# Patient Record
Sex: FEMALE | Race: BLACK OR AFRICAN AMERICAN | Employment: FULL TIME
[De-identification: names, ages, dates, MRNs, and addresses within clinical notes are randomized per-mention and may not be internally consistent; named-entity substitution may affect disease eponyms.]

---

## 2017-01-10 RX ORDER — LANCETS 33 GAUGE
EACH MISCELLANEOUS
Qty: 100 EACH | Refills: 3 | Status: SHIPPED | OUTPATIENT
Start: 2017-01-10 | End: 2017-12-28 | Stop reason: SDUPTHER

## 2017-01-13 RX ORDER — CYCLOBENZAPRINE HCL 10 MG
TABLET ORAL
Qty: 60 TABLET | Refills: 0 | Status: SHIPPED | OUTPATIENT
Start: 2017-01-13 | End: 2017-02-10 | Stop reason: SDUPTHER

## 2017-02-06 ENCOUNTER — OFFICE VISIT (OUTPATIENT)
Dept: FAMILY MEDICINE CLINIC | Facility: CLINIC | Age: 52
End: 2017-02-06

## 2017-02-06 ENCOUNTER — HOSPITAL ENCOUNTER (OUTPATIENT)
Age: 52
Setting detail: SPECIMEN
Discharge: HOME | End: 2017-02-06
Payer: MEDICARE

## 2017-02-06 VITALS
TEMPERATURE: 97.6 F | DIASTOLIC BLOOD PRESSURE: 85 MMHG | HEART RATE: 132 BPM | WEIGHT: 231 LBS | HEIGHT: 63 IN | BODY MASS INDEX: 40.93 KG/M2 | SYSTOLIC BLOOD PRESSURE: 138 MMHG

## 2017-02-06 DIAGNOSIS — R00.0 SINUS TACHYCARDIA: ICD-10-CM

## 2017-02-06 DIAGNOSIS — D50.9 IRON DEFICIENCY ANEMIA, UNSPECIFIED IRON DEFICIENCY ANEMIA TYPE: ICD-10-CM

## 2017-02-06 DIAGNOSIS — E11.65 TYPE 2 DIABETES MELLITUS WITH HYPERGLYCEMIA, WITH LONG-TERM CURRENT USE OF INSULIN (HCC): Primary | ICD-10-CM

## 2017-02-06 DIAGNOSIS — Z79.4 TYPE 2 DIABETES MELLITUS WITH HYPERGLYCEMIA, WITH LONG-TERM CURRENT USE OF INSULIN (HCC): Primary | ICD-10-CM

## 2017-02-06 DIAGNOSIS — Z00.00 HEALTH CARE MAINTENANCE: ICD-10-CM

## 2017-02-06 LAB
ABSOLUTE EOS #: 0.3 K/UL (ref 0–0.4)
ABSOLUTE LYMPH #: 3.1 K/UL (ref 1–4.8)
ABSOLUTE MONO #: 0.5 K/UL (ref 0.1–1.2)
BASOPHILS # BLD: 0 % (ref 0–2)
BASOPHILS ABSOLUTE: 0 K/UL (ref 0–0.2)
CREATININE URINE: 154.3 MG/DL (ref 28–217)
DIFFERENTIAL TYPE: NORMAL
EOSINOPHILS RELATIVE PERCENT: 3 % (ref 1–4)
HBA1C MFR BLD: 14 %
HCT VFR BLD CALC: 39.3 % (ref 36–46)
HEMOGLOBIN: 12.8 G/DL (ref 12–16)
HIV AG/AB: NONREACTIVE
LYMPHOCYTES # BLD: 33 % (ref 24–44)
MCH RBC QN AUTO: 27.6 PG (ref 26–34)
MCHC RBC AUTO-ENTMCNC: 32.6 G/DL (ref 31–37)
MCV RBC AUTO: 84.7 FL (ref 80–100)
MICROALBUMIN/CREAT 24H UR: <12 MG/L
MICROALBUMIN/CREAT UR-RTO: 8 MCG/MG CREAT
MONOCYTES # BLD: 5 % (ref 2–11)
PDW BLD-RTO: 14 % (ref 12.5–15.4)
PLATELET # BLD: 242 K/UL (ref 140–450)
PLATELET ESTIMATE: NORMAL
PMV BLD AUTO: 9.2 FL (ref 6–12)
RBC # BLD: 4.64 M/UL (ref 4–5.2)
RBC # BLD: NORMAL 10*6/UL
SEG NEUTROPHILS: 59 % (ref 36–66)
SEGMENTED NEUTROPHILS ABSOLUTE COUNT: 5.4 K/UL (ref 1.8–7.7)
WBC # BLD: 9.3 K/UL (ref 3.5–11)
WBC # BLD: NORMAL 10*3/UL

## 2017-02-06 PROCEDURE — 90715 TDAP VACCINE 7 YRS/> IM: CPT | Performed by: FAMILY MEDICINE

## 2017-02-06 PROCEDURE — 90472 IMMUNIZATION ADMIN EACH ADD: CPT | Performed by: FAMILY MEDICINE

## 2017-02-06 PROCEDURE — 99213 OFFICE O/P EST LOW 20 MIN: CPT | Performed by: FAMILY MEDICINE

## 2017-02-06 PROCEDURE — 90732 PPSV23 VACC 2 YRS+ SUBQ/IM: CPT | Performed by: FAMILY MEDICINE

## 2017-02-06 PROCEDURE — 83036 HEMOGLOBIN GLYCOSYLATED A1C: CPT | Performed by: FAMILY MEDICINE

## 2017-02-06 PROCEDURE — 90471 IMMUNIZATION ADMIN: CPT | Performed by: FAMILY MEDICINE

## 2017-02-06 RX ORDER — ZOLPIDEM TARTRATE 10 MG/1
10 TABLET ORAL NIGHTLY PRN
COMMUNITY

## 2017-02-06 ASSESSMENT — ENCOUNTER SYMPTOMS
NAUSEA: 0
COUGH: 0
SHORTNESS OF BREATH: 0
VOMITING: 0
WHEEZING: 0
ABDOMINAL PAIN: 0

## 2017-02-10 RX ORDER — PEN NEEDLE, DIABETIC 31 G X1/4"
NEEDLE, DISPOSABLE MISCELLANEOUS
Qty: 100 EACH | Refills: 9 | Status: SHIPPED | OUTPATIENT
Start: 2017-02-10 | End: 2017-03-20 | Stop reason: SDUPTHER

## 2017-02-13 RX ORDER — CYCLOBENZAPRINE HCL 10 MG
TABLET ORAL
Qty: 60 TABLET | Refills: 0 | Status: SHIPPED | OUTPATIENT
Start: 2017-02-13 | End: 2017-03-14 | Stop reason: SDUPTHER

## 2017-02-15 RX ORDER — PEN NEEDLE, DIABETIC 31 G X1/4"
NEEDLE, DISPOSABLE MISCELLANEOUS
Qty: 100 EACH | Refills: 3 | Status: SHIPPED | OUTPATIENT
Start: 2017-02-15 | End: 2017-03-20 | Stop reason: SDUPTHER

## 2017-03-13 DIAGNOSIS — Z79.4 TYPE 2 DIABETES MELLITUS WITH HYPEROSMOLARITY WITHOUT COMA, WITH LONG-TERM CURRENT USE OF INSULIN (HCC): ICD-10-CM

## 2017-03-13 DIAGNOSIS — E11.00 TYPE 2 DIABETES MELLITUS WITH HYPEROSMOLARITY WITHOUT COMA, WITH LONG-TERM CURRENT USE OF INSULIN (HCC): ICD-10-CM

## 2017-03-13 RX ORDER — GABAPENTIN 300 MG/1
CAPSULE ORAL
Qty: 90 CAPSULE | Refills: 10 | Status: SHIPPED | OUTPATIENT
Start: 2017-03-13 | End: 2018-02-23 | Stop reason: SDUPTHER

## 2017-03-14 ENCOUNTER — TELEPHONE (OUTPATIENT)
Dept: FAMILY MEDICINE CLINIC | Age: 52
End: 2017-03-14

## 2017-03-15 RX ORDER — CYCLOBENZAPRINE HCL 10 MG
TABLET ORAL
Qty: 60 TABLET | Refills: 0 | Status: SHIPPED | OUTPATIENT
Start: 2017-03-15 | End: 2017-04-12 | Stop reason: SDUPTHER

## 2017-03-20 RX ORDER — PEN NEEDLE, DIABETIC 31 G X1/4"
NEEDLE, DISPOSABLE MISCELLANEOUS
Qty: 100 EACH | Refills: 11 | Status: SHIPPED | OUTPATIENT
Start: 2017-03-20 | End: 2017-04-20 | Stop reason: SDUPTHER

## 2017-03-28 RX ORDER — AMLODIPINE BESYLATE 5 MG/1
TABLET ORAL
Qty: 30 TABLET | Refills: 5 | Status: SHIPPED | OUTPATIENT
Start: 2017-03-28 | End: 2017-10-24 | Stop reason: SDUPTHER

## 2017-04-12 RX ORDER — PEN NEEDLE, DIABETIC 31 G X1/4"
NEEDLE, DISPOSABLE MISCELLANEOUS
Qty: 100 EACH | Refills: 3 | Status: SHIPPED | OUTPATIENT
Start: 2017-04-12 | End: 2018-10-16

## 2017-04-12 RX ORDER — CYCLOBENZAPRINE HCL 10 MG
TABLET ORAL
Qty: 60 TABLET | Refills: 0 | Status: SHIPPED | OUTPATIENT
Start: 2017-04-12 | End: 2017-05-16 | Stop reason: SDUPTHER

## 2017-04-13 DIAGNOSIS — C50.012 MALIGNANT NEOPLASM OF AREOLA OF LEFT BREAST IN FEMALE (HCC): Primary | ICD-10-CM

## 2017-04-17 ENCOUNTER — OFFICE VISIT (OUTPATIENT)
Dept: FAMILY MEDICINE CLINIC | Age: 52
End: 2017-04-17
Payer: MEDICARE

## 2017-04-17 VITALS
SYSTOLIC BLOOD PRESSURE: 139 MMHG | BODY MASS INDEX: 40.79 KG/M2 | HEART RATE: 98 BPM | HEIGHT: 63 IN | TEMPERATURE: 98.2 F | DIASTOLIC BLOOD PRESSURE: 77 MMHG | WEIGHT: 230.2 LBS

## 2017-04-17 DIAGNOSIS — E11.9 TYPE 2 DIABETES MELLITUS WITHOUT COMPLICATION, WITH LONG-TERM CURRENT USE OF INSULIN (HCC): Primary | ICD-10-CM

## 2017-04-17 DIAGNOSIS — Z79.4 TYPE 2 DIABETES MELLITUS WITHOUT COMPLICATION, WITH LONG-TERM CURRENT USE OF INSULIN (HCC): Primary | ICD-10-CM

## 2017-04-17 PROCEDURE — 99213 OFFICE O/P EST LOW 20 MIN: CPT | Performed by: FAMILY MEDICINE

## 2017-04-17 ASSESSMENT — ENCOUNTER SYMPTOMS
WHEEZING: 0
COUGH: 0
SHORTNESS OF BREATH: 0
VOMITING: 0

## 2017-04-18 ENCOUNTER — HOSPITAL ENCOUNTER (OUTPATIENT)
Facility: MEDICAL CENTER | Age: 52
Discharge: HOME | End: 2017-04-18
Payer: MEDICARE

## 2017-04-18 DIAGNOSIS — C50.012 MALIGNANT NEOPLASM OF AREOLA OF LEFT BREAST IN FEMALE (HCC): ICD-10-CM

## 2017-04-18 LAB
ABSOLUTE EOS #: 0.2 K/UL (ref 0–0.4)
ABSOLUTE LYMPH #: 3.7 K/UL (ref 1–4.8)
ABSOLUTE MONO #: 0.3 K/UL (ref 0.2–0.8)
ALBUMIN SERPL-MCNC: 3.5 G/DL (ref 3.5–5.2)
ALBUMIN/GLOBULIN RATIO: ABNORMAL (ref 1–2.5)
ALP BLD-CCNC: 93 U/L (ref 35–104)
ALT SERPL-CCNC: 11 U/L (ref 5–33)
ANION GAP SERPL CALCULATED.3IONS-SCNC: 16 MMOL/L (ref 9–17)
AST SERPL-CCNC: 17 U/L
BASOPHILS # BLD: 3 % (ref 0–2)
BASOPHILS ABSOLUTE: 0.3 K/UL (ref 0–0.2)
BILIRUB SERPL-MCNC: 0.44 MG/DL (ref 0.3–1.2)
BUN BLDV-MCNC: 8 MG/DL (ref 6–20)
BUN/CREAT BLD: 11 (ref 9–20)
CALCIUM SERPL-MCNC: 9.3 MG/DL (ref 8.6–10.4)
CHLORIDE BLD-SCNC: 98 MMOL/L (ref 98–107)
CO2: 24 MMOL/L (ref 20–31)
CREAT SERPL-MCNC: 0.7 MG/DL (ref 0.5–0.9)
DIFFERENTIAL TYPE: ABNORMAL
EOSINOPHILS RELATIVE PERCENT: 2 % (ref 1–4)
FERRITIN: 703 UG/L (ref 13–150)
GFR AFRICAN AMERICAN: >60 ML/MIN
GFR NON-AFRICAN AMERICAN: >60 ML/MIN
GFR SERPL CREATININE-BSD FRML MDRD: ABNORMAL ML/MIN/{1.73_M2}
GFR SERPL CREATININE-BSD FRML MDRD: ABNORMAL ML/MIN/{1.73_M2}
GLUCOSE BLD-MCNC: 219 MG/DL (ref 70–99)
HCT VFR BLD CALC: 38.8 % (ref 36–46)
HEMOGLOBIN: 12.7 G/DL (ref 12–16)
IRON SATURATION: 46 % (ref 20–55)
IRON: 96 UG/DL (ref 37–145)
LYMPHOCYTES # BLD: 41 % (ref 24–44)
MCH RBC QN AUTO: 28.2 PG (ref 26–34)
MCHC RBC AUTO-ENTMCNC: 32.7 G/DL (ref 31–37)
MCV RBC AUTO: 86.2 FL (ref 80–100)
MONOCYTES # BLD: 4 % (ref 1–7)
PDW BLD-RTO: 13.4 % (ref 11.5–14.5)
PLATELET # BLD: 254 K/UL (ref 130–400)
PLATELET ESTIMATE: ABNORMAL
PMV BLD AUTO: ABNORMAL FL (ref 6–12)
POTASSIUM SERPL-SCNC: 4.1 MMOL/L (ref 3.7–5.3)
RBC # BLD: 4.5 M/UL (ref 4–5.2)
RBC # BLD: ABNORMAL 10*6/UL
SEG NEUTROPHILS: 50 % (ref 36–66)
SEGMENTED NEUTROPHILS ABSOLUTE COUNT: 4.5 K/UL (ref 1.8–7.7)
SODIUM BLD-SCNC: 138 MMOL/L (ref 135–144)
TOTAL IRON BINDING CAPACITY: 210 UG/DL (ref 250–450)
TOTAL PROTEIN: 7.9 G/DL (ref 6.4–8.3)
UNSATURATED IRON BINDING CAPACITY: 114 UG/DL (ref 112–347)
WBC # BLD: 9 K/UL (ref 3.5–11)
WBC # BLD: ABNORMAL 10*3/UL

## 2017-04-18 PROCEDURE — 36415 COLL VENOUS BLD VENIPUNCTURE: CPT

## 2017-04-18 PROCEDURE — 83550 IRON BINDING TEST: CPT

## 2017-04-18 PROCEDURE — 82728 ASSAY OF FERRITIN: CPT

## 2017-04-18 PROCEDURE — 83540 ASSAY OF IRON: CPT

## 2017-04-18 PROCEDURE — 85025 COMPLETE CBC W/AUTO DIFF WBC: CPT

## 2017-04-18 PROCEDURE — 80053 COMPREHEN METABOLIC PANEL: CPT

## 2017-04-24 ENCOUNTER — HOSPITAL ENCOUNTER (OUTPATIENT)
Facility: MEDICAL CENTER | Age: 52
End: 2017-04-24
Payer: MEDICARE

## 2017-04-25 ENCOUNTER — OFFICE VISIT (OUTPATIENT)
Dept: ONCOLOGY | Age: 52
End: 2017-04-25
Payer: MEDICARE

## 2017-04-25 ENCOUNTER — TELEPHONE (OUTPATIENT)
Dept: ONCOLOGY | Age: 52
End: 2017-04-25

## 2017-04-25 VITALS
BODY MASS INDEX: 42.09 KG/M2 | WEIGHT: 237.6 LBS | HEART RATE: 115 BPM | DIASTOLIC BLOOD PRESSURE: 92 MMHG | TEMPERATURE: 97.8 F | SYSTOLIC BLOOD PRESSURE: 414 MMHG | RESPIRATION RATE: 20 BRPM

## 2017-04-25 DIAGNOSIS — C50.912 MALIGNANT NEOPLASM OF LEFT FEMALE BREAST, UNSPECIFIED SITE OF BREAST: Primary | ICD-10-CM

## 2017-04-25 PROCEDURE — 99214 OFFICE O/P EST MOD 30 MIN: CPT | Performed by: INTERNAL MEDICINE

## 2017-04-25 PROCEDURE — 99211 OFF/OP EST MAY X REQ PHY/QHP: CPT

## 2017-04-25 RX ORDER — HYDROCODONE BITARTRATE AND ACETAMINOPHEN 10; 325 MG/1; MG/1
1 TABLET ORAL EVERY 6 HOURS PRN
Qty: 120 TABLET | Refills: 0 | Status: SHIPPED | OUTPATIENT
Start: 2017-04-25 | End: 2017-06-23 | Stop reason: ALTCHOICE

## 2017-05-17 RX ORDER — LISINOPRIL 20 MG/1
TABLET ORAL
Qty: 30 TABLET | Refills: 11 | Status: SHIPPED | OUTPATIENT
Start: 2017-05-17 | End: 2017-06-13 | Stop reason: SDUPTHER

## 2017-05-17 RX ORDER — ASPIRIN 81 MG
TABLET, DELAYED RELEASE (ENTERIC COATED) ORAL
Qty: 30 TABLET | Refills: 11 | Status: SHIPPED | OUTPATIENT
Start: 2017-05-17 | End: 2017-06-13 | Stop reason: SDUPTHER

## 2017-05-19 RX ORDER — CYCLOBENZAPRINE HCL 10 MG
TABLET ORAL
Qty: 60 TABLET | Refills: 0 | Status: SHIPPED | OUTPATIENT
Start: 2017-05-19 | End: 2019-10-17 | Stop reason: SDUPTHER

## 2017-05-19 RX ORDER — FERROUS SULFATE 325(65) MG
TABLET ORAL
Qty: 90 TABLET | Refills: 11 | Status: SHIPPED | OUTPATIENT
Start: 2017-05-19 | End: 2018-06-01 | Stop reason: SDUPTHER

## 2017-05-30 ENCOUNTER — TELEPHONE (OUTPATIENT)
Dept: INFUSION THERAPY | Facility: MEDICAL CENTER | Age: 52
End: 2017-05-30

## 2017-06-13 RX ORDER — LISINOPRIL 20 MG/1
TABLET ORAL
Qty: 30 TABLET | Refills: 11 | Status: SHIPPED | OUTPATIENT
Start: 2017-06-13 | End: 2017-08-02 | Stop reason: SDUPTHER

## 2017-06-13 RX ORDER — ASPIRIN 81 MG
TABLET, DELAYED RELEASE (ENTERIC COATED) ORAL
Qty: 30 TABLET | Refills: 11 | Status: SHIPPED | OUTPATIENT
Start: 2017-06-13 | End: 2017-08-02 | Stop reason: SDUPTHER

## 2017-06-20 DIAGNOSIS — R51.9 HEADACHE DISORDER: ICD-10-CM

## 2017-06-23 ENCOUNTER — OFFICE VISIT (OUTPATIENT)
Dept: FAMILY MEDICINE CLINIC | Age: 52
End: 2017-06-23
Payer: MEDICARE

## 2017-06-23 VITALS
HEART RATE: 86 BPM | WEIGHT: 229.4 LBS | HEIGHT: 63 IN | DIASTOLIC BLOOD PRESSURE: 82 MMHG | TEMPERATURE: 96.7 F | BODY MASS INDEX: 40.64 KG/M2 | SYSTOLIC BLOOD PRESSURE: 148 MMHG

## 2017-06-23 DIAGNOSIS — Z79.4 TYPE 2 DIABETES MELLITUS WITHOUT COMPLICATION, WITH LONG-TERM CURRENT USE OF INSULIN (HCC): Primary | ICD-10-CM

## 2017-06-23 DIAGNOSIS — E11.9 TYPE 2 DIABETES MELLITUS WITHOUT COMPLICATION, WITH LONG-TERM CURRENT USE OF INSULIN (HCC): Primary | ICD-10-CM

## 2017-06-23 LAB — HBA1C MFR BLD: 14 %

## 2017-06-23 PROCEDURE — 83036 HEMOGLOBIN GLYCOSYLATED A1C: CPT | Performed by: FAMILY MEDICINE

## 2017-06-23 PROCEDURE — 99213 OFFICE O/P EST LOW 20 MIN: CPT | Performed by: FAMILY MEDICINE

## 2017-06-23 ASSESSMENT — PATIENT HEALTH QUESTIONNAIRE - PHQ9
SUM OF ALL RESPONSES TO PHQ9 QUESTIONS 1 & 2: 0
1. LITTLE INTEREST OR PLEASURE IN DOING THINGS: 0
2. FEELING DOWN, DEPRESSED OR HOPELESS: 0
SUM OF ALL RESPONSES TO PHQ QUESTIONS 1-9: 0

## 2017-06-23 ASSESSMENT — ENCOUNTER SYMPTOMS
VOMITING: 0
COUGH: 0
SHORTNESS OF BREATH: 0
WHEEZING: 0
ABDOMINAL PAIN: 0
NAUSEA: 0

## 2017-07-17 DIAGNOSIS — E11.9 TYPE 2 DIABETES MELLITUS WITHOUT COMPLICATION, WITH LONG-TERM CURRENT USE OF INSULIN (HCC): ICD-10-CM

## 2017-07-17 DIAGNOSIS — Z79.4 TYPE 2 DIABETES MELLITUS WITH HYPEROSMOLARITY WITHOUT COMA, WITH LONG-TERM CURRENT USE OF INSULIN (HCC): ICD-10-CM

## 2017-07-17 DIAGNOSIS — Z79.4 TYPE 2 DIABETES MELLITUS WITHOUT COMPLICATION, WITH LONG-TERM CURRENT USE OF INSULIN (HCC): ICD-10-CM

## 2017-07-17 DIAGNOSIS — E11.00 TYPE 2 DIABETES MELLITUS WITH HYPEROSMOLARITY WITHOUT COMA, WITH LONG-TERM CURRENT USE OF INSULIN (HCC): ICD-10-CM

## 2017-07-17 RX ORDER — GLIPIZIDE 10 MG/1
10 TABLET ORAL 2 TIMES DAILY
Qty: 60 TABLET | Refills: 3 | Status: SHIPPED | OUTPATIENT
Start: 2017-07-17 | End: 2017-11-28 | Stop reason: SDUPTHER

## 2017-07-18 ENCOUNTER — OFFICE VISIT (OUTPATIENT)
Dept: FAMILY MEDICINE CLINIC | Age: 52
End: 2017-07-18
Payer: MEDICARE

## 2017-07-18 VITALS
TEMPERATURE: 97.3 F | SYSTOLIC BLOOD PRESSURE: 133 MMHG | WEIGHT: 225 LBS | BODY MASS INDEX: 39.87 KG/M2 | HEIGHT: 63 IN | DIASTOLIC BLOOD PRESSURE: 96 MMHG | HEART RATE: 110 BPM

## 2017-07-18 DIAGNOSIS — K61.0 PERIANAL ABSCESS: Primary | ICD-10-CM

## 2017-07-18 PROCEDURE — 99213 OFFICE O/P EST LOW 20 MIN: CPT | Performed by: FAMILY MEDICINE

## 2017-07-18 RX ORDER — DOXYCYCLINE HYCLATE 100 MG
100 TABLET ORAL 2 TIMES DAILY
Qty: 14 TABLET | Refills: 0 | Status: SHIPPED | OUTPATIENT
Start: 2017-07-18 | End: 2017-07-25

## 2017-07-18 ASSESSMENT — ENCOUNTER SYMPTOMS
VOMITING: 0
DIARRHEA: 0
SHORTNESS OF BREATH: 0
ABDOMINAL PAIN: 0
NAUSEA: 0
COUGH: 0

## 2017-07-19 ENCOUNTER — OFFICE VISIT (OUTPATIENT)
Dept: SURGERY | Age: 52
End: 2017-07-19
Payer: MEDICARE

## 2017-07-19 VITALS
WEIGHT: 224.6 LBS | TEMPERATURE: 97.5 F | DIASTOLIC BLOOD PRESSURE: 105 MMHG | BODY MASS INDEX: 39.8 KG/M2 | HEART RATE: 113 BPM | HEIGHT: 63 IN | SYSTOLIC BLOOD PRESSURE: 146 MMHG

## 2017-07-19 DIAGNOSIS — K61.0 PERIANAL ABSCESS: Primary | ICD-10-CM

## 2017-07-19 PROCEDURE — 99203 OFFICE O/P NEW LOW 30 MIN: CPT | Performed by: SURGERY

## 2017-07-20 ENCOUNTER — TELEPHONE (OUTPATIENT)
Dept: FAMILY MEDICINE CLINIC | Age: 52
End: 2017-07-20

## 2017-07-20 ENCOUNTER — TELEPHONE (OUTPATIENT)
Dept: SURGERY | Age: 52
End: 2017-07-20

## 2017-07-21 ENCOUNTER — HOSPITAL ENCOUNTER (OUTPATIENT)
Age: 52
Setting detail: OUTPATIENT SURGERY
Discharge: HOME | End: 2017-07-21
Attending: SURGERY | Admitting: SURGERY
Payer: MEDICARE

## 2017-07-21 ENCOUNTER — ANESTHESIA EVENT (OUTPATIENT)
Dept: OPERATING ROOM | Age: 52
End: 2017-07-21
Payer: MEDICARE

## 2017-07-21 ENCOUNTER — ANESTHESIA (OUTPATIENT)
Dept: OPERATING ROOM | Age: 52
End: 2017-07-21
Payer: MEDICARE

## 2017-07-21 VITALS
DIASTOLIC BLOOD PRESSURE: 79 MMHG | RESPIRATION RATE: 22 BRPM | OXYGEN SATURATION: 98 % | TEMPERATURE: 97.2 F | HEIGHT: 63 IN | HEART RATE: 90 BPM | SYSTOLIC BLOOD PRESSURE: 153 MMHG | BODY MASS INDEX: 39.69 KG/M2 | WEIGHT: 224 LBS

## 2017-07-21 VITALS — OXYGEN SATURATION: 98 % | SYSTOLIC BLOOD PRESSURE: 105 MMHG | DIASTOLIC BLOOD PRESSURE: 62 MMHG

## 2017-07-21 LAB
GFR NON-AFRICAN AMERICAN: >60 ML/MIN
GFR SERPL CREATININE-BSD FRML MDRD: >60 ML/MIN
GFR SERPL CREATININE-BSD FRML MDRD: ABNORMAL ML/MIN/{1.73_M2}
GLUCOSE BLD-MCNC: 197 MG/DL (ref 74–100)
POC CREATININE: 0.44 MG/DL (ref 0.51–1.19)

## 2017-07-21 PROCEDURE — 6370000000 HC RX 637 (ALT 250 FOR IP): Performed by: SURGERY

## 2017-07-21 PROCEDURE — 3600000004 HC SURGERY LEVEL 4 BASE: Performed by: SURGERY

## 2017-07-21 PROCEDURE — 82565 ASSAY OF CREATININE: CPT

## 2017-07-21 PROCEDURE — 86403 PARTICLE AGGLUT ANTBDY SCRN: CPT

## 2017-07-21 PROCEDURE — A6222 GAUZE <=16 IN NO W/SAL W/O B: HCPCS | Performed by: SURGERY

## 2017-07-21 PROCEDURE — 6360000002 HC RX W HCPCS: Performed by: ANESTHESIOLOGY

## 2017-07-21 PROCEDURE — 2500000003 HC RX 250 WO HCPCS: Performed by: NURSE ANESTHETIST, CERTIFIED REGISTERED

## 2017-07-21 PROCEDURE — 87205 SMEAR GRAM STAIN: CPT

## 2017-07-21 PROCEDURE — 7100000011 HC PHASE II RECOVERY - ADDTL 15 MIN: Performed by: SURGERY

## 2017-07-21 PROCEDURE — 82947 ASSAY GLUCOSE BLOOD QUANT: CPT

## 2017-07-21 PROCEDURE — 3600000014 HC SURGERY LEVEL 4 ADDTL 15MIN: Performed by: SURGERY

## 2017-07-21 PROCEDURE — 2580000003 HC RX 258: Performed by: SURGERY

## 2017-07-21 PROCEDURE — 6360000002 HC RX W HCPCS: Performed by: NURSE ANESTHETIST, CERTIFIED REGISTERED

## 2017-07-21 PROCEDURE — 6370000000 HC RX 637 (ALT 250 FOR IP): Performed by: ANESTHESIOLOGY

## 2017-07-21 PROCEDURE — 87075 CULTR BACTERIA EXCEPT BLOOD: CPT

## 2017-07-21 PROCEDURE — 87070 CULTURE OTHR SPECIMN AEROBIC: CPT

## 2017-07-21 PROCEDURE — 3700000000 HC ANESTHESIA ATTENDED CARE: Performed by: SURGERY

## 2017-07-21 PROCEDURE — 7100000010 HC PHASE II RECOVERY - FIRST 15 MIN: Performed by: SURGERY

## 2017-07-21 PROCEDURE — 2500000003 HC RX 250 WO HCPCS: Performed by: SURGERY

## 2017-07-21 PROCEDURE — 3700000001 HC ADD 15 MINUTES (ANESTHESIA): Performed by: SURGERY

## 2017-07-21 RX ORDER — FENTANYL CITRATE 50 UG/ML
25 INJECTION, SOLUTION INTRAMUSCULAR; INTRAVENOUS EVERY 5 MIN PRN
Status: DISCONTINUED | OUTPATIENT
Start: 2017-07-21 | End: 2017-07-21 | Stop reason: HOSPADM

## 2017-07-21 RX ORDER — OXYCODONE HYDROCHLORIDE AND ACETAMINOPHEN 5; 325 MG/1; MG/1
1 TABLET ORAL EVERY 6 HOURS PRN
Qty: 10 TABLET | Refills: 0 | Status: SHIPPED | OUTPATIENT
Start: 2017-07-21 | End: 2017-07-28

## 2017-07-21 RX ORDER — OXYCODONE HYDROCHLORIDE AND ACETAMINOPHEN 5; 325 MG/1; MG/1
1 TABLET ORAL EVERY 4 HOURS PRN
Status: DISCONTINUED | OUTPATIENT
Start: 2017-07-21 | End: 2017-07-21 | Stop reason: HOSPADM

## 2017-07-21 RX ORDER — ULTRASOUND COUPLING MEDIUM
GEL (GRAM) TOPICAL PRN
Status: DISCONTINUED | OUTPATIENT
Start: 2017-07-21 | End: 2017-07-21 | Stop reason: HOSPADM

## 2017-07-21 RX ORDER — MAGNESIUM HYDROXIDE 1200 MG/15ML
LIQUID ORAL CONTINUOUS PRN
Status: DISCONTINUED | OUTPATIENT
Start: 2017-07-21 | End: 2017-07-21 | Stop reason: HOSPADM

## 2017-07-21 RX ORDER — FENTANYL CITRATE 50 UG/ML
50 INJECTION, SOLUTION INTRAMUSCULAR; INTRAVENOUS EVERY 5 MIN PRN
Status: DISCONTINUED | OUTPATIENT
Start: 2017-07-21 | End: 2017-07-21 | Stop reason: HOSPADM

## 2017-07-21 RX ORDER — LIDOCAINE HYDROCHLORIDE 10 MG/ML
INJECTION, SOLUTION EPIDURAL; INFILTRATION; INTRACAUDAL; PERINEURAL PRN
Status: DISCONTINUED | OUTPATIENT
Start: 2017-07-21 | End: 2017-07-21 | Stop reason: HOSPADM

## 2017-07-21 RX ORDER — ONDANSETRON 2 MG/ML
4 INJECTION INTRAMUSCULAR; INTRAVENOUS
Status: DISCONTINUED | OUTPATIENT
Start: 2017-07-21 | End: 2017-07-21 | Stop reason: HOSPADM

## 2017-07-21 RX ORDER — NYSTATIN 100000 U/G
CREAM TOPICAL
Qty: 1 TUBE | Refills: 0 | Status: SHIPPED | OUTPATIENT
Start: 2017-07-21 | End: 2020-10-13 | Stop reason: ALTCHOICE

## 2017-07-21 RX ORDER — HYDRALAZINE HYDROCHLORIDE 20 MG/ML
5 INJECTION INTRAMUSCULAR; INTRAVENOUS EVERY 10 MIN PRN
Status: DISCONTINUED | OUTPATIENT
Start: 2017-07-21 | End: 2017-07-21 | Stop reason: HOSPADM

## 2017-07-21 RX ORDER — PROPOFOL 10 MG/ML
INJECTION, EMULSION INTRAVENOUS PRN
Status: DISCONTINUED | OUTPATIENT
Start: 2017-07-21 | End: 2017-07-21 | Stop reason: SDUPTHER

## 2017-07-21 RX ORDER — PROPOFOL 10 MG/ML
INJECTION, EMULSION INTRAVENOUS CONTINUOUS PRN
Status: DISCONTINUED | OUTPATIENT
Start: 2017-07-21 | End: 2017-07-21 | Stop reason: SDUPTHER

## 2017-07-21 RX ORDER — BUPIVACAINE HYDROCHLORIDE 5 MG/ML
INJECTION, SOLUTION EPIDURAL; INTRACAUDAL PRN
Status: DISCONTINUED | OUTPATIENT
Start: 2017-07-21 | End: 2017-07-21 | Stop reason: HOSPADM

## 2017-07-21 RX ORDER — LIDOCAINE HYDROCHLORIDE 10 MG/ML
INJECTION, SOLUTION EPIDURAL; INFILTRATION; INTRACAUDAL; PERINEURAL PRN
Status: DISCONTINUED | OUTPATIENT
Start: 2017-07-21 | End: 2017-07-21 | Stop reason: SDUPTHER

## 2017-07-21 RX ORDER — MIDAZOLAM HYDROCHLORIDE 1 MG/ML
INJECTION INTRAMUSCULAR; INTRAVENOUS PRN
Status: DISCONTINUED | OUTPATIENT
Start: 2017-07-21 | End: 2017-07-21 | Stop reason: SDUPTHER

## 2017-07-21 RX ORDER — GAUZE BANDAGE 4" X 4"
1 BANDAGE TOPICAL DAILY
Qty: 2 EACH | Refills: 0 | Status: SHIPPED | OUTPATIENT
Start: 2017-07-21 | End: 2020-10-13 | Stop reason: ALTCHOICE

## 2017-07-21 RX ORDER — SODIUM CHLORIDE, SODIUM LACTATE, POTASSIUM CHLORIDE, CALCIUM CHLORIDE 600; 310; 30; 20 MG/100ML; MG/100ML; MG/100ML; MG/100ML
INJECTION, SOLUTION INTRAVENOUS CONTINUOUS
Status: DISCONTINUED | OUTPATIENT
Start: 2017-07-21 | End: 2017-07-21 | Stop reason: HOSPADM

## 2017-07-21 RX ORDER — CEFAZOLIN SODIUM 1 G/3ML
INJECTION, POWDER, FOR SOLUTION INTRAMUSCULAR; INTRAVENOUS PRN
Status: DISCONTINUED | OUTPATIENT
Start: 2017-07-21 | End: 2017-07-21 | Stop reason: SDUPTHER

## 2017-07-21 RX ORDER — TOPIRAMATE 25 MG/1
25 TABLET ORAL 2 TIMES DAILY
Status: DISCONTINUED | OUTPATIENT
Start: 2017-07-21 | End: 2017-07-21

## 2017-07-21 RX ORDER — LABETALOL HYDROCHLORIDE 5 MG/ML
5 INJECTION, SOLUTION INTRAVENOUS EVERY 10 MIN PRN
Status: DISCONTINUED | OUTPATIENT
Start: 2017-07-21 | End: 2017-07-21 | Stop reason: HOSPADM

## 2017-07-21 RX ORDER — FENTANYL CITRATE 50 UG/ML
INJECTION, SOLUTION INTRAMUSCULAR; INTRAVENOUS PRN
Status: DISCONTINUED | OUTPATIENT
Start: 2017-07-21 | End: 2017-07-21 | Stop reason: SDUPTHER

## 2017-07-21 RX ORDER — DIPHENHYDRAMINE HYDROCHLORIDE 50 MG/ML
12.5 INJECTION INTRAMUSCULAR; INTRAVENOUS
Status: DISCONTINUED | OUTPATIENT
Start: 2017-07-21 | End: 2017-07-21 | Stop reason: HOSPADM

## 2017-07-21 RX ADMIN — TOPIRAMATE 200 MG: 200 TABLET, FILM COATED ORAL at 11:21

## 2017-07-21 RX ADMIN — MIDAZOLAM HYDROCHLORIDE 2 MG: 1 INJECTION, SOLUTION INTRAMUSCULAR; INTRAVENOUS at 12:36

## 2017-07-21 RX ADMIN — OXYCODONE HYDROCHLORIDE AND ACETAMINOPHEN 1 TABLET: 5; 325 TABLET ORAL at 13:50

## 2017-07-21 RX ADMIN — LIDOCAINE HYDROCHLORIDE 50 MG: 10 INJECTION, SOLUTION EPIDURAL; INFILTRATION; INTRACAUDAL; PERINEURAL at 12:39

## 2017-07-21 RX ADMIN — FENTANYL CITRATE 50 MCG: 50 INJECTION, SOLUTION INTRAMUSCULAR; INTRAVENOUS at 13:27

## 2017-07-21 RX ADMIN — PROPOFOL 120 MCG/KG/MIN: 10 INJECTION, EMULSION INTRAVENOUS at 12:39

## 2017-07-21 RX ADMIN — SODIUM CHLORIDE, POTASSIUM CHLORIDE, SODIUM LACTATE AND CALCIUM CHLORIDE: 600; 310; 30; 20 INJECTION, SOLUTION INTRAVENOUS at 10:41

## 2017-07-21 RX ADMIN — FENTANYL CITRATE 50 MCG: 50 INJECTION INTRAMUSCULAR; INTRAVENOUS at 12:57

## 2017-07-21 RX ADMIN — FENTANYL CITRATE 50 MCG: 50 INJECTION, SOLUTION INTRAMUSCULAR; INTRAVENOUS at 13:44

## 2017-07-21 RX ADMIN — FENTANYL CITRATE 50 MCG: 50 INJECTION INTRAMUSCULAR; INTRAVENOUS at 12:38

## 2017-07-21 RX ADMIN — PROPOFOL 80 MG: 10 INJECTION, EMULSION INTRAVENOUS at 12:39

## 2017-07-21 RX ADMIN — CEFAZOLIN 2000 MG: 330 INJECTION, POWDER, FOR SOLUTION INTRAMUSCULAR; INTRAVENOUS at 12:42

## 2017-07-21 ASSESSMENT — PAIN SCALES - GENERAL
PAINLEVEL_OUTOF10: 8
PAINLEVEL_OUTOF10: 8
PAINLEVEL_OUTOF10: 9
PAINLEVEL_OUTOF10: 6
PAINLEVEL_OUTOF10: 8
PAINLEVEL_OUTOF10: 0
PAINLEVEL_OUTOF10: 0
PAINLEVEL_OUTOF10: 8

## 2017-07-21 ASSESSMENT — PAIN - FUNCTIONAL ASSESSMENT: PAIN_FUNCTIONAL_ASSESSMENT: 0-10

## 2017-07-26 LAB
CULTURE: ABNORMAL
DIRECT EXAM: ABNORMAL
DIRECT EXAM: ABNORMAL
Lab: ABNORMAL
SPECIMEN DESCRIPTION: ABNORMAL
STATUS: ABNORMAL

## 2017-08-02 ENCOUNTER — OFFICE VISIT (OUTPATIENT)
Dept: SURGERY | Age: 52
End: 2017-08-02
Payer: MEDICARE

## 2017-08-02 VITALS — HEART RATE: 104 BPM | SYSTOLIC BLOOD PRESSURE: 175 MMHG | DIASTOLIC BLOOD PRESSURE: 101 MMHG | TEMPERATURE: 97.8 F

## 2017-08-02 DIAGNOSIS — Z98.890 POST-OPERATIVE STATE: Primary | ICD-10-CM

## 2017-08-02 PROCEDURE — 99024 POSTOP FOLLOW-UP VISIT: CPT | Performed by: STUDENT IN AN ORGANIZED HEALTH CARE EDUCATION/TRAINING PROGRAM

## 2017-08-02 RX ORDER — LISINOPRIL 20 MG/1
TABLET ORAL
Qty: 30 TABLET | Refills: 11 | Status: SHIPPED | OUTPATIENT
Start: 2017-08-02 | End: 2018-03-20 | Stop reason: SINTOL

## 2017-08-02 RX ORDER — ASPIRIN 81 MG/1
TABLET ORAL
Qty: 30 TABLET | Refills: 11 | Status: SHIPPED | OUTPATIENT
Start: 2017-08-02 | End: 2018-06-25 | Stop reason: SDUPTHER

## 2017-08-09 ENCOUNTER — TELEPHONE (OUTPATIENT)
Dept: FAMILY MEDICINE CLINIC | Age: 52
End: 2017-08-09

## 2017-08-25 ENCOUNTER — TELEPHONE (OUTPATIENT)
Dept: ADMINISTRATIVE | Age: 52
End: 2017-08-25

## 2017-08-28 ENCOUNTER — TELEPHONE (OUTPATIENT)
Dept: FAMILY MEDICINE CLINIC | Age: 52
End: 2017-08-28

## 2017-09-11 ENCOUNTER — OFFICE VISIT (OUTPATIENT)
Dept: FAMILY MEDICINE CLINIC | Age: 52
End: 2017-09-11
Payer: MEDICARE

## 2017-09-11 VITALS
WEIGHT: 225 LBS | HEART RATE: 102 BPM | DIASTOLIC BLOOD PRESSURE: 96 MMHG | SYSTOLIC BLOOD PRESSURE: 142 MMHG | BODY MASS INDEX: 39.87 KG/M2 | TEMPERATURE: 97.2 F | HEIGHT: 63 IN

## 2017-09-11 DIAGNOSIS — G89.29 CHRONIC KNEE PAIN, UNSPECIFIED LATERALITY: ICD-10-CM

## 2017-09-11 DIAGNOSIS — M25.569 CHRONIC KNEE PAIN, UNSPECIFIED LATERALITY: ICD-10-CM

## 2017-09-11 DIAGNOSIS — E11.9 TYPE 2 DIABETES MELLITUS WITHOUT COMPLICATION, WITH LONG-TERM CURRENT USE OF INSULIN (HCC): Primary | ICD-10-CM

## 2017-09-11 DIAGNOSIS — N89.8 VAGINAL DISCHARGE: ICD-10-CM

## 2017-09-11 DIAGNOSIS — Z79.4 TYPE 2 DIABETES MELLITUS WITHOUT COMPLICATION, WITH LONG-TERM CURRENT USE OF INSULIN (HCC): Primary | ICD-10-CM

## 2017-09-11 LAB — HBA1C MFR BLD: 14 %

## 2017-09-11 PROCEDURE — 83036 HEMOGLOBIN GLYCOSYLATED A1C: CPT | Performed by: FAMILY MEDICINE

## 2017-09-11 PROCEDURE — 99213 OFFICE O/P EST LOW 20 MIN: CPT | Performed by: FAMILY MEDICINE

## 2017-09-11 RX ORDER — FLUCONAZOLE 150 MG/1
150 TABLET ORAL ONCE
Qty: 1 TABLET | Refills: 0 | Status: SHIPPED | OUTPATIENT
Start: 2017-09-11 | End: 2017-09-11

## 2017-09-11 RX ORDER — IBUPROFEN 600 MG/1
600 TABLET ORAL EVERY 6 HOURS PRN
Qty: 60 TABLET | Refills: 1 | Status: SHIPPED | OUTPATIENT
Start: 2017-09-11 | End: 2022-10-11

## 2017-09-11 RX ORDER — METRONIDAZOLE 500 MG/1
500 TABLET ORAL 3 TIMES DAILY
Qty: 21 TABLET | Refills: 0 | Status: SHIPPED | OUTPATIENT
Start: 2017-09-11 | End: 2017-09-18

## 2017-09-11 ASSESSMENT — ENCOUNTER SYMPTOMS
COUGH: 0
ABDOMINAL PAIN: 0
VOMITING: 0
NAUSEA: 0
SHORTNESS OF BREATH: 0
WHEEZING: 0

## 2017-10-10 ENCOUNTER — NURSE ONLY (OUTPATIENT)
Dept: ONCOLOGY | Age: 52
End: 2017-10-10

## 2017-10-10 ENCOUNTER — HOSPITAL ENCOUNTER (OUTPATIENT)
Facility: MEDICAL CENTER | Age: 52
Discharge: HOME | End: 2017-10-10
Payer: MEDICARE

## 2017-10-10 DIAGNOSIS — C50.912 MALIGNANT NEOPLASM OF LEFT FEMALE BREAST, UNSPECIFIED SITE OF BREAST: ICD-10-CM

## 2017-10-10 DIAGNOSIS — D50.9 IRON DEFICIENCY ANEMIA, UNSPECIFIED IRON DEFICIENCY ANEMIA TYPE: ICD-10-CM

## 2017-10-10 DIAGNOSIS — C50.912 MALIGNANT NEOPLASM OF LEFT FEMALE BREAST, UNSPECIFIED SITE OF BREAST: Primary | ICD-10-CM

## 2017-10-10 LAB
ABSOLUTE EOS #: 0.3 K/UL (ref 0–0.4)
ABSOLUTE LYMPH #: 3.7 K/UL (ref 1–4.8)
ABSOLUTE MONO #: 0.5 K/UL (ref 0.2–0.8)
ALBUMIN SERPL-MCNC: 4.2 G/DL (ref 3.5–5.2)
ALBUMIN/GLOBULIN RATIO: ABNORMAL (ref 1–2.5)
ALP BLD-CCNC: 82 U/L (ref 35–104)
ALT SERPL-CCNC: 12 U/L (ref 5–33)
ANION GAP SERPL CALCULATED.3IONS-SCNC: 14 MMOL/L (ref 9–17)
AST SERPL-CCNC: 14 U/L
BASOPHILS # BLD: 1 %
BASOPHILS ABSOLUTE: 0.1 K/UL (ref 0–0.2)
BILIRUB SERPL-MCNC: 0.31 MG/DL (ref 0.3–1.2)
BUN BLDV-MCNC: 9 MG/DL (ref 6–20)
BUN/CREAT BLD: 10 (ref 9–20)
CALCIUM SERPL-MCNC: 9.2 MG/DL (ref 8.6–10.4)
CHLORIDE BLD-SCNC: 102 MMOL/L (ref 98–107)
CO2: 24 MMOL/L (ref 20–31)
CREAT SERPL-MCNC: 0.92 MG/DL (ref 0.5–0.9)
DIFFERENTIAL TYPE: NORMAL
EOSINOPHILS RELATIVE PERCENT: 3 %
FERRITIN: 625 UG/L (ref 13–150)
GFR AFRICAN AMERICAN: >60 ML/MIN
GFR NON-AFRICAN AMERICAN: >60 ML/MIN
GFR SERPL CREATININE-BSD FRML MDRD: ABNORMAL ML/MIN/{1.73_M2}
GFR SERPL CREATININE-BSD FRML MDRD: ABNORMAL ML/MIN/{1.73_M2}
GLUCOSE BLD-MCNC: 101 MG/DL (ref 70–99)
HCT VFR BLD CALC: 38.5 % (ref 36–46)
HEMOGLOBIN: 12.6 G/DL (ref 12–16)
IRON SATURATION: 36 % (ref 20–55)
IRON: 76 UG/DL (ref 37–145)
LYMPHOCYTES # BLD: 35 %
MCH RBC QN AUTO: 27.9 PG (ref 26–34)
MCHC RBC AUTO-ENTMCNC: 32.8 G/DL (ref 31–37)
MCV RBC AUTO: 85.1 FL (ref 80–100)
MONOCYTES # BLD: 5 %
PDW BLD-RTO: 13.2 % (ref 11.5–14.5)
PLATELET # BLD: 312 K/UL (ref 130–400)
PLATELET ESTIMATE: NORMAL
PMV BLD AUTO: NORMAL FL (ref 6–12)
POTASSIUM SERPL-SCNC: 4.3 MMOL/L (ref 3.7–5.3)
RBC # BLD: 4.52 M/UL (ref 4–5.2)
RBC # BLD: NORMAL 10*6/UL
SEG NEUTROPHILS: 56 %
SEGMENTED NEUTROPHILS ABSOLUTE COUNT: 6.1 K/UL (ref 1.8–7.7)
SODIUM BLD-SCNC: 140 MMOL/L (ref 135–144)
TOTAL IRON BINDING CAPACITY: 210 UG/DL (ref 250–450)
TOTAL PROTEIN: 8.1 G/DL (ref 6.4–8.3)
UNSATURATED IRON BINDING CAPACITY: 134 UG/DL (ref 112–347)
WBC # BLD: 10.7 K/UL (ref 3.5–11)
WBC # BLD: NORMAL 10*3/UL

## 2017-10-10 PROCEDURE — 82728 ASSAY OF FERRITIN: CPT

## 2017-10-10 PROCEDURE — 36415 COLL VENOUS BLD VENIPUNCTURE: CPT

## 2017-10-10 PROCEDURE — 83550 IRON BINDING TEST: CPT

## 2017-10-10 PROCEDURE — 80053 COMPREHEN METABOLIC PANEL: CPT

## 2017-10-10 PROCEDURE — 83540 ASSAY OF IRON: CPT

## 2017-10-10 PROCEDURE — 85025 COMPLETE CBC W/AUTO DIFF WBC: CPT

## 2017-10-11 DIAGNOSIS — C50.012 MALIGNANT NEOPLASM OF NIPPLE OF LEFT BREAST IN FEMALE (HCC): Primary | ICD-10-CM

## 2017-10-12 NOTE — TELEPHONE ENCOUNTER
Medication is on med list please review and address    Please address the medication refill and close the encounter. If I can be of assistance, please route to the applicable pool. Thank you. Next Visit Date:  Future Appointments  Date Time Provider Kota Sanz   10/17/2017 1:40 PM Malachi Arroyo MD [facility]   10/19/2017 9:00 AM Juli Beyer MD [address] [place] [address] Maintenance   Topic Date Due    Colon Cancer Screen FIT/FOBT  05/25/2017    Lipid screen  08/16/2017    Flu vaccine (1) 09/01/2017    Diabetic retinal exam  10/24/2017 (Originally 6/1/2016)    Breast cancer screen  06/30/2018 (Originally 7/17/2013)    Diabetic hemoglobin A1C test  12/11/2017    Diabetic foot exam  02/06/2018    Diabetic microalbuminuria test  02/06/2018    Cervical cancer screen  03/18/2018    DTaP/Tdap/Td vaccine (2 - Td) 02/06/2027    Pneumococcal med risk  Completed    HIV screen  Completed       Hemoglobin A1C (%)   Date Value   09/11/2017 14.0   06/23/2017 14   02/06/2017 14             ( goal A1C is < 7)   Microalb/Crt.  Ratio (mcg/mg creat)   Date Value   02/06/2017 8     LDL Cholesterol (mg/dL)   Date Value   08/16/2016 59       (goal LDL is <100)   AST (U/L)   Date Value   10/10/2017 14     ALT (U/L)   Date Value   10/10/2017 12     BUN (mg/dL)   Date Value   10/10/2017 9     BP Readings from Last 3 Encounters:   09/11/17 (!) 142/96   08/02/17 (!) 175/101   07/21/17 (!) 153/79          (goal 120/80)    All Future Testing planned in [facility]  Lab Frequency Next Occurrence   HIV Screen Once 02/06/2017   CBC With Auto Differential Once 02/06/2017   Comprehensive Metabolic Panel     CBC Auto Differential     Ferritin                 Patient Active Problem List:     Cancer ([facility] [address]     Type II or unspecified type diabetes mellitus without mention of complication, not stated as uncontrolled     Pain, knee     Depression     Hypertension     DJD (degenerative joint disease) of knee

## 2017-10-16 ENCOUNTER — HOSPITAL ENCOUNTER (OUTPATIENT)
Facility: MEDICAL CENTER | Age: 52
End: 2017-10-16
Payer: MEDICARE

## 2017-10-17 ENCOUNTER — TELEPHONE (OUTPATIENT)
Dept: ONCOLOGY | Age: 52
End: 2017-10-17

## 2017-10-17 ENCOUNTER — OFFICE VISIT (OUTPATIENT)
Dept: ONCOLOGY | Age: 52
End: 2017-10-17
Payer: MEDICARE

## 2017-10-17 VITALS
HEART RATE: 125 BPM | RESPIRATION RATE: 20 BRPM | SYSTOLIC BLOOD PRESSURE: 153 MMHG | TEMPERATURE: 98.4 F | WEIGHT: 235.4 LBS | DIASTOLIC BLOOD PRESSURE: 96 MMHG | BODY MASS INDEX: 41.71 KG/M2

## 2017-10-17 DIAGNOSIS — C50.012 MALIGNANT NEOPLASM OF NIPPLE OF LEFT BREAST IN FEMALE, ESTROGEN RECEPTOR POSITIVE (HCC): Primary | ICD-10-CM

## 2017-10-17 DIAGNOSIS — Z17.0 MALIGNANT NEOPLASM OF NIPPLE OF LEFT BREAST IN FEMALE, ESTROGEN RECEPTOR POSITIVE (HCC): Primary | ICD-10-CM

## 2017-10-17 PROCEDURE — G8427 DOCREV CUR MEDS BY ELIG CLIN: HCPCS | Performed by: INTERNAL MEDICINE

## 2017-10-17 PROCEDURE — 3014F SCREEN MAMMO DOC REV: CPT | Performed by: INTERNAL MEDICINE

## 2017-10-17 PROCEDURE — G8484 FLU IMMUNIZE NO ADMIN: HCPCS | Performed by: INTERNAL MEDICINE

## 2017-10-17 PROCEDURE — 99214 OFFICE O/P EST MOD 30 MIN: CPT | Performed by: INTERNAL MEDICINE

## 2017-10-17 PROCEDURE — 99211 OFF/OP EST MAY X REQ PHY/QHP: CPT

## 2017-10-17 PROCEDURE — G8417 CALC BMI ABV UP PARAM F/U: HCPCS | Performed by: INTERNAL MEDICINE

## 2017-10-17 PROCEDURE — 3017F COLORECTAL CA SCREEN DOC REV: CPT | Performed by: INTERNAL MEDICINE

## 2017-10-17 PROCEDURE — 1036F TOBACCO NON-USER: CPT | Performed by: INTERNAL MEDICINE

## 2017-10-17 RX ORDER — AMLODIPINE BESYLATE 5 MG/1
TABLET ORAL
Qty: 30 TABLET | Refills: 5 | Status: CANCELLED | OUTPATIENT
Start: 2017-10-17

## 2017-10-19 ENCOUNTER — OFFICE VISIT (OUTPATIENT)
Dept: FAMILY MEDICINE CLINIC | Age: 52
End: 2017-10-19
Payer: MEDICARE

## 2017-10-19 VITALS
TEMPERATURE: 96.5 F | HEIGHT: 63 IN | BODY MASS INDEX: 41.99 KG/M2 | HEART RATE: 117 BPM | SYSTOLIC BLOOD PRESSURE: 135 MMHG | DIASTOLIC BLOOD PRESSURE: 90 MMHG | WEIGHT: 237 LBS

## 2017-10-19 DIAGNOSIS — Z23 NEEDS FLU SHOT: ICD-10-CM

## 2017-10-19 DIAGNOSIS — Z12.11 COLON CANCER SCREENING: ICD-10-CM

## 2017-10-19 DIAGNOSIS — Z79.4 TYPE 2 DIABETES MELLITUS WITHOUT COMPLICATION, WITH LONG-TERM CURRENT USE OF INSULIN (HCC): Primary | ICD-10-CM

## 2017-10-19 DIAGNOSIS — E11.9 TYPE 2 DIABETES MELLITUS WITHOUT COMPLICATION, WITH LONG-TERM CURRENT USE OF INSULIN (HCC): Primary | ICD-10-CM

## 2017-10-19 PROCEDURE — 3014F SCREEN MAMMO DOC REV: CPT | Performed by: FAMILY MEDICINE

## 2017-10-19 PROCEDURE — 90471 IMMUNIZATION ADMIN: CPT | Performed by: HOSPITALIST

## 2017-10-19 PROCEDURE — 3017F COLORECTAL CA SCREEN DOC REV: CPT | Performed by: FAMILY MEDICINE

## 2017-10-19 PROCEDURE — 99213 OFFICE O/P EST LOW 20 MIN: CPT | Performed by: FAMILY MEDICINE

## 2017-10-19 PROCEDURE — 3046F HEMOGLOBIN A1C LEVEL >9.0%: CPT | Performed by: FAMILY MEDICINE

## 2017-10-19 PROCEDURE — 90688 IIV4 VACCINE SPLT 0.5 ML IM: CPT | Performed by: HOSPITALIST

## 2017-10-19 PROCEDURE — 1036F TOBACCO NON-USER: CPT | Performed by: FAMILY MEDICINE

## 2017-10-19 PROCEDURE — G8417 CALC BMI ABV UP PARAM F/U: HCPCS | Performed by: FAMILY MEDICINE

## 2017-10-19 PROCEDURE — G8427 DOCREV CUR MEDS BY ELIG CLIN: HCPCS | Performed by: FAMILY MEDICINE

## 2017-10-19 PROCEDURE — G8484 FLU IMMUNIZE NO ADMIN: HCPCS | Performed by: FAMILY MEDICINE

## 2017-10-19 RX ORDER — M-VIT,TX,IRON,MINS/CALC/FOLIC 27MG-0.4MG
1 TABLET ORAL DAILY
Qty: 60 TABLET | Refills: 3 | Status: SHIPPED | OUTPATIENT
Start: 2017-10-19 | End: 2018-04-24 | Stop reason: SDUPTHER

## 2017-10-19 ASSESSMENT — ENCOUNTER SYMPTOMS
NAUSEA: 0
BACK PAIN: 0
WHEEZING: 0
VOMITING: 0
ABDOMINAL PAIN: 0
SHORTNESS OF BREATH: 0

## 2017-10-19 NOTE — PATIENT INSTRUCTIONS
Thank you for letting us take care of you today. We hope all your questions were addressed. If a question was overlooked or something else comes to mind after you return home, please contact a member of your Care Team listed below. Please make sure you have a routine office visit set up to follow-up on [address]. Your Care Team at [place] [address] is Team #1  Vero Mooney MD (Faculty)  Sariah Griffin MD (Faculty  Veejacqui Meier MD (Resident)  Davion Cormier MD (Resident)  Anam Lomax MD (Resident)  Yogesh Cano MD (Resident)  Quiana Mcpherson MD (Resident)  Luisito Lau [facility]  Sammi Tucker JOSE LUIS MIGUEL, [facility], ([address] [facility])  DALTON Jones, ([address] [facility])  Genoveva Payton, Ph.D., ([address] [facility])  Gretchen Vaca, [facility] (Clinical Pharmacist)     Office phone number: [phone]    If you need to get in right away due to illness, please be advised we have \"Same Day\" appointments available Monday-Friday. Please call us at [phone] option #1 to schedule your \"Same Day\" appointment.

## 2017-10-19 NOTE — PROGRESS NOTES
optimal glycemic control and provided more logs to continue to monitor serum glucose levels. Insulin regimen with Lantus increased to 70 units BID, she is following a diabetic diet, and planning follow up in 2 months. A referral to endocrinology is still pending. Health maintenance reviewed, diabetic eye exam is planned tomorrow, flu vaccine given and FIT test ordered. Return in about 2 months (around 12/19/2017) for DM type 2.    (Trina Rao ) Dr. Elsa Morelos MD

## 2017-10-19 NOTE — PROGRESS NOTES
Subjective:      Patient ID: Nick Carr is a 47 y.o. female. HPI  Patient is here today to follow up on type 2 DM. Patient was seen by me last month and was found to have an a1c of 14%. I had referred her to endo at the time, and increased her lantus to 70 Units nightly. However, patient states she has not been able to get a hold of the endocrinologist's office, and there fore has not made an appointment with them. She states that she has been watching very carefully what she eats now. She has brought her blood sugar logs wit her and shows fasting blood sugars in the low 100s. She mentioned that she has also been taking 70U of insulin twice a day every other day. She states that she wasn't sure what the actual dosage was. Per chart, she her lantus went up to 70 units at night. Denies any lightheadedness, dizziness, or fatigue. Review of Systems   Constitutional: Negative for appetite change, fatigue and fever. Eyes: Negative for visual disturbance. Respiratory: Negative for shortness of breath and wheezing. Cardiovascular: Negative for chest pain and palpitations. Gastrointestinal: Negative for abdominal pain, nausea and vomiting. Endocrine: Negative for polydipsia, polyphagia and polyuria. Genitourinary: Negative for dysuria. Musculoskeletal: Negative for back pain. Neurological: Negative for dizziness and light-headedness. Objective:   Physical Exam   Constitutional: She is oriented to person, place, and time. She appears well-developed. HENT:   Head: Normocephalic and atraumatic. Cardiovascular: Normal rate and regular rhythm. Pulmonary/Chest: Effort normal. She has no wheezes. Musculoskeletal: She exhibits no edema. Neurological: She is alert and oriented to person, place, and time. Assessment/Plan:        1. Type 2 diabetes mellitus without complication, with long-term current use of insulin (HCC)  - continue 70 units nightly.  I have given more blood sugar logs to monitor   -eye exam tomorrow  - continue ASA, lipitor   - Lipid Panel; Future  - Multiple Vitamins-Minerals (THERAPEUTIC MULTIVITAMIN-MINERALS) tablet; Take 1 tablet by mouth daily  Dispense: 60 tablet; Refill: 3  -f/u in 2 months for a repeat a1c    2. Colon cancer screening  - POCT Fecal Immunochemical Test (FIT); Future    3. Needs flu shot  - INFLUENZA, QUADV, 6 MO AND OLDER, IM, MDV, 0.5ML (FLULAVAL QUADV)    BP Readings from Last 3 Encounters:   10/19/17 (!) 135/90   10/17/17 (!) 153/96   09/11/17 (!) 142/96       Betzy Briscoe received counseling on the following healthy behaviors: nutrition, exercise and medication adherence    Discussed use, benefit, and side effects of prescribed medications. Barriers to medication compliance addressed. All patient questions answered. Pt voiced understanding. Return in about 2 months (around 12/19/2017) for DM type 2.

## 2017-10-19 NOTE — PROGRESS NOTES
Visit Information    Have you changed or started any medications since your last visit including any over-the-counter medicines, vitamins, or herbal medicines? no   Have you stopped taking any of your medications? Is so, why? -  no  Are you having any side effects from any of your medications? - no    Have you seen any other physician or provider since your last visit? yes - oncology   Have you had any other diagnostic tests since your last visit?  no   Have you been seen in the emergency room and/or had an admission in a hospital since we last saw you?  no   Have you had your routine dental cleaning in the past 6 months?  no     Do you have an active MyChart account? If no, what is the barrier?   Yes    Patient Care Team:  Robert Ocampo MD as PCP - General (Family Medicine)  Carlos Varela MD as Consulting Physician (Hematology and Oncology)  Del Worley MD (General Surgery)  Ruperto Berrios DO as Consulting Physician (General Surgery)    Medical History Review  Past Medical, Family, and Social History reviewed and does contribute to the patient presenting condition    Health Maintenance   Topic Date Due    Colon Cancer Screen FIT/FOBT  05/25/2017    Lipid screen  08/16/2017    Flu vaccine (1) 09/01/2017    Diabetic retinal exam  10/24/2017 (Originally 6/1/2016)    Breast cancer screen  06/30/2018 (Originally 7/17/2013)    Diabetic hemoglobin A1C test  12/11/2017    Diabetic foot exam  02/06/2018    Diabetic microalbuminuria test  02/06/2018    Cervical cancer screen  03/18/2018    DTaP/Tdap/Td vaccine (2 - Td) 02/06/2027    Pneumococcal med risk  Completed    HIV screen  Completed

## 2017-10-24 DIAGNOSIS — I10 ESSENTIAL HYPERTENSION: Primary | ICD-10-CM

## 2017-10-24 RX ORDER — AMLODIPINE BESYLATE 5 MG/1
TABLET ORAL
Qty: 30 TABLET | Refills: 5 | Status: SHIPPED | OUTPATIENT
Start: 2017-10-24 | End: 2017-10-24 | Stop reason: SDUPTHER

## 2017-10-24 NOTE — TELEPHONE ENCOUNTER
Medication is on med list please review and address    Please address the medication refill and close the encounter. If I can be of assistance, please route to the applicable pool. Thank you. Next Visit Date:  Future Appointments  Date Time Provider Kota Sanz   12/19/2017 9:00 AM Javy Espinosa MD [facility]   10/9/2018 10:00 AM SCHEDULE, [facility]   10/16/2018 1:00 PM Renée Rollins MD [address] [facility] Maintenance   Topic Date Due    Colon Cancer Screen FIT/FOBT  05/25/2017    Lipid screen  08/16/2017    Diabetic retinal exam  10/24/2017 (Originally 6/1/2016)    Breast cancer screen  06/30/2018 (Originally 7/17/2013)    Diabetic hemoglobin A1C test  12/11/2017    Diabetic foot exam  02/06/2018    Diabetic microalbuminuria test  02/06/2018    Cervical cancer screen  03/18/2018    DTaP/Tdap/Td vaccine (2 - Td) 02/06/2027    Flu vaccine  Completed    Pneumococcal med risk  Completed    HIV screen  Completed       Hemoglobin A1C (%)   Date Value   09/11/2017 14.0   06/23/2017 14   02/06/2017 14             ( goal A1C is < 7)   Microalb/Crt.  Ratio (mcg/mg creat)   Date Value   02/06/2017 8     LDL Cholesterol (mg/dL)   Date Value   08/16/2016 59       (goal LDL is <100)   AST (U/L)   Date Value   10/10/2017 14     ALT (U/L)   Date Value   10/10/2017 12     BUN (mg/dL)   Date Value   10/10/2017 9     BP Readings from Last 3 Encounters:   10/19/17 (!) 135/90   10/17/17 (!) 153/96   09/11/17 (!) 142/96          (goal 120/80)    All Future Testing planned in [facility]  Lab Frequency Next Occurrence   HIV Screen Once 02/06/2017   CBC With Auto Differential Once 02/06/2017   POCT Fecal Immunochemical Test (FIT) Once 11/09/2017   Lipid Panel Once 10/19/2017   Comprehensive Metabolic Panel     CBC Auto Differential     Ferritin                 Patient Active Problem List:     Cancer [facility])     Colon cancer screening     Pain, knee     Depression Hypertension     DJD (degenerative joint disease) of knee     Headache     Breast cancer (HCC)     Iron deficiency anemia     DM2 (diabetes mellitus, type 2) (HCC)     HTN (hypertension)     DM w/o complication type II     Malignant neoplasm of left female breast (HCC)     Left shoulder pain     Diabetes 1.5, managed as type 2 ([facility] [address]     Headache disorder     [place]

## 2017-10-26 RX ORDER — AMLODIPINE BESYLATE 5 MG/1
TABLET ORAL
Qty: 30 TABLET | Refills: 5 | Status: SHIPPED | OUTPATIENT
Start: 2017-10-26 | End: 2018-03-20 | Stop reason: ALTCHOICE

## 2017-10-29 NOTE — PROGRESS NOTES
_      Chief Complaint   Patient presents with    Follow-up     Patient would like to review status of Disease         DIAGNOSIS:   1. Relapse of left breast cancer with original diagnosis of a stage T2N1M0, left breast invasive ductal carcinoma with staging after neoadjuvant chemotherapy, ER/DE positive, HER2-jacinta negative, originally diagnosed in the spring of 2006 with recent recurrence of left breast cancer and a small tumor [id number], left breast invasive ductal carcinoma, ER/DE positive, and HER2-jacinta negative. 2. Iron Deficiency Anemia. CURRENT THERAPY:    1. Status post bilateral mastectomy done on 10/10/2012 with no evidence of residual malignancy. 2. Status post 5 years of tamoxifen  between 2006 and 2011.    3. Status post neoadjuvant chemotherapy with Adriamycin and Cytoxan for 4 cycles followed by weekly Taxol for 8 weeks. Chemotherapy was given between June 2006 and October 2006. 4. Status post lumpectomy and lymph node dissection November 2006.  5. Radiation therapy following the lumpectomy. 6. Iron replacement. INTERIM HISTORY:    The patient is here for follow up for her history of left sided invasive ductal carcinoma with recurrence. She had a bilateral mastectomy in 2012. She denies fever, chills, unintentional weight loss, or new masses. She had bariatric surgery in April     No recent problems related to anemia. No weakness or fatigue. No active bleeding. REVIEW OF SYSTEMS:   General: She has weakness and fatigue. No weight loss or decreased appetite. No fever or chills. Eyes: No blurred vision, eye pain or double vision. Ears: No hearing problems or drainage. No tinnitus. Throat: No sore throat, problems with swallowing or dysphagia. Respiratory: No cough, sputum or hemoptysis. No shortness of breath. Cardiovascular: No chest pain, orthopnea or PND. No lower extremity edema. No palpitation. Gastrointestinal: No problems with swallowing. No abdominal pain or bloating.  No 10/10/2017    FERRITIN 625 (H) 10/10/2017       ASSESSMENT:     1. Recurrence of breast cancer as stated above status post mastectomy. Currently in remission  2. Iron deficiency anemia secondary to heavy menstrual periods and possibly malabsorption. Stable. 3. S/p Bariatric surgery April 2015  4. Chest pain s/p mastectomy controlled with [place] and Neurontin  5. Uncontrolled DM on insulin     PLAN:      The patient is doing well in regard to her breast cancer with no evidence of recurrence. We will continue observation. Will continue the Norco same dose for the patient. follow up with us in 12 months. Hb is stable. No recent drop. No signs of bleeding. Continue observation. Patient's questions were answered to the best of her satisfaction and she verbalized full understanding and agreement.                                   [address] [facility]                          Cell: [phone]

## 2017-10-31 DIAGNOSIS — E11.9 TYPE 2 DIABETES MELLITUS WITHOUT COMPLICATION, WITH LONG-TERM CURRENT USE OF INSULIN (HCC): ICD-10-CM

## 2017-10-31 DIAGNOSIS — Z79.4 TYPE 2 DIABETES MELLITUS WITHOUT COMPLICATION, WITH LONG-TERM CURRENT USE OF INSULIN (HCC): ICD-10-CM

## 2017-10-31 RX ORDER — INSULIN GLARGINE 100 [IU]/ML
INJECTION, SOLUTION SUBCUTANEOUS
Qty: 15 ML | Refills: 11 | Status: SHIPPED | OUTPATIENT
Start: 2017-10-31 | End: 2018-10-19 | Stop reason: SDUPTHER

## 2017-10-31 NOTE — TELEPHONE ENCOUNTER
Medication is on med list please review and address    Please address the medication refill and close the encounter. If I can be of assistance, please route to the applicable pool. Thank you. Next Visit Date:  Future Appointments  Date Time Provider Kota Sanz   12/19/2017 9:00 AM Joe Hodgson MD [facility]   10/9/2018 10:00 AM SCHEDULE, [facility]   10/16/2018 1:00 PM Rachel Lamb MD [address] [facility] Maintenance   Topic Date Due    Diabetic retinal exam  06/01/2016    Colon Cancer Screen FIT/FOBT  05/25/2017    Lipid screen  08/16/2017    Breast cancer screen  06/30/2018 (Originally 7/17/2013)    Diabetic hemoglobin A1C test  12/11/2017    Diabetic foot exam  02/06/2018    Diabetic microalbuminuria test  02/06/2018    Cervical cancer screen  03/18/2018    DTaP/Tdap/Td vaccine (2 - Td) 02/06/2027    Flu vaccine  Completed    Pneumococcal med risk  Completed    HIV screen  Completed       Hemoglobin A1C (%)   Date Value   09/11/2017 14.0   06/23/2017 14   02/06/2017 14             ( goal A1C is < 7)   Microalb/Crt.  Ratio (mcg/mg creat)   Date Value   02/06/2017 8     LDL Cholesterol (mg/dL)   Date Value   08/16/2016 59       (goal LDL is <100)   AST (U/L)   Date Value   10/10/2017 14     ALT (U/L)   Date Value   10/10/2017 12     BUN (mg/dL)   Date Value   10/10/2017 9     BP Readings from Last 3 Encounters:   10/19/17 (!) 135/90   10/17/17 (!) 153/96   09/11/17 (!) 142/96          (goal 120/80)    All Future Testing planned in [facility]  Lab Frequency Next Occurrence   HIV Screen Once 02/06/2017   CBC With Auto Differential Once 02/06/2017   POCT Fecal Immunochemical Test (FIT) Once 11/09/2017   Lipid Panel Once 12/19/2017   Comprehensive Metabolic Panel     CBC Auto Differential     Ferritin                 Patient Active Problem List:     Cancer [facility])     Colon cancer screening     Pain, knee     Depression     Hypertension     DJD

## 2017-11-17 ENCOUNTER — TELEPHONE (OUTPATIENT)
Dept: ADMINISTRATIVE | Age: 52
End: 2017-11-17

## 2017-11-21 ENCOUNTER — HOSPITAL ENCOUNTER (OUTPATIENT)
Age: 52
Setting detail: SPECIMEN
Discharge: HOME | End: 2017-11-21
Payer: MEDICARE

## 2017-11-21 DIAGNOSIS — Z79.4 TYPE 2 DIABETES MELLITUS WITHOUT COMPLICATION, WITH LONG-TERM CURRENT USE OF INSULIN (HCC): ICD-10-CM

## 2017-11-21 DIAGNOSIS — E11.9 TYPE 2 DIABETES MELLITUS WITHOUT COMPLICATION, WITH LONG-TERM CURRENT USE OF INSULIN (HCC): ICD-10-CM

## 2017-11-21 LAB
CHOLESTEROL/HDL RATIO: 2.6
CHOLESTEROL: 179 MG/DL
HDLC SERPL-MCNC: 70 MG/DL
LDL CHOLESTEROL: 95 MG/DL (ref 0–130)
TRIGL SERPL-MCNC: 72 MG/DL
VLDLC SERPL CALC-MCNC: NORMAL MG/DL (ref 1–30)

## 2017-11-22 DIAGNOSIS — Z12.11 COLON CANCER SCREENING: ICD-10-CM

## 2017-11-22 LAB
CONTROL: PRESENT
HEMOCCULT STL QL: NEGATIVE

## 2017-11-28 DIAGNOSIS — Z79.4 TYPE 2 DIABETES MELLITUS WITHOUT COMPLICATION, WITH LONG-TERM CURRENT USE OF INSULIN (HCC): ICD-10-CM

## 2017-11-28 DIAGNOSIS — E11.9 TYPE 2 DIABETES MELLITUS WITHOUT COMPLICATION, WITH LONG-TERM CURRENT USE OF INSULIN (HCC): ICD-10-CM

## 2017-11-28 RX ORDER — GLIPIZIDE 10 MG/1
TABLET ORAL
Qty: 60 TABLET | Refills: 10 | Status: SHIPPED | OUTPATIENT
Start: 2017-11-28 | End: 2017-12-19 | Stop reason: ALTCHOICE

## 2017-11-28 RX ORDER — SITAGLIPTIN 100 MG/1
100 TABLET, FILM COATED ORAL DAILY
Qty: 30 TABLET | Refills: 10 | Status: SHIPPED | OUTPATIENT
Start: 2017-11-28 | End: 2018-05-22 | Stop reason: SDUPTHER

## 2017-11-28 NOTE — TELEPHONE ENCOUNTER
Medication is on med list please review and address    Please address the medication refill and close the encounter. If I can be of assistance, please route to the applicable pool. Thank you. Next Visit Date:  Future Appointments  Date Time Provider Kota Sanz   12/19/2017 9:00 AM Acosta Desir MD [facility]   10/9/2018 10:00 AM SCHEDULE, [facility]   10/16/2018 1:00 PM Nacho Santos MD [address] [facility] Maintenance   Topic Date Due    Diabetic retinal exam  06/01/2016    Diabetic hemoglobin A1C test  12/11/2017    Breast cancer screen  06/30/2018 (Originally 7/17/2013)    Diabetic foot exam  02/06/2018    Diabetic microalbuminuria test  02/06/2018    Cervical cancer screen  03/18/2018    Lipid screen  11/21/2018    Colon Cancer Screen FIT/FOBT  11/21/2018    DTaP/Tdap/Td vaccine (2 - Td) 02/06/2027    Flu vaccine  Completed    Pneumococcal med risk  Completed    HIV screen  Completed       Hemoglobin A1C (%)   Date Value   09/11/2017 14.0   06/23/2017 14   02/06/2017 14             ( goal A1C is < 7)   Microalb/Crt.  Ratio (mcg/mg creat)   Date Value   02/06/2017 8     LDL Cholesterol (mg/dL)   Date Value   11/21/2017 95       (goal LDL is <100)   AST (U/L)   Date Value   10/10/2017 14     ALT (U/L)   Date Value   10/10/2017 12     BUN (mg/dL)   Date Value   10/10/2017 9     BP Readings from Last 3 Encounters:   10/19/17 (!) 135/90   10/17/17 (!) 153/96   09/11/17 (!) 142/96          (goal 120/80)    All Future Testing planned in [facility]  Lab Frequency Next Occurrence   HIV Screen Once 02/06/2017   CBC With Auto Differential Once 02/06/2017   Comprehensive Metabolic Panel     CBC Auto Differential     Ferritin                 Patient Active Problem List:     Cancer [facility])     Colon cancer screening     Pain, knee     Depression     Hypertension     DJD (degenerative joint disease) of knee     Headache     Breast cancer (HCC)     Iron

## 2017-12-19 ENCOUNTER — OFFICE VISIT (OUTPATIENT)
Dept: FAMILY MEDICINE CLINIC | Age: 52
End: 2017-12-19
Payer: MEDICARE

## 2017-12-19 VITALS
TEMPERATURE: 98 F | HEART RATE: 116 BPM | WEIGHT: 238.6 LBS | BODY MASS INDEX: 42.28 KG/M2 | SYSTOLIC BLOOD PRESSURE: 136 MMHG | DIASTOLIC BLOOD PRESSURE: 78 MMHG | HEIGHT: 63 IN

## 2017-12-19 DIAGNOSIS — I10 ESSENTIAL HYPERTENSION: ICD-10-CM

## 2017-12-19 DIAGNOSIS — Z79.4 TYPE 2 DIABETES MELLITUS WITHOUT COMPLICATION, WITH LONG-TERM CURRENT USE OF INSULIN (HCC): Primary | ICD-10-CM

## 2017-12-19 DIAGNOSIS — E11.9 TYPE 2 DIABETES MELLITUS WITHOUT COMPLICATION, WITH LONG-TERM CURRENT USE OF INSULIN (HCC): Primary | ICD-10-CM

## 2017-12-19 LAB — HBA1C MFR BLD: 6.5 %

## 2017-12-19 PROCEDURE — G8417 CALC BMI ABV UP PARAM F/U: HCPCS | Performed by: FAMILY MEDICINE

## 2017-12-19 PROCEDURE — G8427 DOCREV CUR MEDS BY ELIG CLIN: HCPCS | Performed by: FAMILY MEDICINE

## 2017-12-19 PROCEDURE — G8484 FLU IMMUNIZE NO ADMIN: HCPCS | Performed by: FAMILY MEDICINE

## 2017-12-19 PROCEDURE — 3044F HG A1C LEVEL LT 7.0%: CPT | Performed by: FAMILY MEDICINE

## 2017-12-19 PROCEDURE — 99213 OFFICE O/P EST LOW 20 MIN: CPT | Performed by: FAMILY MEDICINE

## 2017-12-19 PROCEDURE — 3017F COLORECTAL CA SCREEN DOC REV: CPT | Performed by: FAMILY MEDICINE

## 2017-12-19 PROCEDURE — 3014F SCREEN MAMMO DOC REV: CPT | Performed by: FAMILY MEDICINE

## 2017-12-19 PROCEDURE — 83036 HEMOGLOBIN GLYCOSYLATED A1C: CPT | Performed by: FAMILY MEDICINE

## 2017-12-19 PROCEDURE — 1036F TOBACCO NON-USER: CPT | Performed by: FAMILY MEDICINE

## 2017-12-19 ASSESSMENT — ENCOUNTER SYMPTOMS
VOMITING: 0
SHORTNESS OF BREATH: 0
COUGH: 0
NAUSEA: 0
WHEEZING: 0

## 2017-12-19 NOTE — PROGRESS NOTES
Attending Physician Statement  I have discussed the care of Jac Spence, including pertinent history and exam findings,  with the resident. I have reviewed the key elements of all parts of the encounter with the resident. I agree with the assessment, plan and orders as documented by the resident.   (GE Modifier)

## 2017-12-19 NOTE — PROGRESS NOTES
Visit Information    Have you changed or started any medications since your last visit including any over-the-counter medicines, vitamins, or herbal medicines? no   Have you stopped taking any of your medications? Is so, why? -  no  Are you having any side effects from any of your medications? - no    Have you seen any other physician or provider since your last visit? yes - psych    Have you had any other diagnostic tests since your last visit?  no   Have you been seen in the emergency room and/or had an admission in a hospital since we last saw you?  no   Have you had your routine dental cleaning in the past 6 months?  no     Do you have an active MyChart account? If no, what is the barrier?   Yes    Patient Care Team:  Cliff Thompson MD as PCP - General (Family Medicine)  Katiuska Torres MD as Consulting Physician (Hematology and Oncology)  Sharyn Garcia MD (General Surgery)  Linda Lovelace DO as Consulting Physician (General Surgery)    Medical History Review  Past Medical, Family, and Social History reviewed and does contribute to the patient presenting condition    Health Maintenance   Topic Date Due    Diabetic retinal exam  06/01/2016    Diabetic hemoglobin A1C test  12/11/2017    Breast cancer screen  06/30/2018 (Originally 7/17/2013)    Diabetic foot exam  02/06/2018    Diabetic microalbuminuria test  02/06/2018    Cervical cancer screen  03/18/2018    Lipid screen  11/21/2018    Colon Cancer Screen FIT/FOBT  11/21/2018    DTaP/Tdap/Td vaccine (2 - Td) 02/06/2027    Flu vaccine  Completed    Pneumococcal med risk  Completed    HIV screen  Completed

## 2017-12-19 NOTE — PROGRESS NOTES
Subjective:      Patient ID: Schuyler Watson is a 47 y.o. female. HPI  Patient is here today to follow-up on type 2 diabetes. She was seen by me nearly 2 months ago and was found to have an A1c of 14%. She had her last A1c checked twice and it was 14%. Patient was referred to Endo at that time however is not scheduled to be seen by that provider until March. States her diet has changed and has been avoiding sugary drinks, soda, and sugared foods. States she has been using more Splenda. Since blood sugars range anywhere from 70s to 130s. She is currently taking 70 units of Lantus, 6 units of NovoLog 3 times a day, Januvia 100 mg. Patient is unsure whether she is taking glipizide are not however. Appears to be in her medication list.  Denies any lightheadedness, dizziness, or fatigue. Review of Systems   Constitutional: Negative for chills, fatigue and fever. HENT: Negative for congestion. Eyes: Negative for visual disturbance. Respiratory: Negative for cough, shortness of breath and wheezing. Cardiovascular: Negative for chest pain, palpitations and leg swelling. Gastrointestinal: Negative for nausea and vomiting. Endocrine: Negative for polydipsia and polyuria. Neurological: Negative for dizziness and light-headedness. Objective:   Physical Exam   Constitutional: She is oriented to person, place, and time. She appears well-developed and well-nourished. HENT:   Head: Normocephalic and atraumatic. Cardiovascular: Normal rate and regular rhythm. No murmur heard. Pulmonary/Chest: Effort normal and breath sounds normal.   Musculoskeletal: She exhibits no edema. Neurological: She is alert and oriented to person, place, and time. Assessment/Plan:       1.  Type 2 diabetes mellitus without complication, with long-term current use of insulin ([facility])  - POCT glycosylated hemoglobin (Hb A1C): 6.5%  -Continue lifestyle modifications  -discontinue glipizide   -Takes aspirin daily,

## 2017-12-28 RX ORDER — LANCETS 33 GAUGE
EACH MISCELLANEOUS
Qty: 100 EACH | Refills: 3 | Status: SHIPPED | OUTPATIENT
Start: 2017-12-28 | End: 2018-05-24 | Stop reason: SDUPTHER

## 2017-12-28 NOTE — TELEPHONE ENCOUNTER
Medication is on med list please review and address    Please address the medication refill and close the encounter. If I can be of assistance, please route to the applicable pool. Thank you. Next Visit Date:  Future Appointments  Date Time Provider Kota Sanz   3/20/2018 8:30 AM Yvette Corbin MD [address] [facility]   10/9/2018 10:00 AM SCHEDULE, [facility]   10/16/2018 1:00 PM Maddison Mcbride MD [address] [facility] Maintenance   Topic Date Due    Diabetic retinal exam  06/01/2016    Breast cancer screen  06/30/2018 (Originally 7/17/2013)    Diabetic foot exam  02/06/2018    Diabetic microalbuminuria test  02/06/2018    Cervical cancer screen  03/18/2018    Lipid screen  11/21/2018    Colon Cancer Screen FIT/FOBT  11/21/2018    Diabetic hemoglobin A1C test  12/19/2018    DTaP/Tdap/Td vaccine (2 - Td) 02/06/2027    Flu vaccine  Completed    Pneumococcal med risk  Completed    HIV screen  Completed       Hemoglobin A1C (%)   Date Value   12/19/2017 6.5   09/11/2017 14.0   06/23/2017 14             ( goal A1C is < 7)   Microalb/Crt.  Ratio (mcg/mg creat)   Date Value   02/06/2017 8     LDL Cholesterol (mg/dL)   Date Value   11/21/2017 95       (goal LDL is <100)   AST (U/L)   Date Value   10/10/2017 14     ALT (U/L)   Date Value   10/10/2017 12     BUN (mg/dL)   Date Value   10/10/2017 9     BP Readings from Last 3 Encounters:   12/19/17 136/78   10/19/17 (!) 135/90   10/17/17 (!) 153/96          (goal 120/80)    All Future Testing planned in [facility]  Lab Frequency Next Occurrence   HIV Screen Once 02/06/2017   CBC With Auto Differential Once 02/06/2017   Comprehensive Metabolic Panel     CBC Auto Differential     Ferritin                 Patient Active Problem List:     Cancer [facility])     Colon cancer screening     Pain, knee     Depression     Hypertension     DJD (degenerative joint disease) of knee     Headache     Breast cancer (HCC)     Iron

## 2018-02-24 RX ORDER — GABAPENTIN 300 MG/1
CAPSULE ORAL
Qty: 90 CAPSULE | Refills: 3 | Status: SHIPPED | OUTPATIENT
Start: 2018-02-24 | End: 2018-06-25 | Stop reason: SDUPTHER

## 2018-03-20 ENCOUNTER — OFFICE VISIT (OUTPATIENT)
Dept: FAMILY MEDICINE CLINIC | Age: 53
End: 2018-03-20
Payer: MEDICARE

## 2018-03-20 VITALS
SYSTOLIC BLOOD PRESSURE: 142 MMHG | BODY MASS INDEX: 43.16 KG/M2 | DIASTOLIC BLOOD PRESSURE: 86 MMHG | HEIGHT: 63 IN | HEART RATE: 108 BPM | TEMPERATURE: 97.1 F | WEIGHT: 243.6 LBS

## 2018-03-20 DIAGNOSIS — I10 ESSENTIAL HYPERTENSION: ICD-10-CM

## 2018-03-20 DIAGNOSIS — E11.9 TYPE 2 DIABETES MELLITUS WITHOUT COMPLICATION, WITH LONG-TERM CURRENT USE OF INSULIN (HCC): Primary | ICD-10-CM

## 2018-03-20 DIAGNOSIS — Z79.4 TYPE 2 DIABETES MELLITUS WITHOUT COMPLICATION, WITH LONG-TERM CURRENT USE OF INSULIN (HCC): Primary | ICD-10-CM

## 2018-03-20 LAB — HBA1C MFR BLD: 7.4 %

## 2018-03-20 PROCEDURE — 3014F SCREEN MAMMO DOC REV: CPT | Performed by: FAMILY MEDICINE

## 2018-03-20 PROCEDURE — 99213 OFFICE O/P EST LOW 20 MIN: CPT

## 2018-03-20 PROCEDURE — G8417 CALC BMI ABV UP PARAM F/U: HCPCS | Performed by: FAMILY MEDICINE

## 2018-03-20 PROCEDURE — 99213 OFFICE O/P EST LOW 20 MIN: CPT | Performed by: FAMILY MEDICINE

## 2018-03-20 PROCEDURE — 1036F TOBACCO NON-USER: CPT | Performed by: FAMILY MEDICINE

## 2018-03-20 PROCEDURE — 3046F HEMOGLOBIN A1C LEVEL >9.0%: CPT | Performed by: FAMILY MEDICINE

## 2018-03-20 PROCEDURE — G8427 DOCREV CUR MEDS BY ELIG CLIN: HCPCS | Performed by: FAMILY MEDICINE

## 2018-03-20 PROCEDURE — 3017F COLORECTAL CA SCREEN DOC REV: CPT | Performed by: FAMILY MEDICINE

## 2018-03-20 PROCEDURE — 83036 HEMOGLOBIN GLYCOSYLATED A1C: CPT | Performed by: FAMILY MEDICINE

## 2018-03-20 PROCEDURE — G8482 FLU IMMUNIZE ORDER/ADMIN: HCPCS | Performed by: FAMILY MEDICINE

## 2018-03-20 RX ORDER — HYDROCHLOROTHIAZIDE 25 MG/1
25 TABLET ORAL DAILY
Qty: 30 TABLET | Refills: 3 | Status: SHIPPED | OUTPATIENT
Start: 2018-03-20 | End: 2018-06-25 | Stop reason: SDUPTHER

## 2018-03-20 ASSESSMENT — ENCOUNTER SYMPTOMS
SHORTNESS OF BREATH: 0
DIARRHEA: 0
ABDOMINAL PAIN: 0
COUGH: 0
VOMITING: 0

## 2018-03-20 NOTE — PROGRESS NOTES
Attending Physician Statement  I have discussed the care of Sherrell Arrieta, 47 y.o. female,including pertinent history and exam findings,  with the resident Dr. Reynaldo Naqvi MD.  History:  Chief Complaint   Patient presents with    Diabetes     f/u     46 yo female here for follow up on type II DM and hypertension. I have reviewed the key elements of the encounter with the resident. Examination was done by resident as documented in residents note. BP Readings from Last 3 Encounters:   03/20/18 (!) 142/86   12/19/17 136/78   10/19/17 (!) 135/90     BP (!) 142/86 (Site: Right Arm, Position: Sitting, Cuff Size: Large Adult)   Pulse 108   Temp 97.1 °F (36.2 °C) (Temporal)   Ht 5' 2.99\" (1.6 m)   Wt 243 lb 9.6 oz (110.5 kg)   LMP 12/08/2015 (Approximate)   BMI 43.16 kg/m²   Lab Results   Component Value Date    WBC 10.7 10/10/2017    HGB 12.6 10/10/2017    HCT 38.5 10/10/2017     10/10/2017    CHOL 179 11/21/2017    TRIG 72 11/21/2017    HDL 70 11/21/2017    ALT 12 10/10/2017    AST 14 10/10/2017     10/10/2017    K 4.3 10/10/2017     10/10/2017    CREATININE 0.92 (H) 10/10/2017    BUN 9 10/10/2017    CO2 24 10/10/2017    TSH 2.79 07/21/2015    LABA1C 6.5 12/19/2017    LABMICR 8 02/06/2017     Lab Results   Component Value Date    CALCIUM 9.2 10/10/2017    PHOS 3.8 08/12/2012     Lab Results   Component Value Date    LDLCHOLESTEROL 95 11/21/2017     I agree with the assessment, plan and diagnosis of   1. Type 2 diabetes mellitus without complication, with long-term current use of insulin ([address]     2. Essential hypertension  hydrochlorothiazide (HYDRODIURIL) 25 MG tablet     I agree with  orders as documented by the resident. Recommendations:   Patient's Hgb A1C went up, she was counseled to titrate her insulin regimen up and schedule follow up with glucose monitoring log, continue Januvia and adhere to diabetic diet.  Lisinopril and Norvasc discontinued due to adverse effects and angioedema, she was started on HCTZ and follow up is planned. Diabetic foot and eye care are up to date. Return in about 2 weeks (around 4/3/2018) for nurse visit-2 weeks .    (Meir Whitt ) Dr. Telford Ahumada, MD

## 2018-04-17 ENCOUNTER — NURSE ONLY (OUTPATIENT)
Dept: FAMILY MEDICINE CLINIC | Age: 53
End: 2018-04-17
Payer: MEDICARE

## 2018-04-17 VITALS — SYSTOLIC BLOOD PRESSURE: 140 MMHG | DIASTOLIC BLOOD PRESSURE: 82 MMHG

## 2018-04-17 DIAGNOSIS — I10 ESSENTIAL HYPERTENSION: Primary | ICD-10-CM

## 2018-04-17 PROCEDURE — 99211 OFF/OP EST MAY X REQ PHY/QHP: CPT | Performed by: FAMILY MEDICINE

## 2018-04-24 ENCOUNTER — TELEPHONE (OUTPATIENT)
Dept: ADMINISTRATIVE | Age: 53
End: 2018-04-24

## 2018-04-24 DIAGNOSIS — Z79.4 TYPE 2 DIABETES MELLITUS WITHOUT COMPLICATION, WITH LONG-TERM CURRENT USE OF INSULIN (HCC): ICD-10-CM

## 2018-04-24 DIAGNOSIS — E11.9 TYPE 2 DIABETES MELLITUS WITHOUT COMPLICATION, WITH LONG-TERM CURRENT USE OF INSULIN (HCC): ICD-10-CM

## 2018-04-25 RX ORDER — PEN NEEDLE, DIABETIC 31 G X1/4"
NEEDLE, DISPOSABLE MISCELLANEOUS
Qty: 100 EACH | Refills: 11 | Status: SHIPPED | OUTPATIENT
Start: 2018-04-25 | End: 2019-06-21 | Stop reason: SDUPTHER

## 2018-04-25 RX ORDER — M-VIT,TX,IRON,MINS/CALC/FOLIC 27MG-0.4MG
1 TABLET ORAL DAILY
Qty: 60 TABLET | Refills: 3 | Status: SHIPPED | OUTPATIENT
Start: 2018-04-25 | End: 2018-11-20 | Stop reason: SDUPTHER

## 2018-05-22 ENCOUNTER — OFFICE VISIT (OUTPATIENT)
Dept: FAMILY MEDICINE CLINIC | Age: 53
End: 2018-05-22
Payer: MEDICARE

## 2018-05-22 VITALS
SYSTOLIC BLOOD PRESSURE: 138 MMHG | HEART RATE: 106 BPM | HEIGHT: 63 IN | WEIGHT: 246.6 LBS | TEMPERATURE: 96.8 F | DIASTOLIC BLOOD PRESSURE: 84 MMHG | BODY MASS INDEX: 43.7 KG/M2

## 2018-05-22 DIAGNOSIS — Z79.4 TYPE 2 DIABETES MELLITUS WITHOUT COMPLICATION, WITH LONG-TERM CURRENT USE OF INSULIN (HCC): Primary | ICD-10-CM

## 2018-05-22 DIAGNOSIS — E11.9 TYPE 2 DIABETES MELLITUS WITHOUT COMPLICATION, WITH LONG-TERM CURRENT USE OF INSULIN (HCC): Primary | ICD-10-CM

## 2018-05-22 DIAGNOSIS — I10 ESSENTIAL HYPERTENSION: ICD-10-CM

## 2018-05-22 LAB — HBA1C MFR BLD: 10 %

## 2018-05-22 PROCEDURE — 3046F HEMOGLOBIN A1C LEVEL >9.0%: CPT | Performed by: FAMILY MEDICINE

## 2018-05-22 PROCEDURE — 3017F COLORECTAL CA SCREEN DOC REV: CPT | Performed by: FAMILY MEDICINE

## 2018-05-22 PROCEDURE — 83036 HEMOGLOBIN GLYCOSYLATED A1C: CPT | Performed by: FAMILY MEDICINE

## 2018-05-22 PROCEDURE — 2022F DILAT RTA XM EVC RTNOPTHY: CPT | Performed by: FAMILY MEDICINE

## 2018-05-22 PROCEDURE — 99213 OFFICE O/P EST LOW 20 MIN: CPT | Performed by: FAMILY MEDICINE

## 2018-05-22 PROCEDURE — G8427 DOCREV CUR MEDS BY ELIG CLIN: HCPCS | Performed by: FAMILY MEDICINE

## 2018-05-22 PROCEDURE — 1036F TOBACCO NON-USER: CPT | Performed by: FAMILY MEDICINE

## 2018-05-22 PROCEDURE — G8417 CALC BMI ABV UP PARAM F/U: HCPCS | Performed by: FAMILY MEDICINE

## 2018-05-22 RX ORDER — WEIGH SCALE
1 MISCELLANEOUS MISCELLANEOUS DAILY
Qty: 1 EACH | Refills: 0 | Status: SHIPPED | OUTPATIENT
Start: 2018-05-22 | End: 2018-10-16

## 2018-05-22 ASSESSMENT — PATIENT HEALTH QUESTIONNAIRE - PHQ9
SUM OF ALL RESPONSES TO PHQ9 QUESTIONS 1 & 2: 0
2. FEELING DOWN, DEPRESSED OR HOPELESS: 0
SUM OF ALL RESPONSES TO PHQ QUESTIONS 1-9: 0
1. LITTLE INTEREST OR PLEASURE IN DOING THINGS: 0

## 2018-05-22 ASSESSMENT — ENCOUNTER SYMPTOMS
SHORTNESS OF BREATH: 0
ABDOMINAL PAIN: 0
COUGH: 0

## 2018-05-23 ENCOUNTER — TELEPHONE (OUTPATIENT)
Dept: PHARMACY | Age: 53
End: 2018-05-23

## 2018-05-25 ENCOUNTER — TELEPHONE (OUTPATIENT)
Dept: ADMINISTRATIVE | Age: 53
End: 2018-05-25

## 2018-05-25 DIAGNOSIS — E11.9 TYPE 2 DIABETES MELLITUS WITHOUT COMPLICATION, WITH LONG-TERM CURRENT USE OF INSULIN (HCC): Primary | ICD-10-CM

## 2018-05-25 DIAGNOSIS — Z79.4 TYPE 2 DIABETES MELLITUS WITHOUT COMPLICATION, WITH LONG-TERM CURRENT USE OF INSULIN (HCC): Primary | ICD-10-CM

## 2018-05-29 RX ORDER — LANCETS 33 GAUGE
EACH MISCELLANEOUS
Qty: 100 EACH | Refills: 3 | Status: SHIPPED | OUTPATIENT
Start: 2018-05-29 | End: 2019-06-21 | Stop reason: SDUPTHER

## 2018-06-01 RX ORDER — FERROUS SULFATE 325(65) MG
TABLET ORAL
Qty: 90 TABLET | Refills: 11 | Status: SHIPPED | OUTPATIENT
Start: 2018-06-01 | End: 2019-05-21 | Stop reason: SDUPTHER

## 2018-06-05 ENCOUNTER — TELEPHONE (OUTPATIENT)
Dept: ADMINISTRATIVE | Age: 53
End: 2018-06-05

## 2018-06-07 ENCOUNTER — TELEPHONE (OUTPATIENT)
Dept: ADMINISTRATIVE | Age: 53
End: 2018-06-07

## 2018-06-07 NOTE — TELEPHONE ENCOUNTER
Patient is calling to say she still has not received her one touch ultra machine. She has not been able to test her blood sugars. Please advise.

## 2018-06-07 NOTE — TELEPHONE ENCOUNTER
Meter was faxed after being signed. After calling pharmacy mason from [facility] states they haven't received it. At this time she transferred me to the pharmacist and I called it in as a verbal order as written per order in epic.

## 2018-06-08 ENCOUNTER — TELEPHONE (OUTPATIENT)
Dept: PHARMACY | Age: 53
End: 2018-06-08

## 2018-06-11 ENCOUNTER — TELEPHONE (OUTPATIENT)
Dept: FAMILY MEDICINE CLINIC | Age: 53
End: 2018-06-11

## 2018-06-21 ENCOUNTER — HOSPITAL ENCOUNTER (OUTPATIENT)
Age: 53
Setting detail: SPECIMEN
Discharge: HOME | End: 2018-06-21
Payer: MEDICARE

## 2018-06-21 ENCOUNTER — TELEPHONE (OUTPATIENT)
Dept: PHARMACY | Age: 53
End: 2018-06-21

## 2018-06-21 ENCOUNTER — OFFICE VISIT (OUTPATIENT)
Dept: FAMILY MEDICINE CLINIC | Age: 53
End: 2018-06-21
Payer: MEDICARE

## 2018-06-21 VITALS
BODY MASS INDEX: 43.05 KG/M2 | TEMPERATURE: 96.8 F | WEIGHT: 243 LBS | HEART RATE: 112 BPM | SYSTOLIC BLOOD PRESSURE: 135 MMHG | HEIGHT: 63 IN | DIASTOLIC BLOOD PRESSURE: 81 MMHG

## 2018-06-21 DIAGNOSIS — N89.8 VAGINAL DISCHARGE: Primary | ICD-10-CM

## 2018-06-21 DIAGNOSIS — Z01.419 WOMEN'S ANNUAL ROUTINE GYNECOLOGICAL EXAMINATION: ICD-10-CM

## 2018-06-21 DIAGNOSIS — N89.8 VAGINAL DISCHARGE: ICD-10-CM

## 2018-06-21 LAB
DIRECT EXAM: NORMAL
Lab: NORMAL
SPECIMEN DESCRIPTION: NORMAL
STATUS: NORMAL

## 2018-06-21 PROCEDURE — 99396 PREV VISIT EST AGE 40-64: CPT | Performed by: FAMILY MEDICINE

## 2018-06-21 PROCEDURE — 99214 OFFICE O/P EST MOD 30 MIN: CPT | Performed by: FAMILY MEDICINE

## 2018-06-21 PROCEDURE — 87480 CANDIDA DNA DIR PROBE: CPT | Performed by: FAMILY MEDICINE

## 2018-06-21 RX ORDER — METRONIDAZOLE 500 MG/1
500 TABLET ORAL 2 TIMES DAILY
Qty: 14 TABLET | Refills: 0 | Status: SHIPPED | OUTPATIENT
Start: 2018-06-21 | End: 2018-06-28

## 2018-06-21 RX ORDER — FLUCONAZOLE 100 MG/1
100 TABLET ORAL DAILY
Qty: 1 TABLET | Refills: 0 | Status: SHIPPED | OUTPATIENT
Start: 2018-06-21 | End: 2018-06-22

## 2018-06-21 ASSESSMENT — ENCOUNTER SYMPTOMS
WHEEZING: 0
VOMITING: 0
NAUSEA: 0
COUGH: 0
ABDOMINAL PAIN: 0
SHORTNESS OF BREATH: 0

## 2018-06-25 DIAGNOSIS — R51.9 HEADACHE DISORDER: ICD-10-CM

## 2018-06-25 DIAGNOSIS — I10 ESSENTIAL HYPERTENSION: ICD-10-CM

## 2018-06-25 LAB
HPV SAMPLE: NORMAL
HPV SOURCE: NORMAL
HPV, GENOTYPE 16: NOT DETECTED
HPV, GENOTYPE 18: NOT DETECTED
HPV, HIGH RISK OTHER: NOT DETECTED
HPV, INTERPRETATION: NORMAL

## 2018-06-25 RX ORDER — GABAPENTIN 300 MG/1
CAPSULE ORAL
Qty: 90 CAPSULE | Refills: 11 | Status: SHIPPED | OUTPATIENT
Start: 2018-06-25 | End: 2019-05-21 | Stop reason: SDUPTHER

## 2018-06-25 RX ORDER — HYDROCHLOROTHIAZIDE 25 MG/1
TABLET ORAL
Qty: 30 TABLET | Refills: 11 | Status: SHIPPED | OUTPATIENT
Start: 2018-06-25 | End: 2019-07-26 | Stop reason: SDUPTHER

## 2018-06-25 RX ORDER — ASPIRIN 81 MG/1
TABLET ORAL
Qty: 30 TABLET | Refills: 11 | Status: SHIPPED | OUTPATIENT
Start: 2018-06-25 | End: 2019-07-26 | Stop reason: SDUPTHER

## 2018-06-27 ENCOUNTER — TELEPHONE (OUTPATIENT)
Dept: PHARMACY | Age: 53
End: 2018-06-27

## 2018-07-10 LAB — CYTOLOGY REPORT: NORMAL

## 2018-07-12 ENCOUNTER — TELEPHONE (OUTPATIENT)
Dept: PHARMACY | Age: 53
End: 2018-07-12

## 2018-07-12 NOTE — TELEPHONE ENCOUNTER
Medication Management Service  [facility]  [phone]      Trang Castrejon is a 47 y.o. female who was called by pharmacy for management of diabetes. Subjective/Objective     New Problems/Changes: Patient reported taking bolus insulin 10 minutes before meals instead of after meals. DIET  Patient reports increased intake of salad    EXERCISE  Patient has been walking more often in the mornings. WEIGHT 243 lb    DIABETES MANAGEMENT  Most Recent A1c: 10.0% on 18    Home Glucose Readings  Patient reported SMBG:  Fastin-128 mg/dl  Post-prandial: 150-180 mg/dl    Hypoglycemia: No episodes of hypoglycemia reported    BLOOD PRESSURE MANAGEMENT  Clinic BP Readin/81 mm Hg    MEDICATIONS    Diabetes Medications:     Basaglar 40 units AM, 35 units PM  Novolog 12 units TID with meals  Januvia 100mg    HTN Medications: Hydrochlorothiazide 25mg    Hyperlipidemia Medications: Atorvastatin 20mg     On Statin: Yes    On Aspirin: Yes    On ACE-I or ARB for HTN or Microalbuminuria: No    Medication Adherence/Cost/Adverse Events: No reports of missed doses    Assessment/Plan     Diabetes Management: Patient reports improved blood glucose readings from previous visit, attributed to administering mealtime insulin before meals instead of after. Counseled patient on diet and exercise, including maintaining intake of salad and current exercise regimen. Patient is within goal for blood sugar readings, and was counseled to continue current regimen. Patient has follow-up PCP appointment tomorrow, . Next Follow-Up: 2 weeks    Patient verbalized understanding of care plan.  Patient advised to call Mediation Management with any questions, concerns, or changes prior to next appointment    Education   The following education was provided during today's visit:     [x] Medication adherence   [] Insulin technique and storage   [x] Healthy lifestyle including diet and exercise changes   [x] Hypoglycemia

## 2018-07-13 ENCOUNTER — HOSPITAL ENCOUNTER (OUTPATIENT)
Age: 53
Setting detail: SPECIMEN
Discharge: HOME | End: 2018-07-13
Payer: MEDICARE

## 2018-07-13 ENCOUNTER — OFFICE VISIT (OUTPATIENT)
Dept: FAMILY MEDICINE CLINIC | Age: 53
End: 2018-07-13
Payer: MEDICARE

## 2018-07-13 VITALS
TEMPERATURE: 98.4 F | SYSTOLIC BLOOD PRESSURE: 128 MMHG | BODY MASS INDEX: 43.2 KG/M2 | HEIGHT: 63 IN | DIASTOLIC BLOOD PRESSURE: 84 MMHG | WEIGHT: 243.8 LBS | HEART RATE: 114 BPM

## 2018-07-13 DIAGNOSIS — Z79.4 TYPE 2 DIABETES MELLITUS WITHOUT COMPLICATION, WITH LONG-TERM CURRENT USE OF INSULIN (HCC): Primary | ICD-10-CM

## 2018-07-13 DIAGNOSIS — E11.9 TYPE 2 DIABETES MELLITUS WITHOUT COMPLICATION, WITH LONG-TERM CURRENT USE OF INSULIN (HCC): Primary | ICD-10-CM

## 2018-07-13 DIAGNOSIS — G47.30 SLEEP APNEA, UNSPECIFIED TYPE: ICD-10-CM

## 2018-07-13 DIAGNOSIS — Z00.00 HEALTH CARE MAINTENANCE: ICD-10-CM

## 2018-07-13 LAB
CREATININE URINE: 226 MG/DL (ref 28–217)
MICROALBUMIN/CREAT 24H UR: <12 MG/L
MICROALBUMIN/CREAT UR-RTO: ABNORMAL MCG/MG CREAT

## 2018-07-13 PROCEDURE — 1036F TOBACCO NON-USER: CPT | Performed by: STUDENT IN AN ORGANIZED HEALTH CARE EDUCATION/TRAINING PROGRAM

## 2018-07-13 PROCEDURE — 99213 OFFICE O/P EST LOW 20 MIN: CPT | Performed by: STUDENT IN AN ORGANIZED HEALTH CARE EDUCATION/TRAINING PROGRAM

## 2018-07-13 PROCEDURE — 2022F DILAT RTA XM EVC RTNOPTHY: CPT | Performed by: STUDENT IN AN ORGANIZED HEALTH CARE EDUCATION/TRAINING PROGRAM

## 2018-07-13 PROCEDURE — G8427 DOCREV CUR MEDS BY ELIG CLIN: HCPCS | Performed by: STUDENT IN AN ORGANIZED HEALTH CARE EDUCATION/TRAINING PROGRAM

## 2018-07-13 PROCEDURE — 3046F HEMOGLOBIN A1C LEVEL >9.0%: CPT | Performed by: STUDENT IN AN ORGANIZED HEALTH CARE EDUCATION/TRAINING PROGRAM

## 2018-07-13 PROCEDURE — G8417 CALC BMI ABV UP PARAM F/U: HCPCS | Performed by: STUDENT IN AN ORGANIZED HEALTH CARE EDUCATION/TRAINING PROGRAM

## 2018-07-13 PROCEDURE — 3017F COLORECTAL CA SCREEN DOC REV: CPT | Performed by: STUDENT IN AN ORGANIZED HEALTH CARE EDUCATION/TRAINING PROGRAM

## 2018-07-13 PROCEDURE — 82043 UR ALBUMIN QUANTITATIVE: CPT | Performed by: STUDENT IN AN ORGANIZED HEALTH CARE EDUCATION/TRAINING PROGRAM

## 2018-07-13 ASSESSMENT — ENCOUNTER SYMPTOMS
CHEST TIGHTNESS: 0
APNEA: 1
BACK PAIN: 0
STRIDOR: 0
WHEEZING: 0
COUGH: 1
SHORTNESS OF BREATH: 0

## 2018-07-13 NOTE — PROGRESS NOTES
Attending Physician Statement  I have discussed the care of Sofia Novoa, including pertinent history and exam findings,  with the resident. I have reviewed the key elements of all parts of the encounter with the resident. I agree with the assessment, plan and orders as documented by the resident. (Justyn Samayoa) - Anita Caceres M.D  Vitals:    07/13/18 1342   BP: 128/84   Pulse:    Temp:      History of milana - treated in past. Sleep study.
wheezing and stridor. Cardiovascular: Negative for chest pain, palpitations and leg swelling. Endocrine: Negative for polydipsia, polyphagia and polyuria. Genitourinary: Negative for frequency. Musculoskeletal: Negative for arthralgias, back pain and joint swelling. Objective:    /84 Comment: manual  Pulse 114   Temp 98.4 °F (36.9 °C) (Oral)   Ht 5' 3\" (1.6 m)   Wt 243 lb 12.8 oz (110.6 kg)   LMP 12/08/2015 (Approximate)   BMI 43.19 kg/m²    BP Readings from Last 3 Encounters:   07/13/18 128/84   06/21/18 135/81   05/22/18 138/84       Physical Exam   Cardiovascular: Normal rate, regular rhythm and normal heart sounds. Pulmonary/Chest: Effort normal and breath sounds normal. She has no wheezes. Lab Results   Component Value Date    WBC 10.7 10/10/2017    HGB 12.6 10/10/2017    HCT 38.5 10/10/2017     10/10/2017    CHOL 179 11/21/2017    TRIG 72 11/21/2017    HDL 70 11/21/2017    ALT 12 10/10/2017    AST 14 10/10/2017     10/10/2017    K 4.3 10/10/2017     10/10/2017    CREATININE 0.92 (H) 10/10/2017    BUN 9 10/10/2017    CO2 24 10/10/2017    TSH 2.79 07/21/2015    LABA1C 10.0 05/22/2018    LABMICR 8 02/06/2017     Lab Results   Component Value Date    CALCIUM 9.2 10/10/2017    PHOS 3.8 08/12/2012     Lab Results   Component Value Date    LDLCHOLESTEROL 95 11/21/2017       Assessment and Plan:    1. Type 2 diabetes mellitus without complication, with long-term current use of insulin (HCC)  - Continue current regimen  - Scheduled to get eye exam done this month  - Microalbumin, Ur  - Check a1c next visit    2. Sleep apnea, unspecified type  - Couldn't find recent records for sleep apnea  - Sleep Study with PAP Titration; Future    3. Health care maintenance    - [facility])          Requested Prescriptions      No prescriptions requested or ordered in this encounter       There are no discontinued medications.     Sergei Cohen received counseling on the

## 2018-07-23 ENCOUNTER — HOSPITAL ENCOUNTER (OUTPATIENT)
Dept: SLEEP CENTER | Age: 53
Discharge: HOME | End: 2018-07-25
Payer: MEDICARE

## 2018-07-23 DIAGNOSIS — G47.30 SLEEP APNEA, UNSPECIFIED TYPE: ICD-10-CM

## 2018-07-23 PROCEDURE — 95811 POLYSOM 6/>YRS CPAP 4/> PARM: CPT

## 2018-07-26 ENCOUNTER — TELEPHONE (OUTPATIENT)
Dept: PHARMACY | Age: 53
End: 2018-07-26

## 2018-07-26 DIAGNOSIS — Z79.4 TYPE 2 DIABETES MELLITUS WITH HYPEROSMOLARITY WITHOUT COMA, WITH LONG-TERM CURRENT USE OF INSULIN (HCC): ICD-10-CM

## 2018-07-26 DIAGNOSIS — E11.00 TYPE 2 DIABETES MELLITUS WITH HYPEROSMOLARITY WITHOUT COMA, WITH LONG-TERM CURRENT USE OF INSULIN (HCC): ICD-10-CM

## 2018-07-27 RX ORDER — INSULIN ASPART 100 [IU]/ML
INJECTION, SOLUTION INTRAVENOUS; SUBCUTANEOUS
Qty: 15 ML | Refills: 3 | Status: SHIPPED | OUTPATIENT
Start: 2018-07-27 | End: 2019-08-07 | Stop reason: SDUPTHER

## 2018-08-02 ENCOUNTER — TELEPHONE (OUTPATIENT)
Dept: PHARMACY | Age: 53
End: 2018-08-02

## 2018-08-02 NOTE — TELEPHONE ENCOUNTER
Medication Management Service  [facility]  [phone]      Lisa Chavez is a 47 y.o. female who was called by pharmacy for diabetes management. Second attempt made to reach patient by telephone. Left message for patient to return phone call to [phone]. Will continue to attempt to contact patient by telephone as appropriate.

## 2018-08-03 NOTE — TELEPHONE ENCOUNTER
Medication Management Service  [facility]  [phone]      Genie Ford is a 47 y.o. female who was called by pharmacy for management of diabetes. Subjective/Objective     New Problems/Changes: Patient has started taking bolus insulin 15min before meals    DIET: Has been increasing salads    EXERCISE  Patient reports increased walking. WEIGHT 243 lbs    DIABETES MANAGEMENT  Most Recent A1c: 10.0%    Home Glucose Readings  FB-110 mg/dl  PP-164 mg/dl    Hypoglycemia: No reports of s/sx of hypoglycemia    Microalbumin/Creatinine Ratio: <12    MEDICATIONS    Diabetes Medications:   Basaglar 40 units QAM and 35 units QPM  Novolog 12 units TID WM  Januvia 100mg daily    HTN Medications:   Hydrochlorothiazide 25 mg daily    Hyperlipidemia Medications:   Atorvastatin 20mg daily    On Statin: Yes    On Aspirin: Yes    On ACE-I or ARB for HTN or Microalbuminuria: No    Medication Adherence/Cost/Adverse Events: None reported    Assessment/Plan     Diabetes Management: Patient states her blood sugars have been increasingly in range with no reports of hypoglycemia s/sx. Encouraged patient to continue current regimen     Next Follow-Up: PCP appt for A1C check on 18    Patient verbalized understanding of care plan. Patient advised to call Mediation Management with any questions, concerns, or changes prior to next appointment    Education   The following education was provided during today's visit:     [x] Medication adherence   [] Insulin technique and storage   [x] Healthy lifestyle including diet and exercise changes   [] Hypoglycemia symptoms and management   [x] Blood glucose goals     Branden GalarzaD  PGY-2 [address] Medication Management  Phone: [phone]  8/3/2018 12:07 PM        I have discussed the care of this patient with the resident. I agree with the assessment and plan as documented by the resident. Leif Lezama, Pharm. D., [address] Medication Management Service  [phone]  8/6/2018  8:30 AM        Time Spent: 20 minutes, phone-call

## 2018-08-14 ENCOUNTER — OFFICE VISIT (OUTPATIENT)
Dept: FAMILY MEDICINE CLINIC | Age: 53
End: 2018-08-14
Payer: MEDICARE

## 2018-08-14 VITALS
WEIGHT: 244 LBS | SYSTOLIC BLOOD PRESSURE: 121 MMHG | BODY MASS INDEX: 43.23 KG/M2 | HEIGHT: 63 IN | TEMPERATURE: 97.2 F | DIASTOLIC BLOOD PRESSURE: 79 MMHG | HEART RATE: 113 BPM

## 2018-08-14 DIAGNOSIS — E11.9 TYPE 2 DIABETES MELLITUS WITHOUT COMPLICATION, WITH LONG-TERM CURRENT USE OF INSULIN (HCC): Primary | ICD-10-CM

## 2018-08-14 DIAGNOSIS — I10 ESSENTIAL HYPERTENSION: ICD-10-CM

## 2018-08-14 DIAGNOSIS — Z79.4 TYPE 2 DIABETES MELLITUS WITHOUT COMPLICATION, WITH LONG-TERM CURRENT USE OF INSULIN (HCC): Primary | ICD-10-CM

## 2018-08-14 LAB — HBA1C MFR BLD: 7.8 %

## 2018-08-14 PROCEDURE — 83036 HEMOGLOBIN GLYCOSYLATED A1C: CPT | Performed by: STUDENT IN AN ORGANIZED HEALTH CARE EDUCATION/TRAINING PROGRAM

## 2018-08-14 PROCEDURE — 1036F TOBACCO NON-USER: CPT | Performed by: STUDENT IN AN ORGANIZED HEALTH CARE EDUCATION/TRAINING PROGRAM

## 2018-08-14 PROCEDURE — 99213 OFFICE O/P EST LOW 20 MIN: CPT | Performed by: STUDENT IN AN ORGANIZED HEALTH CARE EDUCATION/TRAINING PROGRAM

## 2018-08-14 PROCEDURE — 2022F DILAT RTA XM EVC RTNOPTHY: CPT | Performed by: STUDENT IN AN ORGANIZED HEALTH CARE EDUCATION/TRAINING PROGRAM

## 2018-08-14 PROCEDURE — 3045F PR MOST RECENT HEMOGLOBIN A1C LEVEL 7.0-9.0%: CPT | Performed by: STUDENT IN AN ORGANIZED HEALTH CARE EDUCATION/TRAINING PROGRAM

## 2018-08-14 PROCEDURE — G8417 CALC BMI ABV UP PARAM F/U: HCPCS | Performed by: STUDENT IN AN ORGANIZED HEALTH CARE EDUCATION/TRAINING PROGRAM

## 2018-08-14 PROCEDURE — G8427 DOCREV CUR MEDS BY ELIG CLIN: HCPCS | Performed by: STUDENT IN AN ORGANIZED HEALTH CARE EDUCATION/TRAINING PROGRAM

## 2018-08-14 PROCEDURE — 3017F COLORECTAL CA SCREEN DOC REV: CPT | Performed by: STUDENT IN AN ORGANIZED HEALTH CARE EDUCATION/TRAINING PROGRAM

## 2018-08-14 ASSESSMENT — ENCOUNTER SYMPTOMS
CHEST TIGHTNESS: 0
COUGH: 0
NAUSEA: 0
EYE REDNESS: 0
PHOTOPHOBIA: 0
WHEEZING: 0
SHORTNESS OF BREATH: 0
ABDOMINAL PAIN: 0
BLOOD IN STOOL: 0
EYE PAIN: 0
SORE THROAT: 0
BACK PAIN: 0
DIARRHEA: 0
CONSTIPATION: 0
EYE DISCHARGE: 0

## 2018-08-14 NOTE — PROGRESS NOTES
Musculoskeletal: Negative for back pain and joint swelling. Neurological: Negative for light-headedness and headaches. Psychiatric/Behavioral: Negative for agitation and confusion. Objective:    /79 (Site: Right Arm, Position: Sitting, Cuff Size: Medium Adult) Comment: machine  Pulse 113   Temp 97.2 °F (36.2 °C) (Temporal)   Ht 5' 2.99\" (1.6 m)   Wt 244 lb (110.7 kg)   LMP 12/08/2015 (Approximate)   BMI 43.23 kg/m²    BP Readings from Last 3 Encounters:   08/14/18 121/79   07/13/18 128/84   06/21/18 135/81       Physical Exam   Eyes: Conjunctivae are normal. No scleral icterus. Neck: Normal range of motion. Neck supple. No thyromegaly present. Cardiovascular: Normal rate, regular rhythm and normal heart sounds. Pulmonary/Chest: Effort normal and breath sounds normal. She has no wheezes. She has no rales. She exhibits no tenderness. Musculoskeletal: She exhibits no edema or tenderness. Skin: No erythema. No pallor. Lab Results   Component Value Date    WBC 10.7 10/10/2017    HGB 12.6 10/10/2017    HCT 38.5 10/10/2017     10/10/2017    CHOL 179 11/21/2017    TRIG 72 11/21/2017    HDL 70 11/21/2017    ALT 12 10/10/2017    AST 14 10/10/2017     10/10/2017    K 4.3 10/10/2017     10/10/2017    CREATININE 0.92 (H) 10/10/2017    BUN 9 10/10/2017    CO2 24 10/10/2017    TSH 2.79 07/21/2015    LABA1C 7.8 08/14/2018    LABMICR CANNOT BE CALCULATED 07/13/2018     Lab Results   Component Value Date    CALCIUM 9.2 10/10/2017    PHOS 3.8 08/12/2012     Lab Results   Component Value Date    LDLCHOLESTEROL 95 11/21/2017       Assessment and Plan:    1. Type 2 diabetes mellitus without complication, with long-term current use of insulin (HCC)    - POCT glycosylated hemoglobin (Hb A1C)- 7.8  - Continue current regimen  - Eye and foot exam done recently    2. Essential hypertension    - Well controlled  - Control current regiment    3.  Health maintainace  - b/l mastectomy done in 2012 does not need mammogram.  Follows with heme/onc. Requested Prescriptions      No prescriptions requested or ordered in this encounter       There are no discontinued medications. Thelma Mckeon received counseling on the following healthy behaviors: nutrition, exercise and medication adherence    Discussed use, benefit, and side effects of prescribed medications. Barriers to medication compliance addressed. All patient questions answered. Pt voiced understanding. Return in about 3 months (around 11/14/2018).

## 2018-08-14 NOTE — PROGRESS NOTES
Diabetic visit information    BP Readings from Last 3 Encounters:   08/14/18 121/79   07/13/18 128/84   06/21/18 135/81       Hemoglobin A1C (%)   Date Value   05/22/2018 10.0   03/20/2018 7.4   12/19/2017 6.5     Microalb/Crt. Ratio (mcg/mg creat)   Date Value   07/13/2018 CANNOT BE CALCULATED     LDL Cholesterol (mg/dL)   Date Value   11/21/2017 95               Have you changed or started any medications since your last visit including any over-the-counter medicines, vitamins, or herbal medicines? no   Have you stopped taking any of your medications? Is so, why? -  no  Are you having any side effects from any of your medications? - no    Have you seen any other physician or provider since your last visit?  no   Have you had any other diagnostic tests since your last visit?  no   Have you been seen in the emergency room and/or had an admission in a hospital since we last saw you?  no     Have you had your annual diabetic retinal (eye) exam? No   (ensure copy of exam is in the chart)    Have you had your routine dental cleaning in the past 6 months? no    Do you have an active [employer] account? If not, what are your barriers? Yes    Patient Care Team:  Ajit Leblanc MD as PCP - General (Family Medicine)  Genna Brooks MD as PCP - S Attributed Provider  Pricila Rocha MD as Consulting Physician (Hematology and Oncology)  Jaime Chong MD (General Surgery)  Katherin Hassan DO as Consulting Physician (General Surgery)    Medical history Review  Past Medical, Family, and Social History reviewed and does contribute to the patient presenting condition.     Health Maintenance   Topic Date Due    Breast cancer screen  07/17/2013    Shingles Vaccine (1 of 2 - 2 Dose Series) 04/12/2016    Diabetic retinal exam  06/01/2016    A1C test (Diabetic or Prediabetic)  08/22/2018    Flu vaccine (1) 09/01/2018    Potassium monitoring  10/10/2018    Creatinine monitoring  10/10/2018    Lipid screen  11/21/2018   

## 2018-08-23 ENCOUNTER — TELEPHONE (OUTPATIENT)
Dept: PHARMACY | Age: 53
End: 2018-08-23

## 2018-08-23 NOTE — TELEPHONE ENCOUNTER
[place]  PharmD Candidate 2019 8/23/2018  3:58 PM     I have discussed the care of this patient with the student. I agree with the assessment and plan as documented by the student. Mariza Avina, Pharm. D., [address] [facility] Medication Management Service  [phone]  8/30/2018  10:47 AM

## 2018-08-29 DIAGNOSIS — Z79.4 TYPE 2 DIABETES MELLITUS WITHOUT COMPLICATION, WITH LONG-TERM CURRENT USE OF INSULIN (HCC): ICD-10-CM

## 2018-08-29 DIAGNOSIS — E11.9 TYPE 2 DIABETES MELLITUS WITHOUT COMPLICATION, WITH LONG-TERM CURRENT USE OF INSULIN (HCC): ICD-10-CM

## 2018-08-29 NOTE — TELEPHONE ENCOUNTER
Medication is on med list please review and address    Please address the medication refill and close the encounter. If I can be of assistance, please route to the applicable pool. Thank you. Last visit:n/a  Last Med refill:n/a    Next Visit Date:  Future Appointments  Date Time Provider Kota Sanz   9/14/2018 3:00 PM MD Yahaira Sultana  [facility]   10/9/2018 10:00 AM SCHEDULE, [facility]   10/16/2018 1:00 PM Temi Giraldo MD [address] [facility] Maintenance   Topic Date Due    Shingles Vaccine (1 of 2 - 2 Dose Series) 04/12/2016    Diabetic retinal exam  06/01/2016    Flu vaccine (1) 09/01/2018    Potassium monitoring  10/10/2018    Creatinine monitoring  10/10/2018    Lipid screen  11/21/2018    Colon Cancer Screen FIT/FOBT  11/21/2018    Diabetic foot exam  05/22/2019    Diabetic microalbuminuria test  07/13/2019    A1C test (Diabetic or Prediabetic)  08/14/2019    Cervical cancer screen  06/21/2023    DTaP/Tdap/Td vaccine (2 - Td) 02/06/2027    Breast cancer screen  Completed    Pneumococcal med risk  Completed    HIV screen  Completed       Hemoglobin A1C (%)   Date Value   08/14/2018 7.8   05/22/2018 10.0   03/20/2018 7.4             ( goal A1C is < 7)   Microalb/Crt.  Ratio (mcg/mg creat)   Date Value   07/13/2018 CANNOT BE CALCULATED     LDL Cholesterol (mg/dL)   Date Value   11/21/2017 95   08/16/2016 59       (goal LDL is <100)   AST (U/L)   Date Value   10/10/2017 14     ALT (U/L)   Date Value   10/10/2017 12     BUN (mg/dL)   Date Value   10/10/2017 9     BP Readings from Last 3 Encounters:   08/14/18 121/79   07/13/18 128/84   06/21/18 135/81          (goal 120/80)    All Future Testing planned in [facility]  Lab Frequency Next Occurrence   GYN Cytology Once 07/20/2018   Comprehensive Metabolic Panel     CBC Auto Differential     Ferritin                 Patient Active Problem List:     Cancer ([address]     Pain, knee     Depression

## 2018-08-30 RX ORDER — SITAGLIPTIN 100 MG/1
100 TABLET, FILM COATED ORAL DAILY
Qty: 30 TABLET | Refills: 11 | Status: SHIPPED | OUTPATIENT
Start: 2018-08-30 | End: 2019-08-20 | Stop reason: SDUPTHER

## 2018-09-06 LAB — STATUS: NORMAL

## 2018-09-27 ENCOUNTER — TELEPHONE (OUTPATIENT)
Dept: FAMILY MEDICINE CLINIC | Age: 53
End: 2018-09-27

## 2018-10-01 ENCOUNTER — HOSPITAL ENCOUNTER (OUTPATIENT)
Facility: MEDICAL CENTER | Age: 53
End: 2018-10-01
Payer: MEDICARE

## 2018-10-10 ENCOUNTER — HOSPITAL ENCOUNTER (OUTPATIENT)
Facility: MEDICAL CENTER | Age: 53
Discharge: HOME | End: 2018-10-10
Payer: MEDICARE

## 2018-10-10 ENCOUNTER — HOSPITAL ENCOUNTER (OUTPATIENT)
Facility: MEDICAL CENTER | Age: 53
End: 2018-10-10
Payer: MEDICARE

## 2018-10-10 DIAGNOSIS — D50.8 OTHER IRON DEFICIENCY ANEMIA: Primary | ICD-10-CM

## 2018-10-10 DIAGNOSIS — C50.912 MALIGNANT NEOPLASM OF LEFT FEMALE BREAST (HCC): ICD-10-CM

## 2018-10-10 LAB
ABSOLUTE EOS #: 0.2 K/UL (ref 0–0.4)
ABSOLUTE IMMATURE GRANULOCYTE: ABNORMAL K/UL (ref 0–0.3)
ABSOLUTE LYMPH #: 3.1 K/UL (ref 1–4.8)
ABSOLUTE MONO #: 0.4 K/UL (ref 0.2–0.8)
ALBUMIN SERPL-MCNC: 4.1 G/DL (ref 3.5–5.2)
ALBUMIN/GLOBULIN RATIO: ABNORMAL (ref 1–2.5)
ALP BLD-CCNC: 97 U/L (ref 35–104)
ALT SERPL-CCNC: 12 U/L (ref 5–33)
ANION GAP SERPL CALCULATED.3IONS-SCNC: 14 MMOL/L (ref 9–17)
AST SERPL-CCNC: 15 U/L
BASOPHILS # BLD: 0 % (ref 0–2)
BASOPHILS ABSOLUTE: 0 K/UL (ref 0–0.2)
BILIRUB SERPL-MCNC: 0.28 MG/DL (ref 0.3–1.2)
BUN BLDV-MCNC: 12 MG/DL (ref 6–20)
BUN/CREAT BLD: 12 (ref 9–20)
CALCIUM SERPL-MCNC: 9 MG/DL (ref 8.6–10.4)
CHLORIDE BLD-SCNC: 90 MMOL/L (ref 98–107)
CO2: 30 MMOL/L (ref 20–31)
CREAT SERPL-MCNC: 1 MG/DL (ref 0.5–0.9)
DIFFERENTIAL TYPE: ABNORMAL
EOSINOPHILS RELATIVE PERCENT: 1 % (ref 1–4)
FERRITIN: 882 UG/L (ref 13–150)
GFR AFRICAN AMERICAN: >60 ML/MIN
GFR NON-AFRICAN AMERICAN: 58 ML/MIN
GFR SERPL CREATININE-BSD FRML MDRD: ABNORMAL ML/MIN/{1.73_M2}
GFR SERPL CREATININE-BSD FRML MDRD: ABNORMAL ML/MIN/{1.73_M2}
GLUCOSE BLD-MCNC: 379 MG/DL (ref 70–99)
HCT VFR BLD CALC: 39.3 % (ref 36–46)
HEMOGLOBIN: 12.9 G/DL (ref 12–16)
IMMATURE GRANULOCYTES: ABNORMAL %
LYMPHOCYTES # BLD: 26 % (ref 24–44)
MCH RBC QN AUTO: 28.3 PG (ref 26–34)
MCHC RBC AUTO-ENTMCNC: 33 G/DL (ref 31–37)
MCV RBC AUTO: 85.9 FL (ref 80–100)
MONOCYTES # BLD: 4 % (ref 1–7)
NRBC AUTOMATED: ABNORMAL PER 100 WBC
PDW BLD-RTO: 14.5 % (ref 11.5–14.5)
PLATELET # BLD: 312 K/UL (ref 130–400)
PLATELET ESTIMATE: ABNORMAL
PMV BLD AUTO: 8.1 FL (ref 6–12)
POTASSIUM SERPL-SCNC: 3.4 MMOL/L (ref 3.7–5.3)
RBC # BLD: 4.57 M/UL (ref 4–5.2)
RBC # BLD: ABNORMAL 10*6/UL
SEG NEUTROPHILS: 69 % (ref 36–66)
SEGMENTED NEUTROPHILS ABSOLUTE COUNT: 8 K/UL (ref 1.8–7.7)
SODIUM BLD-SCNC: 134 MMOL/L (ref 135–144)
TOTAL PROTEIN: 8.9 G/DL (ref 6.4–8.3)
WBC # BLD: 11.7 K/UL (ref 3.5–11)
WBC # BLD: ABNORMAL 10*3/UL

## 2018-10-10 PROCEDURE — 36415 COLL VENOUS BLD VENIPUNCTURE: CPT

## 2018-10-10 PROCEDURE — 80053 COMPREHEN METABOLIC PANEL: CPT

## 2018-10-10 PROCEDURE — 82728 ASSAY OF FERRITIN: CPT

## 2018-10-10 PROCEDURE — 85025 COMPLETE CBC W/AUTO DIFF WBC: CPT

## 2018-10-11 DIAGNOSIS — G47.33 OSA (OBSTRUCTIVE SLEEP APNEA): Primary | ICD-10-CM

## 2018-10-16 ENCOUNTER — OFFICE VISIT (OUTPATIENT)
Dept: ONCOLOGY | Age: 53
End: 2018-10-16
Payer: MEDICARE

## 2018-10-16 ENCOUNTER — TELEPHONE (OUTPATIENT)
Dept: ONCOLOGY | Age: 53
End: 2018-10-16

## 2018-10-16 ENCOUNTER — OFFICE VISIT (OUTPATIENT)
Dept: FAMILY MEDICINE CLINIC | Age: 53
End: 2018-10-16
Payer: MEDICARE

## 2018-10-16 VITALS
WEIGHT: 242 LBS | DIASTOLIC BLOOD PRESSURE: 85 MMHG | HEIGHT: 63 IN | HEART RATE: 90 BPM | TEMPERATURE: 97.7 F | SYSTOLIC BLOOD PRESSURE: 130 MMHG | BODY MASS INDEX: 42.88 KG/M2

## 2018-10-16 VITALS
RESPIRATION RATE: 18 BRPM | WEIGHT: 238.9 LBS | TEMPERATURE: 98.4 F | SYSTOLIC BLOOD PRESSURE: 143 MMHG | BODY MASS INDEX: 42.32 KG/M2 | HEART RATE: 111 BPM | DIASTOLIC BLOOD PRESSURE: 94 MMHG

## 2018-10-16 DIAGNOSIS — Z17.0 MALIGNANT NEOPLASM OF NIPPLE OF LEFT BREAST IN FEMALE, ESTROGEN RECEPTOR POSITIVE (HCC): Primary | ICD-10-CM

## 2018-10-16 DIAGNOSIS — K59.00 CONSTIPATION, UNSPECIFIED CONSTIPATION TYPE: ICD-10-CM

## 2018-10-16 DIAGNOSIS — D50.8 OTHER IRON DEFICIENCY ANEMIA: ICD-10-CM

## 2018-10-16 DIAGNOSIS — H91.93 BILATERAL HEARING LOSS, UNSPECIFIED HEARING LOSS TYPE: Primary | ICD-10-CM

## 2018-10-16 DIAGNOSIS — C50.012 MALIGNANT NEOPLASM OF NIPPLE OF LEFT BREAST IN FEMALE, ESTROGEN RECEPTOR POSITIVE (HCC): Primary | ICD-10-CM

## 2018-10-16 PROCEDURE — 1036F TOBACCO NON-USER: CPT | Performed by: INTERNAL MEDICINE

## 2018-10-16 PROCEDURE — G8427 DOCREV CUR MEDS BY ELIG CLIN: HCPCS | Performed by: STUDENT IN AN ORGANIZED HEALTH CARE EDUCATION/TRAINING PROGRAM

## 2018-10-16 PROCEDURE — 1036F TOBACCO NON-USER: CPT | Performed by: STUDENT IN AN ORGANIZED HEALTH CARE EDUCATION/TRAINING PROGRAM

## 2018-10-16 PROCEDURE — G8417 CALC BMI ABV UP PARAM F/U: HCPCS | Performed by: INTERNAL MEDICINE

## 2018-10-16 PROCEDURE — 3017F COLORECTAL CA SCREEN DOC REV: CPT | Performed by: STUDENT IN AN ORGANIZED HEALTH CARE EDUCATION/TRAINING PROGRAM

## 2018-10-16 PROCEDURE — 99214 OFFICE O/P EST MOD 30 MIN: CPT | Performed by: INTERNAL MEDICINE

## 2018-10-16 PROCEDURE — G8484 FLU IMMUNIZE NO ADMIN: HCPCS | Performed by: STUDENT IN AN ORGANIZED HEALTH CARE EDUCATION/TRAINING PROGRAM

## 2018-10-16 PROCEDURE — G8427 DOCREV CUR MEDS BY ELIG CLIN: HCPCS | Performed by: INTERNAL MEDICINE

## 2018-10-16 PROCEDURE — G8484 FLU IMMUNIZE NO ADMIN: HCPCS | Performed by: INTERNAL MEDICINE

## 2018-10-16 PROCEDURE — G8417 CALC BMI ABV UP PARAM F/U: HCPCS | Performed by: STUDENT IN AN ORGANIZED HEALTH CARE EDUCATION/TRAINING PROGRAM

## 2018-10-16 PROCEDURE — 3017F COLORECTAL CA SCREEN DOC REV: CPT | Performed by: INTERNAL MEDICINE

## 2018-10-16 PROCEDURE — 99213 OFFICE O/P EST LOW 20 MIN: CPT | Performed by: STUDENT IN AN ORGANIZED HEALTH CARE EDUCATION/TRAINING PROGRAM

## 2018-10-16 PROCEDURE — 99211 OFF/OP EST MAY X REQ PHY/QHP: CPT

## 2018-10-16 RX ORDER — GABAPENTIN 300 MG/1
CAPSULE ORAL
Refills: 11 | COMMUNITY
Start: 2018-08-07 | End: 2019-07-10 | Stop reason: SDUPTHER

## 2018-10-16 RX ORDER — BUPROPION HYDROCHLORIDE 300 MG/1
TABLET ORAL
Refills: 2 | COMMUNITY
Start: 2018-08-07 | End: 2018-10-16

## 2018-10-16 RX ORDER — MELATONIN 10 MG
CAPSULE ORAL
Refills: 2 | COMMUNITY
Start: 2018-08-07

## 2018-10-16 RX ORDER — DOCUSATE SODIUM 100 MG/1
100 CAPSULE, LIQUID FILLED ORAL 2 TIMES DAILY PRN
Qty: 60 CAPSULE | Refills: 0 | Status: SHIPPED | OUTPATIENT
Start: 2018-10-16 | End: 2018-10-19 | Stop reason: SDUPTHER

## 2018-10-16 ASSESSMENT — ENCOUNTER SYMPTOMS
NAUSEA: 0
COUGH: 0
ABDOMINAL PAIN: 0
CHEST TIGHTNESS: 0
SHORTNESS OF BREATH: 0
SORE THROAT: 0
BLOOD IN STOOL: 0
EYE REDNESS: 0
SINUS PAIN: 0
EYE PAIN: 0
PHOTOPHOBIA: 0
EYE DISCHARGE: 0
BACK PAIN: 0
CONSTIPATION: 0
WHEEZING: 0
DIARRHEA: 0

## 2018-10-16 NOTE — PATIENT INSTRUCTIONS
Thank you for letting us take care of you today. We hope all your questions were addressed. If a question was overlooked or something else comes to mind after you return home, please contact a member of your Care Team listed below. Please make sure you have a routine office visit set up to follow-up on [address]. Your Care Team at [place] [address] is Team #1  Silvia Galeano MD (Faculty)  Alvin Libman, MD (Faculty)  Cherrie Talbert MD (Resident)  Yaz Geronimo MD (Resident)  Christopher Kam MD (Resident)  Neymar Velasco MD (Resident)  Florinda Ramirez., [facility] [address] [facility], ([address])  DALTON Seay, (Clinical Practice Manager)  Marjorie Pérez, Ph.D., (Behavioral Services)  Dennis Middleton, [address] [place] [address] (Clinical Pharmacist)     Office phone number: [phone]    If you need to get in right away due to illness, please be advised we have \"Same Day\" appointments available Monday-Friday. Please call us at [phone] option #3 to schedule your \"Same Day\" appointment.

## 2018-10-16 NOTE — PROGRESS NOTES
I have reviewed and discussed key elements of Mj Cruz with the resident including plan of care and follow up and agree with the care erika plan.

## 2018-10-17 NOTE — PROGRESS NOTES
_      Chief Complaint   Patient presents with    Follow-up     Patient would like to review status of disease         DIAGNOSIS:   1. Relapse of left breast cancer with original diagnosis of a stage T2N1M0, left breast invasive ductal carcinoma with staging after neoadjuvant chemotherapy, ER/SD positive, HER2-jacinta negative, originally diagnosed in the spring of 2006 with recent recurrence of left breast cancer and a small tumor [id number], left breast invasive ductal carcinoma, ER/SD positive, and HER2-jacinta negative. 2. Iron Deficiency Anemia. CURRENT THERAPY:    1. Status post bilateral mastectomy done on 10/10/2012 with no evidence of residual malignancy. 2. Status post 5 years of tamoxifen  between 2006 and 2011.    3. Status post neoadjuvant chemotherapy with Adriamycin and Cytoxan for 4 cycles followed by weekly Taxol for 8 weeks. Chemotherapy was given between June 2006 and October 2006. 4. Status post lumpectomy and lymph node dissection November 2006.  5. Radiation therapy following the lumpectomy. 6. Iron replacement. INTERIM HISTORY:    The patient is here for follow up for her history of left sided invasive ductal carcinoma with recurrence. She had a bilateral mastectomy in 2012. She denies fever, chills, unintentional weight loss, or new masses. She had bariatric surgery in April     No recent problems related to anemia. No weakness or fatigue. No active bleeding. REVIEW OF SYSTEMS:   General: She has weakness and fatigue. No weight loss or decreased appetite. No fever or chills. Eyes: No blurred vision, eye pain or double vision. Ears: No hearing problems or drainage. No tinnitus. Throat: No sore throat, problems with swallowing or dysphagia. Respiratory: No cough, sputum or hemoptysis. No shortness of breath. Cardiovascular: No chest pain, orthopnea or PND. No lower extremity edema. No palpitation. Gastrointestinal: No problems with swallowing. No abdominal pain or bloating.  No (L) 10/10/2017    FERRITIN 882 (H) 10/10/2018       ASSESSMENT:     1. Recurrence of breast cancer as stated above status post mastectomy. Currently in remission  2. Iron deficiency anemia secondary to heavy menstrual periods and possibly malabsorption. Stable. 3. S/p Bariatric surgery April 2015  4. Chest pain s/p mastectomy controlled with [place] and Neurontin  5. Uncontrolled DM on insulin     PLAN:      The patient is doing well in regard to her breast cancer with no evidence of recurrence. We will continue observation. Will continue the Norco same dose for the patient. follow up with us in 12 months. Hb is stable. No recent drop. No signs of bleeding. Continue observation. Patient's questions were answered to the best of her satisfaction and she verbalized full understanding and agreement.                                   [address] [place] [address] [facility]                          Cell: [phone]

## 2018-10-18 ENCOUNTER — TELEPHONE (OUTPATIENT)
Dept: FAMILY MEDICINE CLINIC | Age: 53
End: 2018-10-18

## 2018-10-19 DIAGNOSIS — K59.00 CONSTIPATION, UNSPECIFIED CONSTIPATION TYPE: ICD-10-CM

## 2018-10-19 DIAGNOSIS — E11.9 TYPE 2 DIABETES MELLITUS WITHOUT COMPLICATION, WITH LONG-TERM CURRENT USE OF INSULIN (HCC): ICD-10-CM

## 2018-10-19 DIAGNOSIS — Z79.4 TYPE 2 DIABETES MELLITUS WITHOUT COMPLICATION, WITH LONG-TERM CURRENT USE OF INSULIN (HCC): ICD-10-CM

## 2018-10-22 RX ORDER — DOCUSATE SODIUM 100 MG
100 CAPSULE ORAL 2 TIMES DAILY PRN
Qty: 60 CAPSULE | Refills: 11 | Status: SHIPPED | OUTPATIENT
Start: 2018-10-22 | End: 2019-10-14 | Stop reason: SDUPTHER

## 2018-10-22 RX ORDER — INSULIN GLARGINE 100 [IU]/ML
INJECTION, SOLUTION SUBCUTANEOUS
Qty: 15 ML | Refills: 11 | Status: SHIPPED | OUTPATIENT
Start: 2018-10-22 | End: 2019-07-26 | Stop reason: SDUPTHER

## 2018-10-30 ENCOUNTER — TELEPHONE (OUTPATIENT)
Dept: PHARMACY | Age: 53
End: 2018-10-30

## 2018-10-30 ENCOUNTER — OFFICE VISIT (OUTPATIENT)
Dept: PULMONOLOGY | Age: 53
End: 2018-10-30
Payer: MEDICARE

## 2018-10-30 VITALS
WEIGHT: 240 LBS | OXYGEN SATURATION: 96 % | BODY MASS INDEX: 42.52 KG/M2 | HEIGHT: 63 IN | RESPIRATION RATE: 18 BRPM | DIASTOLIC BLOOD PRESSURE: 91 MMHG | HEART RATE: 94 BPM | SYSTOLIC BLOOD PRESSURE: 128 MMHG

## 2018-10-30 DIAGNOSIS — E11.9 TYPE 2 DIABETES MELLITUS WITHOUT COMPLICATION, WITH LONG-TERM CURRENT USE OF INSULIN (HCC): ICD-10-CM

## 2018-10-30 DIAGNOSIS — Z79.4 TYPE 2 DIABETES MELLITUS WITHOUT COMPLICATION, WITH LONG-TERM CURRENT USE OF INSULIN (HCC): ICD-10-CM

## 2018-10-30 DIAGNOSIS — Z99.89 OSA ON CPAP: Primary | ICD-10-CM

## 2018-10-30 DIAGNOSIS — I10 ESSENTIAL HYPERTENSION: ICD-10-CM

## 2018-10-30 DIAGNOSIS — G47.33 OSA ON CPAP: Primary | ICD-10-CM

## 2018-10-30 PROCEDURE — 99204 OFFICE O/P NEW MOD 45 MIN: CPT | Performed by: INTERNAL MEDICINE

## 2018-10-30 PROCEDURE — 1036F TOBACCO NON-USER: CPT | Performed by: INTERNAL MEDICINE

## 2018-10-30 PROCEDURE — G8484 FLU IMMUNIZE NO ADMIN: HCPCS | Performed by: INTERNAL MEDICINE

## 2018-10-30 PROCEDURE — 3017F COLORECTAL CA SCREEN DOC REV: CPT | Performed by: INTERNAL MEDICINE

## 2018-10-30 PROCEDURE — 3045F PR MOST RECENT HEMOGLOBIN A1C LEVEL 7.0-9.0%: CPT | Performed by: INTERNAL MEDICINE

## 2018-10-30 PROCEDURE — G8427 DOCREV CUR MEDS BY ELIG CLIN: HCPCS | Performed by: INTERNAL MEDICINE

## 2018-10-30 PROCEDURE — 2022F DILAT RTA XM EVC RTNOPTHY: CPT | Performed by: INTERNAL MEDICINE

## 2018-10-30 PROCEDURE — G8417 CALC BMI ABV UP PARAM F/U: HCPCS | Performed by: INTERNAL MEDICINE

## 2018-10-30 ASSESSMENT — SLEEP AND FATIGUE QUESTIONNAIRES
HOW LIKELY ARE YOU TO NOD OFF OR FALL ASLEEP WHILE SITTING QUIETLY AFTER LUNCH WITHOUT ALCOHOL: 0
HOW LIKELY ARE YOU TO NOD OFF OR FALL ASLEEP IN A CAR, WHILE STOPPED FOR A FEW MINUTES IN TRAFFIC: 0
HOW LIKELY ARE YOU TO NOD OFF OR FALL ASLEEP WHILE SITTING INACTIVE IN A PUBLIC PLACE: 0
HOW LIKELY ARE YOU TO NOD OFF OR FALL ASLEEP WHEN YOU ARE A PASSENGER IN A CAR FOR AN HOUR WITHOUT A BREAK: 1
HOW LIKELY ARE YOU TO NOD OFF OR FALL ASLEEP WHILE LYING DOWN TO REST IN THE AFTERNOON WHEN CIRCUMSTANCES PERMIT: 0
HOW LIKELY ARE YOU TO NOD OFF OR FALL ASLEEP WHILE SITTING AND TALKING TO SOMEONE: 0
ESS TOTAL SCORE: 2
HOW LIKELY ARE YOU TO NOD OFF OR FALL ASLEEP WHILE WATCHING TV: 0
HOW LIKELY ARE YOU TO NOD OFF OR FALL ASLEEP WHILE SITTING AND READING: 1

## 2018-10-30 NOTE — PROGRESS NOTES
TOBACCO:   reports that she has never smoked. She has never used smokeless tobacco.  ETOH:   reports that she does not drink alcohol. ALLERGIES:      No Known Allergies      Home Meds:   Prior to Admission medications    Medication Sig Start Date End Date Taking?  Authorizing Provider   BASAGLAR KWIKPEN 100 UNIT/ML injection pen INJECT 60 UNITS SUBCUTANEOUSLY NIGHTLY 10/22/18  Yes Sarah Pierre MD   STOOL SOFTENER 100 MG capsule TAKE 1 CAPSULE BY MOUTH 2 TIMES DAILY AS NEEDED FOR CONSTIPATION 10/22/18  Yes Sarah Pierre MD   Melatonin 10 MG CAPS TAKE 1 CAPSULE BY MOUTH AT NIGHT 8/7/18  Yes Historical Provider, MD   gabapentin (NEURONTIN) 300 MG capsule TAKE 1 CAPSULE BY MOUTH THREE TIMES DAILY 8/7/18  Yes Historical Provider, MD   JANUVIA 100 MG tablet TAKE 1 TABLET BY MOUTH DAILY 8/30/18  Yes Sarah Pierre MD   NOVOLOG FLEXPEN 100 UNIT/ML injection pen INJECT SUBCUTANEOUSLY 4 UNITS THREE TIMES A DAY BEFORE MEALS 7/27/18  Yes Daniele Thompson MD   aspirin 81 MG EC tablet TAKE (1) TABLET BY MOUTH ONCE DAILY 6/25/18  Yes Jordyn Ferro MD   topiramate (TOPAMAX) 200 MG tablet TAKE 2 TABLETS BY MOUTH 2 TIMES DAILY 6/25/18  Yes Jordyn Ferro MD   hydrochlorothiazide (HYDRODIURIL) 25 MG tablet TAKE (1) TABLET BY MOUTH DAILY 6/25/18  Yes Jordyn Ferro MD   ferrous sulfate 325 (65 Fe) MG tablet TAKE ONE (1) TABLET BY MOUTH THREE TIMES DAILY 6/1/18 9/1/19 Yes Daryl Betancur MD   ONETOUCH DELICA LANCETS 65N MISC USE TO TEST BLOOD SUGAR DAILY 5/29/18  Yes Sebas Caballero MD   ONE TOUCH ULTRA TEST strip USE DAILY TO TEST SUGARS 5/29/18  Yes Sebas Caballero MD   Multiple Vitamins-Minerals (THERAPEUTIC MULTIVITAMIN-MINERALS) tablet Take 1 tablet by mouth daily 4/25/18  Yes Sebas Caballero MD   EASY TOUCH PEN NEEDLES 31G X 6 MM MISC USE DAILY AS DIRECTED 4/25/18  Yes Sebas Caballero MD   ibuprofen (ADVIL;MOTRIN) 600 MG tablet Take 1 tablet by mouth every 6 hours as needed for Pain 9/11/17  Yes Sebas Caballero MD   nystatin (MYCOSTATIN) 784649 UNIT/GM cream Apply topically 2 times daily. 7/21/17  Yes Tim Gillis, DO   Gauze Pads & Dressings (CURITY IODOFORM PACKING STRIP) [address] [facility] 1 each by Does not apply route daily 7/21/17  Yes Tim Gillis DO   Gauze Pads & Dressings (GAUZE DRESSING) 4\"X4\" PADS 1 each by Does not apply route daily 7/21/17  Yes Tim Gillis, DO   cyclobenzaprine (FLEXERIL) 10 MG tablet TAKE (1) TABLET BY MOUTH EVERY 8 HOURS AS NEEDED FOR MUSCLE SPASMS 5/19/17  Yes Ryder Betancur MD   zolpidem (AMBIEN) 5 MG tablet Take 10 mg by mouth nightly as needed for Sleep. Razia Delgado Historical Provider, MD   risperiDONE (RISPERDAL) 4 MG tablet  6/13/16  Yes Historical Provider, MD   Blood Glucose Monitoring Suppl (ONE TOUCH ULTRA 2) W/DEVICE KIT  3/2/16  Yes Historical Provider, MD ODONNELL 3CC LUER-AMBAR SYR 02MM3-3/2 22G X 1-1/2\" 3 ML MISC  4/14/16  Yes Historical Provider, MD   atorvastatin (LIPITOR) 20 MG tablet TAKE 1 TABLET BY MOUTH ONCE DAILY 12/8/15  Yes Raffaele Olsen MD   hydrOXYzine (VISTARIL) 50 MG capsule Take 1 capsule by mouth 2 times daily 8/20/15  Yes Historical Provider, MD   benztropine (COGENTIN) 0.5 MG tablet Take 1 tablet by mouth 2 times daily 8/27/15  Yes Historical Provider, MD   buPROPion (WELLBUTRIN XL) 150 MG XL tablet Take 1 tablet by mouth daily 8/13/15  Yes Historical Provider, MD   gabapentin (NEURONTIN) 300 MG capsule TAKE 1 CAPSULE BY MOUTH THREE TIMES DAILY 6/25/18 7/25/18  Dane Lim MD              REVIEW OF SYSTEMS:    CONSTITUTIONAL:  negative for  fevers, chills, sweats, fatigue, malaise, anorexia and weight loss  EYES:  negative for  double vision, blurred vision, dry eyes, eye discharge and redness  HEENT:  negative for  hearing loss, tinnitus, ear drainage, earaches and nasal congestion  RESPIRATORY:  No cough, no shortness of breath.   No hemoptysis  CARDIOVASCULAR:  negative for  chest pain,, palpitations, orthopnea, PND  GASTROINTESTINAL:  negative for Neurologic:                  Soft, non-tender, bowel sounds active all four quadrants,     no masses, no organomegaly         2+ and symmetric all extremities     Skin color, texture, turgor normal, no rashes or lesions       Cervical, supraclavicular not enlarged or matted or tender      CNII-XII intact, normal strength 5/5 . Sensation grossly normal  and reflexes normal 2+  throughout     Clubbing No  Lower ext edema NO  Upper ext edema NO         Thyroid:   Lab Results   Component Value Date    TSH 2.79 07/21/2015                 IMPRESSION:     Diagnosis Orders   1. SUNITHA on CPAP     2. Essential hypertension     3. Type 2 diabetes mellitus without complication, with long-term current use of insulin (HCC)          :                PLAN:       Continue CPAP at 10 cm of water pressure  Compliance report requested   CPAP at least 4 hrs qhs  Wt loss is recommended and discussed  Follow good sleep hygeine instructions  Use humidifier   Questions answered pertaining to diagnosis and management explained importance of compliance with therapy     Return in 6 months       Requested Prescriptions      No prescriptions requested or ordered in this encounter       There are no discontinued medications. Spencer Hall received counseling on the following healthy behaviors: nutrition, exercise and medication adherence    Patient given educational materials : see patient instruction       Discussed use, benefit, and side effects of prescribed medications. Barriers to medication compliance addressed. All patient questions answered. Pt voiced understanding. I hope this updates you on my evaluation and clinical thinking. Thank you for allowing me to participate in his care. Sincerely,      Electronically signed by Liliane Do MD on   10/30/18 at 3:12 PM       Please note that this chart was generated using voice recognition Dragon dictation software.   Although every effort was made to ensure the accuracy of

## 2018-11-20 DIAGNOSIS — E11.9 TYPE 2 DIABETES MELLITUS WITHOUT COMPLICATION, WITH LONG-TERM CURRENT USE OF INSULIN (HCC): ICD-10-CM

## 2018-11-20 DIAGNOSIS — Z79.4 TYPE 2 DIABETES MELLITUS WITHOUT COMPLICATION, WITH LONG-TERM CURRENT USE OF INSULIN (HCC): ICD-10-CM

## 2018-11-24 RX ORDER — ASPIRIN 81 MG
TABLET, DELAYED RELEASE (ENTERIC COATED) ORAL
Qty: 60 TABLET | Refills: 11 | Status: SHIPPED | OUTPATIENT
Start: 2018-11-24 | End: 2019-09-24 | Stop reason: SDUPTHER

## 2018-11-28 ENCOUNTER — TELEPHONE (OUTPATIENT)
Dept: PHARMACY | Age: 53
End: 2018-11-28

## 2018-11-28 NOTE — TELEPHONE ENCOUNTER
Medication Management Service  [facility]  [phone]      Gladys Chou is a 47 y.o. female who was called by pharmacy for diabetes. First attempt made to reach patient by telephone. Left message for patient to return phone call to [phone]. Will continue to attempt to contact patient by telephone as appropriate.     [facility]  Medication Management Service  [phone]

## 2018-12-06 ENCOUNTER — TELEPHONE (OUTPATIENT)
Dept: PHARMACY | Age: 53
End: 2018-12-06

## 2018-12-28 ENCOUNTER — TELEPHONE (OUTPATIENT)
Dept: ADMINISTRATIVE | Age: 53
End: 2018-12-28

## 2018-12-28 NOTE — TELEPHONE ENCOUNTER
Becca Pt Ms Heber Davis is calling to see if Melanie Madera faxed over her form to the [facility], tried calling the office, went to , [facility] states they have not received it yet, can call pt at [phone], thanks [address].

## 2019-01-07 ENCOUNTER — OFFICE VISIT (OUTPATIENT)
Dept: FAMILY MEDICINE CLINIC | Age: 54
End: 2019-01-07
Payer: MEDICARE

## 2019-01-07 VITALS
WEIGHT: 230.6 LBS | SYSTOLIC BLOOD PRESSURE: 111 MMHG | BODY MASS INDEX: 40.86 KG/M2 | TEMPERATURE: 97 F | HEART RATE: 106 BPM | HEIGHT: 63 IN | DIASTOLIC BLOOD PRESSURE: 70 MMHG

## 2019-01-07 DIAGNOSIS — E11.9 TYPE 2 DIABETES MELLITUS WITHOUT COMPLICATION, WITH LONG-TERM CURRENT USE OF INSULIN (HCC): Primary | ICD-10-CM

## 2019-01-07 DIAGNOSIS — Z79.4 TYPE 2 DIABETES MELLITUS WITHOUT COMPLICATION, WITH LONG-TERM CURRENT USE OF INSULIN (HCC): Primary | ICD-10-CM

## 2019-01-07 DIAGNOSIS — M17.12 PRIMARY OSTEOARTHRITIS OF LEFT KNEE: ICD-10-CM

## 2019-01-07 DIAGNOSIS — Z12.11 COLON CANCER SCREENING: ICD-10-CM

## 2019-01-07 LAB — HBA1C MFR BLD: 8.8 %

## 2019-01-07 PROCEDURE — 83036 HEMOGLOBIN GLYCOSYLATED A1C: CPT | Performed by: STUDENT IN AN ORGANIZED HEALTH CARE EDUCATION/TRAINING PROGRAM

## 2019-01-07 PROCEDURE — 99213 OFFICE O/P EST LOW 20 MIN: CPT | Performed by: STUDENT IN AN ORGANIZED HEALTH CARE EDUCATION/TRAINING PROGRAM

## 2019-01-07 PROCEDURE — 1036F TOBACCO NON-USER: CPT | Performed by: STUDENT IN AN ORGANIZED HEALTH CARE EDUCATION/TRAINING PROGRAM

## 2019-01-07 PROCEDURE — 2022F DILAT RTA XM EVC RTNOPTHY: CPT | Performed by: STUDENT IN AN ORGANIZED HEALTH CARE EDUCATION/TRAINING PROGRAM

## 2019-01-07 PROCEDURE — G8427 DOCREV CUR MEDS BY ELIG CLIN: HCPCS | Performed by: STUDENT IN AN ORGANIZED HEALTH CARE EDUCATION/TRAINING PROGRAM

## 2019-01-07 PROCEDURE — 3017F COLORECTAL CA SCREEN DOC REV: CPT | Performed by: STUDENT IN AN ORGANIZED HEALTH CARE EDUCATION/TRAINING PROGRAM

## 2019-01-07 PROCEDURE — 3045F PR MOST RECENT HEMOGLOBIN A1C LEVEL 7.0-9.0%: CPT | Performed by: STUDENT IN AN ORGANIZED HEALTH CARE EDUCATION/TRAINING PROGRAM

## 2019-01-07 PROCEDURE — 99211 OFF/OP EST MAY X REQ PHY/QHP: CPT | Performed by: STUDENT IN AN ORGANIZED HEALTH CARE EDUCATION/TRAINING PROGRAM

## 2019-01-07 PROCEDURE — G8417 CALC BMI ABV UP PARAM F/U: HCPCS | Performed by: STUDENT IN AN ORGANIZED HEALTH CARE EDUCATION/TRAINING PROGRAM

## 2019-01-07 PROCEDURE — G8484 FLU IMMUNIZE NO ADMIN: HCPCS | Performed by: STUDENT IN AN ORGANIZED HEALTH CARE EDUCATION/TRAINING PROGRAM

## 2019-01-07 ASSESSMENT — ENCOUNTER SYMPTOMS
DIARRHEA: 0
CONSTIPATION: 0
EYE PAIN: 0
EYE DISCHARGE: 0
PHOTOPHOBIA: 0
SORE THROAT: 0
WHEEZING: 0
EYE REDNESS: 0
SHORTNESS OF BREATH: 0
BACK PAIN: 0
CHEST TIGHTNESS: 0
BLOOD IN STOOL: 0
NAUSEA: 0
COUGH: 0
ABDOMINAL PAIN: 0

## 2019-01-15 ENCOUNTER — HOSPITAL ENCOUNTER (OUTPATIENT)
Dept: PHYSICAL THERAPY | Age: 54
Setting detail: THERAPIES SERIES
Discharge: HOME | End: 2019-01-15
Payer: MEDICARE

## 2019-01-15 PROCEDURE — 97161 PT EVAL LOW COMPLEX 20 MIN: CPT

## 2019-01-15 PROCEDURE — 97110 THERAPEUTIC EXERCISES: CPT

## 2019-01-18 ENCOUNTER — HOSPITAL ENCOUNTER (OUTPATIENT)
Dept: PHYSICAL THERAPY | Age: 54
Setting detail: THERAPIES SERIES
Discharge: HOME | End: 2019-01-18
Payer: MEDICARE

## 2019-01-18 PROCEDURE — 97110 THERAPEUTIC EXERCISES: CPT

## 2019-01-18 PROCEDURE — 97016 VASOPNEUMATIC DEVICE THERAPY: CPT

## 2019-01-22 ENCOUNTER — TELEPHONE (OUTPATIENT)
Dept: PHARMACY | Age: 54
End: 2019-01-22

## 2019-01-22 ENCOUNTER — HOSPITAL ENCOUNTER (OUTPATIENT)
Dept: PHYSICAL THERAPY | Age: 54
Setting detail: THERAPIES SERIES
Discharge: HOME | End: 2019-01-22
Payer: MEDICARE

## 2019-01-22 PROCEDURE — 97016 VASOPNEUMATIC DEVICE THERAPY: CPT

## 2019-01-22 PROCEDURE — 97110 THERAPEUTIC EXERCISES: CPT

## 2019-01-23 PROCEDURE — 94770 HC ETCO2 MONITOR DAILY: CPT

## 2019-01-23 PROCEDURE — 94002 VENT MGMT INPAT INIT DAY: CPT

## 2019-01-23 PROCEDURE — 2700000000 HC OXYGEN THERAPY PER DAY

## 2019-01-25 ENCOUNTER — HOSPITAL ENCOUNTER (OUTPATIENT)
Dept: PHYSICAL THERAPY | Age: 54
Setting detail: THERAPIES SERIES
Discharge: HOME | End: 2019-01-25
Payer: MEDICARE

## 2019-01-28 ENCOUNTER — HOSPITAL ENCOUNTER (OUTPATIENT)
Dept: PHYSICAL THERAPY | Age: 54
Setting detail: THERAPIES SERIES
Discharge: HOME | End: 2019-01-28
Payer: MEDICARE

## 2019-01-28 PROCEDURE — 97110 THERAPEUTIC EXERCISES: CPT

## 2019-01-28 PROCEDURE — 97016 VASOPNEUMATIC DEVICE THERAPY: CPT

## 2019-02-01 ENCOUNTER — HOSPITAL ENCOUNTER (OUTPATIENT)
Dept: PHYSICAL THERAPY | Age: 54
Setting detail: THERAPIES SERIES
Discharge: HOME | End: 2019-02-01
Payer: MEDICARE

## 2019-02-01 PROCEDURE — 97110 THERAPEUTIC EXERCISES: CPT

## 2019-02-01 PROCEDURE — 97016 VASOPNEUMATIC DEVICE THERAPY: CPT

## 2019-02-04 ENCOUNTER — HOSPITAL ENCOUNTER (OUTPATIENT)
Dept: PHYSICAL THERAPY | Age: 54
Setting detail: THERAPIES SERIES
Discharge: HOME | End: 2019-02-04
Payer: MEDICARE

## 2019-02-04 PROCEDURE — 97110 THERAPEUTIC EXERCISES: CPT

## 2019-02-04 PROCEDURE — 97016 VASOPNEUMATIC DEVICE THERAPY: CPT

## 2019-02-08 ENCOUNTER — HOSPITAL ENCOUNTER (OUTPATIENT)
Dept: PHYSICAL THERAPY | Age: 54
Setting detail: THERAPIES SERIES
Discharge: HOME | End: 2019-02-08
Payer: MEDICARE

## 2019-02-08 PROCEDURE — 97016 VASOPNEUMATIC DEVICE THERAPY: CPT

## 2019-02-08 PROCEDURE — 97110 THERAPEUTIC EXERCISES: CPT

## 2019-02-11 ENCOUNTER — HOSPITAL ENCOUNTER (OUTPATIENT)
Dept: PHYSICAL THERAPY | Age: 54
Setting detail: THERAPIES SERIES
Discharge: HOME | End: 2019-02-11
Payer: MEDICARE

## 2019-02-15 ENCOUNTER — HOSPITAL ENCOUNTER (OUTPATIENT)
Dept: PHYSICAL THERAPY | Age: 54
Setting detail: THERAPIES SERIES
Discharge: HOME | End: 2019-02-15
Payer: MEDICARE

## 2019-02-15 PROCEDURE — 97110 THERAPEUTIC EXERCISES: CPT

## 2019-02-15 PROCEDURE — 97016 VASOPNEUMATIC DEVICE THERAPY: CPT

## 2019-02-18 ENCOUNTER — HOSPITAL ENCOUNTER (OUTPATIENT)
Dept: PHYSICAL THERAPY | Age: 54
Setting detail: THERAPIES SERIES
Discharge: HOME | End: 2019-02-18
Payer: MEDICARE

## 2019-02-18 DIAGNOSIS — R51.9 HEADACHE DISORDER: ICD-10-CM

## 2019-02-18 PROCEDURE — 97016 VASOPNEUMATIC DEVICE THERAPY: CPT

## 2019-02-18 PROCEDURE — 97110 THERAPEUTIC EXERCISES: CPT

## 2019-02-20 ENCOUNTER — TELEPHONE (OUTPATIENT)
Dept: PHARMACY | Age: 54
End: 2019-02-20

## 2019-02-22 ENCOUNTER — HOSPITAL ENCOUNTER (OUTPATIENT)
Dept: PHYSICAL THERAPY | Age: 54
Setting detail: THERAPIES SERIES
Discharge: HOME | End: 2019-02-22
Payer: MEDICARE

## 2019-02-22 PROCEDURE — 97016 VASOPNEUMATIC DEVICE THERAPY: CPT

## 2019-02-22 PROCEDURE — 97110 THERAPEUTIC EXERCISES: CPT

## 2019-02-25 ENCOUNTER — HOSPITAL ENCOUNTER (OUTPATIENT)
Dept: PHYSICAL THERAPY | Age: 54
Setting detail: THERAPIES SERIES
Discharge: HOME | End: 2019-02-25
Payer: MEDICARE

## 2019-02-25 PROCEDURE — 97110 THERAPEUTIC EXERCISES: CPT

## 2019-02-25 PROCEDURE — 97016 VASOPNEUMATIC DEVICE THERAPY: CPT

## 2019-02-27 ENCOUNTER — TELEPHONE (OUTPATIENT)
Dept: PHARMACY | Age: 54
End: 2019-02-27

## 2019-03-01 ENCOUNTER — HOSPITAL ENCOUNTER (OUTPATIENT)
Age: 54
Setting detail: THERAPIES SERIES
Discharge: HOME | End: 2019-03-01
Payer: MEDICARE

## 2019-03-04 ENCOUNTER — HOSPITAL ENCOUNTER (OUTPATIENT)
Dept: PHYSICAL THERAPY | Age: 54
Setting detail: THERAPIES SERIES
Discharge: HOME | End: 2019-03-04
Payer: MEDICARE

## 2019-03-04 PROCEDURE — 97110 THERAPEUTIC EXERCISES: CPT

## 2019-03-04 PROCEDURE — 97016 VASOPNEUMATIC DEVICE THERAPY: CPT

## 2019-03-12 ENCOUNTER — TELEPHONE (OUTPATIENT)
Dept: PHARMACY | Age: 54
End: 2019-03-12

## 2019-03-21 ENCOUNTER — TELEPHONE (OUTPATIENT)
Dept: PHARMACY | Age: 54
End: 2019-03-21

## 2019-05-14 ENCOUNTER — OFFICE VISIT (OUTPATIENT)
Dept: PULMONOLOGY | Age: 54
End: 2019-05-14
Payer: MEDICARE

## 2019-05-14 VITALS
HEIGHT: 63 IN | OXYGEN SATURATION: 98 % | BODY MASS INDEX: 39.48 KG/M2 | WEIGHT: 222.8 LBS | DIASTOLIC BLOOD PRESSURE: 80 MMHG | TEMPERATURE: 99.1 F | HEART RATE: 114 BPM | SYSTOLIC BLOOD PRESSURE: 143 MMHG

## 2019-05-14 DIAGNOSIS — C50.012 MALIGNANT NEOPLASM OF NIPPLE OF LEFT BREAST IN FEMALE, UNSPECIFIED ESTROGEN RECEPTOR STATUS (HCC): ICD-10-CM

## 2019-05-14 DIAGNOSIS — C50.012 MALIGNANT NEOPLASM OF NIPPLE OF LEFT BREAST IN FEMALE, ESTROGEN RECEPTOR POSITIVE (HCC): ICD-10-CM

## 2019-05-14 DIAGNOSIS — G47.33 OSA ON CPAP: Primary | ICD-10-CM

## 2019-05-14 DIAGNOSIS — E66.01 CLASS 2 SEVERE OBESITY DUE TO EXCESS CALORIES WITH SERIOUS COMORBIDITY IN ADULT, UNSPECIFIED BMI (HCC): ICD-10-CM

## 2019-05-14 DIAGNOSIS — I10 ESSENTIAL HYPERTENSION: ICD-10-CM

## 2019-05-14 DIAGNOSIS — E11.00 TYPE 2 DIABETES MELLITUS WITH HYPEROSMOLARITY WITHOUT COMA, WITHOUT LONG-TERM CURRENT USE OF INSULIN (HCC): ICD-10-CM

## 2019-05-14 DIAGNOSIS — Z17.0 MALIGNANT NEOPLASM OF NIPPLE OF LEFT BREAST IN FEMALE, ESTROGEN RECEPTOR POSITIVE (HCC): ICD-10-CM

## 2019-05-14 DIAGNOSIS — Z99.89 OSA ON CPAP: Primary | ICD-10-CM

## 2019-05-14 PROCEDURE — 3045F PR MOST RECENT HEMOGLOBIN A1C LEVEL 7.0-9.0%: CPT | Performed by: INTERNAL MEDICINE

## 2019-05-14 PROCEDURE — G8417 CALC BMI ABV UP PARAM F/U: HCPCS | Performed by: INTERNAL MEDICINE

## 2019-05-14 PROCEDURE — 1036F TOBACCO NON-USER: CPT | Performed by: INTERNAL MEDICINE

## 2019-05-14 PROCEDURE — 2022F DILAT RTA XM EVC RTNOPTHY: CPT | Performed by: INTERNAL MEDICINE

## 2019-05-14 PROCEDURE — 99214 OFFICE O/P EST MOD 30 MIN: CPT | Performed by: INTERNAL MEDICINE

## 2019-05-14 PROCEDURE — G8427 DOCREV CUR MEDS BY ELIG CLIN: HCPCS | Performed by: INTERNAL MEDICINE

## 2019-05-14 PROCEDURE — 3017F COLORECTAL CA SCREEN DOC REV: CPT | Performed by: INTERNAL MEDICINE

## 2019-05-14 NOTE — PROGRESS NOTES
REASON FOR THE CONSULTATION:  Obstructive sleep apnea  HISTORY OF PRESENT ILLNESS:    Юлия Lindsay is a 49y.o. year old female who underwent sleep study because she told her physician that she was choking at night, snoring, she was waking up gasping. She was tired and sleepy during the day she noticed that she would doze off on the bus while watching TV while reading and in her class. She underwent sleep study  , But it was changed to titration during the night because of severe elevated AHI during REM    Baseline diagnostic sleep study-split-night-7/23/2018  Total AHI -8.4  AHI during NREM- 4  AHI during REM- 53.3    She was titrated to a CPAP of 10 cm of water pressure. I do not have the compliance report but according to patient, she has been using it from 11:30 PM to 7 AM she feels well rested. She feels the mask is helping her. She denies any excessive daytime fatigue or tiredness. She feels more energetic. Mask is comfortable. Sleep Medicine 10/30/2018   Sitting and reading 1   Watching TV 0   Sitting, inactive in a public place (e.g. a theatre or a meeting) 0   As a passenger in a car for an hour without a break 1   Lying down to rest in the afternoon when circumstances permit 0   Sitting and talking to someone 0   Sitting quietly after a lunch without alcohol 0   In a car, while stopped for a few minutes in traffic 0   Total score 2                   LUNG CANCER SCREENING     1. CRITERIA MET    []     CT ORDERED  []      2. CRITERIA NOT MET   [x]      3. REFUSED                    []        REASON CRITERIA NOT MET     1. SMOKING LESS THAN 30 PY  []      2. AGE LESS THAN 55 or GREATER 77 YEARS  [x]      3. QUIT SMOKING 15 YEARS OR GREATER   []      4. RECENT CT WITH IN 11 MONTHS    []      5. LIFE EXPECTANCY < 5 YEARS   []      6.  SIGNS  AND SYMPTOMS OF LUNG CANCER   []         Immunization   Immunization History   Administered Date(s) Administered    Influenza Virus Vaccine 11/27/2012, NO    Occupational history not working     TOBACCO:   reports that she has never smoked. She has never used smokeless tobacco.  ETOH:   reports that she does not drink alcohol. ALLERGIES:      No Known Allergies      Home Meds:   Prior to Admission medications    Medication Sig Start Date End Date Taking?  Authorizing Provider   topiramate (TOPAMAX) 200 MG tablet TAKE 2 TABLETS BY MOUTH 2 TIMES DAILY 2/19/19  Yes Robert Barajas MD   Multiple Vitamins-Minerals (MULTIVITAMIN-MINERALS) TABS tablet TAKE (1) TABLET BY MOUTH DAILY 11/24/18  Yes Robert Barajas MD   BASAGLAR KWIKPEN 100 UNIT/ML injection pen INJECT 60 UNITS SUBCUTANEOUSLY NIGHTLY 10/22/18  Yes Robert Barajas MD   STOOL SOFTENER 100 MG capsule TAKE 1 CAPSULE BY MOUTH 2 TIMES DAILY AS NEEDED FOR CONSTIPATION 10/22/18  Yes Robert Barajas MD   Melatonin 10 MG CAPS TAKE 1 CAPSULE BY MOUTH AT NIGHT 8/7/18  Yes Historical Provider, MD   gabapentin (NEURONTIN) 300 MG capsule TAKE 1 CAPSULE BY MOUTH THREE TIMES DAILY 8/7/18  Yes Historical Provider, MD   JANUVIA 100 MG tablet TAKE 1 TABLET BY MOUTH DAILY 8/30/18  Yes Robert Barajas MD   NOVOLOG FLEXPEN 100 UNIT/ML injection pen INJECT SUBCUTANEOUSLY 4 UNITS THREE TIMES A DAY BEFORE MEALS 7/27/18  Yes Mario Crawford MD   gabapentin (NEURONTIN) 300 MG capsule TAKE 1 CAPSULE BY MOUTH THREE TIMES DAILY 6/25/18 5/14/19 Yes Khanh Mchugh MD   aspirin 81 MG EC tablet TAKE (1) TABLET BY MOUTH ONCE DAILY 6/25/18  Yes Valery Cardoza MD   hydrochlorothiazide (HYDRODIURIL) 25 MG tablet TAKE (1) TABLET BY MOUTH DAILY 6/25/18  Yes Valery Cardoza MD   ferrous sulfate 325 (65 Fe) MG tablet TAKE ONE (1) TABLET BY MOUTH THREE TIMES DAILY 6/1/18 9/1/19 Yes MD DIDIER MezaTOUCH DELICA LANCETS 33B MISC USE TO TEST BLOOD SUGAR DAILY 5/29/18  Yes Mitchell Junior MD   ONE TOUCH ULTRA TEST strip USE DAILY TO TEST SUGARS 5/29/18  Yes Mitchell Junior MD   EASY TOUCH PEN NEEDLES 31G X 6 MM MISC USE DAILY AS DIRECTED 4/25/18  Yes Daniel Vergara MD   ibuprofen (ADVIL;MOTRIN) 600 MG tablet Take 1 tablet by mouth every 6 hours as needed for Pain 9/11/17  Yes Daniel Vergara MD   nystatin (MYCOSTATIN) 919585 UNIT/GM cream Apply topically 2 times daily. 7/21/17  Yes Tim Gillis, DO   Gauze Pads & Dressings (CURITY IODOFORM PACKING STRIP) [address] [facility] 1 each by Does not apply route daily 7/21/17  Yes Tim Gillis, DO   Gauze Pads & Dressings (GAUZE DRESSING) 4\"X4\" PADS 1 each by Does not apply route daily 7/21/17  Yes Tim Gillis, DO   cyclobenzaprine (FLEXERIL) 10 MG tablet TAKE (1) TABLET BY MOUTH EVERY 8 HOURS AS NEEDED FOR MUSCLE SPASMS 5/19/17  Yes Ryder Betancur MD   zolpidem (AMBIEN) 5 MG tablet Take 10 mg by mouth nightly as needed for Sleep. [place] Historical Provider, MD   risperiDONE (RISPERDAL) 4 MG tablet  6/13/16  Yes Historical Provider, MD   Blood Glucose Monitoring Suppl (ONE TOUCH ULTRA 2) W/DEVICE KIT  3/2/16  Yes Historical Provider, MD ODONNELL 3CC LUER-AMBAR SYR 80KK2-7/2 22G X 1-1/2\" 3 ML MISC  4/14/16  Yes Historical Provider, MD   atorvastatin (LIPITOR) 20 MG tablet TAKE 1 TABLET BY MOUTH ONCE DAILY 12/8/15  Yes Cande Foley MD   hydrOXYzine (VISTARIL) 50 MG capsule Take 1 capsule by mouth 2 times daily 8/20/15  Yes Historical Provider, MD   benztropine (COGENTIN) 0.5 MG tablet Take 1 tablet by mouth 2 times daily 8/27/15  Yes Historical Provider, MD   buPROPion (WELLBUTRIN XL) 150 MG XL tablet Take 1 tablet by mouth daily 8/13/15  Yes Historical Provider, MD              REVIEW OF SYSTEMS:    CONSTITUTIONAL:  negative for  fevers, chills, sweats, fatigue, malaise, anorexia and weight loss  EYES:  negative for  double vision, blurred vision, dry eyes, eye discharge and redness  HEENT:  negative for  hearing loss, tinnitus, ear drainage, earaches and nasal congestion  RESPIRATORY:  No cough, no shortness of breath.   No hemoptysis  CARDIOVASCULAR:  negative for  chest pain,, palpitations, orthopnea, PND  GASTROINTESTINAL:  negative for nausea, vomiting, change in bowel habits, diarrhea, constipation, abdominal pain, pruritus, abdominal mass and abdominal distention  GENITOURINARY:  negative for frequency, dysuria, nocturia, urinary incontinence and hesitancy      HEMATOLOGIC/LYMPHATIC:  negative for easy bruising, bleeding, lymphadenopathy, petechiae and swelling/edema  ALLERGIC/IMMUNOLOGIC:  negative for recurrent infections, urticaria and drug reactions  ENDOCRINE:  negative for heat intolerance, cold intolerance, tremor, weight changes and change in bowel habits  MUSCULOSKELETAL:  negative for  myalgias, arthralgias, pain, joint swelling, stiff joints and decreased range of motion  NEUROLOGICAL:  negative for headaches, dizziness, seizures, memory problems, speech problems, visual disturbance and coordination problems      Vitals:  BP (!) 143/80   Pulse 114   Temp 99.1 °F (37.3 °C)   Ht 5' 3\" (1.6 m)   Wt 222 lb 12.8 oz (101.1 kg)   LMP 12/08/2015 (Approximate)   SpO2 98%   BMI 39.47 kg/m²     PHYSICAL EXAM:  General Appearance:    Alert, cooperative, no distress, appears stated age   Head:    Normocephalic, without obvious abnormality, atraumatic      Eyes:    PERRL, conjunctiva/corneas clear, EOM's intact   Ears:    Normal  external ear canals, both ears   Nose:   Nares normal, septum midline, mucosa normal, no drainage        or sinus tenderness   Throat:   Lips, mucosa, and tongue normal; teeth and gums normal   Neck:   Short thick neck, low hanging soft palate, large tongue, large uvula. No adenopathy, no goiter,    Back:     Symmetric, no curvature, ROM normal, no CVA tenderness   Lungs: No wheezing no rales no rhonchi ventilating all lobes.   No dullness         Heart:    Regular rate and rhythm, S1 and S2 normal, no murmur, rub        or gallop no rvh                           Abdomen:                                                 Pulses:                              Skin: Lymph nodes:                    Neurologic:                  Soft, non-tender, bowel sounds active all four quadrants,     no masses, no organomegaly         2+ and symmetric all extremities     Skin color, texture, turgor normal, no rashes or lesions       Cervical, supraclavicular not enlarged or matted or tender      CNII-XII intact, normal strength 5/5 . Sensation grossly normal  and reflexes normal 2+  throughout     Clubbing No  Lower ext edema NO  Upper ext edema NO         Thyroid:   Lab Results   Component Value Date    TSH 2.79 07/21/2015                 IMPRESSION:     Diagnosis Orders   1. SUNITHA on CPAP     2. Malignant neoplasm of nipple of left breast in female, estrogen receptor positive ([address]     3. Essential hypertension     4. Type 2 diabetes mellitus with hyperosmolarity without coma, without long-term current use of insulin ([address]     5. Malignant neoplasm of nipple of left breast in female, unspecified estrogen receptor status ([address]     6. Class 2 severe obesity due to excess calories with serious comorbidity in adult, unspecified BMI (HCC)          :                PLAN:       Continue CPAP at 10 cm of water pressure  Compliance report requested   CPAP at least 4 hrs qhs  Wt loss is recommended and discussed  Follow good sleep hygeine instructions  Use humidifier   Questions answered pertaining to diagnosis and management explained importance of compliance with therapy     Return in 6 months       Requested Prescriptions      No prescriptions requested or ordered in this encounter       There are no discontinued medications. Frieda Ceja received counseling on the following healthy behaviors: nutrition, exercise and medication adherence    Patient given educational materials : see patient instruction       Discussed use, benefit, and side effects of prescribed medications. Barriers to medication compliance addressed. All patient questions answered. Pt voiced understanding.    I hope this updates you on my evaluation and clinical thinking. Thank you for allowing me to participate in his care. Sincerely,      Electronically signed by Lorenzo Stevens MD on   10/30/18 at 3:12 PM       Please note that this chart was generated using voice recognition Dragon dictation software. Although every effort was made to ensure the accuracy of this automated transcription, some errors in transcription may have occurred. REASON FOR THE CONSULTATION:  Obstructive sleep apnea  HISTORY OF PRESENT ILLNESS:    Nupur Ramirez is a 49y.o. year old female who who will come up with follow-up for sleep apnea syndrome and sleep apnea syndrome has been mild but responding well to the use of CPAP at a final pressure of 10 cm water. She uses the machine regularly. CPAP was very effective in relieving the apnea improving her daytime symptoms. She is not sleepy, tired, fatigued during the daytime. Nighttime sleep is refreshing. Choking episodes. No morning headaches doesn't take naps in the daytime. She has not noted any pedal edema no thromboembolic process. Her blood pressure is under good control with present therapy as is the diabetes. She has a malignancy of the left breast which is has been under control without any recurrence. She continued to be morbidly obese and has not been able to lose much weight, although she is trying to do so. Patient did not   Complete and [place] Sleepiness Scale. LUNG CANCER SCREENING     4. CRITERIA MET    []     CT ORDERED  []      5. CRITERIA NOT MET   [x]      6. REFUSED                    []        REASON CRITERIA NOT MET     7. SMOKING LESS THAN 30 PY  []      8. AGE LESS THAN 55 or GREATER 77 YEARS  [x]      9. QUIT SMOKING 15 YEARS OR GREATER   []      10. RECENT CT WITH IN 11 MONTHS    []      11. LIFE EXPECTANCY < 5 YEARS   []      12.  SIGNS  AND SYMPTOMS OF LUNG CANCER   []         Immunization   Immunization History   Administered Date(s) Administered hot tub exposure. Pets  NO    Occupational history not working     TOBACCO:   reports that she has never smoked. She has never used smokeless tobacco.  ETOH:   reports that she does not drink alcohol. ALLERGIES:      No Known Allergies      Home Meds:   Prior to Admission medications    Medication Sig Start Date End Date Taking?  Authorizing Provider   topiramate (TOPAMAX) 200 MG tablet TAKE 2 TABLETS BY MOUTH 2 TIMES DAILY 2/19/19  Yes Cira Mir MD   Multiple Vitamins-Minerals (MULTIVITAMIN-MINERALS) TABS tablet TAKE (1) TABLET BY MOUTH DAILY 11/24/18  Yes Cira Mir MD   BASAGLAR KWIKPEN 100 UNIT/ML injection pen INJECT 60 UNITS SUBCUTANEOUSLY NIGHTLY 10/22/18  Yes Cira Mir MD   STOOL SOFTENER 100 MG capsule TAKE 1 CAPSULE BY MOUTH 2 TIMES DAILY AS NEEDED FOR CONSTIPATION 10/22/18  Yes Cira Mir MD   Melatonin 10 MG CAPS TAKE 1 CAPSULE BY MOUTH AT NIGHT 8/7/18  Yes Historical Provider, MD   gabapentin (NEURONTIN) 300 MG capsule TAKE 1 CAPSULE BY MOUTH THREE TIMES DAILY 8/7/18  Yes Historical Provider, MD   JANUVIA 100 MG tablet TAKE 1 TABLET BY MOUTH DAILY 8/30/18  Yes Cira Mir MD   NOVOLOG FLEXPEN 100 UNIT/ML injection pen INJECT SUBCUTANEOUSLY 4 UNITS THREE TIMES A DAY BEFORE MEALS 7/27/18  Yes Ijeoma Mccloud MD   gabapentin (NEURONTIN) 300 MG capsule TAKE 1 CAPSULE BY MOUTH THREE TIMES DAILY 6/25/18 5/14/19 Yes Boyd Khan MD   aspirin 81 MG EC tablet TAKE (1) TABLET BY MOUTH ONCE DAILY 6/25/18  Yes Erasmo Abdalla MD   hydrochlorothiazide (HYDRODIURIL) 25 MG tablet TAKE (1) TABLET BY MOUTH DAILY 6/25/18  Yes Erasmo Abdalla MD   ferrous sulfate 325 (65 Fe) MG tablet TAKE ONE (1) TABLET BY MOUTH THREE TIMES DAILY 6/1/18 9/1/19 Yes Franklinjulia Betancur MD   ONETOUCH DELICA LANCETS 46N MISC USE TO TEST BLOOD SUGAR DAILY 5/29/18  Yes Princess Timmy MD   ONE TOUCH ULTRA TEST strip USE DAILY TO TEST SUGARS 5/29/18  Yes Princess Timmy MD   EASY TOUCH PEN NEEDLES 31G X 6 MM [address] [facility] USE DAILY AS DIRECTED 4/25/18  Yes Georgina Farnsworth MD   ibuprofen (ADVIL;MOTRIN) 600 MG tablet Take 1 tablet by mouth every 6 hours as needed for Pain 9/11/17  Yes Georgina Farnsworth MD   nystatin (MYCOSTATIN) 024156 UNIT/GM cream Apply topically 2 times daily. 7/21/17  Yes Tim Gillis, DO   Gauze Pads & Dressings (CURITY IODOFORM PACKING STRIP) [address] [facility] 1 each by Does not apply route daily 7/21/17  Yes Tim Gillis, DO   Gauze Pads & Dressings (GAUZE DRESSING) 4\"X4\" PADS 1 each by Does not apply route daily 7/21/17  Yes Tim Gillis, DO   cyclobenzaprine (FLEXERIL) 10 MG tablet TAKE (1) TABLET BY MOUTH EVERY 8 HOURS AS NEEDED FOR MUSCLE SPASMS 5/19/17  Yes Ryder Betancur MD   zolpidem (AMBIEN) 5 MG tablet Take 10 mg by mouth nightly as needed for Sleep. Remington Castellanos Historical Provider, MD   risperiDONE (RISPERDAL) 4 MG tablet  6/13/16  Yes Historical Provider, MD   Blood Glucose Monitoring Suppl (ONE TOUCH ULTRA 2) W/DEVICE KIT  3/2/16  Yes Historical Provider, MD ODONNELL 3CC LUER-AMBAR SYR [id number]/2 22G X 1-1/2\" 3 ML MISC  4/14/16  Yes Historical Provider, MD   atorvastatin (LIPITOR) 20 MG tablet TAKE 1 TABLET BY MOUTH ONCE DAILY 12/8/15  Yes Sharon Contreras MD   hydrOXYzine (VISTARIL) 50 MG capsule Take 1 capsule by mouth 2 times daily 8/20/15  Yes Historical Provider, MD   benztropine (COGENTIN) 0.5 MG tablet Take 1 tablet by mouth 2 times daily 8/27/15  Yes Historical Provider, MD   buPROPion (WELLBUTRIN XL) 150 MG XL tablet Take 1 tablet by mouth daily 8/13/15  Yes Historical Provider, MD              REVIEW OF SYSTEMS:    CONSTITUTIONAL:  negative for  fevers, chills, sweats, fatigue, malaise, anorexia and weight loss  EYES:  negative for  double vision, blurred vision, dry eyes, eye discharge and redness  HEENT:  negative for  hearing loss, tinnitus, ear drainage, earaches and nasal congestion  RESPIRATORY:  No cough, no shortness of breath.   No hemoptysis  CARDIOVASCULAR:  negative for  chest pain,, palpitations, orthopnea, PND  GASTROINTESTINAL:  negative for nausea, vomiting, change in bowel habits, diarrhea, constipation, abdominal pain, pruritus, abdominal mass and abdominal distention  GENITOURINARY:  negative for frequency, dysuria, nocturia, urinary incontinence and hesitancy      HEMATOLOGIC/LYMPHATIC:  negative for easy bruising, bleeding, lymphadenopathy, petechiae and swelling/edema  ALLERGIC/IMMUNOLOGIC:  negative for recurrent infections, urticaria and drug reactions  ENDOCRINE:  negative for heat intolerance, cold intolerance, tremor, weight changes and change in bowel habits  MUSCULOSKELETAL:  negative for  myalgias, arthralgias, pain, joint swelling, stiff joints and decreased range of motion  NEUROLOGICAL:  negative for headaches, dizziness, seizures, memory problems, speech problems, visual disturbance and coordination problems      Vitals:  BP (!) 143/80   Pulse 114   Temp 99.1 °F (37.3 °C)   Ht 5' 3\" (1.6 m)   Wt 222 lb 12.8 oz (101.1 kg)   LMP 12/08/2015 (Approximate)   SpO2 98%   BMI 39.47 kg/m²     PHYSICAL EXAM:  General Appearance:    Alert, cooperative, no distress, appears stated age   Head:    Normocephalic, without obvious abnormality, atraumatic      Eyes:    PERRL, conjunctiva/corneas clear, EOM's intact   Ears:    Normal  external ear canals, both ears   Nose:   Nares normal, septum midline, mucosa normal, no drainage        or sinus tenderness   Throat:   Lips, mucosa, and tongue normal; teeth and gums normal   Neck:   Short thick neck, low hanging soft palate, large tongue, large uvula. No adenopathy, no goiter,    Back:     Symmetric, no curvature, ROM normal, no CVA tenderness   Lungs: No wheezing no rales no rhonchi ventilating all lobes.   No dullness         Heart:    Regular rate and rhythm, S1 and S2 normal, no murmur, rub        or gallop no rvh                           Abdomen:                                                 Pulses: Skin:                  Lymph nodes:                    Neurologic:                  Soft, non-tender, bowel sounds active all four quadrants,     no masses, no organomegaly         2+ and symmetric all extremities     Skin color, texture, turgor normal, no rashes or lesions       Cervical, supraclavicular not enlarged or matted or tender      CNII-XII intact, normal strength 5/5 . Sensation grossly normal  and reflexes normal 2+  throughout     Clubbing No  Lower ext edema NO  Upper ext edema NO         Thyroid:   Lab Results   Component Value Date    TSH 2.79 07/21/2015                 IMPRESSION:    Sleep apnea syndrome    Obesity. Diabetes. Systemic hypertension. History of breast cancer on the left side and         :                PLAN:      Sleep apnea, responding well to the use of CPAP. She was advised to continue CPAP as before. I did review her compliance study, which is very satisfactory. Patient was encouraged to lose weight. He'll continue the use of a heated medication to prevent dryness of the throat. She will continue the present medication for hypertension which is controlling Well. His new past.  Similarly, she'll continue the present treatment for diabetes as well. The no history of any hypo-glycemia or any ketoacidosis. Patient encouraged to lose weight by participating in exercise program and caloric restrictions. She already had Pneumovax and influenza vaccines. She is not a candidate for lung cancer screening. .  There have been no recurrence of the breast cancer. I plan to see her follow-up  Requested Prescriptions      No prescriptions requested or ordered in this encounter       There are no discontinued medications.     Jeremy Gibbs received counseling on the following healthy behaviors: nutrition, exercise and medication adherence    Patient given educational materials : see patient instruction       Discussed use, benefit, and side effects of

## 2019-05-16 ENCOUNTER — HOSPITAL ENCOUNTER (OUTPATIENT)
Age: 54
Setting detail: SPECIMEN
Discharge: HOME | End: 2019-05-16
Payer: MEDICARE

## 2019-05-16 DIAGNOSIS — M17.12 PRIMARY OSTEOARTHRITIS OF LEFT KNEE: ICD-10-CM

## 2019-05-16 DIAGNOSIS — Z79.4 TYPE 2 DIABETES MELLITUS WITHOUT COMPLICATION, WITH LONG-TERM CURRENT USE OF INSULIN (HCC): ICD-10-CM

## 2019-05-16 DIAGNOSIS — E11.9 TYPE 2 DIABETES MELLITUS WITHOUT COMPLICATION, WITH LONG-TERM CURRENT USE OF INSULIN (HCC): ICD-10-CM

## 2019-05-16 LAB
ABSOLUTE EOS #: 0.22 K/UL (ref 0–0.44)
ABSOLUTE IMMATURE GRANULOCYTE: <0.03 K/UL (ref 0–0.3)
ABSOLUTE LYMPH #: 3.75 K/UL (ref 1.1–3.7)
ABSOLUTE MONO #: 0.49 K/UL (ref 0.1–1.2)
ALBUMIN SERPL-MCNC: 3.6 G/DL (ref 3.5–5.2)
ALBUMIN/GLOBULIN RATIO: 0.8 (ref 1–2.5)
ALP BLD-CCNC: 102 U/L (ref 35–104)
ALT SERPL-CCNC: 9 U/L (ref 5–33)
ANION GAP SERPL CALCULATED.3IONS-SCNC: 12 MMOL/L (ref 9–17)
ANION GAP SERPL CALCULATED.3IONS-SCNC: 12 MMOL/L (ref 9–17)
AST SERPL-CCNC: 10 U/L
BASOPHILS # BLD: 0 % (ref 0–2)
BASOPHILS ABSOLUTE: 0.03 K/UL (ref 0–0.2)
BILIRUB SERPL-MCNC: 0.49 MG/DL (ref 0.3–1.2)
BUN BLDV-MCNC: 5 MG/DL (ref 6–20)
BUN BLDV-MCNC: 5 MG/DL (ref 6–20)
BUN/CREAT BLD: ABNORMAL (ref 9–20)
BUN/CREAT BLD: ABNORMAL (ref 9–20)
CALCIUM SERPL-MCNC: 8.6 MG/DL (ref 8.6–10.4)
CALCIUM SERPL-MCNC: 8.6 MG/DL (ref 8.6–10.4)
CHLORIDE BLD-SCNC: 96 MMOL/L (ref 98–107)
CHLORIDE BLD-SCNC: 96 MMOL/L (ref 98–107)
CHOLESTEROL, FASTING: 184 MG/DL
CHOLESTEROL/HDL RATIO: 4
CHOLESTEROL/HDL RATIO: 4
CHOLESTEROL: 184 MG/DL
CO2: 29 MMOL/L (ref 20–31)
CO2: 29 MMOL/L (ref 20–31)
CREAT SERPL-MCNC: 0.75 MG/DL (ref 0.5–0.9)
CREAT SERPL-MCNC: 0.75 MG/DL (ref 0.5–0.9)
DIFFERENTIAL TYPE: ABNORMAL
EOSINOPHILS RELATIVE PERCENT: 2 % (ref 1–4)
ESTIMATED AVERAGE GLUCOSE: 283 MG/DL
FOLATE: 18.6 NG/ML
GFR AFRICAN AMERICAN: >60 ML/MIN
GFR AFRICAN AMERICAN: >60 ML/MIN
GFR NON-AFRICAN AMERICAN: >60 ML/MIN
GFR NON-AFRICAN AMERICAN: >60 ML/MIN
GFR SERPL CREATININE-BSD FRML MDRD: ABNORMAL ML/MIN/{1.73_M2}
GLUCOSE BLD-MCNC: 347 MG/DL (ref 70–99)
GLUCOSE BLD-MCNC: 347 MG/DL (ref 70–99)
HBA1C MFR BLD: 11.5 % (ref 4–6)
HCT VFR BLD CALC: 39.3 % (ref 36.3–47.1)
HDLC SERPL-MCNC: 46 MG/DL
HDLC SERPL-MCNC: 46 MG/DL
HEMOGLOBIN: 12.4 G/DL (ref 11.9–15.1)
IMMATURE GRANULOCYTES: 0 %
LDL CHOLESTEROL: 116 MG/DL (ref 0–130)
LDL CHOLESTEROL: 116 MG/DL (ref 0–130)
LYMPHOCYTES # BLD: 40 % (ref 24–43)
MCH RBC QN AUTO: 27.4 PG (ref 25.2–33.5)
MCHC RBC AUTO-ENTMCNC: 31.6 G/DL (ref 28.4–34.8)
MCV RBC AUTO: 86.8 FL (ref 82.6–102.9)
MONOCYTES # BLD: 5 % (ref 3–12)
NRBC AUTOMATED: 0 PER 100 WBC
PDW BLD-RTO: 13.2 % (ref 11.8–14.4)
PLATELET # BLD: 245 K/UL (ref 138–453)
PLATELET ESTIMATE: ABNORMAL
PMV BLD AUTO: 10.9 FL (ref 8.1–13.5)
POTASSIUM SERPL-SCNC: 3.8 MMOL/L (ref 3.7–5.3)
POTASSIUM SERPL-SCNC: 3.8 MMOL/L (ref 3.7–5.3)
PROLACTIN: 51.94 UG/L (ref 4.79–23.3)
RBC # BLD: 4.53 M/UL (ref 3.95–5.11)
RBC # BLD: ABNORMAL 10*6/UL
SEG NEUTROPHILS: 53 % (ref 36–65)
SEGMENTED NEUTROPHILS ABSOLUTE COUNT: 4.8 K/UL (ref 1.5–8.1)
SODIUM BLD-SCNC: 137 MMOL/L (ref 135–144)
SODIUM BLD-SCNC: 137 MMOL/L (ref 135–144)
T3 TOTAL: 96 NG/DL (ref 80–200)
THYROXINE, FREE: 1.14 NG/DL (ref 0.93–1.7)
TOTAL PROTEIN: 7.9 G/DL (ref 6.4–8.3)
TRIGL SERPL-MCNC: 111 MG/DL
TRIGLYCERIDE, FASTING: 111 MG/DL
TSH SERPL DL<=0.05 MIU/L-ACNC: 3.33 MIU/L (ref 0.3–5)
VITAMIN B-12: 501 PG/ML (ref 232–1245)
VITAMIN D 25-HYDROXY: 20.1 NG/ML (ref 30–100)
VLDLC SERPL CALC-MCNC: NORMAL MG/DL (ref 1–30)
VLDLC SERPL CALC-MCNC: NORMAL MG/DL (ref 1–30)
WBC # BLD: 9.3 K/UL (ref 3.5–11.3)
WBC # BLD: ABNORMAL 10*3/UL

## 2019-05-17 ENCOUNTER — TELEPHONE (OUTPATIENT)
Dept: FAMILY MEDICINE CLINIC | Age: 54
End: 2019-05-17

## 2019-05-17 NOTE — TELEPHONE ENCOUNTER
----- Message from Lauren Valente MD sent at 5/16/2019 10:54 PM EDT -----  Good morning,    Please have this patient schedule an appointment to discuss her diabetes. Can be scheduled with any provider next week.     Dr. Lisa Vega      ----- Message -----  From: Shira Adame Incoming Lab Results From [facility]  Sent: 5/16/2019  10:48 AM  To: Bryan Vences MD

## 2019-05-21 ENCOUNTER — OFFICE VISIT (OUTPATIENT)
Dept: FAMILY MEDICINE CLINIC | Age: 54
End: 2019-05-21
Payer: MEDICARE

## 2019-05-21 VITALS
WEIGHT: 221 LBS | TEMPERATURE: 96.8 F | HEART RATE: 112 BPM | SYSTOLIC BLOOD PRESSURE: 135 MMHG | HEIGHT: 63 IN | DIASTOLIC BLOOD PRESSURE: 86 MMHG | OXYGEN SATURATION: 97 % | BODY MASS INDEX: 39.16 KG/M2

## 2019-05-21 DIAGNOSIS — E11.9 TYPE 2 DIABETES MELLITUS WITHOUT COMPLICATION, WITH LONG-TERM CURRENT USE OF INSULIN (HCC): ICD-10-CM

## 2019-05-21 DIAGNOSIS — E55.9 VITAMIN D DEFICIENCY: ICD-10-CM

## 2019-05-21 DIAGNOSIS — E78.00 HYPERCHOLESTEREMIA: ICD-10-CM

## 2019-05-21 DIAGNOSIS — E11.00 TYPE 2 DIABETES MELLITUS WITH HYPEROSMOLARITY WITHOUT COMA, WITHOUT LONG-TERM CURRENT USE OF INSULIN (HCC): ICD-10-CM

## 2019-05-21 DIAGNOSIS — Z23 NEED FOR PROPHYLACTIC VACCINATION AND INOCULATION AGAINST VARICELLA: Primary | ICD-10-CM

## 2019-05-21 DIAGNOSIS — Z79.4 TYPE 2 DIABETES MELLITUS WITHOUT COMPLICATION, WITH LONG-TERM CURRENT USE OF INSULIN (HCC): ICD-10-CM

## 2019-05-21 PROCEDURE — 3017F COLORECTAL CA SCREEN DOC REV: CPT | Performed by: FAMILY MEDICINE

## 2019-05-21 PROCEDURE — 2022F DILAT RTA XM EVC RTNOPTHY: CPT | Performed by: FAMILY MEDICINE

## 2019-05-21 PROCEDURE — 1036F TOBACCO NON-USER: CPT | Performed by: FAMILY MEDICINE

## 2019-05-21 PROCEDURE — G8427 DOCREV CUR MEDS BY ELIG CLIN: HCPCS | Performed by: FAMILY MEDICINE

## 2019-05-21 PROCEDURE — G8417 CALC BMI ABV UP PARAM F/U: HCPCS | Performed by: FAMILY MEDICINE

## 2019-05-21 PROCEDURE — 99213 OFFICE O/P EST LOW 20 MIN: CPT | Performed by: FAMILY MEDICINE

## 2019-05-21 PROCEDURE — 3046F HEMOGLOBIN A1C LEVEL >9.0%: CPT | Performed by: FAMILY MEDICINE

## 2019-05-21 RX ORDER — ERGOCALCIFEROL 1.25 MG/1
50000 CAPSULE ORAL WEEKLY
Qty: 12 CAPSULE | Refills: 0 | Status: SHIPPED | OUTPATIENT
Start: 2019-05-21 | End: 2019-08-20 | Stop reason: SDUPTHER

## 2019-05-21 RX ORDER — ATORVASTATIN CALCIUM 20 MG/1
20 TABLET, FILM COATED ORAL DAILY
Qty: 30 TABLET | Refills: 3 | Status: SHIPPED | OUTPATIENT
Start: 2019-05-21 | End: 2019-08-20 | Stop reason: SDUPTHER

## 2019-05-21 NOTE — PROGRESS NOTES
Follow up for DM, HTN, HYPERCHOL. Denies chest pain,sob, edema, fatigue or cough. States has trouble remembering to take insulin twice a day due towork schedule and would liek to see a diabetes specialist about a pump. Vitals reviewed. weight maintaining. Vitals:    05/21/19 1322   BP: 135/86   Pulse: 112   Temp: 96.8 °F (36 °C)   SpO2: 97%       Neck-neg masses, thyroid vikash. Lungs clear in all fields. CV- RRR with normal heart sounds. Neg peripheral edema  POC A1C today 11.1 ( was 8.8)  Lab Results   Component Value Date    WBC 9.3 05/16/2019    HGB 12.4 05/16/2019    HCT 39.3 05/16/2019     05/16/2019    CHOL 184 05/16/2019    TRIG 111 05/16/2019    HDL 46 05/16/2019    ALT 9 05/16/2019    AST 10 05/16/2019     05/16/2019    K 3.8 05/16/2019    CL 96 (L) 05/16/2019    CREATININE 0.75 05/16/2019    BUN 5 (L) 05/16/2019    CO2 29 05/16/2019    TSH 3.33 05/16/2019    LABA1C 11.5 (H) 05/16/2019    LABMICR CANNOT BE CALCULATED 07/13/2018         A/P  Patient received counseling on the following healthy behaviors: medication adherence     Diagnosis Orders   1. Need for prophylactic vaccination and inoculation against varicella  zoster recombinant adjuvanted vaccine [facility]) 50 MCG/0.5ML SUSR injection   2. Type 2 diabetes mellitus with hyperosmolarity without coma, without long-term current use of insulin ([facility])  POCT glycosylated hemoglobin (Hb A1C). Counseled on insulin compliance. Anne Archer MD, Endocrinology, [facility]    atorvastatin (LIPITOR) 20 MG tablet restarted. 3. Vitamin D deficiency  vitamin D (ERGOCALCIFEROL) 68175 units CAPS capsule   4. Type 2 diabetes mellitus without complication, with long-term current use of insulin ([address]     5. Hypercholesteremia  atorvastatin (LIPITOR) 20 MG tablet restarted     Follow up two months with Dr. Vale Joe, her PCP.

## 2019-05-21 NOTE — PROGRESS NOTES
Chronic Disease Visit Information    BP Readings from Last 3 Encounters:   05/21/19 135/86   05/14/19 (!) 143/80   01/07/19 111/70          Hemoglobin A1C (%)   Date Value   05/16/2019 11.5 (H)   01/07/2019 8.8   08/14/2018 7.8     Microalb/Crt. Ratio (mcg/mg creat)   Date Value   07/13/2018 CANNOT BE CALCULATED     LDL Cholesterol (mg/dL)   Date Value   05/16/2019 116     HDL (mg/dL)   Date Value   05/16/2019 46     BUN (mg/dL)   Date Value   05/16/2019 5 (L)     CREATININE (mg/dL)   Date Value   05/16/2019 0.75     Glucose (mg/dL)   Date Value   05/16/2019 347 (H)   03/01/2012 95            Have you changed or started any medications since your last visit including any over-the-counter medicines, vitamins, or herbal medicines? no   Are you having any side effects from any of your medications? -  no  Have you stopped taking any of your medications? Is so, why? -  no    Have you seen any other physician or provider since your last visit? Yes - Records Obtained  Have you had any other diagnostic tests since your last visit? Yes - Records Obtained  Have you been seen in the emergency room and/or had an admission to a hospital since we last saw you? No  Have you had your annual diabetic retinal (eye) exam? No  Have you had your routine dental cleaning in the past 6 months? no    Have you activated your [employer] account? If not, what are your barriers?  Yes     Patient Care Team:  Eboni Brink MD as PCP - General (Family Medicine)  Tereza Fishman MD as PCP - S Attributed Provider  Earlene Vick MD as Consulting Physician (Hematology and Oncology)  Bina Parr MD (General Surgery)  Vera Prather DO as Consulting Physician (General Surgery)  Jamison Madera MD as Consulting Physician (Pulmonology)         Medical History Review  Past Medical, Family, and Social History reviewed and does contribute to the patient presenting condition    Health Maintenance   Topic Date Due    Hepatitis B Vaccine (1 of 3 - Risk 3-dose series) 04/12/1985    Shingles Vaccine (1 of 2) 04/12/2016    Diabetic retinal exam  06/01/2016    Colon Cancer Screen FIT/FOBT  11/21/2018    Diabetic microalbuminuria test  07/13/2019    A1C test (Diabetic or Prediabetic)  08/16/2019    Flu vaccine (Season Ended) 09/01/2019    Diabetic foot exam  01/07/2020    Lipid screen  05/16/2020    Potassium monitoring  05/16/2020    Creatinine monitoring  05/16/2020    Cervical cancer screen  06/21/2023    DTaP/Tdap/Td vaccine (2 - Td) 02/06/2027    Breast cancer screen  Completed    Pneumococcal 0-64 years Vaccine  Completed    HIV screen  Completed

## 2019-05-22 RX ORDER — FERROUS SULFATE 325(65) MG
TABLET ORAL
Qty: 90 TABLET | Refills: 10 | Status: SHIPPED | OUTPATIENT
Start: 2019-05-22 | End: 2020-04-13

## 2019-05-24 ENCOUNTER — TELEPHONE (OUTPATIENT)
Dept: FAMILY MEDICINE CLINIC | Age: 54
End: 2019-05-24

## 2019-05-24 NOTE — TELEPHONE ENCOUNTER
patient wants to reach out to the doctor about an appt she was told to schedule to discuss test results she already has done so with ,patient did not want to schedule sppt

## 2019-05-24 NOTE — TELEPHONE ENCOUNTER
----- Message from Alva Kwon MD sent at 5/16/2019 10:54 PM EDT -----  Good morning,    Please have this patient schedule an appointment to discuss her diabetes. Can be scheduled with any provider next week.     Dr. Betty Perez      ----- Message -----  From: Shira Adame Incoming Lab Results From [facility]  Sent: 5/16/2019  10:48 AM  To: Rosa Hannon MD

## 2019-06-19 ENCOUNTER — TELEPHONE (OUTPATIENT)
Dept: ONCOLOGY | Age: 54
End: 2019-06-19

## 2019-06-24 NOTE — TELEPHONE ENCOUNTER
Please address the medication refill and close the encounter. If I can be of assistance, please route to the applicable pool. Thank you. Last visit: [id number]  Last Med refill: [id number]  Does patient have enough medication for 72 hours:   Next Visit Date:  Future Appointments   Date Time Provider Kota Sanz   7/24/2019  2:00 PM Asuncion Vergara MD [address]   10/10/2019 12:45 PM SCHEDULE, [facility]   10/17/2019  4:00 PM Kia Babb MD [facility]   11/12/2019  1:00 PM SCHEDULE, [facility] [address] Maintenance   Topic Date Due    Hepatitis B Vaccine (1 of 3 - Risk 3-dose series) 04/12/1985    Shingles Vaccine (1 of 2) 04/12/2016    Diabetic retinal exam  06/01/2016    Colon Cancer Screen FIT/FOBT  11/21/2018    Diabetic microalbuminuria test  07/13/2019    A1C test (Diabetic or Prediabetic)  08/16/2019    Flu vaccine (Season Ended) 09/01/2019    Diabetic foot exam  01/07/2020    Lipid screen  05/16/2020    Potassium monitoring  05/16/2020    Creatinine monitoring  05/16/2020    Cervical cancer screen  06/21/2023    DTaP/Tdap/Td vaccine (2 - Td) 02/06/2027    Breast cancer screen  Completed    Pneumococcal 0-64 years Vaccine  Completed    HIV screen  Completed       Hemoglobin A1C (%)   Date Value   05/16/2019 11.5 (H)   01/07/2019 8.8   08/14/2018 7.8             ( goal A1C is < 7)   Microalb/Crt.  Ratio (mcg/mg creat)   Date Value   07/13/2018 CANNOT BE CALCULATED     LDL Cholesterol (mg/dL)   Date Value   05/16/2019 116   05/16/2019 116       (goal LDL is <100)   AST (U/L)   Date Value   05/16/2019 10     ALT (U/L)   Date Value   05/16/2019 9     BUN (mg/dL)   Date Value   05/16/2019 5 (L)     BP Readings from Last 3 Encounters:   05/21/19 135/86   05/14/19 (!) 143/80   01/07/19 111/70          (goal 120/80)    All Future Testing planned in [facility]  Lab Frequency Next Occurrence   POCT Fecal Immunochemical Test

## 2019-06-27 RX ORDER — PEN NEEDLE, DIABETIC 31 G X1/4"
NEEDLE, DISPOSABLE MISCELLANEOUS
Qty: 100 EACH | Refills: 2 | Status: SHIPPED | OUTPATIENT
Start: 2019-06-27

## 2019-06-27 RX ORDER — LANCETS 33 GAUGE
EACH MISCELLANEOUS
Qty: 100 EACH | Refills: 10 | Status: SHIPPED | OUTPATIENT
Start: 2019-06-27 | End: 2020-12-15 | Stop reason: SDUPTHER

## 2019-07-12 RX ORDER — GABAPENTIN 300 MG/1
CAPSULE ORAL
Qty: 90 CAPSULE | Refills: 11 | Status: SHIPPED | OUTPATIENT
Start: 2019-07-12 | End: 2019-10-17 | Stop reason: SDUPTHER

## 2019-07-26 DIAGNOSIS — E11.9 TYPE 2 DIABETES MELLITUS WITHOUT COMPLICATION, WITH LONG-TERM CURRENT USE OF INSULIN (HCC): ICD-10-CM

## 2019-07-26 DIAGNOSIS — Z79.4 TYPE 2 DIABETES MELLITUS WITHOUT COMPLICATION, WITH LONG-TERM CURRENT USE OF INSULIN (HCC): ICD-10-CM

## 2019-07-29 RX ORDER — INSULIN GLARGINE 100 [IU]/ML
INJECTION, SOLUTION SUBCUTANEOUS
Qty: 15 ML | Refills: 10 | Status: SHIPPED | OUTPATIENT
Start: 2019-07-29 | End: 2019-12-24 | Stop reason: SDUPTHER

## 2019-08-07 DIAGNOSIS — Z79.4 TYPE 2 DIABETES MELLITUS WITH HYPEROSMOLARITY WITHOUT COMA, WITH LONG-TERM CURRENT USE OF INSULIN (HCC): ICD-10-CM

## 2019-08-07 DIAGNOSIS — E11.00 TYPE 2 DIABETES MELLITUS WITH HYPEROSMOLARITY WITHOUT COMA, WITH LONG-TERM CURRENT USE OF INSULIN (HCC): ICD-10-CM

## 2019-08-09 RX ORDER — INSULIN ASPART 100 [IU]/ML
INJECTION, SOLUTION INTRAVENOUS; SUBCUTANEOUS
Qty: 15 ML | Refills: 10 | Status: SHIPPED | OUTPATIENT
Start: 2019-08-09 | End: 2020-08-13

## 2019-08-15 ENCOUNTER — OFFICE VISIT (OUTPATIENT)
Dept: FAMILY MEDICINE CLINIC | Age: 54
End: 2019-08-15
Payer: MEDICARE

## 2019-08-15 VITALS
WEIGHT: 203 LBS | HEART RATE: 77 BPM | HEIGHT: 63 IN | BODY MASS INDEX: 35.97 KG/M2 | SYSTOLIC BLOOD PRESSURE: 152 MMHG | DIASTOLIC BLOOD PRESSURE: 92 MMHG

## 2019-08-15 DIAGNOSIS — Z79.4 TYPE 2 DIABETES MELLITUS WITHOUT COMPLICATION, WITH LONG-TERM CURRENT USE OF INSULIN (HCC): Primary | ICD-10-CM

## 2019-08-15 DIAGNOSIS — E11.9 TYPE 2 DIABETES MELLITUS WITHOUT COMPLICATION, WITH LONG-TERM CURRENT USE OF INSULIN (HCC): Primary | ICD-10-CM

## 2019-08-15 DIAGNOSIS — M22.2X9 DISORDER OF PATELLOFEMORAL JOINT, UNSPECIFIED LATERALITY: ICD-10-CM

## 2019-08-15 LAB — HBA1C MFR BLD: 10.9 %

## 2019-08-15 PROCEDURE — 83036 HEMOGLOBIN GLYCOSYLATED A1C: CPT | Performed by: STUDENT IN AN ORGANIZED HEALTH CARE EDUCATION/TRAINING PROGRAM

## 2019-08-15 PROCEDURE — 2022F DILAT RTA XM EVC RTNOPTHY: CPT | Performed by: STUDENT IN AN ORGANIZED HEALTH CARE EDUCATION/TRAINING PROGRAM

## 2019-08-15 PROCEDURE — 3017F COLORECTAL CA SCREEN DOC REV: CPT | Performed by: STUDENT IN AN ORGANIZED HEALTH CARE EDUCATION/TRAINING PROGRAM

## 2019-08-15 PROCEDURE — 99211 OFF/OP EST MAY X REQ PHY/QHP: CPT | Performed by: STUDENT IN AN ORGANIZED HEALTH CARE EDUCATION/TRAINING PROGRAM

## 2019-08-15 PROCEDURE — G8427 DOCREV CUR MEDS BY ELIG CLIN: HCPCS | Performed by: STUDENT IN AN ORGANIZED HEALTH CARE EDUCATION/TRAINING PROGRAM

## 2019-08-15 PROCEDURE — 99213 OFFICE O/P EST LOW 20 MIN: CPT | Performed by: STUDENT IN AN ORGANIZED HEALTH CARE EDUCATION/TRAINING PROGRAM

## 2019-08-15 PROCEDURE — G8417 CALC BMI ABV UP PARAM F/U: HCPCS | Performed by: STUDENT IN AN ORGANIZED HEALTH CARE EDUCATION/TRAINING PROGRAM

## 2019-08-15 PROCEDURE — 3046F HEMOGLOBIN A1C LEVEL >9.0%: CPT | Performed by: STUDENT IN AN ORGANIZED HEALTH CARE EDUCATION/TRAINING PROGRAM

## 2019-08-15 PROCEDURE — 1036F TOBACCO NON-USER: CPT | Performed by: STUDENT IN AN ORGANIZED HEALTH CARE EDUCATION/TRAINING PROGRAM

## 2019-08-15 ASSESSMENT — ENCOUNTER SYMPTOMS
CONSTIPATION: 0
NAUSEA: 0
EYE REDNESS: 0
EYE DISCHARGE: 0
CHEST TIGHTNESS: 0
WHEEZING: 0
BACK PAIN: 0
SORE THROAT: 0
BLOOD IN STOOL: 0
SHORTNESS OF BREATH: 0
DIARRHEA: 0
PHOTOPHOBIA: 0
EYE PAIN: 0
ABDOMINAL PAIN: 0
COUGH: 0

## 2019-08-15 NOTE — PROGRESS NOTES
Attending Physician Statement  I have discussed the care of Sai Jhaveri, including pertinent history and exam findings,  with the resident. I have reviewed the key elements of all parts of the encounter with the resident. I agree with the assessment, plan and orders as documented by the resident.   (GE Modifier)

## 2019-08-15 NOTE — PATIENT INSTRUCTIONS
[address] is Team #1  Taj Winkler MD (Faculty)  Blease Harada, MD (Faculty)  Quiana Castrejon MD (Resident)  Heena Humphreys MD (Resident)  Loren Torres MD (Resident)  Maru Moss MD (Resident)  Manish Grimes, A  Zita Ramirez., A  Malorie Humphrey., [facility] office)  Lissette [facility] office)  Torrie Hedrick [facility] office)  Tano Barahona, TERRYN  BETZAIDA Singh ([id number] )  Gabe Fields, Ph.D., (Behavioral Services)  Alayna Reed, [address] [place] [address] (Clinical Pharmacist)     Office phone number: [phone]    If you need to get in right away due to illness, please be advised we have \"Same Day\" appointments available Monday-Friday. Please call us at [phone] option #3 to schedule your \"Same Day\" appointment.

## 2019-08-15 NOTE — PROGRESS NOTES
Subjective:    Noemi Acuña is a 49 y.o. female with  has a past medical history of Breast cancer ([facility] [address] Cancer ([address] Conjunctivitis, Depression, DJD (degenerative joint disease) of knee, Hypertension, Pain, knee, Primary osteoarthritis of left knee, Sleep apnea, and Type II or unspecified type diabetes mellitus without mention of complication, not stated as uncontrolled. Family History   Problem Relation Age of Onset   Fredy Funez Cancer Father        Presented tot office today for:  Chief Complaint   Patient presents with    Diabetes    Health Maintenance     eye exam due, vaccines due       HPI  Patient is a 48 yo female here following up on DM2. She takes 4 units novolog and 50 basaglar  Last a1c in May was 11.5 today 10.9  Last eye exam- Earlier this year  Last foot exam- today    Review of Systems   Constitutional: Negative for chills, diaphoresis, fatigue and fever. HENT: Negative for ear pain and sore throat. Eyes: Negative for photophobia, pain, discharge, redness and visual disturbance. Respiratory: Negative for cough, chest tightness, shortness of breath and wheezing. Cardiovascular: Negative for chest pain, palpitations and leg swelling. Gastrointestinal: Negative for abdominal pain, blood in stool, constipation, diarrhea and nausea. Endocrine: Negative for cold intolerance and heat intolerance. Genitourinary: Negative for difficulty urinating and hematuria. Musculoskeletal: Negative for back pain and joint swelling. Neurological: Negative for light-headedness and headaches. Psychiatric/Behavioral: Negative for agitation and confusion.        Objective:    BP (!) 152/92 (Site: Right Lower Arm, Position: Sitting, Cuff Size: Medium Adult)   Pulse 77   Ht 5' 3\" (1.6 m)   Wt 203 lb (92.1 kg)   LMP 12/08/2015 (Approximate)   BMI 35.96 kg/m²    BP Readings from Last 3 Encounters:   08/15/19 (!) 152/92   05/21/19 135/86   05/14/19 (!) 143/80       Physical Exam   Eyes: Conjunctivae

## 2019-08-20 DIAGNOSIS — E11.9 TYPE 2 DIABETES MELLITUS WITHOUT COMPLICATION, WITH LONG-TERM CURRENT USE OF INSULIN (HCC): ICD-10-CM

## 2019-08-20 DIAGNOSIS — E11.00 TYPE 2 DIABETES MELLITUS WITH HYPEROSMOLARITY WITHOUT COMA, WITHOUT LONG-TERM CURRENT USE OF INSULIN (HCC): ICD-10-CM

## 2019-08-20 DIAGNOSIS — R51.9 HEADACHE DISORDER: ICD-10-CM

## 2019-08-20 DIAGNOSIS — E78.00 HYPERCHOLESTEREMIA: ICD-10-CM

## 2019-08-20 DIAGNOSIS — E55.9 VITAMIN D DEFICIENCY: ICD-10-CM

## 2019-08-20 DIAGNOSIS — Z79.4 TYPE 2 DIABETES MELLITUS WITHOUT COMPLICATION, WITH LONG-TERM CURRENT USE OF INSULIN (HCC): ICD-10-CM

## 2019-08-20 NOTE — TELEPHONE ENCOUNTER
01/07/2020   Microalbumin, Ur Once 09/14/2019   CBC Auto Differential     Comprehensive Metabolic Panel     Ferritin                 Patient Active Problem List:     Cancer ([facility] [address]     Pain, knee     Depression     Hypertension     DJD (degenerative joint disease) of knee     Headache     Breast cancer (HCC)     Iron deficiency anemia     DM2 (diabetes mellitus, type 2) (HCC)     HTN (hypertension)     DM w/o complication type II     Malignant neoplasm of nipple of left breast in female, estrogen receptor positive ([facility] [address]     Left shoulder pain     Diabetes 1.5, managed as type 2 ([address]     Headache disorder     [place]

## 2019-08-20 NOTE — TELEPHONE ENCOUNTER
Please address the medication refill and close the encounter. If I can be of assistance, please route to the applicable pool. Thank you. Last visit: 8/15/19  Last Med refill: 7/30/19  Does patient have enough medication for 72 hours: Yes    Next Visit Date:  Future Appointments   Date Time Provider Kota Sanz   10/10/2019 12:45 PM SCHEDULE, [facility]   10/17/2019  4:00 PM Cheikh Eubanks MD [facility]   11/12/2019  1:00 PM SCHEDULE, [facility] [address] Maintenance   Topic Date Due    Diabetic retinal exam  06/01/2016    Colon Cancer Screen FIT/FOBT  11/21/2018    Diabetic microalbuminuria test  07/13/2019    Hepatitis B Vaccine (1 of 3 - Risk 3-dose series) 08/15/2020 (Originally 4/12/1985)    Shingles Vaccine (1 of 2) 08/15/2020 (Originally 4/12/2016)    Flu vaccine (1) 09/01/2019    A1C test (Diabetic or Prediabetic)  11/15/2019    Diabetic foot exam  01/07/2020    Lipid screen  05/16/2020    Potassium monitoring  05/16/2020    Creatinine monitoring  05/16/2020    Cervical cancer screen  06/21/2023    DTaP/Tdap/Td vaccine (2 - Td) 02/06/2027    Breast cancer screen  Completed    Pneumococcal 0-64 years Vaccine  Completed    HIV screen  Completed       Hemoglobin A1C (%)   Date Value   08/15/2019 10.9   05/16/2019 11.5 (H)   01/07/2019 8.8             ( goal A1C is < 7)   Microalb/Crt.  Ratio (mcg/mg creat)   Date Value   07/13/2018 CANNOT BE CALCULATED     LDL Cholesterol (mg/dL)   Date Value   05/16/2019 116   05/16/2019 116       (goal LDL is <100)   AST (U/L)   Date Value   05/16/2019 10     ALT (U/L)   Date Value   05/16/2019 9     BUN (mg/dL)   Date Value   05/16/2019 5 (L)     BP Readings from Last 3 Encounters:   08/15/19 (!) 152/92   05/21/19 135/86   05/14/19 (!) 143/80          (goal 120/80)    All Future Testing planned in [facility]  Lab Frequency Next Occurrence   POCT Fecal Immunochemical Test (FIT) Once

## 2019-08-22 RX ORDER — ATORVASTATIN CALCIUM 20 MG/1
20 TABLET, FILM COATED ORAL DAILY
Qty: 30 TABLET | Refills: 10 | Status: SHIPPED | OUTPATIENT
Start: 2019-08-22 | End: 2020-07-10

## 2019-08-22 RX ORDER — SITAGLIPTIN 100 MG/1
100 TABLET, FILM COATED ORAL DAILY
Qty: 30 TABLET | Refills: 10 | Status: SHIPPED | OUTPATIENT
Start: 2019-08-22 | End: 2020-07-10

## 2019-08-22 RX ORDER — ERGOCALCIFEROL 1.25 MG/1
50000 CAPSULE ORAL WEEKLY
Qty: 12 CAPSULE | Refills: 10 | Status: SHIPPED | OUTPATIENT
Start: 2019-08-22 | End: 2020-09-16

## 2019-09-24 DIAGNOSIS — Z79.4 TYPE 2 DIABETES MELLITUS WITHOUT COMPLICATION, WITH LONG-TERM CURRENT USE OF INSULIN (HCC): ICD-10-CM

## 2019-09-24 DIAGNOSIS — E11.9 TYPE 2 DIABETES MELLITUS WITHOUT COMPLICATION, WITH LONG-TERM CURRENT USE OF INSULIN (HCC): ICD-10-CM

## 2019-09-25 RX ORDER — ASPIRIN 81 MG
TABLET, DELAYED RELEASE (ENTERIC COATED) ORAL
Qty: 60 TABLET | Refills: 11 | Status: SHIPPED | OUTPATIENT
Start: 2019-09-25

## 2019-10-02 RX ORDER — BLOOD-GLUCOSE METER
EACH MISCELLANEOUS
Qty: 1 KIT | Refills: 0 | Status: SHIPPED | OUTPATIENT
Start: 2019-10-02 | End: 2020-10-13 | Stop reason: ALTCHOICE

## 2019-10-10 ENCOUNTER — HOSPITAL ENCOUNTER (OUTPATIENT)
Facility: MEDICAL CENTER | Age: 54
Discharge: HOME | End: 2019-10-10
Payer: MEDICARE

## 2019-10-11 ENCOUNTER — HOSPITAL ENCOUNTER (OUTPATIENT)
Facility: MEDICAL CENTER | Age: 54
Discharge: HOME | End: 2019-10-11
Payer: MEDICARE

## 2019-10-11 DIAGNOSIS — D50.8 OTHER IRON DEFICIENCY ANEMIA: ICD-10-CM

## 2019-10-11 DIAGNOSIS — C50.012 MALIGNANT NEOPLASM OF NIPPLE OF LEFT BREAST IN FEMALE, ESTROGEN RECEPTOR POSITIVE (HCC): ICD-10-CM

## 2019-10-11 DIAGNOSIS — Z17.0 MALIGNANT NEOPLASM OF NIPPLE OF LEFT BREAST IN FEMALE, ESTROGEN RECEPTOR POSITIVE (HCC): ICD-10-CM

## 2019-10-11 DIAGNOSIS — C50.012 MALIGNANT NEOPLASM OF NIPPLE OF LEFT BREAST IN FEMALE, ESTROGEN RECEPTOR POSITIVE (HCC): Primary | ICD-10-CM

## 2019-10-11 DIAGNOSIS — Z17.0 MALIGNANT NEOPLASM OF NIPPLE OF LEFT BREAST IN FEMALE, ESTROGEN RECEPTOR POSITIVE (HCC): Primary | ICD-10-CM

## 2019-10-11 LAB
ABSOLUTE EOS #: 0.21 K/UL (ref 0–0.44)
ABSOLUTE IMMATURE GRANULOCYTE: 0.04 K/UL (ref 0–0.3)
ABSOLUTE LYMPH #: 4.83 K/UL (ref 1.1–3.7)
ABSOLUTE MONO #: 0.51 K/UL (ref 0.1–1.2)
ALBUMIN SERPL-MCNC: 3.7 G/DL (ref 3.5–5.2)
ALBUMIN/GLOBULIN RATIO: ABNORMAL (ref 1–2.5)
ALP BLD-CCNC: 86 U/L (ref 35–104)
ALT SERPL-CCNC: 8 U/L (ref 5–33)
ANION GAP SERPL CALCULATED.3IONS-SCNC: 11 MMOL/L (ref 9–17)
AST SERPL-CCNC: 12 U/L
BASOPHILS # BLD: 1 % (ref 0–2)
BASOPHILS ABSOLUTE: 0.06 K/UL (ref 0–0.2)
BILIRUB SERPL-MCNC: 0.46 MG/DL (ref 0.3–1.2)
BUN BLDV-MCNC: 6 MG/DL (ref 6–20)
BUN/CREAT BLD: 8 (ref 9–20)
CALCIUM SERPL-MCNC: 8.7 MG/DL (ref 8.6–10.4)
CHLORIDE BLD-SCNC: 102 MMOL/L (ref 98–107)
CO2: 28 MMOL/L (ref 20–31)
CREAT SERPL-MCNC: 0.74 MG/DL (ref 0.5–0.9)
DIFFERENTIAL TYPE: ABNORMAL
EOSINOPHILS RELATIVE PERCENT: 2 % (ref 1–4)
FERRITIN: 802 UG/L (ref 13–150)
GFR AFRICAN AMERICAN: >60 ML/MIN
GFR NON-AFRICAN AMERICAN: >60 ML/MIN
GFR SERPL CREATININE-BSD FRML MDRD: ABNORMAL ML/MIN/{1.73_M2}
GFR SERPL CREATININE-BSD FRML MDRD: ABNORMAL ML/MIN/{1.73_M2}
GLUCOSE BLD-MCNC: 133 MG/DL (ref 70–99)
HCT VFR BLD CALC: 36 % (ref 36.3–47.1)
HEMOGLOBIN: 11.5 G/DL (ref 11.9–15.1)
IMMATURE GRANULOCYTES: 0 %
LYMPHOCYTES # BLD: 45 % (ref 24–43)
MCH RBC QN AUTO: 27.8 PG (ref 25.2–33.5)
MCHC RBC AUTO-ENTMCNC: 31.9 G/DL (ref 28.4–34.8)
MCV RBC AUTO: 87 FL (ref 82.6–102.9)
MONOCYTES # BLD: 5 % (ref 3–12)
NRBC AUTOMATED: 0 PER 100 WBC
PDW BLD-RTO: 12.8 % (ref 11.8–14.4)
PLATELET # BLD: 272 K/UL (ref 138–453)
PLATELET ESTIMATE: ABNORMAL
PMV BLD AUTO: 9.9 FL (ref 8.1–13.5)
POTASSIUM SERPL-SCNC: 3.1 MMOL/L (ref 3.7–5.3)
RBC # BLD: 4.14 M/UL (ref 3.95–5.11)
RBC # BLD: ABNORMAL 10*6/UL
SEG NEUTROPHILS: 47 % (ref 36–65)
SEGMENTED NEUTROPHILS ABSOLUTE COUNT: 5.11 K/UL (ref 1.5–8.1)
SODIUM BLD-SCNC: 141 MMOL/L (ref 135–144)
TOTAL PROTEIN: 7.5 G/DL (ref 6.4–8.3)
WBC # BLD: 10.8 K/UL (ref 3.5–11.3)
WBC # BLD: ABNORMAL 10*3/UL

## 2019-10-11 PROCEDURE — 36415 COLL VENOUS BLD VENIPUNCTURE: CPT

## 2019-10-11 PROCEDURE — 80053 COMPREHEN METABOLIC PANEL: CPT

## 2019-10-11 PROCEDURE — 85025 COMPLETE CBC W/AUTO DIFF WBC: CPT

## 2019-10-11 PROCEDURE — 82728 ASSAY OF FERRITIN: CPT

## 2019-10-14 DIAGNOSIS — K59.00 CONSTIPATION, UNSPECIFIED CONSTIPATION TYPE: ICD-10-CM

## 2019-10-14 DIAGNOSIS — I10 ESSENTIAL HYPERTENSION: ICD-10-CM

## 2019-10-15 ENCOUNTER — HOSPITAL ENCOUNTER (OUTPATIENT)
Facility: MEDICAL CENTER | Age: 54
End: 2019-10-15
Payer: MEDICARE

## 2019-10-17 ENCOUNTER — TELEPHONE (OUTPATIENT)
Dept: ONCOLOGY | Age: 54
End: 2019-10-17

## 2019-10-17 ENCOUNTER — OFFICE VISIT (OUTPATIENT)
Dept: ONCOLOGY | Age: 54
End: 2019-10-17
Payer: MEDICARE

## 2019-10-17 VITALS
WEIGHT: 206.9 LBS | RESPIRATION RATE: 17 BRPM | SYSTOLIC BLOOD PRESSURE: 136 MMHG | HEART RATE: 81 BPM | BODY MASS INDEX: 36.65 KG/M2 | DIASTOLIC BLOOD PRESSURE: 89 MMHG | TEMPERATURE: 97 F

## 2019-10-17 DIAGNOSIS — D50.8 OTHER IRON DEFICIENCY ANEMIA: ICD-10-CM

## 2019-10-17 DIAGNOSIS — C50.012 MALIGNANT NEOPLASM OF NIPPLE OF LEFT BREAST IN FEMALE, ESTROGEN RECEPTOR POSITIVE (HCC): Primary | ICD-10-CM

## 2019-10-17 DIAGNOSIS — Z17.0 MALIGNANT NEOPLASM OF NIPPLE OF LEFT BREAST IN FEMALE, ESTROGEN RECEPTOR POSITIVE (HCC): Primary | ICD-10-CM

## 2019-10-17 PROCEDURE — G8484 FLU IMMUNIZE NO ADMIN: HCPCS | Performed by: INTERNAL MEDICINE

## 2019-10-17 PROCEDURE — 99211 OFF/OP EST MAY X REQ PHY/QHP: CPT

## 2019-10-17 PROCEDURE — 1036F TOBACCO NON-USER: CPT | Performed by: INTERNAL MEDICINE

## 2019-10-17 PROCEDURE — G8427 DOCREV CUR MEDS BY ELIG CLIN: HCPCS | Performed by: INTERNAL MEDICINE

## 2019-10-17 PROCEDURE — G8417 CALC BMI ABV UP PARAM F/U: HCPCS | Performed by: INTERNAL MEDICINE

## 2019-10-17 PROCEDURE — 3017F COLORECTAL CA SCREEN DOC REV: CPT | Performed by: INTERNAL MEDICINE

## 2019-10-17 PROCEDURE — 99214 OFFICE O/P EST MOD 30 MIN: CPT | Performed by: INTERNAL MEDICINE

## 2019-10-17 RX ORDER — ASPIRIN 81 MG/1
TABLET ORAL
Qty: 30 TABLET | Refills: 2 | Status: SHIPPED | OUTPATIENT
Start: 2019-10-17 | End: 2020-01-13

## 2019-10-17 RX ORDER — GABAPENTIN 300 MG/1
CAPSULE ORAL
Qty: 90 CAPSULE | Refills: 11 | Status: SHIPPED | OUTPATIENT
Start: 2019-10-17 | End: 2020-07-09

## 2019-10-17 RX ORDER — CYCLOBENZAPRINE HCL 10 MG
TABLET ORAL
Qty: 60 TABLET | Refills: 3 | Status: SHIPPED | OUTPATIENT
Start: 2019-10-17 | End: 2020-01-15

## 2019-10-17 RX ORDER — POTASSIUM CHLORIDE 1500 MG/1
20 TABLET, FILM COATED, EXTENDED RELEASE ORAL
Qty: 14 TABLET | Refills: 0 | Status: SHIPPED | OUTPATIENT
Start: 2019-10-17 | End: 2020-01-14

## 2019-10-17 RX ORDER — DOCUSATE SODIUM 100 MG
CAPSULE ORAL
Qty: 60 CAPSULE | Refills: 11 | Status: SHIPPED | OUTPATIENT
Start: 2019-10-17 | End: 2020-10-08

## 2019-10-17 RX ORDER — HYDROCHLOROTHIAZIDE 25 MG/1
TABLET ORAL
Qty: 30 TABLET | Refills: 2 | Status: SHIPPED | OUTPATIENT
Start: 2019-10-17 | End: 2020-01-13

## 2019-11-13 ENCOUNTER — TELEPHONE (OUTPATIENT)
Dept: ADMINISTRATIVE | Age: 54
End: 2019-11-13

## 2019-11-21 ENCOUNTER — HOSPITAL ENCOUNTER (OUTPATIENT)
Age: 54
Setting detail: SPECIMEN
Discharge: HOME | End: 2019-11-21
Payer: MEDICARE

## 2019-11-21 ENCOUNTER — OFFICE VISIT (OUTPATIENT)
Dept: FAMILY MEDICINE CLINIC | Age: 54
End: 2019-11-21
Payer: MEDICARE

## 2019-11-21 VITALS
DIASTOLIC BLOOD PRESSURE: 88 MMHG | BODY MASS INDEX: 35.25 KG/M2 | WEIGHT: 199 LBS | HEART RATE: 107 BPM | SYSTOLIC BLOOD PRESSURE: 136 MMHG

## 2019-11-21 DIAGNOSIS — Z79.4 TYPE 2 DIABETES MELLITUS WITHOUT COMPLICATION, WITH LONG-TERM CURRENT USE OF INSULIN (HCC): ICD-10-CM

## 2019-11-21 DIAGNOSIS — E11.9 TYPE 2 DIABETES MELLITUS WITHOUT COMPLICATION, WITH LONG-TERM CURRENT USE OF INSULIN (HCC): ICD-10-CM

## 2019-11-21 DIAGNOSIS — Z13.1 SCREENING FOR DIABETES MELLITUS: ICD-10-CM

## 2019-11-21 DIAGNOSIS — Z23 NEED FOR INFLUENZA VACCINATION: Primary | ICD-10-CM

## 2019-11-21 LAB
ESTIMATED AVERAGE GLUCOSE: 229 MG/DL
HBA1C MFR BLD: 9.6 % (ref 4–6)

## 2019-11-21 PROCEDURE — 99211 OFF/OP EST MAY X REQ PHY/QHP: CPT | Performed by: FAMILY MEDICINE

## 2019-11-21 PROCEDURE — G8417 CALC BMI ABV UP PARAM F/U: HCPCS | Performed by: STUDENT IN AN ORGANIZED HEALTH CARE EDUCATION/TRAINING PROGRAM

## 2019-11-21 PROCEDURE — 99213 OFFICE O/P EST LOW 20 MIN: CPT | Performed by: STUDENT IN AN ORGANIZED HEALTH CARE EDUCATION/TRAINING PROGRAM

## 2019-11-21 PROCEDURE — 3017F COLORECTAL CA SCREEN DOC REV: CPT | Performed by: STUDENT IN AN ORGANIZED HEALTH CARE EDUCATION/TRAINING PROGRAM

## 2019-11-21 PROCEDURE — 3046F HEMOGLOBIN A1C LEVEL >9.0%: CPT | Performed by: STUDENT IN AN ORGANIZED HEALTH CARE EDUCATION/TRAINING PROGRAM

## 2019-11-21 PROCEDURE — 1036F TOBACCO NON-USER: CPT | Performed by: STUDENT IN AN ORGANIZED HEALTH CARE EDUCATION/TRAINING PROGRAM

## 2019-11-21 PROCEDURE — G8482 FLU IMMUNIZE ORDER/ADMIN: HCPCS | Performed by: STUDENT IN AN ORGANIZED HEALTH CARE EDUCATION/TRAINING PROGRAM

## 2019-11-21 PROCEDURE — 2022F DILAT RTA XM EVC RTNOPTHY: CPT | Performed by: STUDENT IN AN ORGANIZED HEALTH CARE EDUCATION/TRAINING PROGRAM

## 2019-11-21 PROCEDURE — 90686 IIV4 VACC NO PRSV 0.5 ML IM: CPT | Performed by: FAMILY MEDICINE

## 2019-11-21 PROCEDURE — G8427 DOCREV CUR MEDS BY ELIG CLIN: HCPCS | Performed by: STUDENT IN AN ORGANIZED HEALTH CARE EDUCATION/TRAINING PROGRAM

## 2019-11-21 ASSESSMENT — ENCOUNTER SYMPTOMS
BACK PAIN: 0
SHORTNESS OF BREATH: 0
EYE PAIN: 0
DIARRHEA: 0
CONSTIPATION: 0
BLOOD IN STOOL: 0
EYE REDNESS: 0
NAUSEA: 0
EYE DISCHARGE: 0
COUGH: 0
PHOTOPHOBIA: 0
ABDOMINAL PAIN: 0
WHEEZING: 0
SORE THROAT: 0
CHEST TIGHTNESS: 0

## 2019-12-24 ENCOUNTER — OFFICE VISIT (OUTPATIENT)
Dept: FAMILY MEDICINE CLINIC | Age: 54
End: 2019-12-24
Payer: MEDICARE

## 2019-12-24 VITALS
BODY MASS INDEX: 36 KG/M2 | TEMPERATURE: 96.8 F | SYSTOLIC BLOOD PRESSURE: 134 MMHG | WEIGHT: 203.2 LBS | HEART RATE: 113 BPM | HEIGHT: 63 IN | DIASTOLIC BLOOD PRESSURE: 74 MMHG | OXYGEN SATURATION: 97 %

## 2019-12-24 DIAGNOSIS — Z79.4 TYPE 2 DIABETES MELLITUS WITHOUT COMPLICATION, WITH LONG-TERM CURRENT USE OF INSULIN (HCC): Primary | ICD-10-CM

## 2019-12-24 DIAGNOSIS — Z79.4 TYPE 2 DIABETES MELLITUS WITHOUT COMPLICATION, WITH LONG-TERM CURRENT USE OF INSULIN (HCC): ICD-10-CM

## 2019-12-24 DIAGNOSIS — Z12.11 COLON CANCER SCREENING: Primary | ICD-10-CM

## 2019-12-24 DIAGNOSIS — E11.9 TYPE 2 DIABETES MELLITUS WITHOUT COMPLICATION, WITH LONG-TERM CURRENT USE OF INSULIN (HCC): Primary | ICD-10-CM

## 2019-12-24 DIAGNOSIS — E11.9 TYPE 2 DIABETES MELLITUS WITHOUT COMPLICATION, WITH LONG-TERM CURRENT USE OF INSULIN (HCC): ICD-10-CM

## 2019-12-24 PROCEDURE — G8482 FLU IMMUNIZE ORDER/ADMIN: HCPCS | Performed by: STUDENT IN AN ORGANIZED HEALTH CARE EDUCATION/TRAINING PROGRAM

## 2019-12-24 PROCEDURE — 1036F TOBACCO NON-USER: CPT | Performed by: STUDENT IN AN ORGANIZED HEALTH CARE EDUCATION/TRAINING PROGRAM

## 2019-12-24 PROCEDURE — 99213 OFFICE O/P EST LOW 20 MIN: CPT | Performed by: STUDENT IN AN ORGANIZED HEALTH CARE EDUCATION/TRAINING PROGRAM

## 2019-12-24 PROCEDURE — G8417 CALC BMI ABV UP PARAM F/U: HCPCS | Performed by: STUDENT IN AN ORGANIZED HEALTH CARE EDUCATION/TRAINING PROGRAM

## 2019-12-24 PROCEDURE — G8427 DOCREV CUR MEDS BY ELIG CLIN: HCPCS | Performed by: STUDENT IN AN ORGANIZED HEALTH CARE EDUCATION/TRAINING PROGRAM

## 2019-12-24 PROCEDURE — 3046F HEMOGLOBIN A1C LEVEL >9.0%: CPT | Performed by: STUDENT IN AN ORGANIZED HEALTH CARE EDUCATION/TRAINING PROGRAM

## 2019-12-24 PROCEDURE — 2022F DILAT RTA XM EVC RTNOPTHY: CPT | Performed by: STUDENT IN AN ORGANIZED HEALTH CARE EDUCATION/TRAINING PROGRAM

## 2019-12-24 PROCEDURE — 99211 OFF/OP EST MAY X REQ PHY/QHP: CPT | Performed by: FAMILY MEDICINE

## 2019-12-24 PROCEDURE — 3017F COLORECTAL CA SCREEN DOC REV: CPT | Performed by: STUDENT IN AN ORGANIZED HEALTH CARE EDUCATION/TRAINING PROGRAM

## 2019-12-24 ASSESSMENT — ENCOUNTER SYMPTOMS
EYE PAIN: 0
ABDOMINAL PAIN: 0
WHEEZING: 0
PHOTOPHOBIA: 0
CHEST TIGHTNESS: 0
BACK PAIN: 0
DIARRHEA: 0
BLOOD IN STOOL: 0
COUGH: 0
SORE THROAT: 0
EYE DISCHARGE: 0
SHORTNESS OF BREATH: 0
CONSTIPATION: 0
EYE REDNESS: 0
NAUSEA: 0

## 2020-01-13 RX ORDER — HYDROCHLOROTHIAZIDE 25 MG/1
TABLET ORAL
Qty: 30 TABLET | Refills: 10 | Status: SHIPPED | OUTPATIENT
Start: 2020-01-13 | End: 2020-12-16 | Stop reason: SDUPTHER

## 2020-01-13 RX ORDER — ASPIRIN 81 MG/1
TABLET ORAL
Qty: 30 TABLET | Refills: 10 | Status: SHIPPED | OUTPATIENT
Start: 2020-01-13 | End: 2020-12-18 | Stop reason: SDUPTHER

## 2020-01-15 RX ORDER — CYCLOBENZAPRINE HCL 10 MG
TABLET ORAL
Qty: 60 TABLET | Refills: 10 | Status: SHIPPED | OUTPATIENT
Start: 2020-01-15 | End: 2020-12-15

## 2020-01-15 RX ORDER — POTASSIUM CHLORIDE 20 MEQ/1
TABLET, EXTENDED RELEASE ORAL
Qty: 14 TABLET | Refills: 10 | Status: SHIPPED | OUTPATIENT
Start: 2020-01-15 | End: 2021-10-12 | Stop reason: SDUPTHER

## 2020-01-16 ENCOUNTER — TELEPHONE (OUTPATIENT)
Dept: PHARMACY | Age: 55
End: 2020-01-16

## 2020-01-23 ENCOUNTER — TELEPHONE (OUTPATIENT)
Dept: PHARMACY | Age: 55
End: 2020-01-23

## 2020-01-23 NOTE — TELEPHONE ENCOUNTER
Medication Management Service  [facility]  [phone]      Ruiz Cole is a 49 y.o. female who was called by pharmacy for follow-up about home blood sugars. Second attempt made to reach patient by telephone. Left message for patient to return phone call to [phone]. Will continue to attempt to contact patient by telephone as appropriate. Tonie Inman PharmAce  D.  PGY2 Ambulatory Care Pharmacist Resident   [facility]) Medication Management Service  [phone]  1/23/2020 3:53 PM

## 2020-01-28 ENCOUNTER — TELEPHONE (OUTPATIENT)
Dept: PHARMACY | Age: 55
End: 2020-01-28

## 2020-02-04 ENCOUNTER — HOSPITAL ENCOUNTER (OUTPATIENT)
Age: 55
Setting detail: SPECIMEN
Discharge: HOME | End: 2020-02-04
Payer: MEDICARE

## 2020-02-04 LAB
CREATININE URINE: 249.6 MG/DL (ref 28–217)
MICROALBUMIN/CREAT 24H UR: <12 MG/L
MICROALBUMIN/CREAT UR-RTO: ABNORMAL MCG/MG CREAT

## 2020-02-07 ENCOUNTER — OFFICE VISIT (OUTPATIENT)
Dept: FAMILY MEDICINE CLINIC | Age: 55
End: 2020-02-07
Payer: MEDICARE

## 2020-02-07 VITALS
SYSTOLIC BLOOD PRESSURE: 121 MMHG | TEMPERATURE: 97.2 F | HEART RATE: 110 BPM | HEIGHT: 63 IN | OXYGEN SATURATION: 97 % | WEIGHT: 209.2 LBS | DIASTOLIC BLOOD PRESSURE: 80 MMHG | BODY MASS INDEX: 37.07 KG/M2

## 2020-02-07 PROBLEM — E66.812 CLASS 2 SEVERE OBESITY DUE TO EXCESS CALORIES WITH SERIOUS COMORBIDITY IN ADULT: Status: ACTIVE | Noted: 2020-02-07

## 2020-02-07 PROBLEM — E66.01 CLASS 2 SEVERE OBESITY DUE TO EXCESS CALORIES WITH SERIOUS COMORBIDITY IN ADULT (HCC): Status: ACTIVE | Noted: 2020-02-07

## 2020-02-07 PROCEDURE — 99211 OFF/OP EST MAY X REQ PHY/QHP: CPT | Performed by: HOSPITALIST

## 2020-02-07 PROCEDURE — G8427 DOCREV CUR MEDS BY ELIG CLIN: HCPCS | Performed by: STUDENT IN AN ORGANIZED HEALTH CARE EDUCATION/TRAINING PROGRAM

## 2020-02-07 PROCEDURE — 2022F DILAT RTA XM EVC RTNOPTHY: CPT | Performed by: STUDENT IN AN ORGANIZED HEALTH CARE EDUCATION/TRAINING PROGRAM

## 2020-02-07 PROCEDURE — 3046F HEMOGLOBIN A1C LEVEL >9.0%: CPT | Performed by: STUDENT IN AN ORGANIZED HEALTH CARE EDUCATION/TRAINING PROGRAM

## 2020-02-07 PROCEDURE — G8417 CALC BMI ABV UP PARAM F/U: HCPCS | Performed by: STUDENT IN AN ORGANIZED HEALTH CARE EDUCATION/TRAINING PROGRAM

## 2020-02-07 PROCEDURE — 1036F TOBACCO NON-USER: CPT | Performed by: STUDENT IN AN ORGANIZED HEALTH CARE EDUCATION/TRAINING PROGRAM

## 2020-02-07 PROCEDURE — G8482 FLU IMMUNIZE ORDER/ADMIN: HCPCS | Performed by: STUDENT IN AN ORGANIZED HEALTH CARE EDUCATION/TRAINING PROGRAM

## 2020-02-07 PROCEDURE — 99213 OFFICE O/P EST LOW 20 MIN: CPT | Performed by: STUDENT IN AN ORGANIZED HEALTH CARE EDUCATION/TRAINING PROGRAM

## 2020-02-07 PROCEDURE — 3017F COLORECTAL CA SCREEN DOC REV: CPT | Performed by: STUDENT IN AN ORGANIZED HEALTH CARE EDUCATION/TRAINING PROGRAM

## 2020-02-07 RX ORDER — LANCETS 30 GAUGE
EACH MISCELLANEOUS
Qty: 100 EACH | Refills: 3 | Status: SHIPPED | OUTPATIENT
Start: 2020-02-07 | End: 2020-03-17 | Stop reason: SDUPTHER

## 2020-02-07 ASSESSMENT — ENCOUNTER SYMPTOMS
ABDOMINAL PAIN: 0
WHEEZING: 0
PHOTOPHOBIA: 0
EYE DISCHARGE: 0
SHORTNESS OF BREATH: 0
SORE THROAT: 0
CHEST TIGHTNESS: 0
EYE PAIN: 0
NAUSEA: 0
EYE REDNESS: 0
BLOOD IN STOOL: 0
BACK PAIN: 0
COUGH: 0
DIARRHEA: 0
CONSTIPATION: 0

## 2020-02-07 NOTE — PROGRESS NOTES
Psychiatric/Behavioral: Negative for agitation and confusion. Objective:    /80 (Site: Right Lower Arm, Position: Sitting, Cuff Size: Medium Adult) Comment: machine  Pulse 110   Temp 97.2 °F (36.2 °C) (Temporal)   Ht 5' 2.99\" (1.6 m)   Wt 209 lb 3.2 oz (94.9 kg)   LMP 12/08/2015 (Approximate)   SpO2 97%   BMI 37.07 kg/m²    BP Readings from Last 3 Encounters:   02/07/20 121/80   12/24/19 134/74   11/21/19 136/88       Physical Exam  Eyes:      General: No scleral icterus. Conjunctiva/sclera: Conjunctivae normal.   Neck:      Musculoskeletal: Normal range of motion and neck supple. Thyroid: No thyromegaly. Cardiovascular:      Rate and Rhythm: Normal rate and regular rhythm. Heart sounds: Normal heart sounds. Pulmonary:      Effort: Pulmonary effort is normal.      Breath sounds: Normal breath sounds. No wheezing or rales. Chest:      Chest wall: No tenderness. Musculoskeletal:         General: No tenderness. Skin:     Coloration: Skin is not pale. Findings: No erythema. Lab Results   Component Value Date    WBC 10.8 10/11/2019    HGB 11.5 (L) 10/11/2019    HCT 36.0 (L) 10/11/2019     10/11/2019    CHOL 184 05/16/2019    TRIG 111 05/16/2019    HDL 46 05/16/2019    ALT 8 10/11/2019    AST 12 10/11/2019     10/11/2019    K 3.1 (L) 10/11/2019     10/11/2019    CREATININE 0.74 10/11/2019    BUN 6 10/11/2019    CO2 28 10/11/2019    TSH 3.33 05/16/2019    LABA1C 9.6 (H) 11/21/2019    LABMICR CANNOT BE CALCULATED 02/04/2020     Lab Results   Component Value Date    CALCIUM 8.7 10/11/2019    PHOS 3.8 08/12/2012     Lab Results   Component Value Date    LDLCHOLESTEROL 116 05/16/2019       Assessment and Plan:    1. Type 2 diabetes mellitus with hyperosmolarity without coma, with long-term current use of insulin (HCC)  Increase basaglar to 58 U  Follow up nurse visit in 2 weeks    2.  Class 2 severe obesity due to excess calories with serious comorbidity in adult, unspecified BMI ([facility] [address]  - bariatric surgery in 2015  - working on lifestyle modifications  hcc gap addressed    3. Malignant neoplasm of nipple of left breast in female, estrogen receptor [facility] ([facility]  - follows with dr Yariel Vela  in remission  hcc gap addressed      Requested Prescriptions     Signed Prescriptions Disp Refills    Lancets (ONETOUCH DELICA PLUS [id number]) MISC 100 each 3     Sig: Check blood sugars 3 times daily       There are no discontinued medications. Eyad Oscar received counseling on the following healthy behaviors:nutrition, exercise and medication adherence    Discussed use, benefit, and side effects of prescribed medications. Barriers to medication compliance addressed. All patient questions answered. Pt voicedunderstanding. Return in about 5 weeks (around 3/13/2020).

## 2020-02-07 NOTE — PATIENT INSTRUCTIONS
Thank you for letting us take care of you today. We hope all your questions were addressed. If a question was overlooked or something else comes to mind after you return home, please contact a member of your Care Team listed below. Please make sure you have a routine office visit set up to follow-up on [address]. Your Care Team at [place] [address] is Team #1  Dominique Lopez MD (Faculty)  Blayne Woods DO (Faculty)  Daina Waite (Resident)  Mya Espinoza (Resident)  Laureen Milner MD (Resident)  Yenny Love MD (Resident)  Law Floyd., JOSE LUIS Campbell., [facility] office)  [facility])  [facility])  DALTON Garsia Hawthorne, [address] [facility] ([address] [facility])  Nigel Castellon [facility] (Clinical Pharmacist)     Office phone number: [phone]    If you need to get in right away due to illness, please be advised we have \"Same Day\" appointments available Monday-Friday. Please call us at [phone] option #3 to schedule your \"Same Day\" appointment.

## 2020-02-07 NOTE — PROGRESS NOTES
Diabetic visit information    BP Readings from Last 3 Encounters:   02/07/20 121/80   12/24/19 134/74   11/21/19 136/88       Hemoglobin A1C (%)   Date Value   11/21/2019 9.6 (H)   08/15/2019 10.9   05/16/2019 11.5 (H)     Microalb/Crt. Ratio (mcg/mg creat)   Date Value   02/04/2020 CANNOT BE CALCULATED     LDL Cholesterol (mg/dL)   Date Value   05/16/2019 116               Have you changed or started any medications since your last visit including any over-the-counter medicines, vitamins, or herbal medicines? no   Have you stopped taking any of your medications? Is so, why? -  no  Are you having any side effects from any of your medications? - no    Have you seen any other physician or provider since your last visit?  no   Have you had any other diagnostic tests since your last visit?  no   Have you been seen in the emergency room and/or had an admission in a hospital since we last saw you?  no     Have you had your annual diabetic retinal (eye) exam? Yes  01/2020  (ensure copy of exam is in the chart)    Have you had your routine dental cleaning in the past 6 months? no    Do you have an active MyChart account? If not, what are your barriers? Yes    Patient Care Team:  Jesus Arriaga MD as PCP - General (Family Medicine)  Germain Batres MD as PCP - [facility] Empaneled Provider  Cooper Mena MD as Consulting Physician (Hematology and Oncology)  Shaan Del Angel MD (General Surgery)  Olga Morocho DO as Consulting Physician (General Surgery)  Williams Salazar MD as Consulting Physician (Pulmonology)    Medical history Review  Past Medical, Family, and Social History reviewed and does contribute to the patient presenting condition.     Health Maintenance   Topic Date Due    Diabetic retinal exam  06/01/2016    Colon Cancer Screen FIT/FOBT  11/21/2018    Diabetic microalbuminuria test  07/13/2019    Diabetic foot exam  01/07/2020    A1C test (Diabetic or Prediabetic)  02/21/2020    Hepatitis B

## 2020-02-20 ENCOUNTER — TELEPHONE (OUTPATIENT)
Dept: ONCOLOGY | Age: 55
End: 2020-02-20

## 2020-03-03 NOTE — TELEPHONE ENCOUNTER
[facility]  Lab Frequency Next Occurrence   Microalbumin, Ur Once 08/14/2020   Hemoglobin A1C Once 12/24/2020               Patient Active Problem List:     Cancer ([facility] [address]     Pain, knee     Depression     Hypertension     DJD (degenerative joint disease) of knee     Headache     Breast cancer ([address]     Iron deficiency anemia     DM2 (diabetes mellitus, type 2) ([address]     HTN (hypertension)     DM w/o complication type II     Malignant neoplasm of nipple of left breast in female, estrogen receptor positive ([address]     Left shoulder pain     Diabetes 1.5, managed as type 2 ([address]     Headache disorder     [place]ia     Class 2 severe obesity due to excess calories with serious comorbidity in adult [facility])

## 2020-03-17 ENCOUNTER — OFFICE VISIT (OUTPATIENT)
Dept: FAMILY MEDICINE CLINIC | Age: 55
End: 2020-03-17
Payer: MEDICARE

## 2020-03-17 VITALS
WEIGHT: 209 LBS | BODY MASS INDEX: 37.03 KG/M2 | TEMPERATURE: 98.1 F | OXYGEN SATURATION: 90 % | HEART RATE: 102 BPM | HEIGHT: 63 IN | DIASTOLIC BLOOD PRESSURE: 80 MMHG | SYSTOLIC BLOOD PRESSURE: 130 MMHG

## 2020-03-17 LAB — HBA1C MFR BLD: 10.9 %

## 2020-03-17 PROCEDURE — 3046F HEMOGLOBIN A1C LEVEL >9.0%: CPT | Performed by: STUDENT IN AN ORGANIZED HEALTH CARE EDUCATION/TRAINING PROGRAM

## 2020-03-17 PROCEDURE — 99211 OFF/OP EST MAY X REQ PHY/QHP: CPT | Performed by: FAMILY MEDICINE

## 2020-03-17 PROCEDURE — G8427 DOCREV CUR MEDS BY ELIG CLIN: HCPCS | Performed by: STUDENT IN AN ORGANIZED HEALTH CARE EDUCATION/TRAINING PROGRAM

## 2020-03-17 PROCEDURE — 3017F COLORECTAL CA SCREEN DOC REV: CPT | Performed by: STUDENT IN AN ORGANIZED HEALTH CARE EDUCATION/TRAINING PROGRAM

## 2020-03-17 PROCEDURE — G8417 CALC BMI ABV UP PARAM F/U: HCPCS | Performed by: STUDENT IN AN ORGANIZED HEALTH CARE EDUCATION/TRAINING PROGRAM

## 2020-03-17 PROCEDURE — 1036F TOBACCO NON-USER: CPT | Performed by: STUDENT IN AN ORGANIZED HEALTH CARE EDUCATION/TRAINING PROGRAM

## 2020-03-17 PROCEDURE — 83036 HEMOGLOBIN GLYCOSYLATED A1C: CPT | Performed by: STUDENT IN AN ORGANIZED HEALTH CARE EDUCATION/TRAINING PROGRAM

## 2020-03-17 PROCEDURE — 99213 OFFICE O/P EST LOW 20 MIN: CPT | Performed by: STUDENT IN AN ORGANIZED HEALTH CARE EDUCATION/TRAINING PROGRAM

## 2020-03-17 PROCEDURE — 90746 HEPB VACCINE 3 DOSE ADULT IM: CPT | Performed by: FAMILY MEDICINE

## 2020-03-17 PROCEDURE — G8482 FLU IMMUNIZE ORDER/ADMIN: HCPCS | Performed by: STUDENT IN AN ORGANIZED HEALTH CARE EDUCATION/TRAINING PROGRAM

## 2020-03-17 PROCEDURE — 2022F DILAT RTA XM EVC RTNOPTHY: CPT | Performed by: STUDENT IN AN ORGANIZED HEALTH CARE EDUCATION/TRAINING PROGRAM

## 2020-03-17 RX ORDER — LANCETS 30 GAUGE
EACH MISCELLANEOUS
Qty: 100 EACH | Refills: 3 | Status: SHIPPED | OUTPATIENT
Start: 2020-03-17

## 2020-03-17 ASSESSMENT — ENCOUNTER SYMPTOMS
BLOOD IN STOOL: 0
EYE DISCHARGE: 0
NAUSEA: 0
SORE THROAT: 0
CHEST TIGHTNESS: 0
BACK PAIN: 0
DIARRHEA: 0
WHEEZING: 0
SHORTNESS OF BREATH: 0
EYE REDNESS: 0
ABDOMINAL PAIN: 0
CONSTIPATION: 0
COUGH: 0
EYE PAIN: 0
PHOTOPHOBIA: 0

## 2020-03-17 NOTE — PROGRESS NOTES
Subjective:    Sandee Soni is a 49 y.o. female with  has a past medical history of Breast cancer ([address] Cancer ([address] Conjunctivitis, Depression, Disorder of patellofemoral joint, DJD (degenerative joint disease) of knee, Hypertension, Pain, knee, Primary osteoarthritis of left knee, Sleep apnea, and Type II or unspecified type diabetes mellitus without mention of complication, not stated as uncontrolled. Family History   Problem Relation Age of Onset   Antonio Yates Cancer Father        Presented tothe office today for:  Chief Complaint   Patient presents with    Diabetes     follow up        HPI  Patient is a 48 yo female following up on diabetes  Last a1c in November was 9.6 today it is 10.9  She is on basaglar 64 and januvia 100  Has up to date with eye and foot exam.     Review of Systems   Constitutional: Negative for chills, diaphoresis, fatigue and fever. HENT: Negative for ear pain and sore throat. Eyes: Negative for photophobia, pain, discharge, redness and visual disturbance. Respiratory: Negative for cough, chest tightness, shortness of breath and wheezing. Cardiovascular: Negative for chest pain, palpitations and leg swelling. Gastrointestinal: Negative for abdominal pain, blood in stool, constipation, diarrhea and nausea. Endocrine: Negative for cold intolerance and heat intolerance. Genitourinary: Negative for difficulty urinating and hematuria. Musculoskeletal: Negative for back pain and joint swelling. Neurological: Negative for light-headedness and headaches. Psychiatric/Behavioral: Negative for agitation and confusion.        Objective:    /80 (Site: Right Upper Arm, Position: Sitting, Cuff Size: Medium Adult) Comment: machine  Pulse 102   Temp 98.1 °F (36.7 °C) (Oral)   Ht 5' 2.99\" (1.6 m)   Wt 209 lb (94.8 kg)   LMP 12/08/2015 (Approximate)   SpO2 90%   BMI 37.03 kg/m²    BP Readings from Last 3 Encounters:   03/17/20 130/80   02/07/20 121/80   12/24/19 134/74 counseling on the following healthy behaviors:nutrition, exercise and medication adherence    Discussed use, benefit, and side effects of prescribed medications. Barriers to medication compliance addressed. All patient questions answered. Pt voicedunderstanding. Return in about 4 weeks (around 4/14/2020).

## 2020-03-17 NOTE — PATIENT INSTRUCTIONS
Thank you for letting us take care of you today. We hope all your questions were addressed. If a question was overlooked or something else comes to mind after you return home, please contact a member of your Care Team listed below. Please make sure you have a routine office visit set up to follow-up on [address]. Your Care Team at [place] [address] is Team #1  Randy Vieira MD (Faculty)  Loulou Ulrich DO (Faculty)  Greg Pace (Resident)  Lang Brasher (Resident)  Keith Candelario MD (Resident)  Lucien Hodgkins, MD (Resident)  Jade Hernandez., JOSE LUIS Merino., [facility] office)  [facility] office)  [facility])  DALTON Roberts, [address] [facility] ([address] [facility])  Adwoa [facility] (Clinical Pharmacist)     Office phone number: [phone]    If you need to get in right away due to illness, please be advised we have \"Same Day\" appointments available Monday-Friday. Please call us at [phone] option #3 to schedule your \"Same Day\" appointment. Patient Education        Hepatitis B Vaccine: What You Need to Know  Why get vaccinated? Hepatitis B vaccine can prevent hepatitis B. Hepatitis B is a liver disease that can cause mild illness lasting a few weeks, or it can lead to a serious, lifelong illness. · Acute hepatitis B infection is a short-term illness that can lead to fever, fatigue, loss of appetite, nausea, vomiting, jaundice (yellow skin or eyes, dark urine, maurice-colored bowel movements), and pain in the muscles, joints, and stomach. · Chronic hepatitis B infection is a long-term illness that occurs when the hepatitis B virus remains in a person's body. Most people who go on to develop chronic hepatitis B do not have symptoms, but it is still very serious and can lead to liver damage (cirrhosis), liver cancer, and death.  Chronically-infected people can spread hepatitis B virus to others, even if they do not feel or look sick protected from hepatitis B  Hepatitis B vaccine may be given at the same time as other vaccines. Talk with your health care provider  Tell your vaccine provider if the person getting the vaccine:  · Has had an allergic reaction after a previous dose of hepatitis B vaccine, or has any severe, life-threatening allergies. In some cases, your health care provider may decide to postpone hepatitis B vaccination to a future visit. People with minor illnesses, such as a cold, may be vaccinated. People who are moderately or severely ill should usually wait until they recover before getting hepatitis B vaccine. Your health care provider can give you more information. Risks of a vaccine reaction  · Soreness where the shot is given or fever can happen after hepatitis B vaccine. People sometimes faint after medical procedures, including vaccination. Tell your provider if you feel dizzy or have vision changes or ringing in the ears. As with any medicine, there is a very remote chance of a vaccine causing a severe allergic reaction, other serious injury, or death. What if there is a serious problem? An allergic reaction could occur after the vaccinated person leaves the clinic. If you see signs of a severe allergic reaction (hives, swelling of the face and throat, difficulty breathing, a fast heartbeat, dizziness, or weakness), call 9-1-1 and get the person to the nearest hospital.  For other signs that concern you, call your health care provider. Adverse reactions should be reported to the Vaccine Adverse Event Reporting System (VAERS). Your health care provider will usually file this report, or you can do it yourself. Visit the VAERS website at www.vaers. hhs.gov or call 8-130.244.2275. VAERS is only for reporting reactions, and VAERS staff do not give medical advice.   The [employer] Vaccine Injury Compensation Program  The National Vaccine Injury Compensation Program (VICP) is a federal program that was created to

## 2020-03-17 NOTE — PROGRESS NOTES
Diabetic visit information    BP Readings from Last 3 Encounters:   02/07/20 121/80   12/24/19 134/74   11/21/19 136/88       Hemoglobin A1C (%)   Date Value   11/21/2019 9.6 (H)   08/15/2019 10.9   05/16/2019 11.5 (H)     Microalb/Crt. Ratio (mcg/mg creat)   Date Value   02/04/2020 CANNOT BE CALCULATED     LDL Cholesterol (mg/dL)   Date Value   05/16/2019 116               Have you changed or started any medications since your last visit including any over-the-counter medicines, vitamins, or herbal medicines? no   Have you stopped taking any of your medications? Is so, why? -  no  Are you having any side effects from any of your medications? - no    Have you seen any other physician or provider since your last visit?  no   Have you had any other diagnostic tests since your last visit?  no   Have you been seen in the emergency room and/or had an admission in a hospital since we last saw you?  no     Have you had your annual diabetic retinal (eye) exam? Yes   (ensure copy of exam is in the chart)    Have you had your routine dental cleaning in the past 6 months? yes - 01/2020    Do you have an active MyChart account? If not, what are your barriers? Yes    Patient Care Team:  Tay Darby MD as PCP - General (Family Medicine)  Lorie Corral [facility] as [facility]CP [facility]  Ebony Mercedes MD as Consulting Physician (Hematology and Oncology)  Arlene Bell MD (General Surgery)  Everette Romero DO as Consulting Physician (General Surgery)  Vidhi Kelly MD as Consulting Physician (Pulmonology)    Medical history Review  Past Medical, Family, and Social History reviewed and does  contribute to the patient presenting condition.     Health Maintenance   Topic Date Due    Colon Cancer Screen FIT/FOBT  11/21/2018    A1C test (Diabetic or Prediabetic)  02/21/2020    Hepatitis B vaccine (1 of 3 - Risk 3-dose series) 08/15/2020 (Originally 4/12/1984)    Shingles Vaccine (1 of 2) 08/15/2020

## 2020-04-13 RX ORDER — FERROUS SULFATE 325(65) MG
TABLET ORAL
Qty: 90 TABLET | Refills: 10 | Status: SHIPPED | OUTPATIENT
Start: 2020-04-13 | End: 2021-03-17 | Stop reason: SDUPTHER

## 2020-05-04 ENCOUNTER — VIRTUAL VISIT (OUTPATIENT)
Dept: FAMILY MEDICINE CLINIC | Age: 55
End: 2020-05-04
Payer: MEDICARE

## 2020-05-04 VITALS — BODY MASS INDEX: 35.44 KG/M2 | WEIGHT: 200 LBS | HEIGHT: 63 IN

## 2020-05-04 PROBLEM — R55 EPISODE OF SYNCOPE: Status: ACTIVE | Noted: 2020-05-04

## 2020-05-04 PROCEDURE — 99442 PR PHYS/QHP TELEPHONE EVALUATION 11-20 MIN: CPT | Performed by: FAMILY MEDICINE

## 2020-05-12 NOTE — TELEPHONE ENCOUNTER
E-scribe request for insulin. Please review and e-scribe if applicable. Last Visit Date:  5-4-20  Next Visit Date:  Visit date not found    Hemoglobin A1C (%)   Date Value   03/17/2020 10.9   11/21/2019 9.6 (H)   08/15/2019 10.9             ( goal A1C is < 7)   Microalb/Crt.  Ratio (mcg/mg creat)   Date Value   02/04/2020 CANNOT BE CALCULATED     LDL Cholesterol (mg/dL)   Date Value   05/16/2019 116       (goal LDL is <100)   AST (U/L)   Date Value   10/11/2019 12     ALT (U/L)   Date Value   10/11/2019 8     BUN (mg/dL)   Date Value   10/11/2019 6     BP Readings from Last 3 Encounters:   03/17/20 130/80   02/07/20 121/80   12/24/19 134/74          (goal 120/80)        Patient Active Problem List:     Cancer ([address]     Pain, knee     Depression     Hypertension     DJD (degenerative joint disease) of knee     Headache     Breast cancer (HCC)     Iron deficiency anemia     DM2 (diabetes mellitus, type 2) (HCC)     HTN (hypertension)     DM w/o complication type II     Malignant neoplasm of nipple of left breast in female, estrogen receptor positive ([address]     Left shoulder pain     Diabetes 1.5, managed as type 2 ([address]     Headache disorder     [place]     Class 2 severe obesity due to excess calories with serious comorbidity in [facility])     Episode of syncope      ----Cindy Living

## 2020-05-14 RX ORDER — INSULIN GLARGINE 100 [IU]/ML
INJECTION, SOLUTION SUBCUTANEOUS
Qty: 15 ML | Refills: 10 | Status: SHIPPED
Start: 2020-05-14 | End: 2020-08-04

## 2020-05-18 NOTE — PROGRESS NOTES
Check will be due on the 06/17/2020 do not want to come in, want to got to the lab to do blood draw.

## 2020-06-25 ENCOUNTER — TELEPHONE (OUTPATIENT)
Dept: FAMILY MEDICINE CLINIC | Age: 55
End: 2020-06-25

## 2020-07-01 ENCOUNTER — TELEPHONE (OUTPATIENT)
Dept: FAMILY MEDICINE CLINIC | Age: 55
End: 2020-07-01

## 2020-07-09 RX ORDER — GABAPENTIN 300 MG/1
CAPSULE ORAL
Qty: 90 CAPSULE | Refills: 11 | Status: SHIPPED | OUTPATIENT
Start: 2020-07-09 | End: 2021-07-01

## 2020-07-09 NOTE — TELEPHONE ENCOUNTER
Next Visit Date:  Future Appointments   Date Time Provider Kota Sheehani   10/6/2020  9:45 AM SCHEDULE, [facility]   10/13/2020 10:00 AM Corrinne Mulders, MD [facility]       Health Maintenance   Topic Date Due    Hepatitis B vaccine (2 of 3 - CpG risk 3-dose series) 04/12/1985    Colon Cancer Screen FIT/FOBT  11/21/2018    Lipid screen  05/16/2020    A1C test (Diabetic or Prediabetic)  06/17/2020    Shingles Vaccine (1 of 2) 08/15/2020 (Originally 4/12/2016)    Flu vaccine (1) 09/01/2020    Potassium monitoring  10/11/2020    Creatinine monitoring  10/11/2020    Diabetic retinal exam  12/31/2020    Diabetic microalbuminuria test  02/04/2021    Diabetic foot exam  02/07/2021    Cervical cancer screen  06/21/2023    DTaP/Tdap/Td vaccine (2 - Td) 02/06/2027    Breast cancer screen  Completed    Pneumococcal 0-64 years Vaccine  Completed    HIV screen  Completed    Hepatitis A vaccine  Aged Out    Hib vaccine  Aged Out    Meningococcal (ACWY) vaccine  Aged Out       Hemoglobin A1C (%)   Date Value   03/17/2020 10.9   11/21/2019 9.6 (H)   08/15/2019 10.9             ( goal A1C is < 7)   Microalb/Crt.  Ratio (mcg/mg creat)   Date Value   02/04/2020 CANNOT BE CALCULATED     LDL Cholesterol (mg/dL)   Date Value   05/16/2019 116   05/16/2019 116       (goal LDL is <100)   AST (U/L)   Date Value   10/11/2019 12     ALT (U/L)   Date Value   10/11/2019 8     BUN (mg/dL)   Date Value   10/11/2019 6     BP Readings from Last 3 Encounters:   03/17/20 130/80   02/07/20 121/80   12/24/19 134/74          (goal 120/80)    All Future Testing planned in [facility]  Lab Frequency Next Occurrence   Microalbumin, Ur Once 08/14/2020   Hemoglobin A1C Once 12/24/2020   Cologuard (For External Results Only) Once 03/17/2021               Patient Active Problem List:     Cancer ([address]     Pain, knee     Depression     Hypertension     DJD (degenerative joint disease) of knee Headache     Breast cancer (HCC)     Iron deficiency anemia     DM2 (diabetes mellitus, type 2) (HCC)     HTN (hypertension)     DM w/o complication type II     Malignant neoplasm of nipple of left breast in female, estrogen receptor positive (HCC)     Left shoulder pain     Diabetes 1.5, managed as type 2 (HCC)     Headache disorder     [place]     Class 2 severe obesity due to excess calories with serious comorbidity in adult [facility])     Episode of syncope

## 2020-07-10 RX ORDER — SITAGLIPTIN 100 MG/1
100 TABLET, FILM COATED ORAL DAILY
Qty: 30 TABLET | Refills: 10 | Status: SHIPPED | OUTPATIENT
Start: 2020-07-10 | End: 2021-06-01

## 2020-07-10 RX ORDER — ATORVASTATIN CALCIUM 20 MG/1
20 TABLET, FILM COATED ORAL DAILY
Qty: 30 TABLET | Refills: 10 | Status: SHIPPED | OUTPATIENT
Start: 2020-07-10 | End: 2021-06-01

## 2020-07-13 ENCOUNTER — TELEPHONE (OUTPATIENT)
Dept: FAMILY MEDICINE CLINIC | Age: 55
End: 2020-07-13

## 2020-07-13 NOTE — TELEPHONE ENCOUNTER
PA request for Basaglar.     PA processed and submitted to pt insurance, waiting for response in regards to coverage

## 2020-08-04 ENCOUNTER — TELEPHONE (OUTPATIENT)
Dept: FAMILY MEDICINE CLINIC | Age: 55
End: 2020-08-04

## 2020-08-04 RX ORDER — INSULIN GLARGINE 100 [IU]/ML
60 INJECTION, SOLUTION SUBCUTANEOUS NIGHTLY
Qty: 5 PEN | Refills: 3 | Status: SHIPPED | OUTPATIENT
Start: 2020-08-04 | End: 2020-11-11

## 2020-08-13 RX ORDER — INSULIN ASPART 100 [IU]/ML
INJECTION, SOLUTION INTRAVENOUS; SUBCUTANEOUS
Qty: 15 ML | Refills: 10 | Status: SHIPPED | OUTPATIENT
Start: 2020-08-13

## 2020-08-13 NOTE — TELEPHONE ENCOUNTER
Last visit: 03/17/20  Last Med refill:   Does patient have enough medication for 72 hours:     Next Visit Date:  Future Appointments   Date Time Provider Kota Sanz   10/6/2020  9:45 AM SCHEDULE, [facility]   10/13/2020 10:00 AM Prema Porras MD [facility]       Health Maintenance   Topic Date Due    Colon Cancer Screen FIT/FOBT  11/21/2018    Hepatitis B vaccine (2 of 3 - Risk 3-dose series) 04/14/2020    Lipid screen  05/16/2020    A1C test (Diabetic or Prediabetic)  06/17/2020    Shingles Vaccine (1 of 2) 08/15/2020 (Originally 4/12/2016)    Flu vaccine (1) 09/01/2020    Potassium monitoring  10/11/2020    Creatinine monitoring  10/11/2020    Diabetic retinal exam  12/31/2020    Diabetic microalbuminuria test  02/04/2021    Diabetic foot exam  02/07/2021    Cervical cancer screen  06/21/2023    DTaP/Tdap/Td vaccine (2 - Td) 02/06/2027    Breast cancer screen  Completed    Pneumococcal 0-64 years Vaccine  Completed    HIV screen  Completed    Hepatitis A vaccine  Aged Out    Hib vaccine  Aged Out    Meningococcal (ACWY) vaccine  Aged Out       Hemoglobin A1C (%)   Date Value   03/17/2020 10.9   11/21/2019 9.6 (H)   08/15/2019 10.9             ( goal A1C is < 7)   Microalb/Crt.  Ratio (mcg/mg creat)   Date Value   02/04/2020 CANNOT BE CALCULATED     LDL Cholesterol (mg/dL)   Date Value   05/16/2019 116   05/16/2019 116       (goal LDL is <100)   AST (U/L)   Date Value   10/11/2019 12     ALT (U/L)   Date Value   10/11/2019 8     BUN (mg/dL)   Date Value   10/11/2019 6     BP Readings from Last 3 Encounters:   03/17/20 130/80   02/07/20 121/80   12/24/19 134/74          (goal 120/80)    All Future Testing planned in [facility]  Lab Frequency Next Occurrence   Microalbumin, Ur Once 08/14/2020   Hemoglobin A1C Once 12/24/2020   Cologuard (For External Results Only) Once 03/17/2021               Patient Active Problem List:     Cancer ([address]     Pain, knee Depression     Hypertension     DJD (degenerative joint disease) of knee     Headache     Breast cancer ([facility]     Iron deficiency anemia     DM2 (diabetes mellitus, type 2) (HCC)     HTN (hypertension)     DM w/o complication type II     Malignant neoplasm of nipple of left breast in female, estrogen receptor positive (HCC)     Left shoulder pain     Diabetes 1.5, managed as type 2 (HCC)     Headache disorder     [place]     Class 2 severe obesity due to excess calories with serious comorbidity in adult [facility])     Episode of syncope

## 2020-08-14 ENCOUNTER — TELEPHONE (OUTPATIENT)
Dept: FAMILY MEDICINE CLINIC | Age: 55
End: 2020-08-14

## 2020-09-14 ENCOUNTER — TELEPHONE (OUTPATIENT)
Dept: FAMILY MEDICINE CLINIC | Age: 55
End: 2020-09-14

## 2020-09-14 NOTE — TELEPHONE ENCOUNTER
Spoke to patient to schedule f/u appt dm/a1c working off the a1c/bcs list, pt slick with Dr. Ivana Torre 10/22/20 @8:30am

## 2020-09-16 RX ORDER — ERGOCALCIFEROL 1.25 MG/1
CAPSULE ORAL
Qty: 12 CAPSULE | Refills: 10 | Status: SHIPPED | OUTPATIENT
Start: 2020-09-16 | End: 2022-10-11

## 2020-09-16 NOTE — TELEPHONE ENCOUNTER
Patient    Please address the medication refill and close the encounter. If I can be of assistance, please route to the applicable pool. Thank you. Last visit: 05/04/20  Last Med refill: 08/22/19  Does patient have enough medication for 72 hours: No:     Next Visit Date:  Future Appointments   Date Time Provider Kota Sheehani   10/6/2020  9:45 AM SCHEDULE, [facility]   10/13/2020 10:00 AM Antionette Matamoros MD [facility]   10/22/2020  8:30 AM Vincent Beth MD [address] [place] [address] Maintenance   Topic Date Due    Shingles Vaccine (1 of 2) 04/12/2016    Colon Cancer Screen FIT/FOBT  11/21/2018    Hepatitis B vaccine (2 of 3 - Risk 3-dose series) 04/14/2020    Lipid screen  05/16/2020    A1C test (Diabetic or Prediabetic)  06/17/2020    Flu vaccine (1) 09/01/2020    Potassium monitoring  10/11/2020    Creatinine monitoring  10/11/2020    Diabetic retinal exam  12/31/2020    Diabetic microalbuminuria test  02/04/2021    Diabetic foot exam  02/07/2021    Cervical cancer screen  06/21/2023    DTaP/Tdap/Td vaccine (2 - Td) 02/06/2027    Breast cancer screen  Completed    Pneumococcal 0-64 years Vaccine  Completed    HIV screen  Completed    Hepatitis A vaccine  Aged Out    Hib vaccine  Aged Out    Meningococcal (ACWY) vaccine  Aged Out       Hemoglobin A1C (%)   Date Value   03/17/2020 10.9   11/21/2019 9.6 (H)   08/15/2019 10.9             ( goal A1C is < 7)   Microalb/Crt.  Ratio (mcg/mg creat)   Date Value   02/04/2020 CANNOT BE CALCULATED     LDL Cholesterol (mg/dL)   Date Value   05/16/2019 116   05/16/2019 116       (goal LDL is <100)   AST (U/L)   Date Value   10/11/2019 12     ALT (U/L)   Date Value   10/11/2019 8     BUN (mg/dL)   Date Value   10/11/2019 6     BP Readings from Last 3 Encounters:   03/17/20 130/80   02/07/20 121/80   12/24/19 134/74          (goal 120/80)    All Future Testing planned in [facility]  Lab Frequency Next Occurrence   Hemoglobin A1C Once 12/24/2020   Cologjimmy (For External Results Only) Once 03/17/2021               Patient Active Problem List:     Cancer ([facility]     Pain, knee     Depression     Hypertension     DJD (degenerative joint disease) of knee     Headache     Breast cancer ([facility]     Iron deficiency anemia     DM2 (diabetes mellitus, type 2) ([facility]     HTN (hypertension)     DM w/o complication type II     Malignant neoplasm of nipple of left breast in female, estrogen receptor positive (HCC)     Left shoulder pain     Diabetes 1.5, managed as type 2 ([facility]     Headache disorder     [place]     Class 2 severe obesity due to excess calories with serious comorbidity in adult [facility])     Episode of syncope

## 2020-09-23 NOTE — TELEPHONE ENCOUNTER
Last Visit Date:  5-4-20  Next Visit Date:  10/22/2020    Hemoglobin A1C (%)   Date Value   03/17/2020 10.9   11/21/2019 9.6 (H)   08/15/2019 10.9             ( goal A1C is < 7)   Microalb/Crt.  Ratio (mcg/mg creat)   Date Value   02/04/2020 CANNOT BE CALCULATED     LDL Cholesterol (mg/dL)   Date Value   05/16/2019 116       (goal LDL is <100)   AST (U/L)   Date Value   10/11/2019 12     ALT (U/L)   Date Value   10/11/2019 8     BUN (mg/dL)   Date Value   10/11/2019 6     BP Readings from Last 3 Encounters:   03/17/20 130/80   02/07/20 121/80   12/24/19 134/74          (goal 120/80)        Patient Active Problem List:     Cancer ([address]     Pain, knee     Depression     Hypertension     DJD (degenerative joint disease) of knee     Headache     Breast cancer (HCC)     Iron deficiency anemia     DM2 (diabetes mellitus, type 2) (HCC)     HTN (hypertension)     DM w/o complication type II     Malignant neoplasm of nipple of left breast in female, estrogen receptor positive ([address]     Left shoulder pain     Diabetes 1.5, managed as type 2 ([address]     Headache disorder     [place]     Class 2 severe obesity due to excess calories with serious comorbidity in [facility])     Episode of syncope      ----Graciella Later

## 2020-09-30 ENCOUNTER — HOSPITAL ENCOUNTER (OUTPATIENT)
Facility: MEDICAL CENTER | Age: 55
End: 2020-09-30

## 2020-10-07 ENCOUNTER — TELEPHONE (OUTPATIENT)
Dept: FAMILY MEDICINE CLINIC | Age: 55
End: 2020-10-07

## 2020-10-07 ENCOUNTER — HOSPITAL ENCOUNTER (OUTPATIENT)
Facility: MEDICAL CENTER | Age: 55
End: 2020-10-07
Payer: MEDICARE

## 2020-10-07 ENCOUNTER — HOSPITAL ENCOUNTER (OUTPATIENT)
Facility: MEDICAL CENTER | Age: 55
Discharge: HOME | End: 2020-10-07
Payer: MEDICARE

## 2020-10-07 DIAGNOSIS — Z17.0 MALIGNANT NEOPLASM OF NIPPLE OF LEFT BREAST IN FEMALE, ESTROGEN RECEPTOR POSITIVE (HCC): ICD-10-CM

## 2020-10-07 DIAGNOSIS — C50.012 MALIGNANT NEOPLASM OF NIPPLE OF LEFT BREAST IN FEMALE, ESTROGEN RECEPTOR POSITIVE (HCC): ICD-10-CM

## 2020-10-07 LAB
ABSOLUTE EOS #: 0.2 K/UL (ref 0–0.44)
ABSOLUTE IMMATURE GRANULOCYTE: 0.02 K/UL (ref 0–0.3)
ABSOLUTE LYMPH #: 2.37 K/UL (ref 1.1–3.7)
ABSOLUTE MONO #: 0.46 K/UL (ref 0.1–1.2)
ALBUMIN SERPL-MCNC: 4.1 G/DL (ref 3.5–5.2)
ALBUMIN/GLOBULIN RATIO: ABNORMAL (ref 1–2.5)
ALP BLD-CCNC: 113 U/L (ref 35–104)
ALT SERPL-CCNC: 11 U/L (ref 5–33)
ANION GAP SERPL CALCULATED.3IONS-SCNC: 11 MMOL/L (ref 9–17)
AST SERPL-CCNC: 13 U/L
BASOPHILS # BLD: 0 % (ref 0–2)
BASOPHILS ABSOLUTE: 0.03 K/UL (ref 0–0.2)
BILIRUB SERPL-MCNC: 0.63 MG/DL (ref 0.3–1.2)
BUN BLDV-MCNC: 12 MG/DL (ref 6–20)
BUN/CREAT BLD: 12 (ref 9–20)
CALCIUM SERPL-MCNC: 9.2 MG/DL (ref 8.6–10.4)
CHLORIDE BLD-SCNC: 91 MMOL/L (ref 98–107)
CO2: 31 MMOL/L (ref 20–31)
CREAT SERPL-MCNC: 1.04 MG/DL (ref 0.5–0.9)
DIFFERENTIAL TYPE: ABNORMAL
EOSINOPHILS RELATIVE PERCENT: 3 % (ref 1–4)
FERRITIN: 1385 UG/L (ref 13–150)
GFR AFRICAN AMERICAN: >60 ML/MIN
GFR NON-AFRICAN AMERICAN: 55 ML/MIN
GFR SERPL CREATININE-BSD FRML MDRD: ABNORMAL ML/MIN/{1.73_M2}
GFR SERPL CREATININE-BSD FRML MDRD: ABNORMAL ML/MIN/{1.73_M2}
GLUCOSE BLD-MCNC: 496 MG/DL (ref 70–99)
HCT VFR BLD CALC: 37 % (ref 36.3–47.1)
HEMOGLOBIN: 11.7 G/DL (ref 11.9–15.1)
IMMATURE GRANULOCYTES: 0 %
LYMPHOCYTES # BLD: 30 % (ref 24–43)
MCH RBC QN AUTO: 27 PG (ref 25.2–33.5)
MCHC RBC AUTO-ENTMCNC: 31.6 G/DL (ref 28.4–34.8)
MCV RBC AUTO: 85.5 FL (ref 82.6–102.9)
MONOCYTES # BLD: 6 % (ref 3–12)
NRBC AUTOMATED: ABNORMAL PER 100 WBC
PDW BLD-RTO: 12.5 % (ref 11.8–14.4)
PLATELET # BLD: 260 K/UL (ref 138–453)
PLATELET ESTIMATE: ABNORMAL
PMV BLD AUTO: 10.6 FL (ref 8.1–13.5)
POTASSIUM SERPL-SCNC: 3 MMOL/L (ref 3.7–5.3)
RBC # BLD: 4.33 M/UL (ref 3.95–5.11)
RBC # BLD: ABNORMAL 10*6/UL
SEG NEUTROPHILS: 62 % (ref 36–65)
SEGMENTED NEUTROPHILS ABSOLUTE COUNT: 4.93 K/UL (ref 1.5–8.1)
SODIUM BLD-SCNC: 133 MMOL/L (ref 135–144)
TOTAL PROTEIN: 8.1 G/DL (ref 6.4–8.3)
WBC # BLD: 8 K/UL (ref 3.5–11.3)
WBC # BLD: ABNORMAL 10*3/UL

## 2020-10-07 PROCEDURE — 36415 COLL VENOUS BLD VENIPUNCTURE: CPT

## 2020-10-07 PROCEDURE — 82728 ASSAY OF FERRITIN: CPT

## 2020-10-07 PROCEDURE — 80053 COMPREHEN METABOLIC PANEL: CPT

## 2020-10-07 PROCEDURE — 85025 COMPLETE CBC W/AUTO DIFF WBC: CPT

## 2020-10-07 NOTE — TELEPHONE ENCOUNTER
E-scribe request for  MG capsule. Please review and e-scribe if applicable. Last Visit Date:    Next Visit Date:  10/22/2020    Hemoglobin A1C (%)   Date Value   03/17/2020 10.9   11/21/2019 9.6 (H)   08/15/2019 10.9             ( goal A1C is < 7)   Microalb/Crt.  Ratio (mcg/mg creat)   Date Value   02/04/2020 CANNOT BE CALCULATED     LDL Cholesterol (mg/dL)   Date Value   05/16/2019 116       (goal LDL is <100)   AST (U/L)   Date Value   10/11/2019 12     ALT (U/L)   Date Value   10/11/2019 8     BUN (mg/dL)   Date Value   10/11/2019 6     BP Readings from Last 3 Encounters:   03/17/20 130/80   02/07/20 121/80   12/24/19 134/74          (goal 120/80)        Patient Active Problem List:     Cancer ([address]     Pain, knee     Depression     Hypertension     DJD (degenerative joint disease) of knee     Headache     Breast cancer (HCC)     Iron deficiency anemia     DM2 (diabetes mellitus, type 2) (HCC)     HTN (hypertension)     DM w/o complication type II     Malignant neoplasm of nipple of left breast in female, estrogen receptor positive ([address]     Left shoulder pain     Diabetes 1.5, managed as type 2 ([address]     Headache disorder     [place]     Class 2 severe obesity due to excess calories with serious comorbidity in [facility])     Episode of syncope      ----Casi Pompa

## 2020-10-08 RX ORDER — DOCUSATE SODIUM 100 MG/1
CAPSULE, LIQUID FILLED ORAL
Qty: 60 CAPSULE | Refills: 10 | Status: SHIPPED | OUTPATIENT
Start: 2020-10-08 | End: 2021-09-09

## 2020-10-08 NOTE — TELEPHONE ENCOUNTER
Called patient to discuss her results, patient has very elevated glucose and low potassium. Patient should come in to the office to further address her diabetes when she has a chance. Current being treated at [address] for cancer.

## 2020-10-13 ENCOUNTER — OFFICE VISIT (OUTPATIENT)
Dept: ONCOLOGY | Age: 55
End: 2020-10-13
Payer: MEDICARE

## 2020-10-13 ENCOUNTER — TELEPHONE (OUTPATIENT)
Dept: ONCOLOGY | Age: 55
End: 2020-10-13

## 2020-10-13 VITALS
DIASTOLIC BLOOD PRESSURE: 89 MMHG | TEMPERATURE: 97 F | BODY MASS INDEX: 35.7 KG/M2 | SYSTOLIC BLOOD PRESSURE: 132 MMHG | WEIGHT: 201.5 LBS | HEART RATE: 92 BPM

## 2020-10-13 PROCEDURE — 99214 OFFICE O/P EST MOD 30 MIN: CPT | Performed by: INTERNAL MEDICINE

## 2020-10-13 PROCEDURE — G8484 FLU IMMUNIZE NO ADMIN: HCPCS | Performed by: INTERNAL MEDICINE

## 2020-10-13 PROCEDURE — G8427 DOCREV CUR MEDS BY ELIG CLIN: HCPCS | Performed by: INTERNAL MEDICINE

## 2020-10-13 PROCEDURE — 1036F TOBACCO NON-USER: CPT | Performed by: INTERNAL MEDICINE

## 2020-10-13 PROCEDURE — G8417 CALC BMI ABV UP PARAM F/U: HCPCS | Performed by: INTERNAL MEDICINE

## 2020-10-13 PROCEDURE — 3017F COLORECTAL CA SCREEN DOC REV: CPT | Performed by: INTERNAL MEDICINE

## 2020-10-13 PROCEDURE — 99211 OFF/OP EST MAY X REQ PHY/QHP: CPT

## 2020-10-13 RX ORDER — POTASSIUM CHLORIDE 750 MG/1
10 TABLET, EXTENDED RELEASE ORAL 2 TIMES DAILY
Qty: 180 TABLET | Refills: 3 | Status: SHIPPED | OUTPATIENT
Start: 2020-10-13 | End: 2021-09-10

## 2020-10-13 NOTE — TELEPHONE ENCOUNTER
YULY ARRIVES AMBULATORY FOR MD VISIT  DR Monika Michaels IN TO SEE PATIENT  ORDERS RECEIVED  RV 1 YEAR W/LABS  LABS CDP CMP FERRITIN 10/5/21  MD VISIT 10/12/21 @10:15AM  SCRIPT CALLED INTO PATIENT'S PHARMACY  AVS PRINTED AND GIVEN TO PATIENT WITH INSTRUCTIONS  PATIENT DISCHARGED AMBULATORY

## 2020-10-13 NOTE — PROGRESS NOTES
_      Chief Complaint   Patient presents with    Follow-up    Discuss Labs         DIAGNOSIS:   1. Relapse of left breast cancer with original diagnosis of a stage T2N1M0, left breast invasive ductal carcinoma with staging after neoadjuvant chemotherapy, ER/TX positive, HER2-jacinta negative, originally diagnosed in the spring of 2006 with recent recurrence of left breast cancer and a small tumor [id number], left breast invasive ductal carcinoma, ER/TX positive, and HER2-jacinta negative. 2. Iron Deficiency Anemia. CURRENT THERAPY:    1. Status post bilateral mastectomy done on 10/10/2012 with no evidence of residual malignancy. 2. Status post 5 years of tamoxifen  between 2006 and 2011.    3. Status post neoadjuvant chemotherapy with Adriamycin and Cytoxan for 4 cycles followed by weekly Taxol for 8 weeks. Chemotherapy was given between June 2006 and October 2006. 4. Status post lumpectomy and lymph node dissection November 2006.  5. Radiation therapy following the lumpectomy. 6. Iron replacement. INTERIM HISTORY:    The patient is here for follow up for her history of left sided invasive ductal carcinoma with recurrence. She had a bilateral mastectomy in 2012. She denies fever, chills, unintentional weight loss, or new masses. She had bariatric surgery in April 2015    No recent problems related to anemia. No weakness or fatigue. No active bleeding. Patient was on oral iron. She has high ferritin. REVIEW OF SYSTEMS:   General: She has weakness and fatigue. No weight loss or decreased appetite. No fever or chills. Eyes: No blurred vision, eye pain or double vision. Ears: No hearing problems or drainage. No tinnitus. Throat: No sore throat, problems with swallowing or dysphagia. Respiratory: No cough, sputum or hemoptysis. No shortness of breath. Cardiovascular: No chest pain, orthopnea or PND. No lower extremity edema. No palpitation. Gastrointestinal: No problems with swallowing.  No abdominal pain or bloating. No nausea or vomiting. No diarrhea or constipation. No GI bleeding. Genitourinary: No dysuria, hematuria, frequency or urgency. Musculoskeletal: + muscle aches or pains. No limitation of movement. No back pain. Dermatologic: No skin rashes or pruritus. No skin lesions or discolorations. Psychiatric: No depression, anxiety, or stress or signs of schizophrenia. Hematologic: No history of bleeding tendency. No bruises or ecchymosis. No history of clotting problems. Infectious disease: No fever, chills or frequent infections. Endocrine: No problems with opacity. No polydipsia or polyuria. Neurologic: No headaches or dizziness. No weakness or numbness of the extremities. SOCIAL HISTORY:  No smoking and no alcohol drinking. PHYSICAL EXAM:  The patient is not in acute distress. Vital signs:  Blood pressure 132/89, pulse 92, temperature 97 °F (36.1 °C), temperature source Temporal, weight 201 lb 8 oz (91.4 kg), last menstrual period 12/08/2015, not currently breastfeeding. HEENT:  Eyes are normal. Ears, nose and throat are normal.  Neck: Supple. No lymph node enlargement. No thyroid enlargement. Trachea is centrally located. Breasts:  Status post bilateral mastectomy, no masses are felt, no axillary lymph node enlargement. Chest:  Clear to auscultation. No wheezes or crepitations. Heart: Regular sinus rhythm. Abdomen: Soft, nontender obese. No hepatosplenomegaly. No masses. Extremities:  With no edema. Lymph Nodes:  No cervical, axillary or inguinal lymph node enlargement. Neurologic:  Conscious and oriented. No focal neurological deficits. Psychosocial: No depression, anxiety or stress. Skin: No rashes, bruises or ecchymoses.     REVIEW OF DIAGNOSTIC DATA:     Lab Results   Component Value Date    WBC 8.0 10/07/2020    HGB 11.7 (L) 10/07/2020    HCT 37.0 10/07/2020    MCV 85.5 10/07/2020     10/07/2020     Lab Results   Component Value Date    IRON 76 10/10/2017    TIBC 210 (L) 10/10/2017    FERRITIN 1,385 (H) 10/07/2020       ASSESSMENT:     1. Recurrence of breast cancer as stated above status post mastectomy. Currently in remission  2. Iron deficiency anemia secondary to heavy menstrual periods and possibly malabsorption. Stable. 3. S/p Bariatric surgery April 2015  4. Chest pain s/p mastectomy controlled with Vancleve and Neurontin  5. Uncontrolled DM on insulin     PLAN:      The patient is doing well in regard to her breast cancer with no evidence of recurrence. We will continue observation. Hb is stable. No recent drop. No signs of bleeding. Continue observation. High ferritin. Discontinue iron. Patient has significant problem with hypoglycemia and hypokalemia. Undergoing management by her PCP. Explained importance of compliance with medications and glucose control. Patient will have follow-up with her PCP. Continues potassium replacement. Patient will be seen in 1 year. Sooner for any problems. Patient's questions were answered to the best of her satisfaction and she verbalized full understanding and agreement.                                   [address] [place] [address] [facility]                          Cell: [phone]

## 2020-10-23 ENCOUNTER — OFFICE VISIT (OUTPATIENT)
Dept: FAMILY MEDICINE CLINIC | Age: 55
End: 2020-10-23
Payer: MEDICARE

## 2020-10-23 VITALS
DIASTOLIC BLOOD PRESSURE: 97 MMHG | BODY MASS INDEX: 36.02 KG/M2 | TEMPERATURE: 97 F | HEIGHT: 63 IN | HEART RATE: 114 BPM | SYSTOLIC BLOOD PRESSURE: 137 MMHG | WEIGHT: 203.3 LBS

## 2020-10-23 LAB — HBA1C MFR BLD: 10 %

## 2020-10-23 PROCEDURE — 90715 TDAP VACCINE 7 YRS/> IM: CPT | Performed by: FAMILY MEDICINE

## 2020-10-23 PROCEDURE — 99213 OFFICE O/P EST LOW 20 MIN: CPT | Performed by: STUDENT IN AN ORGANIZED HEALTH CARE EDUCATION/TRAINING PROGRAM

## 2020-10-23 PROCEDURE — G8417 CALC BMI ABV UP PARAM F/U: HCPCS | Performed by: STUDENT IN AN ORGANIZED HEALTH CARE EDUCATION/TRAINING PROGRAM

## 2020-10-23 PROCEDURE — 3046F HEMOGLOBIN A1C LEVEL >9.0%: CPT | Performed by: STUDENT IN AN ORGANIZED HEALTH CARE EDUCATION/TRAINING PROGRAM

## 2020-10-23 PROCEDURE — G8482 FLU IMMUNIZE ORDER/ADMIN: HCPCS | Performed by: STUDENT IN AN ORGANIZED HEALTH CARE EDUCATION/TRAINING PROGRAM

## 2020-10-23 PROCEDURE — 90686 IIV4 VACC NO PRSV 0.5 ML IM: CPT | Performed by: FAMILY MEDICINE

## 2020-10-23 PROCEDURE — 90707 MMR VACCINE SC: CPT | Performed by: FAMILY MEDICINE

## 2020-10-23 PROCEDURE — 90632 HEPA VACCINE ADULT IM: CPT | Performed by: FAMILY MEDICINE

## 2020-10-23 PROCEDURE — G8427 DOCREV CUR MEDS BY ELIG CLIN: HCPCS | Performed by: STUDENT IN AN ORGANIZED HEALTH CARE EDUCATION/TRAINING PROGRAM

## 2020-10-23 PROCEDURE — 3017F COLORECTAL CA SCREEN DOC REV: CPT | Performed by: STUDENT IN AN ORGANIZED HEALTH CARE EDUCATION/TRAINING PROGRAM

## 2020-10-23 PROCEDURE — 1036F TOBACCO NON-USER: CPT | Performed by: STUDENT IN AN ORGANIZED HEALTH CARE EDUCATION/TRAINING PROGRAM

## 2020-10-23 PROCEDURE — 99211 OFF/OP EST MAY X REQ PHY/QHP: CPT | Performed by: FAMILY MEDICINE

## 2020-10-23 PROCEDURE — G0010 ADMIN HEPATITIS B VACCINE: HCPCS | Performed by: FAMILY MEDICINE

## 2020-10-23 PROCEDURE — 83036 HEMOGLOBIN GLYCOSYLATED A1C: CPT | Performed by: STUDENT IN AN ORGANIZED HEALTH CARE EDUCATION/TRAINING PROGRAM

## 2020-10-23 PROCEDURE — 2022F DILAT RTA XM EVC RTNOPTHY: CPT | Performed by: STUDENT IN AN ORGANIZED HEALTH CARE EDUCATION/TRAINING PROGRAM

## 2020-10-23 ASSESSMENT — ENCOUNTER SYMPTOMS
SINUS PRESSURE: 0
DIARRHEA: 0
SHORTNESS OF BREATH: 0
ABDOMINAL PAIN: 0
CHEST TIGHTNESS: 0
ABDOMINAL DISTENTION: 0
SORE THROAT: 0
ANAL BLEEDING: 0
NAUSEA: 0
COUGH: 0
COLOR CHANGE: 0
SINUS PAIN: 0
WHEEZING: 0

## 2020-10-23 NOTE — PATIENT INSTRUCTIONS
Thank you for letting us take care of you today. We hope all your questions were addressed. If a question was overlooked or something else comes to mind after you return home, please contact a member of your Care Team listed below. Your Care Team at [place] [address] is Team #4  José Miguel Treadwell MD (Faculty)  Adriana Lewis MD (Resident)  Tonio Helton MD (Resident)  Anju Boykin MD (Resident)  Mary Lock MD (Resident)  DALTON Neville RMA Myra Palin., [facility] office)  Dylan Butt, [address] [facility] (Clinical Practice Manager)  Rogerio Perez, [facility] (Clinical Pharmacist)       Office phone number: [phone]    If you need to get in right away due to illness, please be advised we have \"Same Day\" appointments available Monday-Friday. Please call us at [phone] option #3 to schedule your \"Same Day\" appointment.

## 2020-10-23 NOTE — PROGRESS NOTES
Visit Information    Have you changed or started any medications since your last visit including any over-the-counter medicines, vitamins, or herbal medicines? no   Have you stopped taking any of your medications? Is so, why? -  no  Are you having any side effects from any of your medications? - no    Have you seen any other physician or provider since your last visit?  no   Have you had any other diagnostic tests since your last visit?  no   Have you been seen in the emergency room and/or had an admission in a hospital since we last saw you?  no   Have you had your routine dental cleaning in the past 6 months?  no     Do you have an active MyChart account? If no, what is the barrier?   Yes    Patient Care Team:  Jaiden Mattson MD as PCP - General (Family Medicine)  Jatin Branham MD as PCP - [facility] Provider  Edison Duncan MD as Consulting Physician (Hematology and Oncology)  Melvin Tellez MD (General Surgery)  Tyrese Thorne DO as Consulting Physician (General Surgery)  Ora Campbell MD as Consulting Physician (Pulmonology)    Medical History Review  Past Medical, Family, and Social History reviewed and does contribute to the patient presenting condition    Health Maintenance   Topic Date Due    Shingles Vaccine (1 of 2) 04/12/2016    Colon Cancer Screen FIT/FOBT  11/21/2018    Hepatitis B vaccine (2 of 3 - Risk 3-dose series) 04/14/2020    Lipid screen  05/16/2020    A1C test (Diabetic or Prediabetic)  06/17/2020    Flu vaccine (1) 09/01/2020    Diabetic retinal exam  12/31/2020    Diabetic microalbuminuria test  02/04/2021    Diabetic foot exam  02/07/2021    Potassium monitoring  10/07/2021    Creatinine monitoring  10/07/2021    Cervical cancer screen  06/21/2023    DTaP/Tdap/Td vaccine (2 - Td) 02/06/2027    Breast cancer screen  Completed    Pneumococcal 0-64 years Vaccine  Completed    HIV screen  Completed    Hepatitis A vaccine  Aged Out    Hib vaccine  Aged Out    Meningococcal (ACWY) vaccine  Aged Out

## 2020-10-23 NOTE — PROGRESS NOTES
I have reviewed and discussed key elements of Mj Cruz with the resident including plan of care and follow up and agree with the care erika plan. VERY ELEVATED a1c. STATES HAS BEEN NOT TAKING INSULIN FOR ABOUT A MONTH. wILL RESTART AND FOLLOW  UP IN THREE MONTHS.

## 2020-10-23 NOTE — PROGRESS NOTES
Subjective:    Mayra Taylor is a 48 y.o. female with  has a past medical history of Breast cancer ([facility] [address] Cancer ([address] Conjunctivitis, Depression, Disorder of patellofemoral joint, DJD (degenerative joint disease) of knee, Hypertension, Pain, knee, Primary osteoarthritis of left knee, Sleep apnea, and Type II or unspecified type diabetes mellitus without mention of complication, not stated as uncontrolled. Presented to the office today for:  Chief Complaint   Patient presents with    Follow-up     here for dm and a1c checkup     Hypertension    Diabetes       HPI    Pt is presenting for diabetes follow up. Pt states that she has not been taking her insulin and other medications properly for the last 1 month. She has all her refills and does not need anything today. Recently had a visit with Dr. Marlen Chacko for her breast cancer remission. She is also requesting vaccinations today. No other complaints at this time. Review of Systems   Constitutional: Negative for fatigue and fever. HENT: Negative for sinus pressure, sinus pain, sore throat and tinnitus. Eyes: Negative for visual disturbance. Respiratory: Negative for cough, chest tightness, shortness of breath and wheezing. Cardiovascular: Negative for chest pain, palpitations and leg swelling. Gastrointestinal: Negative for abdominal distention, abdominal pain, anal bleeding, diarrhea and nausea. Genitourinary: Negative for enuresis, frequency and urgency. Musculoskeletal: Negative for arthralgias, gait problem and neck stiffness. Skin: Negative for color change and rash. Neurological: Negative for dizziness and headaches. Psychiatric/Behavioral: Negative for agitation and confusion.                  The patient has a   Family History   Problem Relation Age of Onset    Cancer Father        Objective:    BP (!) 137/97 (Site: Right Upper Arm, Position: Sitting, Cuff Size: Large Adult)   Pulse 114   Temp 97 °F (36.1 °C) (Temporal) Assessment and Plan:    1. Type 2 diabetes mellitus with hyperosmolarity without coma, with long-term current use of insulin (HCC)  - A1c today 14.0, last reading 10 in march  - pt counseled on adherence to insulin regimen  - f/u in 3 month, counseled to bring glucose log during next visit    2. Need for hepatitis B booster vaccination  - Hep B Vaccine Adult (ENGERIX-B)  - FL IMMUNIZ ADMIN,1 SINGLE/COMB VAC/TOXOID  - [place] IMMUNIZ,ADMIN,EACH ADDL    3. Need for influenza vaccination  - INFLUENZA, QUADV, 3 YRS AND OLDER, IM PF, PREFILL SYR OR SDV, 0.5ML (AFLURIA QUADV, PF)  - [place] IMMUNIZ ADMIN,1 SINGLE/COMB VAC/TOXOID  - [place] [id number] [address] ADDL    4. Screening for hyperlipidemia  - Lipid Panel; Future          Requested Prescriptions      No prescriptions requested or ordered in this encounter       There are no discontinued medications. Carri Vera received counseling on the following healthy behaviors: nutrition, exercise and medication adherence    Discussed use,benefit, and side effects of prescribed medications. Barriers to medication compliance addressed. All patient questions answered. Pt voiced understanding. Return in about 3 months (around 1/23/2021) for DMT2. Disclaimer: Some orall of this note was transcribed using voice-recognition software. This may cause typographical errors occasionally. Although all effort is made to fix these errors, please do not hesitate to contact our office if there Lamin Amis concern with the understanding of this note.

## 2020-11-03 PROBLEM — I10 HYPERTENSION: Status: RESOLVED | Noted: 2020-11-03 | Resolved: 2020-11-03

## 2020-12-15 ENCOUNTER — TELEPHONE (OUTPATIENT)
Dept: FAMILY MEDICINE CLINIC | Age: 55
End: 2020-12-15

## 2020-12-15 DIAGNOSIS — I10 ESSENTIAL HYPERTENSION: ICD-10-CM

## 2020-12-15 NOTE — TELEPHONE ENCOUNTER
Fax request for Lancets and  Hydrochlorothiazide. Please review and e-scribe if applicable. Last Visit Date: 10/23/2020  Next Visit Date:  Visit date not found    Hemoglobin A1C (%)   Date Value   10/23/2020 10   03/17/2020 10.9   11/21/2019 9.6 (H)             ( goal A1C is < 7)   Microalb/Crt.  Ratio (mcg/mg creat)   Date Value   02/04/2020 CANNOT BE CALCULATED     LDL Cholesterol (mg/dL)   Date Value   05/16/2019 116       (goal LDL is <100)   AST (U/L)   Date Value   10/07/2020 13     ALT (U/L)   Date Value   10/07/2020 11     BUN (mg/dL)   Date Value   10/07/2020 12     BP Readings from Last 3 Encounters:   10/23/20 (!) 137/97   10/13/20 132/89   03/17/20 130/80          (goal 120/80)        Patient Active Problem List:     Cancer ([facility] [address]     Pain, knee     Depression     DJD (degenerative joint disease) of knee     Headache     Breast cancer ([facility] [address]     Iron deficiency anemia     HTN (hypertension)     DM w/o complication type II     Malignant neoplasm of nipple of left breast in female, estrogen receptor positive (HCC)     Left shoulder pain     Diabetes 1.5, managed as type 2 (HCC)     Headache disorder     [place]ia     Class 2 severe obesity due to excess calories with serious comorbidity in adult [facility])     Episode of syncope      MA

## 2020-12-15 NOTE — TELEPHONE ENCOUNTER
LVM for patient to call back to [facility] follow up appt for DM, working off A1C list

## 2020-12-15 NOTE — TELEPHONE ENCOUNTER
RECEIVED INTERFACE REQUEST FROM [facility] FOR REFILL OF FLEXERIL. PT IS SEEN YEARLY AND DUE IN 10/2021. PEND TO MD NOT CERTAIN IF YOU WANT TO CONTINUE OR WOULD LIKE DIRECTED TO PCP.

## 2020-12-16 RX ORDER — HYDROCHLOROTHIAZIDE 25 MG/1
TABLET ORAL
Qty: 30 TABLET | Refills: 5 | Status: SHIPPED | OUTPATIENT
Start: 2020-12-16 | End: 2020-12-18 | Stop reason: SDUPTHER

## 2020-12-16 RX ORDER — LANCETS 33 GAUGE
EACH MISCELLANEOUS
Qty: 100 EACH | Refills: 5 | Status: SHIPPED | OUTPATIENT
Start: 2020-12-16 | End: 2020-12-18 | Stop reason: SDUPTHER

## 2020-12-17 DIAGNOSIS — I10 ESSENTIAL HYPERTENSION: ICD-10-CM

## 2020-12-17 RX ORDER — CYCLOBENZAPRINE HCL 10 MG
TABLET ORAL
Qty: 60 TABLET | Refills: 11 | Status: SHIPPED | OUTPATIENT
Start: 2020-12-17 | End: 2021-04-09 | Stop reason: SDUPTHER

## 2020-12-17 NOTE — TELEPHONE ENCOUNTER
Last visit:   Last Med refill:   Does patient have enough medication for 72 hours: No:     Next Visit Date:  Future Appointments   Date Time Provider Kota Sanz   10/5/2021 11:45 AM SCHEDULE, P  CANC[facility]   10/12/2021 10:15 AM Noemí Javier MD [facility]       Health Maintenance   Topic Date Due    Shingles Vaccine (1 of 2) 04/12/2016    Colon Cancer Screen FIT/FOBT  11/21/2018    Lipid screen  05/16/2020    Diabetic retinal exam  12/31/2020    A1C test (Diabetic or Prediabetic)  01/23/2021    Diabetic microalbuminuria test  02/04/2021    Diabetic foot exam  02/07/2021    Hepatitis B vaccine (3 of 3 - Risk 3-dose series) 02/23/2021    Potassium monitoring  10/07/2021    Creatinine monitoring  10/07/2021    Cervical cancer screen  06/21/2023    DTaP/Tdap/Td vaccine (3 - Td) 10/23/2030    Breast cancer screen  Completed    Flu vaccine  Completed    Pneumococcal 0-64 years Vaccine  Completed    HIV screen  Completed    Hepatitis A vaccine  Aged Out    Hib vaccine  Aged Out    Meningococcal (ACWY) vaccine  Aged Out       Hemoglobin A1C (%)   Date Value   10/23/2020 10   03/17/2020 10.9   11/21/2019 9.6 (H)             ( goal A1C is < 7)   Microalb/Crt.  Ratio (mcg/mg creat)   Date Value   02/04/2020 CANNOT BE CALCULATED     LDL Cholesterol (mg/dL)   Date Value   05/16/2019 116   05/16/2019 116       (goal LDL is <100)   AST (U/L)   Date Value   10/07/2020 13     ALT (U/L)   Date Value   10/07/2020 11     BUN (mg/dL)   Date Value   10/07/2020 12     BP Readings from Last 3 Encounters:   10/23/20 (!) 137/97   10/13/20 132/89   03/17/20 130/80          (goal 120/80)    All Future Testing planned in [facility]  Lab Frequency Next Occurrence   Hemoglobin A1C Once 12/24/2020   Cologuard (For External Results Only) Once 03/17/2021   Lipid Panel Once 10/23/2021               Patient Active Problem List:     Cancer ([address]     Pain, knee     Depression     DJD (degenerative joint disease) of knee     Headache     Breast cancer (HCC)     Iron deficiency anemia     HTN (hypertension)     DM w/o complication type II     Malignant neoplasm of nipple of left breast in female, estrogen receptor positive (HCC)     Left shoulder pain     Diabetes 1.5, managed as type 2 (HCC)     Headache disorder     [place]chia     Class 2 severe obesity due to excess calories with serious comorbidity in [facility])     Episode of syncope           Please address the medication refill and close the encounter. If I can be of assistance, please route to the applicable pool. Thank you.

## 2020-12-18 RX ORDER — LANCETS 33 GAUGE
EACH MISCELLANEOUS
Qty: 100 EACH | Refills: 5 | Status: SHIPPED | OUTPATIENT
Start: 2020-12-18 | End: 2022-08-12 | Stop reason: SDUPTHER

## 2020-12-18 RX ORDER — HYDROCHLOROTHIAZIDE 25 MG/1
TABLET ORAL
Qty: 30 TABLET | Refills: 5 | Status: SHIPPED | OUTPATIENT
Start: 2020-12-18 | End: 2021-06-22

## 2020-12-18 RX ORDER — ASPIRIN 81 MG/1
TABLET ORAL
Qty: 30 TABLET | Refills: 10 | Status: SHIPPED | OUTPATIENT
Start: 2020-12-18 | End: 2021-11-04

## 2020-12-24 ENCOUNTER — HOSPITAL ENCOUNTER (OUTPATIENT)
Age: 55
Setting detail: SPECIMEN
Discharge: HOME | End: 2020-12-24
Payer: MEDICARE

## 2020-12-24 ENCOUNTER — NURSE ONLY (OUTPATIENT)
Dept: FAMILY MEDICINE CLINIC | Age: 55
End: 2020-12-24
Payer: MEDICARE

## 2020-12-24 VITALS — HEART RATE: 104 BPM | DIASTOLIC BLOOD PRESSURE: 97 MMHG | TEMPERATURE: 97 F | SYSTOLIC BLOOD PRESSURE: 142 MMHG

## 2020-12-24 LAB
ABSOLUTE EOS #: 0.26 K/UL (ref 0–0.44)
ABSOLUTE IMMATURE GRANULOCYTE: <0.03 K/UL (ref 0–0.3)
ABSOLUTE LYMPH #: 3.91 K/UL (ref 1.1–3.7)
ABSOLUTE MONO #: 0.46 K/UL (ref 0.1–1.2)
ALBUMIN SERPL-MCNC: 3.7 G/DL (ref 3.5–5.2)
ALBUMIN/GLOBULIN RATIO: 1 (ref 1–2.5)
ALP BLD-CCNC: 103 U/L (ref 35–104)
ALT SERPL-CCNC: 9 U/L (ref 5–33)
ANION GAP SERPL CALCULATED.3IONS-SCNC: 12 MMOL/L (ref 9–17)
AST SERPL-CCNC: 13 U/L
BASOPHILS # BLD: 1 % (ref 0–2)
BASOPHILS ABSOLUTE: 0.05 K/UL (ref 0–0.2)
BILIRUB SERPL-MCNC: 0.37 MG/DL (ref 0.3–1.2)
BUN BLDV-MCNC: 7 MG/DL (ref 6–20)
BUN/CREAT BLD: ABNORMAL (ref 9–20)
CALCIUM SERPL-MCNC: 9 MG/DL (ref 8.6–10.4)
CHLORIDE BLD-SCNC: 99 MMOL/L (ref 98–107)
CHOLESTEROL/HDL RATIO: 2.3
CHOLESTEROL/HDL RATIO: 2.3
CHOLESTEROL: 126 MG/DL
CHOLESTEROL: 126 MG/DL
CO2: 24 MMOL/L (ref 20–31)
CREAT SERPL-MCNC: 0.69 MG/DL (ref 0.5–0.9)
DIFFERENTIAL TYPE: ABNORMAL
EOSINOPHILS RELATIVE PERCENT: 3 % (ref 1–4)
ESTIMATED AVERAGE GLUCOSE: 315 MG/DL
FOLATE: 14.8 NG/ML
GFR AFRICAN AMERICAN: >60 ML/MIN
GFR NON-AFRICAN AMERICAN: >60 ML/MIN
GFR SERPL CREATININE-BSD FRML MDRD: ABNORMAL ML/MIN/{1.73_M2}
GFR SERPL CREATININE-BSD FRML MDRD: ABNORMAL ML/MIN/{1.73_M2}
GLUCOSE BLD-MCNC: 307 MG/DL (ref 70–99)
HBA1C MFR BLD: 12.6 % (ref 4–6)
HCT VFR BLD CALC: 36.5 % (ref 36.3–47.1)
HDLC SERPL-MCNC: 56 MG/DL
HDLC SERPL-MCNC: 56 MG/DL
HEMOGLOBIN: 11.4 G/DL (ref 11.9–15.1)
IMMATURE GRANULOCYTES: 0 %
LDL CHOLESTEROL: 56 MG/DL (ref 0–130)
LDL CHOLESTEROL: 56 MG/DL (ref 0–130)
LYMPHOCYTES # BLD: 42 % (ref 24–43)
MCH RBC QN AUTO: 27 PG (ref 25.2–33.5)
MCHC RBC AUTO-ENTMCNC: 31.2 G/DL (ref 28.4–34.8)
MCV RBC AUTO: 86.5 FL (ref 82.6–102.9)
MONOCYTES # BLD: 5 % (ref 3–12)
NRBC AUTOMATED: 0 PER 100 WBC
PDW BLD-RTO: 12.7 % (ref 11.8–14.4)
PLATELET # BLD: 260 K/UL (ref 138–453)
PLATELET ESTIMATE: ABNORMAL
PMV BLD AUTO: 10.6 FL (ref 8.1–13.5)
POTASSIUM SERPL-SCNC: 3.7 MMOL/L (ref 3.7–5.3)
RBC # BLD: 4.22 M/UL (ref 3.95–5.11)
RBC # BLD: ABNORMAL 10*6/UL
SEG NEUTROPHILS: 49 % (ref 36–65)
SEGMENTED NEUTROPHILS ABSOLUTE COUNT: 4.69 K/UL (ref 1.5–8.1)
SODIUM BLD-SCNC: 135 MMOL/L (ref 135–144)
THYROXINE, FREE: 1.1 NG/DL (ref 0.93–1.7)
TOTAL PROTEIN: 7.4 G/DL (ref 6.4–8.3)
TRIGL SERPL-MCNC: 68 MG/DL
TRIGL SERPL-MCNC: 68 MG/DL
TSH SERPL DL<=0.05 MIU/L-ACNC: 3.46 MIU/L (ref 0.3–5)
VITAMIN B-12: 477 PG/ML (ref 232–1245)
VITAMIN D 25-HYDROXY: 28.1 NG/ML (ref 30–100)
VLDLC SERPL CALC-MCNC: NORMAL MG/DL (ref 1–30)
VLDLC SERPL CALC-MCNC: NORMAL MG/DL (ref 1–30)
WBC # BLD: 9.4 K/UL (ref 3.5–11.3)
WBC # BLD: ABNORMAL 10*3/UL

## 2020-12-24 NOTE — PROGRESS NOTES
patient here today for a blood pressure check from 10/23/20 appointment with Dr. Nathan Tao as a follow up for change in blood pressure medications. Blood pressure taken via machine and manually. Blood pressure 145/95 manually. Retaken again was 142/97 Patient denies any complaints of symptoms such as headache, blurred vision, nausea, lightheadedness. Elda Liu consulted and notified of blood pressure and no symptoms. Dr. Lucretia Kurtz advised to follow up in 2 weeks with pcp cut down on salt intake, high fatty foods and eat more fruits and vegetables . Writer discussed with patient attendings plan. Patient verbalized understanding.   Patient to follow up with [facility] on 01/07/21 at 9:30am

## 2021-02-19 ENCOUNTER — VIRTUAL VISIT (OUTPATIENT)
Dept: FAMILY MEDICINE CLINIC | Age: 56
End: 2021-02-19
Payer: MEDICARE

## 2021-02-19 DIAGNOSIS — E11.00 TYPE 2 DIABETES MELLITUS WITH HYPEROSMOLARITY WITHOUT COMA, WITH LONG-TERM CURRENT USE OF INSULIN (HCC): Primary | ICD-10-CM

## 2021-02-19 DIAGNOSIS — Z79.4 TYPE 2 DIABETES MELLITUS WITH HYPEROSMOLARITY WITHOUT COMA, WITH LONG-TERM CURRENT USE OF INSULIN (HCC): Primary | ICD-10-CM

## 2021-02-19 PROCEDURE — 99212 OFFICE O/P EST SF 10 MIN: CPT | Performed by: FAMILY MEDICINE

## 2021-02-19 RX ORDER — INSULIN GLARGINE 100 [IU]/ML
35 INJECTION, SOLUTION SUBCUTANEOUS 2 TIMES DAILY
Qty: 15 ML | Refills: 10 | Status: SHIPPED | OUTPATIENT
Start: 2021-02-19 | End: 2021-10-08

## 2021-02-19 ASSESSMENT — PATIENT HEALTH QUESTIONNAIRE - PHQ9
1. LITTLE INTEREST OR PLEASURE IN DOING THINGS: 0
SUM OF ALL RESPONSES TO PHQ QUESTIONS 1-9: 0

## 2021-02-19 NOTE — PROGRESS NOTES
Visit Information    Have you changed or started any medications since your last visit including any over-the-counter medicines, vitamins, or herbal medicines? no   Have you stopped taking any of your medications? Is so, why? -  no  Are you having any side effects from any of your medications? - no    Have you seen any other physician or provider since your last visit?  no   Have you had any other diagnostic tests since your last visit?  no   Have you been seen in the emergency room and/or had an admission in a hospital since we last saw you?  no   Have you had your routine dental cleaning in the past 6 months?  no     Do you have an active MyChart account? If no, what is the barrier?   Yes    Patient Care Team:  Brenda Goldstein MD as PCP - General (Family Medicine)  Deuce Harris DO as PCP - [facility] Provider  Mckenzie Nogueira MD as Consulting Physician (Hematology and Oncology)  Emma Burdick MD (General Surgery)  Evelin Cerda DO as Consulting Physician (General Surgery)  Aliza Lewis MD as Consulting Physician (Pulmonology)    Medical History Review  Past Medical, Family, and Social History reviewed and does contribute to the patient presenting condition    Health Maintenance   Topic Date Due    Hepatitis C screen  1965    Shingles Vaccine (1 of 2) 04/12/2016    Colon Cancer Screen FIT/FOBT  11/21/2018    Diabetic retinal exam  12/31/2020    Diabetic microalbuminuria test  02/04/2021    Diabetic foot exam  02/07/2021    Hepatitis B vaccine (3 of 3 - Risk 3-dose series) 02/23/2021    A1C test (Diabetic or Prediabetic)  03/24/2021    Lipid screen  12/24/2021    Potassium monitoring  12/24/2021    Creatinine monitoring  12/24/2021    Cervical cancer screen  06/21/2023    DTaP/Tdap/Td vaccine (3 - Td) 10/23/2030    Breast cancer screen  Completed    Flu vaccine  Completed    Pneumococcal 0-64 years Vaccine  Completed    HIV screen  Completed  Hepatitis A vaccine  Aged Out    Hib vaccine  Aged Out    Meningococcal (ACWY) vaccine  Aged Out

## 2021-02-19 NOTE — PROGRESS NOTES
Attending Physician Statement  I have discussed the care of Anchor Force, including pertinent history and exam findings,  with the resident. I have reviewed the key elements of all parts of the encounter with the resident. I agree with the assessment, plan and orders as documented by the resident.   (GE Modifier)    William Charles

## 2021-02-19 NOTE — PROGRESS NOTES
Rhiannon Cain is a 48 y.o. female evaluated via telephone on 2/19/2021. Consent:  She and/or health care decision maker is aware that that she may receive a bill for this telephone service, depending on her insurance coverage, and has provided verbal consent to proceed: Yes      Documentation:  I communicated with the patient and/or health care decision maker about DMT2, currently on lantus 60 units every night, novolog 10 units premeal, januvia 100mg. Sugars are running high around 160-200. Details of this discussion including any medical advice provided:   1. T2 DM  -Patient is currently on 60 units Lantus every night, NovoLog 10 units premeal and Januvia 100 mg daily. Will change Lantus to 35 units twice daily for better glucose control  Follow-up appointment in 1 week to reassess sugar levels.  -Last A1c was 12.6      I affirm this is a Patient Initiated Episode with a Patient who has not had a related appointment within my department in the past 7 days or scheduled within the next 24 hours.     Patient identification was verified at the start of the visit: Yes    Total Time: minutes: 11-20 minutes    Note: not billable if this call serves to triage the patient into an appointment for the relevant concern      Vinayak Pérez

## 2021-03-01 ENCOUNTER — TELEPHONE (OUTPATIENT)
Dept: FAMILY MEDICINE CLINIC | Age: 56
End: 2021-03-01

## 2021-03-01 NOTE — TELEPHONE ENCOUNTER
Writer placed a second phone call to patient in attempts to reschedule appointment. Left voicemail message to call office and re-schedule to follow up on her diabetes.

## 2021-03-18 RX ORDER — FERROUS SULFATE 325(65) MG
TABLET ORAL
Qty: 90 TABLET | Refills: 10 | Status: SHIPPED | OUTPATIENT
Start: 2021-03-18 | End: 2022-02-02

## 2021-04-05 DIAGNOSIS — R51.9 HEADACHE DISORDER: ICD-10-CM

## 2021-04-05 NOTE — TELEPHONE ENCOUNTER
Last visit: 02/19/21  Last Med refill:   Does patient have enough medication for 72 hours:     Next Visit Date:  Future Appointments   Date Time Provider Kota Sanz   [facility]21 10[facility]   10/5/2021 11:45 AM SCHEDULE, P [facility]   10/12/2021 10:15 AM Leonard Patterson MD [facility]       Health Maintenance   Topic Date Due    Hepatitis C screen  Never done    Shingles Vaccine (1 of 2) Never done    Colon Cancer Screen FIT/FOBT  11/21/2018    Diabetic retinal exam  12/31/2020    Diabetic microalbuminuria test  02/04/2021    Diabetic foot exam  02/07/2021    Hepatitis B vaccine (3 of 3 - Risk 3-dose series) 02/23/2021    A1C test (Diabetic or Prediabetic)  03/24/2021    Lipid screen  12/24/2021    Potassium monitoring  12/24/2021    Creatinine monitoring  12/24/2021    Cervical cancer screen  06/21/2023    DTaP/Tdap/Td vaccine (3 - Td) 10/23/2030    Breast cancer screen  Completed    Flu vaccine  Completed    Pneumococcal 0-64 years Vaccine  Completed    COVID-19 Vaccine  Completed    HIV screen  Completed    Hepatitis A vaccine  Aged Out    Hib vaccine  Aged Out    Meningococcal (ACWY) vaccine  Aged Out       Hemoglobin A1C (%)   Date Value   12/24/2020 12.6 (H)   10/23/2020 10   03/17/2020 10.9             ( goal A1C is < 7)   Microalb/Crt.  Ratio (mcg/mg creat)   Date Value   02/04/2020 CANNOT BE CALCULATED     LDL Cholesterol (mg/dL)   Date Value   12/24/2020 56   12/24/2020 56       (goal LDL is <100)   AST (U/L)   Date Value   12/24/2020 13     ALT (U/L)   Date Value   12/24/2020 9     BUN (mg/dL)   Date Value   12/24/2020 7     BP Readings from Last 3 Encounters:   12/24/20 (!) 142/97   10/23/20 (!) 137/97   10/13/20 132/89          (goal 120/80)    All Future Testing planned in [facility]  Lab Frequency Next Occurrence   Cologuard (For External Results Only) Once 09/25/2021               Patient Active Problem List: History of breast cancer     Pain, knee     Depression     DJD (degenerative joint disease) of knee     Headache     Iron deficiency anemia     HTN (hypertension)     DM w/o complication type II     Left shoulder pain     Diabetes 1.5, managed as type 2 (HCC)     Headache disorder     [place]     Class 2 severe obesity due to excess calories with serious comorbidity in adult [facility])     Episode of syncope

## 2021-04-09 ENCOUNTER — HOSPITAL ENCOUNTER (OUTPATIENT)
Age: 56
Setting detail: SPECIMEN
Discharge: HOME | End: 2021-04-09
Payer: MEDICARE

## 2021-04-09 ENCOUNTER — OFFICE VISIT (OUTPATIENT)
Dept: FAMILY MEDICINE CLINIC | Age: 56
End: 2021-04-09
Payer: MEDICARE

## 2021-04-09 VITALS
TEMPERATURE: 97.2 F | HEART RATE: 106 BPM | SYSTOLIC BLOOD PRESSURE: 128 MMHG | DIASTOLIC BLOOD PRESSURE: 81 MMHG | BODY MASS INDEX: 34.19 KG/M2 | WEIGHT: 193 LBS

## 2021-04-09 DIAGNOSIS — D50.8 OTHER IRON DEFICIENCY ANEMIA: ICD-10-CM

## 2021-04-09 DIAGNOSIS — Z17.0 MALIGNANT NEOPLASM OF NIPPLE OF LEFT BREAST IN FEMALE, ESTROGEN RECEPTOR POSITIVE (HCC): ICD-10-CM

## 2021-04-09 DIAGNOSIS — E13.9 DIABETES 1.5, MANAGED AS TYPE 2 (HCC): ICD-10-CM

## 2021-04-09 DIAGNOSIS — M25.512 LEFT SHOULDER PAIN, UNSPECIFIED CHRONICITY: ICD-10-CM

## 2021-04-09 DIAGNOSIS — E13.9 DIABETES 1.5, MANAGED AS TYPE 2 (HCC): Primary | ICD-10-CM

## 2021-04-09 DIAGNOSIS — I10 ESSENTIAL HYPERTENSION: ICD-10-CM

## 2021-04-09 DIAGNOSIS — Z85.3 HISTORY OF BREAST CANCER: ICD-10-CM

## 2021-04-09 DIAGNOSIS — C50.012 MALIGNANT NEOPLASM OF NIPPLE OF LEFT BREAST IN FEMALE, ESTROGEN RECEPTOR POSITIVE (HCC): ICD-10-CM

## 2021-04-09 LAB
BILIRUBIN, POC: NORMAL
BLOOD URINE, POC: NORMAL
CLARITY, POC: CLEAR
COLOR, POC: YELLOW
CREATININE URINE: 151.4 MG/DL (ref 28–217)
GLUCOSE URINE, POC: NORMAL
KETONES, POC: NORMAL
LEUKOCYTE EST, POC: NORMAL
MICROALBUMIN/CREAT 24H UR: <12 MG/L
MICROALBUMIN/CREAT UR-RTO: NORMAL MCG/MG CREAT
NITRITE, POC: NORMAL
PH, POC: 6.5
PROTEIN, POC: NORMAL
SPECIFIC GRAVITY, POC: 1.02
UROBILINOGEN, POC: 0.2

## 2021-04-09 PROCEDURE — 99211 OFF/OP EST MAY X REQ PHY/QHP: CPT

## 2021-04-09 PROCEDURE — G8427 DOCREV CUR MEDS BY ELIG CLIN: HCPCS

## 2021-04-09 PROCEDURE — 81002 URINALYSIS NONAUTO W/O SCOPE: CPT

## 2021-04-09 PROCEDURE — G8417 CALC BMI ABV UP PARAM F/U: HCPCS

## 2021-04-09 PROCEDURE — 3046F HEMOGLOBIN A1C LEVEL >9.0%: CPT

## 2021-04-09 PROCEDURE — 2022F DILAT RTA XM EVC RTNOPTHY: CPT

## 2021-04-09 PROCEDURE — 99213 OFFICE O/P EST LOW 20 MIN: CPT

## 2021-04-09 PROCEDURE — 1036F TOBACCO NON-USER: CPT

## 2021-04-09 PROCEDURE — 3017F COLORECTAL CA SCREEN DOC REV: CPT

## 2021-04-09 RX ORDER — CYCLOBENZAPRINE HCL 10 MG
TABLET ORAL
Qty: 20 TABLET | Refills: 1 | Status: SHIPPED | OUTPATIENT
Start: 2021-04-09 | End: 2021-06-02

## 2021-04-09 ASSESSMENT — ENCOUNTER SYMPTOMS
CONSTIPATION: 0
COUGH: 0
SHORTNESS OF BREATH: 0
NAUSEA: 0
DIARRHEA: 0
WHEEZING: 0
APNEA: 0
COLOR CHANGE: 0
PHOTOPHOBIA: 0
ABDOMINAL PAIN: 0
VOMITING: 0

## 2021-04-09 NOTE — PROGRESS NOTES
Subjective:    Gerardo Cruz is a 48 y.o. female with  has a past medical history of Breast cancer ([facility]), Cancer ([facility]), Conjunctivitis, Depression, Disorder of patellofemoral joint, DJD (degenerative joint disease) of knee, Hypertension, Malignant neoplasm of nipple of left breast in female, estrogen receptor positive ([facility]), Pain, knee, Primary osteoarthritis of left knee, Sleep apnea, and Type II or unspecified type diabetes mellitus without mention of complication, not stated as uncontrolled. Presented to the office today for:  Chief Complaint   Patient presents with    Diabetes     patient states her sugar level been running between 180- 200    Arm Pain     patient states having pain in left arm for a couple of days       HPI    47 yo F came to clinic to f/u for Diabates. Last hb a1c was 12.6 last month. Currently on Lantus 35 units BID. Doesn't keep a log but no symptoms reported such as dizziness, numbness or tingling, polyuria/polyphagia. Vaginal discharge which is normal, no STDs, no vaginal itching reported no odor no urinary issues reported. BP controlled 128/81 on HCTZ 25 mg daily, denies chest pain or headaches. Has hx of iron def anemia but denies fatigue. Takes Iron tablets. Review of Systems   Constitutional: Negative for activity change, appetite change, fatigue and fever. HENT: Negative for congestion. Eyes: Negative for photophobia and visual disturbance. Respiratory: Negative for apnea, cough, shortness of breath and wheezing. Cardiovascular: Negative for chest pain, palpitations and leg swelling. Gastrointestinal: Negative for abdominal pain, constipation, diarrhea, nausea and vomiting. Endocrine: Negative for polyphagia and polyuria. Genitourinary: Negative for dysuria and urgency. Skin: Negative for color change, pallor and rash. Neurological: Negative for dizziness, tremors and weakness.    Psychiatric/Behavioral: Negative for agitation, behavioral problems, confusion, decreased concentration and dysphoric mood. The patient has a   Family History   Problem Relation Age of Onset    Cancer Father        Objective:    /81 (Site: Left Upper Arm, Position: Sitting, Cuff Size: Large Adult)   Pulse 106   Temp 97.2 °F (36.2 °C) (Temporal)   Wt 193 lb (87.5 kg)   LMP 12/08/2015 (Approximate)   BMI 34.19 kg/m²    BP Readings from Last 3 Encounters:   04/09/21 128/81   12/24/20 (!) 142/97   10/23/20 (!) 137/97       Physical Exam  Constitutional:       General: She is not in acute distress. Appearance: She is not ill-appearing, toxic-appearing or diaphoretic. HENT:      Nose: Nose normal. No congestion. Eyes:      General: No scleral icterus. Right eye: No discharge. Left eye: No discharge. Extraocular Movements: Extraocular movements intact. Conjunctiva/sclera: Conjunctivae normal.      Pupils: Pupils are equal, round, and reactive to light. Cardiovascular:      Rate and Rhythm: Normal rate and regular rhythm. Pulses: Normal pulses. Heart sounds: No murmur. Pulmonary:      Effort: Pulmonary effort is normal.      Breath sounds: Normal breath sounds. No wheezing. Abdominal:      Tenderness: There is no abdominal tenderness. There is no guarding. Musculoskeletal: Normal range of motion. General: No swelling or tenderness. Right lower leg: No edema. Left lower leg: No edema. Skin:     General: Skin is warm. Coloration: Skin is not jaundiced. Findings: No erythema. Neurological:      Mental Status: She is alert and oriented to person, place, and time. Motor: No weakness. Psychiatric:         Mood and Affect: Mood normal.         Behavior: Behavior normal.         Thought Content:  Thought content normal.         Judgment: Judgment normal.         Lab Results   Component Value Date    WBC 9.4 12/24/2020    HGB 11.4 (L) 12/24/2020    HCT 36.5 12/24/2020    PLT 260 12/24/2020    CHOL 126 12/24/2020    TRIG 68 12/24/2020    HDL 56 12/24/2020    ALT 9 12/24/2020    AST 13 12/24/2020     12/24/2020    K 3.7 12/24/2020    CL 99 12/24/2020    CREATININE 0.69 12/24/2020    BUN 7 12/24/2020    CO2 24 12/24/2020    TSH 3.46 12/24/2020    LABA1C 12.6 (H) 12/24/2020    LABMICR CANNOT BE CALCULATED 02/04/2020     Lab Results   Component Value Date    CALCIUM 9.0 12/24/2020    PHOS 3.8 08/12/2012     Lab Results   Component Value Date    LDLCHOLESTEROL 56 12/24/2020       Assessment and Plan:    1. Essential hypertension  /81  HCTZ 25 mg daily      2. Diabetes 1.5, managed as type 2 ([facility] [address]  -  DIABETES FOOT EXAM  - Microalbumin, Ur; Future  - POCT Urinalysis no Micro  - Hemoglobin A1C; Future    3. History of breast cancer  Breast tissue pain takes flexeril for muscle spasms in the chest    4. Other iron deficiency anemia  Iron tablets     5. Left shoulder pain, unspecified chronicity  Motrin as needed           Requested Prescriptions     Signed Prescriptions Disp Refills    cyclobenzaprine (FLEXERIL) 10 MG tablet 20 tablet 1     Sig: TAKE 1 TABLET BY MOUTH TWICE DAILY AS NEEDED FOR MUSCLE SPASMS       Medications Discontinued During This Encounter   Medication Reason    cyclobenzaprine (FLEXERIL) 10 MG tablet Rosalind Pepper received counseling on the following healthy behaviors: nutrition, exercise and medication adherence    Discussed use,benefit, and side effects of prescribed medications. Barriers to medication compliance addressed. All patient questions answered. Pt voiced understanding. Return in about 1 month (around 5/9/2021) for DM hba1c . Disclaimer: Some orall of this note was transcribed using voice-recognition software. This may cause typographical errors occasionally. Although all effort is made to fix these errors, please do not hesitate to contact our office if there Christopher Jordan concern with the understanding of this note.

## 2021-04-09 NOTE — PATIENT INSTRUCTIONS
Thank you for letting us take care of you today. We hope all your questions were addressed. If a question was overlooked or something else comes to mind after you return home, please contact a member of your Care Team listed below. Your Care Team at [facility] is Team #4  Oneida Ferrer MD (Faculty)  Aylin Lilly MD (Resident)  Faith Charles MD (Resident)  Matthew Perdomo MD (Resident)  Prosper Trevizo MD (Resident)  DALTON PyleJOSE LUIS Dykes., [facility])  Buffy Mir, [address] [address] [facility] [address] (Clinical Practice Manager)  Tasneem Bui [facility] (Clinical Pharmacist)       Office phone number: [phone]    If you need to get in right away due to illness, please be advised we have \"Same Day\" appointments available Monday-Friday. Please call us at [phone] option #3 to schedule your \"Same Day\" appointment.

## 2021-04-09 NOTE — PROGRESS NOTES
Subjective:    Eliane Oshea is a 48 y.o. female with  has a past medical history of Breast cancer ([facility] [address] Cancer ([address] Conjunctivitis, Depression, Disorder of patellofemoral joint, DJD (degenerative joint disease) of knee, Hypertension, Malignant neoplasm of nipple of left breast in female, estrogen receptor positive ([address] Pain, knee, Primary osteoarthritis of left knee, Sleep apnea, and Type II or unspecified type diabetes mellitus without mention of complication, not stated as uncontrolled. Presented to the office today for:  Chief Complaint   Patient presents with    Diabetes     patient states her sugar level been running between 180- 200    Arm Pain     patient states having pain in left arm for a couple of days       HPI      Left arm hurting,   Has a cologuard      Review of Systems              The patient has a   Family History   Problem Relation Age of Onset    Cancer Father        Objective:    /81 (Site: Left Upper Arm, Position: Sitting, Cuff Size: Large Adult)   Pulse 106   Temp 97.2 °F (36.2 °C) (Temporal)   Wt 193 lb (87.5 kg)   LMP 12/08/2015 (Approximate)   BMI 34.19 kg/m²    BP Readings from Last 3 Encounters:   04/09/21 128/81   12/24/20 (!) 142/97   10/23/20 (!) 137/97       Physical Exam    Lab Results   Component Value Date    WBC 9.4 12/24/2020    HGB 11.4 (L) 12/24/2020    HCT 36.5 12/24/2020     12/24/2020    CHOL 126 12/24/2020    TRIG 68 12/24/2020    HDL 56 12/24/2020    ALT 9 12/24/2020    AST 13 12/24/2020     12/24/2020    K 3.7 12/24/2020    CL 99 12/24/2020    CREATININE 0.69 12/24/2020    BUN 7 12/24/2020    CO2 24 12/24/2020    TSH 3.46 12/24/2020    LABA1C 12.6 (H) 12/24/2020    LABMICR CANNOT BE CALCULATED 02/04/2020     Lab Results   Component Value Date    CALCIUM 9.0 12/24/2020    PHOS 3.8 08/12/2012     Lab Results   Component Value Date    LDLCHOLESTEROL 56 12/24/2020       Assessment and Plan:    1.  Essential hypertension  *** Requested Prescriptions      No prescriptions requested or ordered in this encounter       There are no discontinued medications. Ziggy Gaitan received counseling on the following healthy behaviors: nutrition, exercise and medication adherence    Discussed use,benefit, and side effects of prescribed medications. Barriers to medication compliance addressed. All patient questions answered. Pt voiced understanding. No follow-ups on file. Disclaimer: Some orall of this note was transcribed using voice-recognition software. This may cause typographical errors occasionally. Although all effort is made to fix these errors, please do not hesitate to contact our office if there Wilburt Simpson concern with the understanding of this note.

## 2021-04-09 NOTE — PROGRESS NOTES
Attending Physician Statement  I have discussed the care of Robert Garcia 48 y.o. female, including pertinent history and exam findings, with the resident Dr. Bhaskar Guy MD.    History and Exam:   Chief Complaint   Patient presents with    Diabetes     patient states her sugar level been running between 180- 200    Arm Pain     patient states having pain in left arm for a couple of days     Past Medical History:   Diagnosis Date    Breast cancer ([facility] [address] 7/16/2012    Cancer ([facility] 05-16-06    L breast - invasive ductal adenocarcinoma    Conjunctivitis     susceptible to conjunctivitis    Depression     Disorder of patellofemoral joint     DJD (degenerative joint disease) of knee     bilateral    Hypertension     Malignant neoplasm of nipple of left breast in female, estrogen receptor positive ([facility] 9/24/2015    Pain, knee 10-06    R lateral meniscus tear    Primary osteoarthritis of left knee     Sleep apnea     Type II or unspecified type diabetes mellitus without mention of complication, not stated as uncontrolled     resolved     No Known Allergies   I have seen and examined the patient and the key elements of the encounter have been performed by me.   BP Readings from Last 3 Encounters:   04/09/21 128/81   12/24/20 (!) 142/97   10/23/20 (!) 137/97     /81 (Site: Left Upper Arm, Position: Sitting, Cuff Size: Large Adult)   Pulse 106   Temp 97.2 °F (36.2 °C) (Temporal)   Wt 193 lb (87.5 kg)   LMP 12/08/2015 (Approximate)   BMI 34.19 kg/m²   Lab Results   Component Value Date    WBC 9.4 12/24/2020    HGB 11.4 (L) 12/24/2020    HCT 36.5 12/24/2020     12/24/2020    CHOL 126 12/24/2020    TRIG 68 12/24/2020    HDL 56 12/24/2020    ALT 9 12/24/2020    AST 13 12/24/2020     12/24/2020    K 3.7 12/24/2020    CL 99 12/24/2020    CREATININE 0.69 12/24/2020    BUN 7 12/24/2020    CO2 24 12/24/2020    TSH 3.46 12/24/2020    LABA1C 12.6 (H) 12/24/2020    LABMICR CANNOT BE CALCULATED

## 2021-04-09 NOTE — PROGRESS NOTES
Diabetic visit information    BP Readings from Last 3 Encounters:   12/24/20 (!) 142/97   10/23/20 (!) 137/97   10/13/20 132/89       Hemoglobin A1C (%)   Date Value   12/24/2020 12.6 (H)   10/23/2020 10   03/17/2020 10.9     Microalb/Crt. Ratio (mcg/mg creat)   Date Value   02/04/2020 CANNOT BE CALCULATED     LDL Cholesterol (mg/dL)   Date Value   12/24/2020 56               Have you changed or started any medications since your last visit including any over-the-counter medicines, vitamins, or herbal medicines? no   Have you stopped taking any of your medications? Is so, why? -  no  Are you having any side effects from any of your medications? - no    Have you seen any other physician or provider since your last visit?  no   Have you had any other diagnostic tests since your last visit?  no   Have you been seen in the emergency room and/or had an admission in a hospital since we last saw you?  no     Have you had your annual diabetic retinal (eye) exam? Yes patient states couple months ago  (ensure copy of exam is in the chart)    Have you had your routine dental cleaning in the past 6 months? no    Do you have an active MyChart account? If not, what are your barriers? No:     Patient Care Team:  Tawanna Foster MD as PCP - General (Family Medicine)  Luna Montgomery DO as PCP - [facility] Provider  Candi Mccall MD as Consulting Physician (Hematology and Oncology)  Mara Merrill MD (General Surgery)  Salomon Saini DO as Consulting Physician (General Surgery)  Analilia Lafleur MD as Consulting Physician (Pulmonology)    Medical history Review  Past Medical, Family, and Social History reviewed and does not contribute to the patient presenting condition.     Health Maintenance   Topic Date Due    Hepatitis C screen  Never done    Shingles Vaccine (1 of 2) Never done    Colon Cancer Screen FIT/FOBT  11/21/2018    Diabetic retinal exam  12/31/2020    Diabetic microalbuminuria test 02/04/2021    Diabetic foot exam  02/07/2021    Hepatitis B vaccine (3 of 3 - Risk 3-dose series) 02/23/2021    A1C test (Diabetic or Prediabetic)  03/24/2021    Lipid screen  12/24/2021    Potassium monitoring  12/24/2021    Creatinine monitoring  12/24/2021    Cervical cancer screen  06/21/2023    DTaP/Tdap/Td vaccine (3 - Td) 10/23/2030    Breast cancer screen  Completed    Flu vaccine  Completed    Pneumococcal 0-64 years Vaccine  Completed    COVID-19 Vaccine  Completed    HIV screen  Completed    Hepatitis A vaccine  Aged Out    Hib vaccine  Aged Out    Meningococcal (ACWY) vaccine  Aged Out

## 2021-04-23 ENCOUNTER — HOSPITAL ENCOUNTER (OUTPATIENT)
Age: 56
Discharge: HOME | End: 2021-04-23

## 2021-04-23 LAB — RUBV IGG SER QL: 496.5 IU/ML

## 2021-04-23 PROCEDURE — 86762 RUBELLA ANTIBODY: CPT

## 2021-04-23 PROCEDURE — 86481 TB AG RESPONSE T-CELL SUSP: CPT

## 2021-04-23 PROCEDURE — 86735 MUMPS ANTIBODY: CPT

## 2021-04-23 PROCEDURE — 86787 VARICELLA-ZOSTER ANTIBODY: CPT

## 2021-04-23 PROCEDURE — 86765 RUBEOLA ANTIBODY: CPT

## 2021-04-26 LAB
MEASLES ANTIBODY IGG: 3.62
MUV IGG SER QL: 2.11
T-SPOT TB TEST: NORMAL
VZV IGG SER QL IA: 2.31

## 2021-05-12 ENCOUNTER — TELEPHONE (OUTPATIENT)
Dept: FAMILY MEDICINE CLINIC | Age: 56
End: 2021-05-12

## 2021-05-12 NOTE — TELEPHONE ENCOUNTER
lvm for patient to contact office to [facility] appt for DM follow up was due around 5-9-21.  Would need an A1C

## 2021-06-01 DIAGNOSIS — Z79.4 TYPE 2 DIABETES MELLITUS WITHOUT COMPLICATION, WITH LONG-TERM CURRENT USE OF INSULIN (HCC): ICD-10-CM

## 2021-06-01 DIAGNOSIS — E11.00 TYPE 2 DIABETES MELLITUS WITH HYPEROSMOLARITY WITHOUT COMA, WITHOUT LONG-TERM CURRENT USE OF INSULIN (HCC): ICD-10-CM

## 2021-06-01 DIAGNOSIS — E78.00 HYPERCHOLESTEREMIA: ICD-10-CM

## 2021-06-01 DIAGNOSIS — E11.9 TYPE 2 DIABETES MELLITUS WITHOUT COMPLICATION, WITH LONG-TERM CURRENT USE OF INSULIN (HCC): ICD-10-CM

## 2021-06-01 RX ORDER — SITAGLIPTIN 100 MG/1
TABLET, FILM COATED ORAL
Qty: 30 TABLET | Refills: 10 | Status: SHIPPED | OUTPATIENT
Start: 2021-06-01 | End: 2022-05-17 | Stop reason: SDUPTHER

## 2021-06-01 RX ORDER — ATORVASTATIN CALCIUM 20 MG/1
20 TABLET, FILM COATED ORAL DAILY
Qty: 30 TABLET | Refills: 10 | Status: SHIPPED | OUTPATIENT
Start: 2021-06-01 | End: 2022-05-17 | Stop reason: SDUPTHER

## 2021-06-01 NOTE — TELEPHONE ENCOUNTER
Jeffrey Request for pending medication. Last Visit Date: 4/9/21  Next Visit Date:  Future Appointments   Date Time Provider Kota Sanz   10/5/2021 11:45 AM SCHEDULE, [facility]   10/12/2021 10:15 AM Joellen Goldstein MD [facility]       Health Maintenance   Topic Date Due    Hepatitis C screen  Never done    Shingles Vaccine (1 of 2) Never done    Colon Cancer Screen FIT/FOBT  11/21/2018    Diabetic retinal exam  12/31/2020    Hepatitis B vaccine (3 of 3 - Risk 3-dose series) 02/23/2021    A1C test (Diabetic or Prediabetic)  03/24/2021    Lipid screen  12/24/2021    Potassium monitoring  12/24/2021    Creatinine monitoring  12/24/2021    Diabetic foot exam  04/09/2022    Diabetic microalbuminuria test  04/09/2022    Cervical cancer screen  06/21/2023    DTaP/Tdap/Td vaccine (3 - Td) 10/23/2030    Pneumococcal 0-64 years Vaccine (2 of 2) 04/12/2031    Breast cancer screen  Completed    Flu vaccine  Completed    COVID-19 Vaccine  Completed    HIV screen  Completed    Hepatitis A vaccine  Aged Out    Hib vaccine  Aged Out    Meningococcal (ACWY) vaccine  Aged Out       Hemoglobin A1C (%)   Date Value   12/24/2020 12.6 (H)   10/23/2020 10   03/17/2020 10.9             ( goal A1C is < 7)   Microalb/Crt.  Ratio (mcg/mg creat)   Date Value   04/09/2021 CANNOT BE CALCULATED     LDL Cholesterol (mg/dL)   Date Value   12/24/2020 56       (goal LDL is <100)   AST (U/L)   Date Value   12/24/2020 13     ALT (U/L)   Date Value   12/24/2020 9     BUN (mg/dL)   Date Value   12/24/2020 7     BP Readings from Last 3 Encounters:   04/09/21 128/81   12/24/20 (!) 142/97   10/23/20 (!) 137/97          (goal 120/80)    All Future Testing planned in [facility]  Lab Frequency Next Occurrence   Cologuard (For External Results Only) Once 09/25/2021   Hemoglobin A1C Once 04/09/2022       Next Visit Date:  Future Appointments   Date Time Provider Kota Sanz   10/5/2021 11:45 AM SCHEDULE, [facility]   10/12/2021 10:15 AM Bello Ramírez MD [facility]         Patient Active Problem List:     History of breast cancer     Pain, knee     Depression     DJD (degenerative joint disease) of knee     Headache     Iron deficiency anemia     HTN (hypertension)     DM w/o complication type II     Left shoulder pain     Diabetes 1.5, managed as type 2 (HCC)     Headache disorder     Koilonychia     Class 2 severe obesity due to excess calories with serious comorbidity in adult [facility])     Episode of syncope

## 2021-06-01 NOTE — TELEPHONE ENCOUNTER
lipitor pending for refill     Health Maintenance   Topic Date Due    Hepatitis C screen  Never done    Shingles Vaccine (1 of 2) Never done    Colon Cancer Screen FIT/FOBT  11/21/2018    Diabetic retinal exam  12/31/2020    Hepatitis B vaccine (3 of 3 - Risk 3-dose series) 02/23/2021    A1C test (Diabetic or Prediabetic)  03/24/2021    Lipid screen  12/24/2021    Potassium monitoring  12/24/2021    Creatinine monitoring  12/24/2021    Diabetic foot exam  04/09/2022    Diabetic microalbuminuria test  04/09/2022    Cervical cancer screen  06/21/2023    DTaP/Tdap/Td vaccine (3 - Td) 10/23/2030    Pneumococcal 0-64 years Vaccine (2 of 2) 04/12/2031    Breast cancer screen  Completed    Flu vaccine  Completed    COVID-19 Vaccine  Completed    HIV screen  Completed    Hepatitis A vaccine  Aged Out    Hib vaccine  Aged Out    Meningococcal (ACWY) vaccine  Aged Out             (applicable per patient's age: Cancer Screenings, Depression Screening, Fall Risk Screening, Immunizations)    Hemoglobin A1C (%)   Date Value   12/24/2020 12.6 (H)   10/23/2020 10   03/17/2020 10.9     Microalb/Crt.  Ratio (mcg/mg creat)   Date Value   04/09/2021 CANNOT BE CALCULATED     LDL Cholesterol (mg/dL)   Date Value   12/24/2020 56     AST (U/L)   Date Value   12/24/2020 13     ALT (U/L)   Date Value   12/24/2020 9     BUN (mg/dL)   Date Value   12/24/2020 7      (goal A1C is < 7)   (goal LDL is <100) need 30-50% reduction from baseline     BP Readings from Last 3 Encounters:   04/09/21 128/81   12/24/20 (!) 142/97   10/23/20 (!) 137/97    (goal /80)      All Future Testing planned in [facility]:  Lab Frequency Next Occurrence   Cologuard (For External Results Only) Once 09/25/2021   Hemoglobin A1C Once 04/09/2022       Next Visit Date:  Future Appointments   Date Time Provider Kota Sanz   10/5/2021 11:45 AM SCHEDULE, [facility]   10/12/2021 10:15 AM Barbara Malone MD [facility]

## 2021-06-02 DIAGNOSIS — C50.012 MALIGNANT NEOPLASM OF NIPPLE OF LEFT BREAST IN FEMALE, ESTROGEN RECEPTOR POSITIVE (HCC): ICD-10-CM

## 2021-06-02 DIAGNOSIS — Z17.0 MALIGNANT NEOPLASM OF NIPPLE OF LEFT BREAST IN FEMALE, ESTROGEN RECEPTOR POSITIVE (HCC): ICD-10-CM

## 2021-06-02 RX ORDER — LANCETS 33 GAUGE
EACH MISCELLANEOUS
Qty: 100 EACH | Refills: 3 | Status: SHIPPED | OUTPATIENT
Start: 2021-06-02 | End: 2021-07-01

## 2021-06-02 RX ORDER — CYCLOBENZAPRINE HCL 10 MG
TABLET ORAL
Qty: 20 TABLET | Refills: 1 | Status: SHIPPED | OUTPATIENT
Start: 2021-06-02 | End: 2022-03-21

## 2021-06-02 NOTE — TELEPHONE ENCOUNTER
E-scribe request for cyclobenzaprine. Please review and e-scribe if applicable. Last Visit Date: 4/9/2021  Next Visit Date:  Visit date not found    Hemoglobin A1C (%)   Date Value   12/24/2020 12.6 (H)   10/23/2020 10   03/17/2020 10.9             ( goal A1C is < 7)   Microalb/Crt.  Ratio (mcg/mg creat)   Date Value   04/09/2021 CANNOT BE CALCULATED     LDL Cholesterol (mg/dL)   Date Value   12/24/2020 56       (goal LDL is <100)   AST (U/L)   Date Value   12/24/2020 13     ALT (U/L)   Date Value   12/24/2020 9     BUN (mg/dL)   Date Value   12/24/2020 7     BP Readings from Last 3 Encounters:   04/09/21 128/81   12/24/20 (!) 142/97   10/23/20 (!) 137/97          (goal 120/80)        Patient Active Problem List:     History of breast cancer     Pain, knee     Depression     DJD (degenerative joint disease) of knee     Headache     Iron deficiency anemia     HTN (hypertension)     DM w/o complication type II     Left shoulder pain     Diabetes 1.5, managed as type 2 (HCC)     Headache disorder     Koilonychia     Class 2 severe obesity due to excess calories with serious comorbidity in adult [facility])     Episode of syncope

## 2021-06-02 NOTE — TELEPHONE ENCOUNTER
Please address the medication refill and close the encounter. If I can be of assistance, please route to the applicable pool. Thank you. Last visit: 4-9-21  Last Med refill: 3-  Does patient have enough medication for 72 hours: No:     Next Visit Date:  Future Appointments   Date Time Provider Kota Sanz   10/5/2021 11:45 AM SCHEDULE, [facility]   10/12/2021 10:15 AM Katerine Galeano MD [facility]       Health Maintenance   Topic Date Due    Hepatitis C screen  Never done    Shingles Vaccine (1 of 2) Never done    Colon Cancer Screen FIT/FOBT  11/21/2018    Diabetic retinal exam  12/31/2020    Hepatitis B vaccine (3 of 3 - Risk 3-dose series) 02/23/2021    A1C test (Diabetic or Prediabetic)  03/24/2021    Lipid screen  12/24/2021    Potassium monitoring  12/24/2021    Creatinine monitoring  12/24/2021    Diabetic foot exam  04/09/2022    Diabetic microalbuminuria test  04/09/2022    Cervical cancer screen  06/21/2023    DTaP/Tdap/Td vaccine (3 - Td or Tdap) 10/23/2030    Pneumococcal 0-64 years Vaccine (2 of 2) 04/12/2031    Breast cancer screen  Completed    Flu vaccine  Completed    COVID-19 Vaccine  Completed    HIV screen  Completed    Hepatitis A vaccine  Aged Out    Hib vaccine  Aged Out    Meningococcal (ACWY) vaccine  Aged Out       Hemoglobin A1C (%)   Date Value   12/24/2020 12.6 (H)   10/23/2020 10   03/17/2020 10.9             ( goal A1C is < 7)   Microalb/Crt.  Ratio (mcg/mg creat)   Date Value   04/09/2021 CANNOT BE CALCULATED     LDL Cholesterol (mg/dL)   Date Value   12/24/2020 56   12/24/2020 56       (goal LDL is <100)   AST (U/L)   Date Value   12/24/2020 13     ALT (U/L)   Date Value   12/24/2020 9     BUN (mg/dL)   Date Value   12/24/2020 7     BP Readings from Last 3 Encounters:   04/09/21 128/81   12/24/20 (!) 142/97   10/23/20 (!) 137/97          (goal 120/80)    All Future Testing planned in [facility]  Lab Frequency Next Occurrence   Cologuard (For External Results Only) Once 09/25/2021   Hemoglobin A1C Once 04/09/2022               Patient Active Problem List:     History of breast cancer     Pain, knee     Depression     DJD (degenerative joint disease) of knee     Headache     Iron deficiency anemia     HTN (hypertension)     DM w/o complication type II     Left shoulder pain     Diabetes 1.5, managed as type 2 ([address]     Headache disorder     Koilonychia     Class 2 severe obesity due to excess calories with serious comorbidity in adult [facility])     Episode of syncope

## 2021-06-04 RX ORDER — LANCETS 33 GAUGE
EACH MISCELLANEOUS
Refills: 5 | OUTPATIENT
Start: 2021-06-04

## 2021-06-22 DIAGNOSIS — I10 ESSENTIAL HYPERTENSION: ICD-10-CM

## 2021-06-22 RX ORDER — HYDROCHLOROTHIAZIDE 25 MG/1
TABLET ORAL
Qty: 30 TABLET | Refills: 5 | Status: SHIPPED | OUTPATIENT
Start: 2021-06-22 | End: 2021-12-06

## 2021-07-01 DIAGNOSIS — Z17.0 MALIGNANT NEOPLASM OF NIPPLE OF LEFT BREAST IN FEMALE, ESTROGEN RECEPTOR POSITIVE (HCC): ICD-10-CM

## 2021-07-01 DIAGNOSIS — C50.012 MALIGNANT NEOPLASM OF NIPPLE OF LEFT BREAST IN FEMALE, ESTROGEN RECEPTOR POSITIVE (HCC): ICD-10-CM

## 2021-07-01 RX ORDER — LANCETS 33 GAUGE
EACH MISCELLANEOUS
Qty: 100 EACH | Refills: 5 | Status: SHIPPED | OUTPATIENT
Start: 2021-07-01 | End: 2021-07-09

## 2021-07-01 RX ORDER — BLOOD SUGAR DIAGNOSTIC
STRIP MISCELLANEOUS
Qty: 100 EACH | Refills: 5 | Status: SHIPPED | OUTPATIENT
Start: 2021-07-01 | End: 2022-01-03 | Stop reason: SDUPTHER

## 2021-07-01 RX ORDER — GABAPENTIN 300 MG/1
CAPSULE ORAL
Qty: 90 CAPSULE | Refills: 11 | Status: SHIPPED | OUTPATIENT
Start: 2021-07-01 | End: 2022-07-01

## 2021-07-01 NOTE — TELEPHONE ENCOUNTER
Last visit:   Last Med refill:   Does patient have enough medication for 72 hours: No:     Next Visit Date:  Future Appointments   Date Time Provider Kota Sanz   10/5/2021 11:45 AM SCHEDULE, P [facility]   10/12/2021 10:15 AM Jordon Nina MD [facility]       Health Maintenance   Topic Date Due    Hepatitis C screen  Never done    Shingles Vaccine (1 of 2) Never done    Colon Cancer Screen FIT/FOBT  11/21/2018    Diabetic retinal exam  12/31/2020    Hepatitis B vaccine (3 of 3 - Risk 3-dose series) 02/23/2021    A1C test (Diabetic or Prediabetic)  03/24/2021    Flu vaccine (1) 09/01/2021    Lipid screen  12/24/2021    Potassium monitoring  12/24/2021    Creatinine monitoring  12/24/2021    Diabetic foot exam  04/09/2022    Diabetic microalbuminuria test  04/09/2022    Cervical cancer screen  06/21/2023    DTaP/Tdap/Td vaccine (3 - Td or Tdap) 10/23/2030    Pneumococcal 0-64 years Vaccine (2 of 2 - PPSV23) 04/12/2031    Breast cancer screen  Completed    COVID-19 Vaccine  Completed    HIV screen  Completed    Hepatitis A vaccine  Aged Out    Hib vaccine  Aged Out    Meningococcal (ACWY) vaccine  Aged Out       Hemoglobin A1C (%)   Date Value   12/24/2020 12.6 (H)   10/23/2020 10   03/17/2020 10.9             ( goal A1C is < 7)   Microalb/Crt.  Ratio (mcg/mg creat)   Date Value   04/09/2021 CANNOT BE CALCULATED     LDL Cholesterol (mg/dL)   Date Value   12/24/2020 56   12/24/2020 56       (goal LDL is <100)   AST (U/L)   Date Value   12/24/2020 13     ALT (U/L)   Date Value   12/24/2020 9     BUN (mg/dL)   Date Value   12/24/2020 7     BP Readings from Last 3 Encounters:   04/09/21 128/81   12/24/20 (!) 142/97   10/23/20 (!) 137/97          (goal 120/80)    All Future Testing planned in [facility]  Lab Frequency Next Occurrence   Cologuard (For External Results Only) Once 09/25/2021   Hemoglobin A1C Once 04/09/2022               Patient Active Problem List:     History of breast cancer     Pain, knee     Depression     DJD (degenerative joint disease) of knee     Headache     Iron deficiency anemia     HTN (hypertension)     DM w/o complication type II     Left shoulder pain     Diabetes 1.5, managed as type 2 ([facility] [address]     Headache disorder     Koilonychia     Class 2 severe obesity due to excess calories with serious comorbidity in adult [facility])     Episode of syncope           Please address the medication refill and close the encounter. If I can be of assistance, please route to the applicable pool. Thank you.

## 2021-07-09 RX ORDER — LANCETS 33 GAUGE
EACH MISCELLANEOUS
Qty: 100 EACH | Refills: 5 | Status: SHIPPED | OUTPATIENT
Start: 2021-07-09 | End: 2022-01-03 | Stop reason: SDUPTHER

## 2021-09-08 DIAGNOSIS — K59.00 CONSTIPATION, UNSPECIFIED CONSTIPATION TYPE: ICD-10-CM

## 2021-09-08 DIAGNOSIS — R51.9 HEADACHE DISORDER: ICD-10-CM

## 2021-09-09 RX ORDER — DOCUSATE SODIUM 100 MG/1
CAPSULE, LIQUID FILLED ORAL
Qty: 60 CAPSULE | Refills: 10 | Status: SHIPPED | OUTPATIENT
Start: 2021-09-09 | End: 2022-06-28 | Stop reason: ALTCHOICE

## 2021-09-09 NOTE — TELEPHONE ENCOUNTER
Last visit: 04/09/21  Last Med refill: 04/05/21  Does patient have enough medication for 72 hours: No:      Next Visit Date:  Future Appointments   Date Time Provider Kota Sanz   10/5/2021 11:45 AM SCHEDULE, MHP [facility]   10/12/2021 10:15 AM Amol Gandhi MD [id number] [facility] Maintenance   Topic Date Due    Hepatitis C screen  Never done    Cervical cancer screen  Never done    Shingles Vaccine (1 of 2) Never done    Colon Cancer Screen FIT/FOBT  11/21/2018    Diabetic retinal exam  12/31/2020    Hepatitis B vaccine (3 of 3 - Risk 3-dose series) 02/23/2021    A1C test (Diabetic or Prediabetic)  03/24/2021    Flu vaccine (1) 09/01/2021    Lipid screen  12/24/2021    Potassium monitoring  12/24/2021    Creatinine monitoring  12/24/2021    Diabetic foot exam  04/09/2022    Diabetic microalbuminuria test  04/09/2022    DTaP/Tdap/Td vaccine (3 - Td or Tdap) 10/23/2030    Pneumococcal 0-64 years Vaccine (2 of 2 - PPSV23) 04/12/2031    COVID-19 Vaccine  Completed    HIV screen  Completed    Hepatitis A vaccine  Aged Out    Hib vaccine  Aged Out    Meningococcal (ACWY) vaccine  Aged Out       Hemoglobin A1C (%)   Date Value   12/24/2020 12.6 (H)   10/23/2020 10   03/17/2020 10.9             ( goal A1C is < 7)   Microalb/Crt.  Ratio (mcg/mg creat)   Date Value   04/09/2021 CANNOT BE CALCULATED     LDL Cholesterol (mg/dL)   Date Value   12/24/2020 56   12/24/2020 56       (goal LDL is <100)   AST (U/L)   Date Value   12/24/2020 13     ALT (U/L)   Date Value   12/24/2020 9     BUN (mg/dL)   Date Value   12/24/2020 7     BP Readings from Last 3 Encounters:   04/09/21 128/81   12/24/20 (!) 142/97   10/23/20 (!) 137/97          (goal 120/80)    All Future Testing planned in [facility]  Lab Frequency Next Occurrence   Cologuard (For External Results Only) Once 09/25/2021   Hemoglobin A1C Once 04/09/2022               Patient Active Problem List:     History of breast cancer     Pain, knee     Depression     DJD (degenerative joint disease) of knee     Headache     Iron deficiency anemia     HTN (hypertension)     DM w/o complication type II     Left shoulder pain     Diabetes 1.5, managed as type 2 ([address]     Headache disorder     [place]ia     Class 2 severe obesity due to excess calories with serious comorbidity in adult [facility])     Episode of syncope       Last visit: 04/10/21  Last Med refill: 04/05/21  Does patient have enough medication for 72 hours: No:      Next Visit Date:  Future Appointments   Date Time Provider Kota Sanz   10/5/2021 11:45 AM SCHEDULE, MHP [facility]   10/12/2021 10:15 AM Reinier Akers MD [id number] [facility] [place] Maintenance   Topic Date Due    Hepatitis C screen  Never done    Cervical cancer screen  Never done    Shingles Vaccine (1 of 2) Never done    Colon Cancer Screen FIT/FOBT  11/21/2018    Diabetic retinal exam  12/31/2020    Hepatitis B vaccine (3 of 3 - Risk 3-dose series) 02/23/2021    A1C test (Diabetic or Prediabetic)  03/24/2021    Flu vaccine (1) 09/01/2021    Lipid screen  12/24/2021    Potassium monitoring  12/24/2021    Creatinine monitoring  12/24/2021    Diabetic foot exam  04/09/2022    Diabetic microalbuminuria test  04/09/2022    DTaP/Tdap/Td vaccine (3 - Td or Tdap) 10/23/2030    Pneumococcal 0-64 years Vaccine (2 of 2 - PPSV23) 04/12/2031    COVID-19 Vaccine  Completed    HIV screen  Completed    Hepatitis A vaccine  Aged Out    Hib vaccine  Aged Out    Meningococcal (ACWY) vaccine  Aged Out       Hemoglobin A1C (%)   Date Value   12/24/2020 12.6 (H)   10/23/2020 10   03/17/2020 10.9             ( goal A1C is < 7)   Microalb/Crt.  Ratio (mcg/mg creat)   Date Value   04/09/2021 CANNOT BE CALCULATED     LDL Cholesterol (mg/dL)   Date Value   12/24/2020 56   12/24/2020 56       (goal LDL is <100)   AST (U/L)   Date Value   12/24/2020 13     ALT (U/L)   Date Value 12/24/2020 9     BUN (mg/dL)   Date Value   12/24/2020 7     BP Readings from Last 3 Encounters:   04/09/21 128/81   12/24/20 (!) 142/97   10/23/20 (!) 137/97          (goal 120/80)    All Future Testing planned in [facility]  Lab Frequency Next Occurrence   Cologuard (For External Results Only) Once 09/25/2021   Hemoglobin A1C Once 04/09/2022               Patient Active Problem List:     History of breast cancer     Pain, knee     Depression     DJD (degenerative joint disease) of knee     Headache     Iron deficiency anemia     HTN (hypertension)     DM w/o complication type II     Left shoulder pain     Diabetes 1.5, managed as type 2 (HCC)     Headache disorder     Koil[place]chia     Class 2 severe obesity due to excess calories with serious comorbidity in adult [facility])     Episode of syncope

## 2021-09-09 NOTE — TELEPHONE ENCOUNTER
Last visit: 04/09/21  Last Med refill:  10/08/20  Does patient have enough medication for 72 hours: No:      Next Visit Date:  Future Appointments   Date Time Provider Kota Sanz   10/5/2021 11:45 AM SCHEDULE, MHP [facility]   10/12/2021 10:15 AM Akila Urias MD [id number] [facility] Maintenance   Topic Date Due    Hepatitis C screen  Never done    Cervical cancer screen  Never done    Shingles Vaccine (1 of 2) Never done    Colon Cancer Screen FIT/FOBT  11/21/2018    Diabetic retinal exam  12/31/2020    Hepatitis B vaccine (3 of 3 - Risk 3-dose series) 02/23/2021    A1C test (Diabetic or Prediabetic)  03/24/2021    Flu vaccine (1) 09/01/2021    Lipid screen  12/24/2021    Potassium monitoring  12/24/2021    Creatinine monitoring  12/24/2021    Diabetic foot exam  04/09/2022    Diabetic microalbuminuria test  04/09/2022    DTaP/Tdap/Td vaccine (3 - Td or Tdap) 10/23/2030    Pneumococcal 0-64 years Vaccine (2 of 2 - PPSV23) 04/12/2031    COVID-19 Vaccine  Completed    HIV screen  Completed    Hepatitis A vaccine  Aged Out    Hib vaccine  Aged Out    Meningococcal (ACWY) vaccine  Aged Out       Hemoglobin A1C (%)   Date Value   12/24/2020 12.6 (H)   10/23/2020 10   03/17/2020 10.9             ( goal A1C is < 7)   Microalb/Crt.  Ratio (mcg/mg creat)   Date Value   04/09/2021 CANNOT BE CALCULATED     LDL Cholesterol (mg/dL)   Date Value   12/24/2020 56   12/24/2020 56       (goal LDL is <100)   AST (U/L)   Date Value   12/24/2020 13     ALT (U/L)   Date Value   12/24/2020 9     BUN (mg/dL)   Date Value   12/24/2020 7     BP Readings from Last 3 Encounters:   04/09/21 128/81   12/24/20 (!) 142/97   10/23/20 (!) 137/97          (goal 120/80)    All Future Testing planned in [facility]  Lab Frequency Next Occurrence   Cologuard (For External Results Only) Once 09/25/2021   Hemoglobin A1C Once 04/09/2022               Patient Active Problem List:     History of

## 2021-09-10 RX ORDER — POTASSIUM CHLORIDE 750 MG/1
TABLET, EXTENDED RELEASE ORAL
Qty: 60 TABLET | Refills: 3 | Status: SHIPPED | OUTPATIENT
Start: 2021-09-10 | End: 2022-01-03

## 2021-09-14 ENCOUNTER — TELEPHONE (OUTPATIENT)
Dept: FAMILY MEDICINE CLINIC | Age: 56
End: 2021-09-14

## 2021-09-14 NOTE — TELEPHONE ENCOUNTER
----- Message from Pat Barrera sent at 9/14/2021 11:27 AM EDT -----  Subject: Referral Request    QUESTIONS   Reason for referral request? pt will need x-rays for cancer screening   Has the physician seen you for this condition before? No   Preferred Specialist (if applicable)? Do you already have an appointment scheduled? No  Additional Information for Provider?   ---------------------------------------------------------------------------  --------------  CALL BACK INFO  What is the best way for the office to contact you? OK to leave message on   voicemail  Preferred Call Back Phone Number?  [phone]

## 2021-09-14 NOTE — TELEPHONE ENCOUNTER
Patient has an appointment on 9/20/21 with Dr. Melanie Lyman because she can only come on Mondays or Thursdays.

## 2021-09-23 ENCOUNTER — OFFICE VISIT (OUTPATIENT)
Dept: FAMILY MEDICINE CLINIC | Age: 56
End: 2021-09-23
Payer: MEDICARE

## 2021-09-23 VITALS
HEART RATE: 110 BPM | WEIGHT: 139 LBS | TEMPERATURE: 98.1 F | BODY MASS INDEX: 24.62 KG/M2 | SYSTOLIC BLOOD PRESSURE: 130 MMHG | DIASTOLIC BLOOD PRESSURE: 85 MMHG

## 2021-09-23 DIAGNOSIS — M89.8X9 BONE PAIN: ICD-10-CM

## 2021-09-23 DIAGNOSIS — K59.09 OTHER CONSTIPATION: ICD-10-CM

## 2021-09-23 DIAGNOSIS — E11.00 TYPE 2 DIABETES MELLITUS WITH HYPEROSMOLARITY WITHOUT COMA, WITHOUT LONG-TERM CURRENT USE OF INSULIN (HCC): Primary | ICD-10-CM

## 2021-09-23 DIAGNOSIS — E55.9 VITAMIN D DEFICIENCY: ICD-10-CM

## 2021-09-23 DIAGNOSIS — Z23 IMMUNIZATION DUE: ICD-10-CM

## 2021-09-23 DIAGNOSIS — I10 ESSENTIAL HYPERTENSION: ICD-10-CM

## 2021-09-23 LAB — HBA1C MFR BLD: 11.6 %

## 2021-09-23 PROCEDURE — 99213 OFFICE O/P EST LOW 20 MIN: CPT

## 2021-09-23 PROCEDURE — 83036 HEMOGLOBIN GLYCOSYLATED A1C: CPT

## 2021-09-23 PROCEDURE — 90686 IIV4 VACC NO PRSV 0.5 ML IM: CPT

## 2021-09-23 PROCEDURE — 99211 OFF/OP EST MAY X REQ PHY/QHP: CPT

## 2021-09-23 RX ORDER — POLYETHYLENE GLYCOL 3350 17 G/17G
17 POWDER, FOR SOLUTION ORAL DAILY PRN
Qty: 1530 G | Refills: 1 | Status: SHIPPED | OUTPATIENT
Start: 2021-09-23 | End: 2022-03-01

## 2021-09-23 ASSESSMENT — ENCOUNTER SYMPTOMS
CONSTIPATION: 1
APNEA: 0
STRIDOR: 0

## 2021-09-23 NOTE — PROGRESS NOTES
Subjective:    Krysta Weiss is a 55 y.o. female with  has a past medical history of Breast cancer ([facility] [address] Cancer ([facility] [address] Conjunctivitis, Depression, Disorder of patellofemoral joint, DJD (degenerative joint disease) of knee, Hypertension, Malignant neoplasm of nipple of left breast in female, estrogen receptor positive ([facility] [address] Pain, knee, Primary osteoarthritis of left knee, Sleep apnea, and Type II or unspecified type diabetes mellitus without mention of complication, not stated as uncontrolled. Presented to the office today for:  Chief Complaint   Patient presents with    Diabetes     patient states she is doing ok       HPI    Diabetes mellitus type 2  Last hemoglobin A1c was December 2020 and was 12.6  Today hemoglobin A1c is 11.6  Lantus 35 units twice daily  NovoLog (takes 9 units at dinner time)  Januvia 100 mg p.o. daily  Gabapentin 300 mg 3 times daily    No Polyuria, polyphagia, polydipsia yes  No Gastroparesis but some constipation which is better with stool softners   Neuropathy    Hypertension  Blood pressure 130/85  Reports No Headaches, dizziness, blurry vision  Hydrochlorothiazide 25 mg p.o. daily  Lipitor 20 mg p.o. daily  Aspirin 81 mg p.o. daily    Got flu shot today    12 oct is [facility] appt for yearly breast cancer follow up  complaiing of bilateral arms and leg pain    Review of Systems   Constitutional: Negative for activity change and appetite change. Respiratory: Negative for apnea and stridor. Cardiovascular: Negative for chest pain. Gastrointestinal: Positive for constipation. Musculoskeletal: Positive for arthralgias. Neurological: Negative for dizziness and weakness. Psychiatric/Behavioral: Negative for agitation.          The patient has a   Family History   Problem Relation Age of Onset    Cancer Father        Objective:    /85 (Site: Right Upper Arm, Position: Sitting, Cuff Size: Large Adult)   Pulse 110   Temp 98.1 °F (36.7 °C) (Temporal)   Wt 139 lb (63 kg) LMP 12/08/2015 (Approximate)   BMI 24.62 kg/m²    BP Readings from Last 3 Encounters:   09/23/21 130/85   04/09/21 128/81   12/24/20 (!) 142/97       Physical Exam  Cardiovascular:      Rate and Rhythm: Normal rate and regular rhythm. Pulses: Normal pulses. Heart sounds: No murmur heard. No friction rub. No gallop. Pulmonary:      Effort: Pulmonary effort is normal.   Abdominal:      General: Abdomen is flat. Tenderness: There is no abdominal tenderness. Musculoskeletal:         General: No swelling or tenderness. Normal range of motion. Right lower leg: No edema. Left lower leg: No edema. Skin:     General: Skin is warm. Findings: No erythema. Neurological:      Mental Status: She is oriented to person, place, and time. Sensory: No sensory deficit. Gait: Gait normal.         Lab Results   Component Value Date    WBC 9.4 12/24/2020    HGB 11.4 (L) 12/24/2020    HCT 36.5 12/24/2020     12/24/2020    CHOL 126 12/24/2020    TRIG 68 12/24/2020    HDL 56 12/24/2020    ALT 9 12/24/2020    AST 13 12/24/2020     12/24/2020    K 3.7 12/24/2020    CL 99 12/24/2020    CREATININE 0.69 12/24/2020    BUN 7 12/24/2020    CO2 24 12/24/2020    TSH 3.46 12/24/2020    LABA1C 11.6 09/23/2021    LABMICR CANNOT BE CALCULATED 04/09/2021     Lab Results   Component Value Date    CALCIUM 9.0 12/24/2020    PHOS 3.8 08/12/2012     Lab Results   Component Value Date    LDLCHOLESTEROL 56 12/24/2020       Assessment and Plan:    1. Type 2 diabetes mellitus with hyperosmolarity without coma, without long-term current use of insulin (HCC)  - POCT glycosylated hemoglobin (Hb A1C) 11. 6 today    2. Essential hypertension  Blood pressure 130/85 and stable    3. Bone pain  We will have her follow-up with her heme-onc doctor to rule out mets even though I do not suspect that she has any mets because her breast cancer was a while back    4.  Vitamin D deficiency  Patient is on replacement    5. Other constipation  Rule out gastroparesis  - polyethylene glycol (GLYCOLAX) 17 GM/SCOOP powder; Take 17 g by mouth daily as needed (for constipation)  Dispense: 1530 g; Refill: 1    6. Immunization due  - Zoster recombinant [facility])  -Patient is to come back in 6 weeks to get the second dose          Requested Prescriptions     Signed Prescriptions Disp Refills    polyethylene glycol (GLYCOLAX) 17 GM/SCOOP powder 1530 g 1     Sig: Take 17 g by mouth daily as needed (for constipation)       There are no discontinued medications. Juany Mckenzie received counseling on the following healthy behaviors: nutrition, exercise and medication adherence    Discussed use,benefit, and side effects of prescribed medications. Barriers to medication compliance addressed. All patient questions answered. Pt voiced understanding. Return in about 6 weeks (around 11/4/2021) for Hba1c and 2nd shingrix dose . Disclaimer: Some orall of this note was transcribed using voice-recognition software. This may cause typographical errors occasionally. Although all effort is made to fix these errors, please do not hesitate to contact our office if there Jilda [place] concern with the understanding of this note.

## 2021-09-23 NOTE — PATIENT INSTRUCTIONS
Thank you for letting us take care of you today. We hope all your questions were addressed. If a question was overlooked or something else comes to mind after you return home, please contact a member of your Care Team listed below. Your Care Team at [place] [address] is Team #4  Romulo Hayes MD (Faculty)  Palmira Jarvis MD (Resident)  Tony Valdivia MD (Resident)  Gulshan Kim MD (Resident)  Angie Cifuentes MD (Resident)  DALTON MurphyJOSE LUIS Han, [facility] office)  Emanuelcecil Patricia, [address] (Clinical Practice Manager)  Jose , [address] [facility] (Clinical Pharmacist)       Office phone number: [phone]    If you need to get in right away due to illness, please be advised we have \"Same Day\" appointments available Monday-Friday. Please call us at [phone] option #3 to schedule your \"Same Day\" appointment. Patient Education        Influenza (Flu): Care Instructions  Overview     Influenza (flu) is an infection in the lungs and breathing passages. It is caused by the influenza virus. There are different strains, or types, of the flu virus from year to year. Unlike the common cold, the flu comes on suddenly and the symptoms can be more severe. These symptoms include a cough, congestion, fever, chills, fatigue, aches, and pains. These symptoms may last up to 10 days. Although the flu can make you feel very sick, it usually doesn't cause serious health problems. Home treatment is usually all you need for flu symptoms. But your doctor may prescribe antiviral medicine to prevent other health problems, such as pneumonia, from developing. The risk of other health problems from the flu is highest for young children (under 2), older adults (over 72), pregnant women, and people with long-term health conditions. Follow-up care is a key part of your treatment and safety. Be sure to make and go to all appointments, and call your doctor if you are having problems.  It's also a good idea to know your test results and keep a list of the medicines you take. How can you care for yourself at home? · Get plenty of rest.  · Drink plenty of fluids. If you have to limit fluids because of a health problem, talk with your doctor before you increase the amount of fluids you drink. · Take an over-the-counter pain medicine if needed, such as acetaminophen (Tylenol), ibuprofen (Advil, Motrin), or naproxen (Aleve), to relieve fever, headache, and muscle aches. Be safe with medicines. Read and follow all instructions on the label. · No one younger than 20 should take aspirin. It has been linked to Reye syndrome, a serious illness. · Take any prescribed medicine exactly as directed. · Do not smoke. Smoking can make the flu worse. If you need help quitting, talk to your doctor about stop-smoking programs and medicines. These can increase your chances of quitting for good. · If the skin around your nose and lips becomes sore, put some petroleum jelly (such as Vaseline) on the area. · To ease coughing:  ? Suck on cough drops or plain, hard candy. ? Try an over-the-counter cough or cold medicine. Read and follow all instructions on the label. ? Raise your head at night with an extra pillow. This may help you rest if coughing keeps you awake. To avoid spreading the flu  · Wash your hands regularly, and keep your hands away from your face. · Stay home from school, work, and other public places until you are feeling better and your fever has been gone for at least 24 hours. The fever needs to have gone away on its own without the help of medicine. · Ask people living with you to talk to their doctors about preventing the flu. They may get antiviral medicine to keep from getting the flu from you. · To prevent the flu in the future, get the flu vaccine every fall. Encourage people living with you to get the vaccine. · Cover your mouth when you cough or sneeze.  If you can, cough or sneeze into the

## 2021-09-23 NOTE — PROGRESS NOTES
Diabetic visit information    BP Readings from Last 3 Encounters:   04/09/21 128/81   12/24/20 (!) 142/97   10/23/20 (!) 137/97       Hemoglobin A1C (%)   Date Value   12/24/2020 12.6 (H)   10/23/2020 10   03/17/2020 10.9     Microalb/Crt. Ratio (mcg/mg creat)   Date Value   04/09/2021 CANNOT BE CALCULATED     LDL Cholesterol (mg/dL)   Date Value   12/24/2020 56               Have you changed or started any medications since your last visit including any over-the-counter medicines, vitamins, or herbal medicines? no   Have you stopped taking any of your medications? Is so, why? -  no  Are you having any side effects from any of your medications? - no    Have you seen any other physician or provider since your last visit?  no   Have you had any other diagnostic tests since your last visit?  no   Have you been seen in the emergency room and/or had an admission in a hospital since we last saw you?  no     Have you had your annual diabetic retinal (eye) exam? No   (ensure copy of exam is in the chart)    Have you had your routine dental cleaning in the past 6 months? no    Do you have an active MyChart account? If not, what are your barriers? No:     Patient Care Team:  Amy Hui MD as PCP - General (Family Medicine)  Ady Brown DO as PCP - [facility] Empaneled Provider  Wilfredo Sheffield MD as Consulting Physician (Hematology and Oncology)  Chanell Davenport MD (General Surgery)  Levi Garza DO as Consulting Physician (General Surgery)  Mini Rodriguez MD as Consulting Physician (Pulmonology)    Medical history Review  Past Medical, Family, and Social History reviewed and does not contribute to the patient presenting condition.     Health Maintenance   Topic Date Due    Hepatitis C screen  Never done    Shingles Vaccine (1 of 2) Never done    Colon Cancer Screen FIT/FOBT  11/21/2018    Diabetic retinal exam  12/31/2020    Hepatitis B vaccine (3 of 3 - Risk 3-dose series) 02/23/2021    A1C test (Diabetic or Prediabetic)  03/24/2021    Flu vaccine (1) 09/01/2021    Lipid screen  12/24/2021    Potassium monitoring  12/24/2021    Creatinine monitoring  12/24/2021    Diabetic foot exam  04/09/2022    Diabetic microalbuminuria test  04/09/2022    Cervical cancer screen  06/21/2023    Pneumococcal 0-64 years Vaccine (2 of 2 - PPSV23) 04/12/2031    DTaP/Tdap/Td vaccine (4 - Td or Tdap) 04/23/2031    COVID-19 Vaccine  Completed    HIV screen  Completed    Hepatitis A vaccine  Aged Out    Hib vaccine  Aged Out    Meningococcal (ACWY) vaccine  Aged Out

## 2021-10-05 ENCOUNTER — HOSPITAL ENCOUNTER (OUTPATIENT)
Facility: MEDICAL CENTER | Age: 56
End: 2021-10-05
Payer: MEDICARE

## 2021-10-05 ENCOUNTER — HOSPITAL ENCOUNTER (OUTPATIENT)
Facility: MEDICAL CENTER | Age: 56
Discharge: HOME | End: 2021-10-05
Payer: MEDICARE

## 2021-10-05 DIAGNOSIS — Z17.0 MALIGNANT NEOPLASM OF NIPPLE OF LEFT BREAST IN FEMALE, ESTROGEN RECEPTOR POSITIVE (HCC): ICD-10-CM

## 2021-10-05 DIAGNOSIS — Z17.0 MALIGNANT NEOPLASM OF NIPPLE OF LEFT BREAST IN FEMALE, ESTROGEN RECEPTOR POSITIVE (HCC): Primary | ICD-10-CM

## 2021-10-05 DIAGNOSIS — C50.012 MALIGNANT NEOPLASM OF NIPPLE OF LEFT BREAST IN FEMALE, ESTROGEN RECEPTOR POSITIVE (HCC): Primary | ICD-10-CM

## 2021-10-05 DIAGNOSIS — C50.012 MALIGNANT NEOPLASM OF NIPPLE OF LEFT BREAST IN FEMALE, ESTROGEN RECEPTOR POSITIVE (HCC): ICD-10-CM

## 2021-10-05 LAB
ABSOLUTE EOS #: 0.34 K/UL (ref 0–0.44)
ABSOLUTE IMMATURE GRANULOCYTE: 0 K/UL (ref 0–0.3)
ABSOLUTE LYMPH #: 5.15 K/UL (ref 1.1–3.7)
ABSOLUTE MONO #: 0.56 K/UL (ref 0.1–1.2)
ABSOLUTE RETIC #: 0.07 M/UL (ref 0.03–0.08)
ALBUMIN SERPL-MCNC: 4 G/DL (ref 3.5–5.2)
ALBUMIN/GLOBULIN RATIO: ABNORMAL (ref 1–2.5)
ALP BLD-CCNC: 95 U/L (ref 35–104)
ALT SERPL-CCNC: 11 U/L (ref 5–33)
ANION GAP SERPL CALCULATED.3IONS-SCNC: 13 MMOL/L (ref 9–17)
AST SERPL-CCNC: 17 U/L
BASOPHILS # BLD: 1 % (ref 0–2)
BASOPHILS ABSOLUTE: 0.11 K/UL (ref 0–0.2)
BILIRUB SERPL-MCNC: 0.45 MG/DL (ref 0.3–1.2)
BUN BLDV-MCNC: 16 MG/DL (ref 6–20)
BUN/CREAT BLD: 19 (ref 9–20)
CALCIUM SERPL-MCNC: 9 MG/DL (ref 8.6–10.4)
CHLORIDE BLD-SCNC: 101 MMOL/L (ref 98–107)
CO2: 23 MMOL/L (ref 20–31)
CREAT SERPL-MCNC: 0.85 MG/DL (ref 0.5–0.9)
DIFFERENTIAL TYPE: ABNORMAL
EOSINOPHILS RELATIVE PERCENT: 3 % (ref 1–4)
FERRITIN: 1640 UG/L (ref 13–150)
GFR AFRICAN AMERICAN: >60 ML/MIN
GFR NON-AFRICAN AMERICAN: >60 ML/MIN
GFR SERPL CREATININE-BSD FRML MDRD: ABNORMAL ML/MIN/{1.73_M2}
GFR SERPL CREATININE-BSD FRML MDRD: ABNORMAL ML/MIN/{1.73_M2}
GLUCOSE BLD-MCNC: 277 MG/DL (ref 70–99)
HCT VFR BLD CALC: 36.5 % (ref 36.3–47.1)
HEMOGLOBIN: 11.8 G/DL (ref 11.9–15.1)
IMMATURE GRANULOCYTES: 0 %
IMMATURE RETIC FRACT: 7.2 % (ref 2.7–18.3)
LYMPHOCYTES # BLD: 46 % (ref 24–43)
MCH RBC QN AUTO: 27.4 PG (ref 25.2–33.5)
MCHC RBC AUTO-ENTMCNC: 32.3 G/DL (ref 28.4–34.8)
MCV RBC AUTO: 84.7 FL (ref 82.6–102.9)
MONOCYTES # BLD: 5 % (ref 3–12)
NRBC AUTOMATED: 0 PER 100 WBC
PDW BLD-RTO: 12 % (ref 11.8–14.4)
PLATELET # BLD: 297 K/UL (ref 138–453)
PLATELET ESTIMATE: ABNORMAL
PMV BLD AUTO: 10.1 FL (ref 8.1–13.5)
POTASSIUM SERPL-SCNC: 4 MMOL/L (ref 3.7–5.3)
RBC # BLD: 4.31 M/UL (ref 3.95–5.11)
RBC # BLD: ABNORMAL 10*6/UL
RETIC %: 1.7 % (ref 0.5–1.9)
RETIC HEMOGLOBIN: 29.8 PG (ref 28.2–35.7)
SEG NEUTROPHILS: 45 % (ref 36–65)
SEGMENTED NEUTROPHILS ABSOLUTE COUNT: 5.04 K/UL (ref 1.5–8.1)
SODIUM BLD-SCNC: 137 MMOL/L (ref 135–144)
TOTAL PROTEIN: 7.8 G/DL (ref 6.4–8.3)
WBC # BLD: 11.2 K/UL (ref 3.5–11.3)
WBC # BLD: ABNORMAL 10*3/UL

## 2021-10-05 PROCEDURE — 85045 AUTOMATED RETICULOCYTE COUNT: CPT

## 2021-10-05 PROCEDURE — 85025 COMPLETE CBC W/AUTO DIFF WBC: CPT

## 2021-10-05 PROCEDURE — 82728 ASSAY OF FERRITIN: CPT

## 2021-10-05 PROCEDURE — 80053 COMPREHEN METABOLIC PANEL: CPT

## 2021-10-05 PROCEDURE — 36415 COLL VENOUS BLD VENIPUNCTURE: CPT

## 2021-10-06 LAB
PATHOLOGIST REVIEW: NORMAL
SURGICAL PATHOLOGY REPORT: NORMAL

## 2021-10-08 DIAGNOSIS — Z79.4 TYPE 2 DIABETES MELLITUS WITH HYPEROSMOLARITY WITHOUT COMA, WITH LONG-TERM CURRENT USE OF INSULIN (HCC): ICD-10-CM

## 2021-10-08 DIAGNOSIS — E11.00 TYPE 2 DIABETES MELLITUS WITH HYPEROSMOLARITY WITHOUT COMA, WITH LONG-TERM CURRENT USE OF INSULIN (HCC): ICD-10-CM

## 2021-10-08 RX ORDER — INSULIN GLARGINE 100 [IU]/ML
INJECTION, SOLUTION SUBCUTANEOUS
Qty: 15 ML | Refills: 10 | Status: SHIPPED | OUTPATIENT
Start: 2021-10-08 | End: 2021-11-08

## 2021-10-08 NOTE — TELEPHONE ENCOUNTER
lantus pending for refill     Health Maintenance   Topic Date Due    Hepatitis C screen  Never done    Shingles Vaccine (1 of 2) Never done    Colon Cancer Screen FIT/FOBT  11/21/2018    Diabetic retinal exam  12/31/2020    Hepatitis B vaccine (3 of 3 - Risk 3-dose series) 02/23/2021    A1C test (Diabetic or Prediabetic)  12/23/2021    Lipid screen  12/24/2021    Diabetic foot exam  04/09/2022    Diabetic microalbuminuria test  04/09/2022    Potassium monitoring  10/05/2022    Creatinine monitoring  10/05/2022    Cervical cancer screen  06/21/2023    Pneumococcal 0-64 years Vaccine (2 of 2 - PPSV23) 04/12/2031    DTaP/Tdap/Td vaccine (4 - Td or Tdap) 04/23/2031    Flu vaccine  Completed    COVID-19 Vaccine  Completed    HIV screen  Completed    Hepatitis A vaccine  Aged Out    Hib vaccine  Aged Out    Meningococcal (ACWY) vaccine  Aged Out             (applicable per patient's age: Cancer Screenings, Depression Screening, Fall Risk Screening, Immunizations)    Hemoglobin A1C (%)   Date Value   09/23/2021 11.6   12/24/2020 12.6 (H)   10/23/2020 10     Microalb/Crt.  Ratio (mcg/mg creat)   Date Value   04/09/2021 CANNOT BE CALCULATED     LDL Cholesterol (mg/dL)   Date Value   12/24/2020 56     AST (U/L)   Date Value   10/05/2021 17     ALT (U/L)   Date Value   10/05/2021 11     BUN (mg/dL)   Date Value   10/05/2021 16      (goal A1C is < 7)   (goal LDL is <100) need 30-50% reduction from baseline     BP Readings from Last 3 Encounters:   09/23/21 130/85   04/09/21 128/81   12/24/20 (!) 142/97    (goal /80)      All Future Testing planned in [facility]:  Lab Frequency Next Occurrence   Hemoglobin A1C Once 04/09/2022       Next Visit Date:  Future Appointments   Date Time Provider Kota Sanz   10/12/2021 10:15 AM Daquan Rahman MD [facility]   11/5/2021  1:30 PM Aamir Quevedo MD [address] [facility]            Patient Active Problem List:     History of breast cancer Pain, knee     Depression     DJD (degenerative joint disease) of knee     Headache     Iron deficiency anemia     HTN (hypertension)     DM w/o complication type II     Left shoulder pain     Diabetes 1.5, managed as type 2 (HCC)     Headache disorder     Ko[place]huy     Class 2 severe obesity due to excess calories with serious comorbidity in adult [facility])     Episode of syncope

## 2021-10-12 ENCOUNTER — TELEPHONE (OUTPATIENT)
Dept: ONCOLOGY | Age: 56
End: 2021-10-12

## 2021-10-12 ENCOUNTER — OFFICE VISIT (OUTPATIENT)
Dept: ONCOLOGY | Age: 56
End: 2021-10-12
Payer: MEDICARE

## 2021-10-12 VITALS
SYSTOLIC BLOOD PRESSURE: 138 MMHG | BODY MASS INDEX: 32.56 KG/M2 | TEMPERATURE: 97.9 F | HEART RATE: 108 BPM | WEIGHT: 183.8 LBS | RESPIRATION RATE: 16 BRPM | DIASTOLIC BLOOD PRESSURE: 97 MMHG

## 2021-10-12 DIAGNOSIS — C50.012 MALIGNANT NEOPLASM OF NIPPLE OF LEFT BREAST IN FEMALE, ESTROGEN RECEPTOR POSITIVE (HCC): Primary | ICD-10-CM

## 2021-10-12 DIAGNOSIS — M89.8X9 BONE PAIN: ICD-10-CM

## 2021-10-12 DIAGNOSIS — Z17.0 MALIGNANT NEOPLASM OF NIPPLE OF LEFT BREAST IN FEMALE, ESTROGEN RECEPTOR POSITIVE (HCC): Primary | ICD-10-CM

## 2021-10-12 PROCEDURE — 3017F COLORECTAL CA SCREEN DOC REV: CPT | Performed by: INTERNAL MEDICINE

## 2021-10-12 PROCEDURE — G8427 DOCREV CUR MEDS BY ELIG CLIN: HCPCS | Performed by: INTERNAL MEDICINE

## 2021-10-12 PROCEDURE — 99214 OFFICE O/P EST MOD 30 MIN: CPT | Performed by: INTERNAL MEDICINE

## 2021-10-12 PROCEDURE — G8482 FLU IMMUNIZE ORDER/ADMIN: HCPCS | Performed by: INTERNAL MEDICINE

## 2021-10-12 PROCEDURE — G8417 CALC BMI ABV UP PARAM F/U: HCPCS | Performed by: INTERNAL MEDICINE

## 2021-10-12 PROCEDURE — 99211 OFF/OP EST MAY X REQ PHY/QHP: CPT | Performed by: INTERNAL MEDICINE

## 2021-10-12 PROCEDURE — 1036F TOBACCO NON-USER: CPT | Performed by: INTERNAL MEDICINE

## 2021-10-12 NOTE — TELEPHONE ENCOUNTER
YULY HERE FOR MD VISIT  BONE SCAN SOON  DISCONTINUE IRON  RV 1 YR W/ LABS @ RV  NM BONE SCAN IS ON 10/21/21 @ 12:15PM AT [address] ARRIVAL @ 8:45AM  LABS CDP CMP FERRITIN ORDERS MAILED TO PT   MD VISIT 10/11/22 @ 11AM  AVS PRINTED W/ INSTRUCTIONS AND GIVEN TO PT ON EXIT

## 2021-10-13 NOTE — PROGRESS NOTES
_      Chief Complaint   Patient presents with    Follow-up    Mouth Lesions     would like ATB for this     Discuss Labs         DIAGNOSIS:   1. Relapse of left breast cancer with original diagnosis of a stage T2N1M0, left breast invasive ductal carcinoma with staging after neoadjuvant chemotherapy, ER/UT positive, HER2-jacinta negative, originally diagnosed in the spring of 2006 with recent recurrence of left breast cancer and a small tumor [id number], left breast invasive ductal carcinoma, ER/UT positive, and HER2-jacinta negative. 2. Iron Deficiency Anemia. CURRENT THERAPY:    1. Status post bilateral mastectomy done on 10/10/2012 with no evidence of residual malignancy. 2. Status post 5 years of tamoxifen  between 2006 and 2011.    3. Status post neoadjuvant chemotherapy with Adriamycin and Cytoxan for 4 cycles followed by weekly Taxol for 8 weeks. Chemotherapy was given between June 2006 and October 2006. 4. Status post lumpectomy and lymph node dissection November 2006.  5. Radiation therapy following the lumpectomy. 6. Iron replacement. INTERIM HISTORY:    The patient is here for follow up for her history of left sided invasive ductal carcinoma with recurrence. She had a bilateral mastectomy in 2012. She denies fever, chills, unintentional weight loss, or new masses. She had bariatric surgery in April 2015  C/o pain in body aches. No recent problems related to anemia. No weakness or fatigue. No active bleeding. Patient was on oral iron. She has high ferritin. REVIEW OF SYSTEMS:   General: She has weakness and fatigue. No weight loss or decreased appetite. No fever or chills. Eyes: No blurred vision, eye pain or double vision. Ears: No hearing problems or drainage. No tinnitus. Throat: No sore throat, problems with swallowing or dysphagia. Respiratory: No cough, sputum or hemoptysis. No shortness of breath. Cardiovascular: No chest pain, orthopnea or PND. No lower extremity edema.  No palpitation. Gastrointestinal: No problems with swallowing. No abdominal pain or bloating. No nausea or vomiting. No diarrhea or constipation. No GI bleeding. Genitourinary: No dysuria, hematuria, frequency or urgency. Musculoskeletal: + muscle aches or pains. No limitation of movement. No back pain. Dermatologic: No skin rashes or pruritus. No skin lesions or discolorations. Psychiatric: No depression, anxiety, or stress or signs of schizophrenia. Hematologic: No history of bleeding tendency. No bruises or ecchymosis. No history of clotting problems. Infectious disease: No fever, chills or frequent infections. Endocrine: No problems with opacity. No polydipsia or polyuria. Neurologic: No headaches or dizziness. No weakness or numbness of the extremities. SOCIAL HISTORY:  No smoking and no alcohol drinking. PHYSICAL EXAM:  The patient is not in acute distress. Vital signs:  Blood pressure (!) 138/97, pulse 108, temperature 97.9 °F (36.6 °C), temperature source Temporal, resp. rate 16, weight 183 lb 12.8 oz (83.4 kg), last menstrual period 12/08/2015, not currently breastfeeding. HEENT:  Eyes are normal. Ears, nose and throat are normal.  Neck: Supple. No lymph node enlargement. No thyroid enlargement. Trachea is centrally located. Breasts:  Status post bilateral mastectomy, no masses are felt, no axillary lymph node enlargement. Chest:  Clear to auscultation. No wheezes or crepitations. Heart: Regular sinus rhythm. Abdomen: Soft, nontender obese. No hepatosplenomegaly. No masses. Extremities:  With no edema. Lymph Nodes:  No cervical, axillary or inguinal lymph node enlargement. Neurologic:  Conscious and oriented. No focal neurological deficits. Psychosocial: No depression, anxiety or stress. Skin: No rashes, bruises or ecchymoses.     REVIEW OF DIAGNOSTIC DATA:     Lab Results   Component Value Date    WBC 11.2 10/05/2021    HGB 11.8 (L) 10/05/2021    HCT 36.5 10/05/2021    MCV 84.7 10/05/2021     10/05/2021     Lab Results   Component Value Date    IRON 76 10/10/2017    TIBC 210 (L) 10/10/2017    FERRITIN 1,640 (H) 10/05/2021       ASSESSMENT:     1. Recurrence of breast cancer as stated above status post mastectomy. Currently in remission  2. Iron deficiency anemia secondary to heavy menstrual periods and possibly malabsorption. Stable. 3. S/p Bariatric surgery April 2015  4. Chest pain s/p mastectomy controlled with [place] and Neurontin  5. Uncontrolled DM on insulin   6. Bone aches. Will do bone scan    PLAN:      The patient is doing well in regard to her breast cancer with no evidence of recurrence. We will continue observation. With bone aches, we will do bone scan  Hb is stable. No recent drop. No signs of bleeding. Continue observation. High ferritin. Discontinued iron. Patient has significant problem with hypoglycemia and hypokalemia. Undergoing management by her PCP. Explained importance of compliance with medications and glucose control. Patient will have follow-up with her PCP. Continues potassium replacement. Patient will be seen in 1 year. Sooner for any problems. Patient's questions were answered to the best of her satisfaction and she verbalized full understanding and agreement. [address] [facility]                            This note is created with the assistance of a speech recognition program.  While intending to generate a document that actually reflects the content of the visit, the document can still have some errors including those of syntax and sound a like substitutions which may escape proof reading. It such instances, actual meaning can be extrapolated by contextual diversion.

## 2021-10-21 ENCOUNTER — HOSPITAL ENCOUNTER (OUTPATIENT)
Dept: NUCLEAR MEDICINE | Age: 56
Discharge: HOME | End: 2021-10-23
Payer: MEDICARE

## 2021-10-21 DIAGNOSIS — M89.8X9 BONE PAIN: ICD-10-CM

## 2021-10-21 DIAGNOSIS — Z17.0 MALIGNANT NEOPLASM OF NIPPLE OF LEFT BREAST IN FEMALE, ESTROGEN RECEPTOR POSITIVE (HCC): ICD-10-CM

## 2021-10-21 DIAGNOSIS — C50.012 MALIGNANT NEOPLASM OF NIPPLE OF LEFT BREAST IN FEMALE, ESTROGEN RECEPTOR POSITIVE (HCC): ICD-10-CM

## 2021-10-21 PROCEDURE — 3430000000 HC RX DIAGNOSTIC RADIOPHARMACEUTICAL: Performed by: INTERNAL MEDICINE

## 2021-10-21 PROCEDURE — A9503 TC99M MEDRONATE: HCPCS | Performed by: INTERNAL MEDICINE

## 2021-10-21 PROCEDURE — 78306 BONE IMAGING WHOLE BODY: CPT

## 2021-10-21 RX ORDER — TC 99M MEDRONATE 20 MG/10ML
23.6 INJECTION, POWDER, LYOPHILIZED, FOR SOLUTION INTRAVENOUS
Status: COMPLETED | OUTPATIENT
Start: 2021-10-21 | End: 2021-10-21

## 2021-10-21 RX ADMIN — TC 99M MEDRONATE 23.6 MILLICURIE: 20 INJECTION, POWDER, LYOPHILIZED, FOR SOLUTION INTRAVENOUS at 08:55

## 2021-10-22 ENCOUNTER — TELEPHONE (OUTPATIENT)
Dept: ONCOLOGY | Age: 56
End: 2021-10-22

## 2021-10-22 NOTE — TELEPHONE ENCOUNTER
RECEIVED VM FROM YULY. CONFIRMED BONE SCAN COMPLETED 10/20/21  CALLING FOR RESULTS. CALL BACK # [phone]    PLAN:       The patient is doing well in regard to her breast cancer with no evidence of recurrence. We will continue observation. With bone aches, we will do bone scanER MD NOTE:      Impression   Increasing activity at the mid lumbar spine, typically degenerative. Metastatic disease cannot be entirely excluded though is less favored given   lack of widespread findings elsewhere.  If patient is focally symptomatic, MR   could be considered.       Multifocal degenerative changes are otherwise favored as above.         PLEASE ADVISE.

## 2021-11-04 RX ORDER — ASPIRIN 81 MG/1
TABLET ORAL
Qty: 60 TABLET | Refills: 2 | Status: SHIPPED | OUTPATIENT
Start: 2021-11-04 | End: 2022-05-06

## 2021-11-04 NOTE — TELEPHONE ENCOUNTER
Please address the medication refill and close the encounter. If I can be of assistance, please route to the applicable pool. Thank you. Last visit: 9-23-21  Last Med refill: 12/18/2020  Does patient have enough medication for 72 hours: No:     Next Visit Date:  Future Appointments   Date Time Provider Kota Sanz   11/8/2021 10:30 AM [facility]   10/11/2022 11:00 AM Rowena Guallpa MD [address] [facility] Time Warden Maintenance   Topic Date Due    Hepatitis C screen  Never done    Shingles Vaccine (1 of 2) Never done    Colon Cancer Screen FIT/FOBT  11/21/2018    Diabetic retinal exam  12/31/2020    Hepatitis B vaccine (3 of 3 - Risk 3-dose series) 02/23/2021    COVID-19 Vaccine (3 - Pfizer booster) 08/10/2021    A1C test (Diabetic or Prediabetic)  12/23/2021    Lipid screen  12/24/2021    Diabetic foot exam  04/09/2022    Diabetic microalbuminuria test  04/09/2022    Potassium monitoring  10/05/2022    Creatinine monitoring  10/05/2022    Cervical cancer screen  06/21/2023    Pneumococcal 0-64 years Vaccine (2 of 2 - PPSV23) 04/12/2031    DTaP/Tdap/Td vaccine (4 - Td or Tdap) 04/23/2031    Flu vaccine  Completed    HIV screen  Completed    Hepatitis A vaccine  Aged Out    Hib vaccine  Aged Out    Meningococcal (ACWY) vaccine  Aged Out       Hemoglobin A1C (%)   Date Value   09/23/2021 11.6   12/24/2020 12.6 (H)   10/23/2020 10             ( goal A1C is < 7)   Microalb/Crt.  Ratio (mcg/mg creat)   Date Value   04/09/2021 CANNOT BE CALCULATED     LDL Cholesterol (mg/dL)   Date Value   12/24/2020 56   12/24/2020 56       (goal LDL is <100)   AST (U/L)   Date Value   10/05/2021 17     ALT (U/L)   Date Value   10/05/2021 11     BUN (mg/dL)   Date Value   10/05/2021 16     BP Readings from Last 3 Encounters:   10/12/21 (!) 138/97   09/23/21 130/85   04/09/21 128/81          (goal 120/80)    All Future Testing planned in [facility]  Lab Frequency Next Occurrence   Hemoglobin A1C Once 04/09/2022   CBC With Auto Differential Once 10/12/2022   Ferritin Once 10/12/2022   Comprehensive Metabolic Panel Once 00/95/7771               Patient Active Problem List:     History of breast cancer     Pain, knee     Depression     DJD (degenerative joint disease) of knee     Headache     Iron deficiency anemia     HTN (hypertension)     DM w/o complication type II     Left shoulder pain     Diabetes 1.5, managed as type 2 ([address]     Headache disorder     Koilonychia     Class 2 severe obesity due to excess calories with serious comorbidity in adult [facility])     Episode of syncope

## 2021-11-08 ENCOUNTER — OFFICE VISIT (OUTPATIENT)
Dept: FAMILY MEDICINE CLINIC | Age: 56
End: 2021-11-08
Payer: MEDICARE

## 2021-11-08 VITALS
BODY MASS INDEX: 31.18 KG/M2 | HEART RATE: 110 BPM | SYSTOLIC BLOOD PRESSURE: 138 MMHG | DIASTOLIC BLOOD PRESSURE: 94 MMHG | WEIGHT: 176 LBS | TEMPERATURE: 97.3 F

## 2021-11-08 DIAGNOSIS — Z79.4 TYPE 2 DIABETES MELLITUS WITH HYPEROSMOLARITY WITHOUT COMA, WITH LONG-TERM CURRENT USE OF INSULIN (HCC): ICD-10-CM

## 2021-11-08 DIAGNOSIS — Z12.11 COLON CANCER SCREENING: ICD-10-CM

## 2021-11-08 DIAGNOSIS — E11.00 TYPE 2 DIABETES MELLITUS WITH HYPEROSMOLARITY WITHOUT COMA, WITHOUT LONG-TERM CURRENT USE OF INSULIN (HCC): Primary | ICD-10-CM

## 2021-11-08 DIAGNOSIS — E11.00 TYPE 2 DIABETES MELLITUS WITH HYPEROSMOLARITY WITHOUT COMA, WITH LONG-TERM CURRENT USE OF INSULIN (HCC): ICD-10-CM

## 2021-11-08 LAB — HBA1C MFR BLD: 12.6 %

## 2021-11-08 PROCEDURE — 3046F HEMOGLOBIN A1C LEVEL >9.0%: CPT

## 2021-11-08 PROCEDURE — 99214 OFFICE O/P EST MOD 30 MIN: CPT

## 2021-11-08 PROCEDURE — 2022F DILAT RTA XM EVC RTNOPTHY: CPT

## 2021-11-08 PROCEDURE — G8427 DOCREV CUR MEDS BY ELIG CLIN: HCPCS

## 2021-11-08 PROCEDURE — 99211 OFF/OP EST MAY X REQ PHY/QHP: CPT

## 2021-11-08 PROCEDURE — 83036 HEMOGLOBIN GLYCOSYLATED A1C: CPT

## 2021-11-08 PROCEDURE — G8482 FLU IMMUNIZE ORDER/ADMIN: HCPCS

## 2021-11-08 PROCEDURE — 1036F TOBACCO NON-USER: CPT

## 2021-11-08 PROCEDURE — G8417 CALC BMI ABV UP PARAM F/U: HCPCS

## 2021-11-08 PROCEDURE — 3017F COLORECTAL CA SCREEN DOC REV: CPT

## 2021-11-08 RX ORDER — INSULIN GLARGINE 100 [IU]/ML
40 INJECTION, SOLUTION SUBCUTANEOUS 2 TIMES DAILY
Qty: 15 ML | Refills: 10 | Status: SHIPPED | OUTPATIENT
Start: 2021-11-08

## 2021-11-08 ASSESSMENT — ENCOUNTER SYMPTOMS
ABDOMINAL DISTENTION: 0
DIARRHEA: 0
ABDOMINAL PAIN: 0
VOMITING: 0
SORE THROAT: 0
COUGH: 0
CONSTIPATION: 0
SHORTNESS OF BREATH: 0
NAUSEA: 0

## 2021-11-08 NOTE — PROGRESS NOTES
Jamila Bhatti (:  1965) is a 55 y.o. female,Established patient, here for evaluation of the following chief complaint(s):  1 Month Follow-Up (patient states her blood sugar is doing ok)         ASSESSMENT/PLAN:  1. Type 2 diabetes mellitus with hyperosmolarity without coma, without long-term current use of insulin (HCC)  -     POCT glycosylated hemoglobin (Hb A1C) is 12.6  -Increase Lantus to 40 units twice daily  2. Colon cancer screening  -     [facility] (For [facility] Results Only[facility] Future      No follow-ups on file. Subjective   SUBJECTIVE/OBJECTIVE:  55years old female patient with past medical history of diabetes and hypertension came for diabetes follow-up. Patient is using Lantus, NovoLog and Januvia. Patient said that she is taking her medication regularly without side effects. She said that she skips  novolog dose at lunch as she is at work at that time. Patient reported polydipsia and polyuria. Patient said that she lost some weight intentionally . denies any weakness, numbness, chest pain, shortness of breath, nausea, vomiting, bowel or urinary changes. Patient A1c today is 12.6  Patient heart rate today is 110  Patient was counseled about colon cancer screening      Review of Systems   Constitutional: Negative for fatigue and fever. HENT: Negative for congestion and sore throat. Respiratory: Negative for cough and shortness of breath. Gastrointestinal: Negative for abdominal distention, abdominal pain, constipation, diarrhea, nausea and vomiting. Endocrine: Positive for polydipsia and polyuria. Genitourinary: Negative for dysuria and flank pain. Musculoskeletal: Negative. Neurological: Negative for dizziness, weakness and numbness. Psychiatric/Behavioral: Negative for agitation and confusion. Objective   Physical Exam  Constitutional:       General: She is not in acute distress. Appearance: Normal appearance.    HENT:      Head: Normocephalic and

## 2021-11-08 NOTE — PROGRESS NOTES
Attending Physician Statement  I  have discussed the care of Otkathieia Flatten including pertinent history and exam findings with the resident. I agree with the assessment, plan and orders as documented by the resident. BP (!) 138/94 (Site: Left Upper Arm, Position: Sitting, Cuff Size: Large Adult)   Pulse 110   Temp 97.3 °F (36.3 °C) (Temporal)   Wt 176 lb (79.8 kg)   LMP 12/08/2015 (Approximate)   BMI 31.18 kg/m²    BP Readings from Last 3 Encounters:   11/08/21 (!) 138/94   10/12/21 (!) 138/97   09/23/21 130/85     Wt Readings from Last 3 Encounters:   11/08/21 176 lb (79.8 kg)   10/12/21 183 lb 12.8 oz (83.4 kg)   09/23/21 139 lb (63 kg)          Diagnosis Orders   1. Type 2 diabetes mellitus with hyperosmolarity without coma, without long-term current use of insulin (HCC)  POCT glycosylated hemoglobin (Hb A1C)   2. Colon cancer screening  Cologuard (For External Results Only)       See orders. RTO as noted, discussed care plan with patient and Resident Physician. Questions answered. Call results - if indicated to patient.     Murtaza Savage DO 11/8/2021 11:13 AM

## 2021-11-17 RX ORDER — PEN NEEDLE, DIABETIC 31 GX5/16"
1 NEEDLE, DISPOSABLE MISCELLANEOUS DAILY
Qty: 100 EACH | Refills: 3 | Status: SHIPPED | OUTPATIENT
Start: 2021-11-17 | End: 2022-10-11 | Stop reason: ALTCHOICE

## 2021-11-17 NOTE — TELEPHONE ENCOUNTER
Jeffery Request for pending medication. Last Visit Date: 11/8/21  Next Visit Date:  Future Appointments   Date Time Provider Kota Sanz   11/22/2021  2:00 PM [facility]   10/11/2022 11:00 AM Sarabjit Hayes [place] [address] [facility] Maintenance   Topic Date Due    Hepatitis C screen  Never done    Shingles Vaccine (1 of 2) Never done    Colon Cancer Screen FIT/FOBT  11/21/2018    Diabetic retinal exam  12/31/2020    Hepatitis B vaccine (3 of 3 - Risk 3-dose series) 02/23/2021    COVID-19 Vaccine (3 - Booster for Pfizer series) 08/10/2021    Lipid screen  12/24/2021    A1C test (Diabetic or Prediabetic)  02/08/2022    Diabetic foot exam  04/09/2022    Diabetic microalbuminuria test  04/09/2022    Potassium monitoring  10/05/2022    Creatinine monitoring  10/05/2022    Cervical cancer screen  06/21/2023    Pneumococcal 0-64 years Vaccine (2 of 2 - PPSV23) 04/12/2031    DTaP/Tdap/Td vaccine (4 - Td or Tdap) 04/23/2031    Flu vaccine  Completed    HIV screen  Completed    Hepatitis A vaccine  Aged Out    Hib vaccine  Aged Out    Meningococcal (ACWY) vaccine  Aged Out       Hemoglobin A1C (%)   Date Value   11/08/2021 12.6   09/23/2021 11.6   12/24/2020 12.6 (H)             ( goal A1C is < 7)   Microalb/Crt.  Ratio (mcg/mg creat)   Date Value   04/09/2021 CANNOT BE CALCULATED     LDL Cholesterol (mg/dL)   Date Value   12/24/2020 56       (goal LDL is <100)   AST (U/L)   Date Value   10/05/2021 17     ALT (U/L)   Date Value   10/05/2021 11     BUN (mg/dL)   Date Value   10/05/2021 16     BP Readings from Last 3 Encounters:   11/08/21 (!) 138/94   10/12/21 (!) 138/97   09/23/21 130/85          (goal 120/80)    All Future Testing planned in [facility]  Lab Frequency Next Occurrence   Hemoglobin A1C Once 04/09/2022   CBC With Auto Differential Once 10/12/2022   Ferritin Once 10/12/2022   Comprehensive Metabolic Panel Once 14/22/5417   [facility]olog[facility] (For External Results Only) Once 11/08/2022       Next Visit Date:  Future Appointments   Date Time Provider Kota Sanz   11/22/2021  2:00 PM Nahid Silva, [address] [facility]   10/11/2022 11:00 AM Maribel Buchanan MD [facility]         Patient Active Problem List:     History of breast cancer     Pain, knee     Depression     DJD (degenerative joint disease) of knee     Headache     Iron deficiency anemia     HTN (hypertension)     DM w/o complication type II     Left shoulder pain     Diabetes 1.5, managed as type 2 (HCC)     Headache disorder     Koilonychia     Class 2 severe obesity due to excess calories with serious comorbidity in adult [facility])     Episode of syncope

## 2021-11-26 NOTE — TELEPHONE ENCOUNTER
E-scribe request for insulin pen needles. Please review and e-scribe if applicable. Last Visit Date:  11/08/2021  Next Visit Date:  Visit date not found    Hemoglobin A1C (%)   Date Value   11/08/2021 12.6   09/23/2021 11.6   12/24/2020 12.6 (H)             ( goal A1C is < 7)   Microalb/Crt.  Ratio (mcg/mg creat)   Date Value   04/09/2021 CANNOT BE CALCULATED     LDL Cholesterol (mg/dL)   Date Value   12/24/2020 56       (goal LDL is <100)   AST (U/L)   Date Value   10/05/2021 17     ALT (U/L)   Date Value   10/05/2021 11     BUN (mg/dL)   Date Value   10/05/2021 16     BP Readings from Last 3 Encounters:   11/08/21 (!) 138/94   10/12/21 (!) 138/97   09/23/21 130/85          (goal 120/80)        Patient Active Problem List:     History of breast cancer     Pain, knee     Depression     DJD (degenerative joint disease) of knee     Headache     Iron deficiency anemia     HTN (hypertension)     DM w/o complication type II     Left shoulder pain     Diabetes 1.5, managed as type 2 ([address]     Headache disorder     Koilonychia     Class 2 severe obesity due to excess calories with serious comorbidity in adult [facility])     Episode of syncope      ----Jp Babcock

## 2021-11-27 RX ORDER — PEN NEEDLE, DIABETIC 31 GX5/16"
1 NEEDLE, DISPOSABLE MISCELLANEOUS DAILY
Qty: 100 EACH | Refills: 3 | OUTPATIENT
Start: 2021-11-27

## 2021-12-03 DIAGNOSIS — I10 ESSENTIAL HYPERTENSION: ICD-10-CM

## 2021-12-06 RX ORDER — HYDROCHLOROTHIAZIDE 25 MG/1
TABLET ORAL
Qty: 30 TABLET | Refills: 5 | Status: SHIPPED | OUTPATIENT
Start: 2021-12-06 | End: 2022-06-02

## 2021-12-06 NOTE — TELEPHONE ENCOUNTER
Hydrochlorothiazide pending for refill     Health Maintenance   Topic Date Due    Hepatitis C screen  Never done    Shingles Vaccine (1 of 2) Never done    Colon Cancer Screen FIT/FOBT  11/21/2018    Diabetic retinal exam  12/31/2020    Hepatitis B vaccine (3 of 3 - Risk 3-dose series) 02/23/2021    COVID-19 Vaccine (3 - Booster for Pfizer series) 08/10/2021    Lipid screen  12/24/2021    A1C test (Diabetic or Prediabetic)  02/08/2022    Diabetic foot exam  04/09/2022    Diabetic microalbuminuria test  04/09/2022    Potassium monitoring  10/05/2022    Creatinine monitoring  10/05/2022    Cervical cancer screen  06/21/2023    Pneumococcal 0-64 years Vaccine (2 of 2 - PPSV23) 04/12/2031    DTaP/Tdap/Td vaccine (4 - Td or Tdap) 04/23/2031    Flu vaccine  Completed    HIV screen  Completed    Hepatitis A vaccine  Aged Out    Hib vaccine  Aged Out    Meningococcal (ACWY) vaccine  Aged Out             (applicable per patient's age: Cancer Screenings, Depression Screening, Fall Risk Screening, Immunizations)    Hemoglobin A1C (%)   Date Value   11/08/2021 12.6   09/23/2021 11.6   12/24/2020 12.6 (H)     Microalb/Crt.  Ratio (mcg/mg creat)   Date Value   04/09/2021 CANNOT BE CALCULATED     LDL Cholesterol (mg/dL)   Date Value   12/24/2020 56     AST (U/L)   Date Value   10/05/2021 17     ALT (U/L)   Date Value   10/05/2021 11     BUN (mg/dL)   Date Value   10/05/2021 16      (goal A1C is < 7)   (goal LDL is <100) need 30-50% reduction from baseline     BP Readings from Last 3 Encounters:   11/08/21 (!) 138/94   10/12/21 (!) 138/97   09/23/21 130/85    (goal /80)      All Future Testing planned in [facility]:  Lab Frequency Next Occurrence   Hemoglobin A1C Once 04/09/2022   CBC With Auto Differential Once 10/12/2022   Ferritin Once 10/12/2022   Comprehensive Metabolic Panel Once 95/05/1335   Cologuard (For External Results Only) Once 11/08/2022       Next Visit Date:  Future Appointments   Date Time Provider Kota Sanz   10/11/2022 11:00 AM Patricio Haney MD [facility]            Patient Active Problem List:     History of breast cancer     Pain, knee     Depression     DJD (degenerative joint disease) of knee     Headache     Iron deficiency anemia     HTN (hypertension)     DM w/o complication type II     Left shoulder pain     Diabetes 1.5, managed as type 2 ([address]     Headache disorder     Koilonychia     Class 2 severe obesity due to excess calories with serious comorbidity in adult [facility])     Episode of syncope

## 2021-12-30 ENCOUNTER — TELEPHONE (OUTPATIENT)
Dept: FAMILY MEDICINE CLINIC | Age: 56
End: 2021-12-30

## 2021-12-30 DIAGNOSIS — R51.9 HEADACHE DISORDER: ICD-10-CM

## 2021-12-30 NOTE — TELEPHONE ENCOUNTER
Patient called in stating she has not taken her insulin in over a month the prior auth for her insulin needles is being denied she is requesting different needles so she can take her insulin.     Please advise

## 2021-12-31 DIAGNOSIS — E13.9 DIABETES 1.5, MANAGED AS TYPE 2 (HCC): Primary | ICD-10-CM

## 2022-01-02 NOTE — TELEPHONE ENCOUNTER
ELECTRONIC REQUEST FROM PHARMACY TO REFILL POTASSIUM CHLORIDE TAB  LAST REFILL SEPT 2021   SENT TO MD

## 2022-01-03 RX ORDER — LANCETS 33 GAUGE
EACH MISCELLANEOUS
Qty: 100 EACH | Refills: 5 | Status: SHIPPED | OUTPATIENT
Start: 2022-01-03 | End: 2022-05-05

## 2022-01-03 RX ORDER — BLOOD SUGAR DIAGNOSTIC
STRIP MISCELLANEOUS
Qty: 100 EACH | Refills: 5 | Status: SHIPPED | OUTPATIENT
Start: 2022-01-03

## 2022-01-03 RX ORDER — POTASSIUM CHLORIDE 750 MG/1
TABLET, EXTENDED RELEASE ORAL
Qty: 60 TABLET | Refills: 3 | Status: SHIPPED | OUTPATIENT
Start: 2022-01-03 | End: 2022-05-09

## 2022-01-03 NOTE — TELEPHONE ENCOUNTER
E-scribe request for med refills. Please review and e-scribe if applicable. Last Visit Date:  12/30/2021  Next Visit Date:  12/30/2021    Hemoglobin A1C (%)   Date Value   11/08/2021 12.6   09/23/2021 11.6   12/24/2020 12.6 (H)             ( goal A1C is < 7)   Microalb/Crt.  Ratio (mcg/mg creat)   Date Value   04/09/2021 CANNOT BE CALCULATED     LDL Cholesterol (mg/dL)   Date Value   12/24/2020 56       (goal LDL is <100)   AST (U/L)   Date Value   10/05/2021 17     ALT (U/L)   Date Value   10/05/2021 11     BUN (mg/dL)   Date Value   10/05/2021 16     BP Readings from Last 3 Encounters:   11/08/21 (!) 138/94   10/12/21 (!) 138/97   09/23/21 130/85          (goal 120/80)        Patient Active Problem List:     History of breast cancer     Pain, knee     Depression     DJD (degenerative joint disease) of knee     Headache     Iron deficiency anemia     HTN (hypertension)     DM w/o complication type II     Left shoulder pain     Diabetes 1.5, managed as type 2 ([facility] [address]     Headache disorder     Koilonychia     Class 2 severe obesity due to excess calories with serious comorbidity in adult [facility])     Episode of syncope      ----Autumn Patel

## 2022-01-07 ENCOUNTER — HOSPITAL ENCOUNTER (OUTPATIENT)
Age: 57
Discharge: HOME | End: 2022-01-09

## 2022-01-07 ENCOUNTER — HOSPITAL ENCOUNTER (OUTPATIENT)
Dept: GENERAL RADIOLOGY | Age: 57
Discharge: HOME | End: 2022-01-09

## 2022-01-07 ENCOUNTER — OFFICE VISIT (OUTPATIENT)
Dept: PRIMARY CARE CLINIC | Age: 57
End: 2022-01-07
Payer: MEDICARE

## 2022-01-07 VITALS — HEART RATE: 117 BPM | OXYGEN SATURATION: 99 % | DIASTOLIC BLOOD PRESSURE: 98 MMHG | SYSTOLIC BLOOD PRESSURE: 157 MMHG

## 2022-01-07 DIAGNOSIS — R07.89 CHEST WALL PAIN: ICD-10-CM
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
## 2022-01-07 PROCEDURE — G8428 CUR MEDS NOT DOCUMENT: HCPCS | Performed by: INTERNAL MEDICINE

## 2022-01-07 PROCEDURE — 3017F COLORECTAL CA SCREEN DOC REV: CPT | Performed by: INTERNAL MEDICINE

## 2022-01-07 PROCEDURE — 71100 X-RAY EXAM RIBS UNI 2 VIEWS: CPT

## 2022-01-07 PROCEDURE — 1036F TOBACCO NON-USER: CPT | Performed by: INTERNAL MEDICINE

## 2022-01-07 PROCEDURE — G8417 CALC BMI ABV UP PARAM F/U: HCPCS | Performed by: INTERNAL MEDICINE

## 2022-01-07 PROCEDURE — G8482 FLU IMMUNIZE ORDER/ADMIN: HCPCS | Performed by: INTERNAL MEDICINE

## 2022-01-07 PROCEDURE — 99202 OFFICE O/P NEW SF 15 MIN: CPT | Performed by: INTERNAL MEDICINE

## 2022-01-07 NOTE — PROGRESS NOTES
each 3    Insulin Pen Needle 31G X 6 MM MISC 1 each by Does not apply route daily 100 each 3    hydroCHLOROthiazide (HYDRODIURIL) 25 MG tablet TAKE 1 TABLET BY MOUTH DAILY 30 tablet 5    Insulin Pen Needle (KROGER PEN NEEDLES 31G) 31G X 8 MM MISC 1 each by Does not apply route daily 100 each 3    insulin glargine (LANTUS SOLOSTAR) 100 UNIT/ML injection pen Inject 40 Units into the skin 2 times daily 15 mL 10    aspirin (ASPIRIN LOW DOSE) 81 MG EC tablet TAKE 1 TABLET BY MOUTH DAILY 60 tablet 2    docusate sodium (COLACE) 100 MG capsule TAKE ONE (1) CAPSULE BY MOUTH TWICE DAILY AS NEEDED FOR CONSTIPATION 60 capsule 10    cyclobenzaprine (FLEXERIL) 10 MG tablet TAKE 1 TABLET BY MOUTH TWICE DAILY AS NEEDED FOR MUSCLE SPASMS 20 tablet 1    JANUVIA 100 MG tablet TAKE (1) TABLET BY MOUTH DAILY 30 tablet 10    atorvastatin (LIPITOR) 20 MG tablet TAKE 1 TABLET BY MOUTH DAILY 30 tablet 10    ferrous sulfate (IRON 325) 325 (65 Fe) MG tablet TAKE 1 TABLET BY MOUTH 3 TIMES DAILY (Patient taking differently: TAKE 1 TABLET BY MOUTH 3 TIMES DAILY// 10/12/21 taking one time daily) 90 tablet 10    OneTouch Delica Lancets 55M MISC USE TO TEST BLOOD SUGAR DAILY 100 each 5    vitamin D (ERGOCALCIFEROL) 1.25 MG (73798 UT) CAPS capsule TAKE ONE (1) CAPSULE BY MOUTH ONCE A WEEK 12 capsule 10    NOVOLOG FLEXPEN 100 UNIT/ML injection pen INJECT 4 UNITS SUBCUTANEOUSLY THREE TIMES A DAY BEFORE MEALS 15 mL 10    Lancets (ONETOUCH DELICA PLUS [id number]) MISC Check blood sugars 3 times daily 100 each 3    Multiple Vitamins-Minerals (MULTIVITAMIN-MINERALS) TABS tablet TAKE (1) TABLET BY MOUTH DAILY 60 tablet 11    TRUEPLUS PEN NEEDLES 31G X 6 MM MISC USE DAILY AS DIRECTED 100 each 2    Melatonin 10 MG CAPS TAKE 1 CAPSULE BY MOUTH AT NIGHT  2    zolpidem (AMBIEN) 5 MG tablet Take 10 mg by mouth nightly as needed for Sleep.        risperiDONE (RISPERDAL) 4 MG tablet       B-D 3CC LUER-AMBAR SYR 75GS2-5/2 22G X 1-1/2\" 3 ML MISC  benztropine (COGENTIN) 0.5 MG tablet Take 1 tablet by mouth 2 times daily  2    gabapentin (NEURONTIN) 300 MG capsule TAKE 1 CAPSULE BY MOUTH THREE TIMES A DAY 90 capsule 11    ibuprofen (ADVIL;MOTRIN) 600 MG tablet Take 1 tablet by mouth every 6 hours as needed for Pain (Patient not taking: Reported on 1/7/2022) 60 tablet 1     No current facility-administered medications for this visit. No Known Allergies    Health Maintenance   Topic Date Due    Hepatitis C screen  Never done    Shingles Vaccine (1 of 2) Never done    Colon Cancer Screen FIT/FOBT  11/21/2018    Diabetic retinal exam  12/31/2020    Hepatitis B vaccine (3 of 3 - Risk 3-dose series) 02/23/2021    Lipid screen  12/24/2021    A1C test (Diabetic or Prediabetic)  02/08/2022    Depression Monitoring  02/19/2022    Diabetic foot exam  04/09/2022    Diabetic microalbuminuria test  04/09/2022    Potassium monitoring  10/05/2022    Creatinine monitoring  10/05/2022    Cervical cancer screen  06/21/2023    Pneumococcal 0-64 years Vaccine (2 of 2 - PPSV23) 04/12/2031    DTaP/Tdap/Td vaccine (4 - Td or Tdap) 04/23/2031    Flu vaccine  Completed    COVID-19 Vaccine  Completed    HIV screen  Completed    Hepatitis A vaccine  Aged Out    Hib vaccine  Aged Out    Meningococcal (ACWY) vaccine  Aged Out       Subjective:      Review of Systems   All other systems reviewed and are negative. Objective:     Physical Exam  Vitals reviewed. Constitutional:       Appearance: Normal appearance. HENT:      Head: Normocephalic and atraumatic. Musculoskeletal:        Arms:    Skin:     General: Skin is warm and dry. Neurological:      General: No focal deficit present. Mental Status: She is alert and oriented to person, place, and time.    Psychiatric:         Mood and Affect: Mood normal.         Behavior: Behavior normal.       BP (!) 157/98 (Site: Left Upper Arm, Position: Sitting, Cuff Size: Large Adult)   Pulse 117   LMP 12/08/2015 (Approximate)   SpO2 99%       Assessment:       Diagnosis Orders   1. Fall, initial encounter  XR RIBS RIGHT (2 VIEWS)   2. Chest wall pain  XR RIBS RIGHT (2 VIEWS)       Plan:      No follow-ups on file. No orders of the defined types were placed in this encounter. Orders Placed This Encounter   Procedures    XR RIBS RIGHT (2 VIEWS)     Standing Status:   Future     Standing Expiration Date:   1/7/2023            Patient given educational materials - see patient instructions. Discussed use, benefit, and side effects of prescribed medications. All patientquestions answered. Pt voiced understanding.     Electronically signed by Elton Washington MD on 1/7/2022at 2:45 PM

## 2022-01-10 ENCOUNTER — APPOINTMENT (OUTPATIENT)
Dept: GENERAL RADIOLOGY | Age: 57
End: 2022-01-10
Payer: MEDICARE

## 2022-01-10 ENCOUNTER — HOSPITAL ENCOUNTER (EMERGENCY)
Age: 57
Discharge: HOME | End: 2022-01-10
Attending: EMERGENCY MEDICINE
Payer: MEDICARE

## 2022-01-10 VITALS
HEART RATE: 98 BPM | OXYGEN SATURATION: 97 % | HEIGHT: 63 IN | TEMPERATURE: 100.2 F | RESPIRATION RATE: 19 BRPM | SYSTOLIC BLOOD PRESSURE: 148 MMHG | BODY MASS INDEX: 30.83 KG/M2 | WEIGHT: 174 LBS | DIASTOLIC BLOOD PRESSURE: 89 MMHG

## 2022-01-10 DIAGNOSIS — S20.211A CONTUSION OF RIGHT CHEST WALL, INITIAL ENCOUNTER: Primary | ICD-10-CM

## 2022-01-10 LAB
-: ABNORMAL
AMORPHOUS: ABNORMAL
BACTERIA: ABNORMAL
BILIRUBIN URINE: NEGATIVE
CASTS UA: ABNORMAL /LPF (ref 0–8)
COLOR: YELLOW
CRYSTALS, UA: ABNORMAL /HPF
EPITHELIAL CELLS UA: ABNORMAL /HPF (ref 0–5)
GLUCOSE URINE: ABNORMAL
KETONES, URINE: NEGATIVE
LEUKOCYTE ESTERASE, URINE: ABNORMAL
MUCUS: ABNORMAL
NITRITE, URINE: NEGATIVE
OTHER OBSERVATIONS UA: ABNORMAL
PH UA: 5.5 (ref 5–8)
PROTEIN UA: NEGATIVE
RBC UA: ABNORMAL /HPF (ref 0–4)
RENAL EPITHELIAL, UA: ABNORMAL /HPF
SPECIFIC GRAVITY UA: 1.03 (ref 1–1.03)
TRICHOMONAS: ABNORMAL
TURBIDITY: CLEAR
URINE HGB: NEGATIVE
UROBILINOGEN, URINE: NORMAL
WBC UA: ABNORMAL /HPF (ref 0–5)
YEAST: ABNORMAL

## 2022-01-10 PROCEDURE — 6370000000 HC RX 637 (ALT 250 FOR IP)

## 2022-01-10 PROCEDURE — 99285 EMERGENCY DEPT VISIT HI MDM: CPT

## 2022-01-10 PROCEDURE — 81001 URINALYSIS AUTO W/SCOPE: CPT

## 2022-01-10 PROCEDURE — 71045 X-RAY EXAM CHEST 1 VIEW: CPT

## 2022-01-10 PROCEDURE — 72100 X-RAY EXAM L-S SPINE 2/3 VWS: CPT

## 2022-01-10 PROCEDURE — 93005 ELECTROCARDIOGRAM TRACING: CPT

## 2022-01-10 RX ORDER — ACETAMINOPHEN 325 MG/1
650 TABLET ORAL ONCE
Status: COMPLETED | OUTPATIENT
Start: 2022-01-10 | End: 2022-01-10

## 2022-01-10 RX ORDER — IBUPROFEN 400 MG/1
600 TABLET ORAL ONCE
Status: COMPLETED | OUTPATIENT
Start: 2022-01-10 | End: 2022-01-10

## 2022-01-10 RX ADMIN — IBUPROFEN 600 MG: 400 TABLET, FILM COATED ORAL at 19:49

## 2022-01-10 RX ADMIN — ACETAMINOPHEN 650 MG: 325 TABLET ORAL at 19:49

## 2022-01-10 ASSESSMENT — ENCOUNTER SYMPTOMS
WHEEZING: 0
ABDOMINAL PAIN: 0
VOMITING: 0
SORE THROAT: 0
CHEST TIGHTNESS: 0
BACK PAIN: 1
SHORTNESS OF BREATH: 0
NAUSEA: 0
EYE REDNESS: 0
COLOR CHANGE: 0

## 2022-01-10 ASSESSMENT — PAIN SCALES - GENERAL
PAINLEVEL_OUTOF10: 8
PAINLEVEL_OUTOF10: 8

## 2022-01-10 NOTE — ED PROVIDER NOTES
[facility]     Emergency Department     Faculty Attestation    I performed a history and physical examination of the patient and discussed management with the resident. I reviewed the residents note and agree with the documented findings and plan of care. Any areas of disagreement are noted on the chart. I was personally present for the key portions of any procedures. I have documented in the chart those procedures where I was not present during the key portions. I have reviewed the emergency nurses triage note. I agree with the chief complaint, past medical history, past surgical history, allergies, medications, social and family history as documented unless otherwise noted below. For Physician Assistant/ Nurse Practitioner cases/documentation I have personally evaluated this patient and have completed at least one if not all key elements of the E/M (history, physical exam, and MDM). Additional findings are as noted. I have personally seen and evaluated the patient. I find the patient's history and physical exam are consistent with the NP/PA documentation. I agree with the care provided, treatment rendered, disposition and follow-up plan. Reporting discomfort in the right upper chest to be due to a recent fall. The patient experienced a direct fall and injuring her lower back and chest she denies chest pain she denies shortness of breath is noted to be borderline febrile here but is denied cough or other symptoms at this time. Critical Care     Erick Pace M.D.   Attending Emergency  Physician              Jacek Iyer MD  01/10/22 [id number]

## 2022-01-10 NOTE — ED PROVIDER NOTES
[facility] ED  Emergency Department Encounter  Emergency Medicine Resident     Pt Name: Mirta Kowalski  MRN: [id number]  [place] 1965  Date of evaluation: 1/10/22  PCP:  Alphonso Veronica MD    [address] [place] [address]       Chief Complaint   Patient presents with    Chest Pain       HISTORY [facility]  (Location/Symptom, Timing/Onset, Context/Setting, Quality, Duration, Modifying Liban Goran.)      Mirta Kowalski is a 55 y.o. female with a medical history of breast cancer status post bilateral mastectomy who presents with tenderness along the rib cage and lower back pain. Patient reports she was carrying a box and fell down a flight of 10-12 stairs this past Thursday. Patient is concerned that she bruised her ribs in the process. Patient has tried Motrin but this has not resolved her pain. She states the pain has been constant and pleuritic. She also endorses lower back pain but denies any numbness, tingling, or fecal/urinary incontinence. Patient denies any chest pain, shortness of breath, fevers, chills. PAST MEDICAL / SURGICAL / SOCIAL / FAMILY HISTORY     Patient  has a past medical history of Breast cancer ([address] Cancer ([address] Conjunctivitis, Depression, Disorder of patellofemoral joint, DJD (degenerative joint disease) of knee, Hypertension, Malignant neoplasm of nipple of left breast in female, estrogen receptor positive ([address] Pain, knee, Primary osteoarthritis of left knee, Sleep apnea, and Type II or unspecified type diabetes mellitus without mention of complication, not stated as uncontrolled. Patient  has a past surgical history that includes Breast surgery (2006); Tubal ligation;  section; Mastectomy (Bilateral, 08/10/2012); Mastectomy (Bilateral); lymphadenectomy; other surgical history (N/A, 2017); and incision and drainage (N/A, 2017).     Social History     Socioeconomic History    Marital status:      Spouse name: Not on file    Number of children: Not on file    Years of education: Not on file    Highest education level: Not on file   Occupational History    Not on file   Tobacco Use    Smoking status: Passive Smoke Exposure - Never Smoker    Smokeless tobacco: Never Used   Vaping Use    Vaping Use: Never used   Substance and Sexual Activity    Alcohol use: No    Drug use: No    Sexual activity: Never   Other Topics Concern    Not on file   Social History Narrative    Not on file     Social Determinants of Health     Financial Resource Strain:     Difficulty of Paying Living Expenses: Not on file   Food Insecurity:     Worried About Running Out of Food in the Last Year: Not on file    Velvet of Food in the Last Year: Not on file   Transportation Needs:     Lack of Transportation (Medical): Not on file    Lack of Transportation (Non-Medical): Not on file   Physical Activity:     Days of Exercise per Week: Not on file    Minutes of Exercise per Session: Not on file   Stress:     Feeling of Stress : Not on file   Social Connections:     Frequency of Communication with Friends and Family: Not on file    Frequency of Social Gatherings with Friends and Family: Not on file    Attends Tenri[employer] Services: Not on file    Active Member of [address] [place] [address] [employer] or Organizations: Not on file    Attends Club or Organization Meetings: Not on file    Marital Status: Not on file   Intimate Partner Violence:     Fear of Current or Ex-Partner: Not on file    Emotionally Abused: Not on file    Physically Abused: Not on file    Sexually Abused: Not on file   Housing Stability:     Unable to Pay for Housing in the Last Year: Not on file    Number of [place] in the Last Year: Not on file    Unstable Housing in the Last Year: Not on file       Family History   Problem Relation Age of Onset    Cancer Father        Allergies:  Patient has no known allergies.     Home Medications:  Prior to Admission medications    Medication Sig Start Date End Date Taking?  Authorizing Provider   potassium chloride (KLOR-CON M) 10 MEQ extended release tablet TAKE 1 TABLET BY MOUTH TWICE DAILY 1/3/22   Remy Betancur MD   topiramate (TOPAMAX) 200 MG tablet TAKE TWO (2) TABLETS BY MOUTH TWICE DAILY 1/3/22   Russ Aguilar MD   Lancets (ONETOUCH DELICA PLUS [id number]) MISC USE TO TEST BLOOD SUGAR DAILY 1/3/22   Russ Aguilar MD   blood glucose test strips (ONETOUCH ULTRA) strip USE AS DIRECTED AS NEEDED 1/3/22   Russ Aguilar MD   Insulin Pen Needle 31G X 8 MM MISC 1 each by Does not apply route daily 12/31/21   Amalia Schreiber MD   Insulin Pen Needle 31G X 6 MM MISC 1 each by Does not apply route daily 12/31/21   Amalia Schreiber MD   hydroCHLOROthiazide (HYDRODIURIL) 25 MG tablet TAKE 1 TABLET BY MOUTH DAILY 12/6/21   Dana Bhakta MD   Insulin Pen Needle ([employer]OGER PEN NEEDLES 31G) 31G X 8 MM MISC 1 each by Does not apply route daily 11/17/21   Jemal Garcia MD   insulin glargine (LANTUS SOLOSTAR) 100 UNIT/ML injection pen Inject 40 Units into the skin 2 times daily 11/8/21   Abundio Tao MD   aspirin (ASPIRIN LOW DOSE) 81 MG EC tablet TAKE 1 TABLET BY MOUTH DAILY 11/4/21   Abundio Tao MD   docusate sodium (COLACE) 100 MG capsule TAKE ONE (1) CAPSULE BY MOUTH TWICE DAILY AS NEEDED FOR CONSTIPATION 9/9/21   Kandace Banuelos MD   gabapentin (NEURONTIN) 300 MG capsule TAKE 1 CAPSULE BY MOUTH THREE TIMES A DAY 7/1/21 10/12/21  Remy Betancur MD   cyclobenzaprine (FLEXERIL) 10 MG tablet TAKE 1 TABLET BY MOUTH TWICE DAILY AS NEEDED FOR MUSCLE SPASMS 6/2/21   Tripp Pereira MD   JANUVIA 100 MG tablet TAKE (1) TABLET BY MOUTH DAILY 6/1/21   Caro Fragoso MD   atorvastatin (LIPITOR) 20 MG tablet TAKE 1 TABLET BY MOUTH DAILY 6/1/21   Caro Fragoso MD   ferrous sulfate (IRON 325) 325 (65 Fe) MG tablet TAKE 1 TABLET BY MOUTH 3 TIMES DAILY  Patient taking differently: TAKE 1 TABLET BY MOUTH 3 TIMES DAILY// 10/12/21 taking one time daily 3/18/21   Jaelyn Aparicio MD   [facility] ER Lancets 43O MISC USE TO TEST BLOOD SUGAR DAILY 12/18/20   Poojitha Pressley Boxer, MD   vitamin D (ERGOCALCIFEROL) 1.25 MG (50841 UT) CAPS capsule TAKE ONE (1) CAPSULE BY MOUTH ONCE A WEEK 9/16/20   Hailee Welch MD   NOVOLOG FLEXPEN 100 UNIT/ML injection pen INJECT 4 UNITS SUBCUTANEOUSLY THREE TIMES A DAY BEFORE MEALS 8/13/20   Kitty Linda MD   [facility] [id number]) MISC Check blood sugars 3 times daily 3/17/20   Ela Araujo MD   Multiple Vitamins-Minerals (MULTIVITAMIN-MINERALS) TABS tablet TAKE (1) TABLET BY MOUTH DAILY 9/25/19   Ela Araujo MD   TRUEPLUS PEN NEEDLES 31G X 6 MM MISC USE DAILY AS DIRECTED 6/27/19   Ela Araujo MD   Melatonin 10 MG CAPS TAKE 1 CAPSULE BY MOUTH AT NIGHT 8/7/18   Historical Provider, MD   ibuprofen (ADVIL;MOTRIN) 600 MG tablet Take 1 tablet by mouth every 6 hours as needed for Pain  Patient not taking: Reported on 1/7/2022 9/11/17   Christina Valerio MD   zolpidem (AMBIEN) 5 MG tablet Take 10 mg by mouth nightly as needed for Sleep. Historical Provider, MD   risperiDONE (RISPERDAL) 4 MG tablet  6/13/16   Historical Provider, MD ODONNELL 3CC LUER-AMBAR SYR 94CH0-6/2 22G X 1-1/2\" 3 ML MISC  4/14/16   Historical Provider, MD   benztropine (COGENTIN) 0.5 MG tablet Take 1 tablet by mouth 2 times daily 8/27/15   Historical Provider, MD       REVIEW OF SYSTEMS    (2-9 systems for level 4, 10 or more for level 5)      Review of Systems   Constitutional: Negative for appetite change, chills, fatigue and fever. HENT: Negative for congestion and sore throat. Eyes: Negative for redness. Respiratory: Negative for chest tightness, shortness of breath and wheezing. Cardiovascular: Negative for chest pain, palpitations and leg swelling. Gastrointestinal: Negative for abdominal pain, nausea and vomiting. Genitourinary: Negative for dysuria and hematuria. Musculoskeletal: Positive for back pain. Bruised ribs   Skin: Negative for color change. Neurological: Negative for dizziness, weakness, light-headedness and headaches. Psychiatric/Behavioral: Negative for confusion and decreased concentration. PHYSICAL EXAM   (up to 7 for level 4, 8 or more for level 5)     INITIAL VITALS:    height is 5' 3\" (1.6 m) and weight is 174 lb (78.9 kg). Her oral temperature is 100.2 °F (37.9 °C). Her blood pressure is 148/89 (abnormal) and her pulse is 98. Her respiration is 19 and oxygen saturation is 97%. Physical Exam  Constitutional:       General: She is not in acute distress. Appearance: Normal appearance. She is normal weight. HENT:      Head: Normocephalic and atraumatic. Right Ear: External ear normal.      Left Ear: External ear normal.      Nose: Nose normal.   Eyes:      Extraocular Movements: Extraocular movements intact. Cardiovascular:      Rate and Rhythm: Normal rate and regular rhythm. Pulses: Normal pulses. Heart sounds: Normal heart sounds. No murmur heard. Pulmonary:      Effort: Pulmonary effort is normal. No respiratory distress. Breath sounds: Normal breath sounds. No wheezing. Abdominal:      General: Bowel sounds are normal. There is no distension. Palpations: Abdomen is soft. Tenderness: There is no abdominal tenderness. There is no guarding or rebound. Musculoskeletal:         General: Tenderness and signs of injury present. Lumbar back: Tenderness (Midline tenderness) present. Right lower leg: No edema. Left lower leg: No edema. Comments: Right-sided abrasion and tenderness along ribs 4 through 6   Skin:     General: Skin is warm. Neurological:      General: No focal deficit present. Mental Status: She is alert and oriented to person, place, and time. Mental status is at baseline. Psychiatric:         Mood and Affect: Mood normal.         Behavior: Behavior normal.         Thought Content:  Thought content normal.         DIFFERENTIAL  DIAGNOSIS     PLAN (LABS / IMAGING / EKG):  Orders Placed This Encounter   Procedures    XR CHEST PORTABLE    XR LUMBAR SPINE (2-3 VIEWS)    Urinalysis with microscopic       MEDICATIONS ORDERED:  Orders Placed This Encounter   Medications    ibuprofen (ADVIL;MOTRIN) tablet 600 mg    acetaminophen (TYLENOL) tablet 650 mg       DDX: Rib contusion, rib fracture, pneumothorax    Initial MDM/Plan: 55 y.o. female who presents with right-sided rib pain and lower back pain. Patient is also tachycardic and has a low-grade fever. We will begin evaluation with chest and lumbar x-ray, UA. We will begin treatment with Motrin. DIAGNOSTIC RESULTS / EMERGENCY DEPARTMENT COURSE / MDM     LABS:  Labs Reviewed   URINALYSIS WITH MICROSCOPIC - Abnormal; Notable for the following components:       Result Value    Glucose, Ur 3+ (*)     Leukocyte Esterase, Urine SMALL (*)     All other components within normal limits         RADIOLOGY:  XR LUMBAR SPINE (2-3 VIEWS)    Result Date: 1/10/2022  EXAMINATION: THREE XRAY VIEWS OF THE LUMBAR SPINE 1/10/2022 7:06 pm COMPARISON: None. HISTORY: ORDERING SYSTEM PROVIDED HISTORY: fall down stairs TECHNOLOGIST PROVIDED HISTORY: fall down stairs FINDINGS: There are 5 lumbar type vertebral bodies. Lumbar vertebral body heights, vertebral body alignment and disc spaces are normal.  There are no significant degenerative changes. Pedicles are intact. Sacroiliac joints are unremarkable. There is a radiopaque staple line in the left upper and mid abdomen. Moderate stool load in the transverse colon. No acute fracture or traumatic malalignment of the lumbar spine. XR CHEST PORTABLE    Result Date: 1/10/2022  EXAMINATION: ONE XRAY VIEW OF THE CHEST 1/10/2022 7:06 pm COMPARISON: 09/07/2012 HISTORY: ORDERING SYSTEM PROVIDED HISTORY: fall TECHNOLOGIST PROVIDED HISTORY: fall FINDINGS: The lungs are without acute focal process.   There is no effusion or pneumothorax. The cardiomediastinal silhouette is stable. The osseous structures are stable. No acute process. EKG  EKG Interpretation    Interpreted by emergency department physician    All EKG's are interpreted by the Emergency Department Physician who either signs or Co-signs this chart in the absence of a cardiologist.    EMERGENCY DEPARTMENT COURSE:  ED Course as of 01/10/22 2023   Mon Emanuel 10, 2022   1931 XR CHEST PORTABLE  No acute process. [OG]   1939 XR LUMBAR SPINE (2-3 VIEWS)  No acute fracture or traumatic malalignment of the lumbar spine. [OG]   2004 Negative UA we will discharge home. [OG]      ED Course User Index  [OG] Ramón Veloz MD         PROCEDURES:  None    CONSULTS:  None    CRITICAL CARE:  Please see attending note    FINAL IMPRESSION      1. Contusion of right chest wall, initial encounter          DISPOSITION / PLAN     DISPOSITION Decision To Discharge 01/10/2022 08:05:10 PM    Decision was made to discharge the patient as all of her imaging was negative and UA was nonconcerning for infection. Patient was instructed to return to the emergency department if she has any new or concerning symptoms. Patient was instructed to follow-up with her primary care provider and to take all medications as prescribed. PATIENTREFERRED TO:  Shala Hopper MD  [address] [address]  [phone]      As needed, If symptoms worsen    Shala Hopper MD  [address]   [address] [address]  [phone]      As needed, If symptoms worsen      DISCHARGE MEDICATIONS:  Discharge Medication List as of 1/10/2022  8:05 PM          Julio Zavala MD  Transitional Year Resident    (Please note that portions of this note were completed with a voice recognition program.  Efforts were made to edit the dictations but occasionally words are mis-transcribed.)       Ramón Veloz MD  Resident  01/10/22 2024

## 2022-01-12 LAB
EKG ATRIAL RATE: 104 BPM
EKG P AXIS: 54 DEGREES
EKG P-R INTERVAL: 164 MS
EKG Q-T INTERVAL: 362 MS
EKG QRS DURATION: 92 MS
EKG QTC CALCULATION (BAZETT): 476 MS
EKG R AXIS: -13 DEGREES
EKG T AXIS: 12 DEGREES
EKG VENTRICULAR RATE: 104 BPM

## 2022-01-12 PROCEDURE — 93010 ELECTROCARDIOGRAM REPORT: CPT | Performed by: INTERNAL MEDICINE

## 2022-01-28 ENCOUNTER — TELEPHONE (OUTPATIENT)
Dept: ONCOLOGY | Age: 57
End: 2022-01-28

## 2022-02-02 RX ORDER — FERROUS SULFATE 325(65) MG
TABLET ORAL
Qty: 90 TABLET | Refills: 10 | Status: SHIPPED | OUTPATIENT
Start: 2022-02-02

## 2022-02-03 ENCOUNTER — HOSPITAL ENCOUNTER (OUTPATIENT)
Dept: GENERAL RADIOLOGY | Age: 57
Discharge: HOME | End: 2022-02-05
Payer: MEDICARE

## 2022-02-03 DIAGNOSIS — R07.89 CHEST WALL PAIN: ICD-10-CM
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
## 2022-02-03 PROCEDURE — 71100 X-RAY EXAM RIBS UNI 2 VIEWS: CPT

## 2022-02-28 DIAGNOSIS — K59.09 OTHER CONSTIPATION: ICD-10-CM

## 2022-03-01 RX ORDER — POLYETHYLENE GLYCOL 3350 17 G/17G
POWDER, FOR SOLUTION ORAL
Qty: 510 G | Refills: 1 | Status: SHIPPED | OUTPATIENT
Start: 2022-03-01 | End: 2022-05-06

## 2022-03-01 NOTE — TELEPHONE ENCOUNTER
glycolax pending for refill     Health Maintenance   Topic Date Due    Hepatitis C screen  Never done    Depression Monitoring  Never done    Shingles Vaccine (1 of 2) Never done    Colorectal Cancer Screen  11/21/2018    Diabetic retinal exam  12/31/2020    Hepatitis B vaccine (3 of 3 - Risk 3-dose series) 02/23/2021    Lipid screen  12/24/2021    A1C test (Diabetic or Prediabetic)  02/08/2022    Diabetic foot exam  04/09/2022    Diabetic microalbuminuria test  04/09/2022    Potassium monitoring  10/05/2022    Creatinine monitoring  10/05/2022    Cervical cancer screen  06/21/2023    Pneumococcal 0-64 years Vaccine (2 of 2 - PPSV23) 04/12/2031    DTaP/Tdap/Td vaccine (4 - Td or Tdap) 04/23/2031    Flu vaccine  Completed    COVID-19 Vaccine  Completed    HIV screen  Completed    Hepatitis A vaccine  Aged Out    Hib vaccine  Aged Out    Meningococcal (ACWY) vaccine  Aged Out             (applicable per patient's age: Cancer Screenings, Depression Screening, Fall Risk Screening, Immunizations)    Hemoglobin A1C (%)   Date Value   11/08/2021 12.6   09/23/2021 11.6   12/24/2020 12.6 (H)     Microalb/Crt.  Ratio (mcg/mg creat)   Date Value   04/09/2021 CANNOT BE CALCULATED     LDL Cholesterol (mg/dL)   Date Value   12/24/2020 56     AST (U/L)   Date Value   10/05/2021 17     ALT (U/L)   Date Value   10/05/2021 11     BUN (mg/dL)   Date Value   10/05/2021 16      (goal A1C is < 7)   (goal LDL is <100) need 30-50% reduction from baseline     BP Readings from Last 3 Encounters:   01/10/22 (!) 148/89   01/07/22 (!) 157/98   11/08/21 (!) 138/94    (goal /80)      All Future Testing planned in [facility]:  Lab Frequency Next Occurrence   Hemoglobin A1C Once 04/09/2022   CBC With Auto Differential Once 10/12/2022   Ferritin Once 10/12/2022   Comprehensive Metabolic Panel Once 87/45/4853   Cologuard (For External Results Only) Once 11/08/2022       Next Visit Date:  Future Appointments   Date Time Provider Kota Sanz   3/2/2022  8:30 AM KAHLIL Matthews [facility]   3/8/2022  3:45 PM MD Yahaira Shepherd [facility]   10/11/2022 11:00 AM Kim Bowers MD [facility]            Patient Active Problem List:     History of breast cancer     Pain, knee     Depression     DJD (degenerative joint disease) of knee     Headache     Iron deficiency anemia     HTN (hypertension)     DM w/o complication type II     Left shoulder pain     Diabetes 1.5, managed as type 2 ([address]     Headache disorder     [place]     Class 2 severe obesity due to excess calories with serious comorbidity in adult [facility])     Episode of syncope

## 2022-03-02 ENCOUNTER — OFFICE VISIT (OUTPATIENT)
Dept: SURGERY | Age: 57
End: 2022-03-02

## 2022-03-02 VITALS
BODY MASS INDEX: 30.23 KG/M2 | DIASTOLIC BLOOD PRESSURE: 82 MMHG | HEART RATE: 116 BPM | TEMPERATURE: 97.2 F | WEIGHT: 170.6 LBS | SYSTOLIC BLOOD PRESSURE: 131 MMHG | HEIGHT: 63 IN

## 2022-03-02 DIAGNOSIS — Z12.11 ENCOUNTER FOR SCREENING COLONOSCOPY: Primary | ICD-10-CM

## 2022-03-02 PROCEDURE — S0285 CNSLT BEFORE SCREEN COLONOSC: HCPCS | Performed by: STUDENT IN AN ORGANIZED HEALTH CARE EDUCATION/TRAINING PROGRAM

## 2022-03-02 RX ORDER — POLYETHYLENE GLYCOL 3350 17 G/17G
POWDER, FOR SOLUTION ORAL
Qty: 510 G | Refills: 0 | Status: SHIPPED | OUTPATIENT
Start: 2022-03-02 | End: 2022-06-28 | Stop reason: ALTCHOICE

## 2022-03-02 NOTE — PATIENT INSTRUCTIONS
Thank you letting us take care of you today. We hope that all your questions were addressed. If a question was overlooked or something else comes to mind after you return home, please call our office at [phone]. If you need to cancel or change an appointment, surgery or procedure, please contact the office at [phone].

## 2022-03-02 NOTE — PROGRESS NOTES
[facility]   History and Physical      PATIENT NAME: Lamberto Hamper OF BIRTH: 1965     TODAY'S DATE: 3/2/2022    CHIEF COMPLAINT:  Screening colonoscopy      HISTORY OF PRESENT ILLNESS:  This is a 55 y.o. female w/ hx of breast cancer, HTN, DM type II presenting to be evaluated for screening colonoscopy. Patient denies any abdominal pain, constipation, diarrhea, change in color of stool, or blood in stool. Patient has no family history of colorectal cancer or polyps. Denies taking any anticoagulants. No other complaints noted at this time. Patient initially did not want to take a Cologuard test for screening colonoscopy as she has a history of breast cancer and would like to ensure that she has the best chance for colon cancer detection.       Past Medical History:        Diagnosis Date    Breast cancer ([facility] [address] 2012    Cancer ([facility] [address] 06    L breast - invasive ductal adenocarcinoma    Conjunctivitis     susceptible to conjunctivitis    Depression     Disorder of patellofemoral joint     DJD (degenerative joint disease) of knee     bilateral    Hypertension     Malignant neoplasm of nipple of left breast in female, estrogen receptor positive ([facility] [address] 2015    Pain, knee 10-06    R lateral meniscus tear    Primary osteoarthritis of left knee     Sleep apnea     Type II or unspecified type diabetes mellitus without mention of complication, not stated as uncontrolled     resolved       Past Surgical History:        Procedure Laterality Date    BREAST SURGERY  2006    L lumpectomy, [place] lymph node bx, L axillary node dissection     SECTION      INCISION AND DRAINAGE N/A 2017    INCISION AND DRAINAGE LEFT PERIANAL ABSCESS performed by Jolene Yuan DO at [address] [facility]      left breath lymph node removed    MASTECTOMY Bilateral 08/10/2012    bilateral     MASTECTOMY Bilateral     bilateral    OTHER SURGICAL HISTORY N/A 2017 I&D perirectal abcess    TUBAL LIGATION         Medications:  Scheduled Meds:  Continuous Infusions:  PRN Meds:. Allergies:  Patient has no known allergies. Social History:   Social History     Socioeconomic History    Marital status:      Spouse name: Not on file    Number of children: Not on file    Years of education: Not on file    Highest education level: Not on file   Occupational History    Not on file   Tobacco Use    Smoking status: Passive Smoke Exposure - Never Smoker    Smokeless tobacco: Never Used   Vaping Use    Vaping Use: Never used   Substance and Sexual Activity    Alcohol use: No    Drug use: No    Sexual activity: Never   Other Topics Concern    Not on file   Social History Narrative    Not on file     Social Determinants of Health     Financial Resource Strain:     Difficulty of Paying Living Expenses: Not on file   Food Insecurity:     Worried About Running Out of Food in the Last Year: Not on file    Velvet of Food in the Last Year: Not on file   Transportation Needs:     Lack of Transportation (Medical): Not on file    Lack of Transportation (Non-Medical):  Not on file   Physical Activity:     Days of Exercise per Week: Not on file    Minutes of Exercise per Session: Not on file   Stress:     Feeling of Stress : Not on file   Social Connections:     Frequency of Communication with Friends and Family: Not on file    Frequency of Social Gatherings with Friends and Family: Not on file    Attends Jew Services: Not on file    Active Member of Clubs or Organizations: Not on file    Attends Club or Organization Meetings: Not on file    Marital Status: Not on file   Intimate Partner Violence:     Fear of Current or Ex-Partner: Not on file    Emotionally Abused: Not on file    Physically Abused: Not on file    Sexually Abused: Not on file   Housing Stability:     Unable to Pay for Housing in the Last Year: Not on file    Number of [facility] in the Last Year: Not on file    Unstable Housing in the Last Year: Not on file       Family History:       Problem Relation Age of Onset    Cancer Father        REVIEW OF SYSTEMS:    CONSTITUTIONAL:  No recent weight gain/loss. Energy level normal for pt. HEENT:  negative  CARDIOVASCULAR:  No chest pain  GASTROINTESTINAL:  See HPI  GENITOURINARY:  No dysuria  HEMATOLOGIC/LYMPHATIC:  No easy bruising.  + History of breast cancer  ENDOCRINE:  negative   Review of systems negative unless above. PHYSICAL EXAM:    VITALS:  /82 (Site: Right Upper Arm, Position: Sitting, Cuff Size: Medium Adult)   Pulse 116   Temp 97.2 °F (36.2 °C)   Ht 5' 3\" (1.6 m)   Wt 170 lb 9.6 oz (77.4 kg)   LMP 12/08/2015 (Approximate)   BMI 30.22 kg/m²     CONSTITUTIONAL:  awake, alert, not distressed  LUNGS:  CTA bilaterally  CARDIOVASCULAR: S1S2  ABDOMEN: Soft, nondistended, No TTP, BS present  NEUROLOGIC:  Mental Status Exam:  Level of Alertness:   alert  Orientation:   oriented to person, place, and time      ASSESSMENT   1.  55 y.o. F needing screening colonoscopy      PLAN    1. Patient will be scheduled for screening colonoscopy. Patient was given a thorough bowel prep instruction. The risks, benefits, suspected outcome and alternatives to the procedure were explained. The risk included but are not limited to bleeding, infection, respiratory distress, hypertension and perforation of the colon. The patient understands and is in agreement. All questions were answered appropriately.     Patient's case was discussed with Dr. Carlton Smith    Electronically signed by Dacia Samuel DO  on 3/2/2022 at 9:29 AM

## 2022-03-24 ENCOUNTER — ANESTHESIA EVENT (OUTPATIENT)
Dept: OPERATING ROOM | Age: 57
End: 2022-03-24
Payer: MEDICARE

## 2022-03-24 ENCOUNTER — ANESTHESIA (OUTPATIENT)
Dept: OPERATING ROOM | Age: 57
End: 2022-03-24
Payer: MEDICARE

## 2022-03-24 ENCOUNTER — HOSPITAL ENCOUNTER (OUTPATIENT)
Age: 57
Setting detail: OUTPATIENT SURGERY
Discharge: HOME | End: 2022-03-24
Attending: SURGERY | Admitting: SURGERY
Payer: MEDICARE

## 2022-03-24 VITALS
SYSTOLIC BLOOD PRESSURE: 130 MMHG | DIASTOLIC BLOOD PRESSURE: 85 MMHG | HEIGHT: 63 IN | RESPIRATION RATE: 16 BRPM | TEMPERATURE: 96.8 F | OXYGEN SATURATION: 99 % | HEART RATE: 74 BPM | BODY MASS INDEX: 27.46 KG/M2 | WEIGHT: 155 LBS

## 2022-03-24 VITALS
RESPIRATION RATE: 14 BRPM | OXYGEN SATURATION: 100 % | DIASTOLIC BLOOD PRESSURE: 71 MMHG | SYSTOLIC BLOOD PRESSURE: 117 MMHG

## 2022-03-24 LAB
GFR NON-AFRICAN AMERICAN: >60 ML/MIN
GFR SERPL CREATININE-BSD FRML MDRD: >60 ML/MIN
GFR SERPL CREATININE-BSD FRML MDRD: NORMAL ML/MIN/{1.73_M2}
GLUCOSE BLD-MCNC: 239 MG/DL (ref 65–105)
GLUCOSE BLD-MCNC: 369 MG/DL (ref 74–100)
POC BUN: 8 MG/DL (ref 8–26)
POC CREATININE: 0.66 MG/DL (ref 0.51–1.19)
POC POTASSIUM: 4 MMOL/L (ref 3.5–4.5)

## 2022-03-24 PROCEDURE — 2709999900 HC NON-CHARGEABLE SUPPLY: Performed by: SURGERY

## 2022-03-24 PROCEDURE — 2580000003 HC RX 258: Performed by: ANESTHESIOLOGY

## 2022-03-24 PROCEDURE — 84132 ASSAY OF SERUM POTASSIUM: CPT

## 2022-03-24 PROCEDURE — 3700000001 HC ADD 15 MINUTES (ANESTHESIA): Performed by: SURGERY

## 2022-03-24 PROCEDURE — 84520 ASSAY OF UREA NITROGEN: CPT

## 2022-03-24 PROCEDURE — 82565 ASSAY OF CREATININE: CPT

## 2022-03-24 PROCEDURE — 3700000000 HC ANESTHESIA ATTENDED CARE: Performed by: SURGERY

## 2022-03-24 PROCEDURE — 7100000040 HC SPAR PHASE II RECOVERY - FIRST 15 MIN: Performed by: SURGERY

## 2022-03-24 PROCEDURE — 6370000000 HC RX 637 (ALT 250 FOR IP): Performed by: ANESTHESIOLOGY

## 2022-03-24 PROCEDURE — 7100000041 HC SPAR PHASE II RECOVERY - ADDTL 15 MIN: Performed by: SURGERY

## 2022-03-24 PROCEDURE — 3609027000 HC COLONOSCOPY: Performed by: SURGERY

## 2022-03-24 PROCEDURE — 6360000002 HC RX W HCPCS: Performed by: ANESTHESIOLOGY

## 2022-03-24 PROCEDURE — 82947 ASSAY GLUCOSE BLOOD QUANT: CPT

## 2022-03-24 RX ORDER — SODIUM CHLORIDE, SODIUM LACTATE, POTASSIUM CHLORIDE, CALCIUM CHLORIDE 600; 310; 30; 20 MG/100ML; MG/100ML; MG/100ML; MG/100ML
INJECTION, SOLUTION INTRAVENOUS CONTINUOUS
Status: DISCONTINUED | OUTPATIENT
Start: 2022-03-24 | End: 2022-03-24 | Stop reason: HOSPADM

## 2022-03-24 RX ORDER — PROPOFOL 10 MG/ML
INJECTION, EMULSION INTRAVENOUS PRN
Status: DISCONTINUED | OUTPATIENT
Start: 2022-03-24 | End: 2022-03-24 | Stop reason: SDUPTHER

## 2022-03-24 RX ORDER — SODIUM CHLORIDE 9 MG/ML
25 INJECTION, SOLUTION INTRAVENOUS PRN
Status: DISCONTINUED | OUTPATIENT
Start: 2022-03-24 | End: 2022-03-24 | Stop reason: HOSPADM

## 2022-03-24 RX ORDER — PROPOFOL 10 MG/ML
INJECTION, EMULSION INTRAVENOUS CONTINUOUS PRN
Status: DISCONTINUED | OUTPATIENT
Start: 2022-03-24 | End: 2022-03-24 | Stop reason: SDUPTHER

## 2022-03-24 RX ORDER — SODIUM CHLORIDE 0.9 % (FLUSH) 0.9 %
5-40 SYRINGE (ML) INJECTION EVERY 12 HOURS SCHEDULED
Status: DISCONTINUED | OUTPATIENT
Start: 2022-03-24 | End: 2022-03-24 | Stop reason: HOSPADM

## 2022-03-24 RX ORDER — SODIUM CHLORIDE 0.9 % (FLUSH) 0.9 %
5-40 SYRINGE (ML) INJECTION PRN
Status: DISCONTINUED | OUTPATIENT
Start: 2022-03-24 | End: 2022-03-24 | Stop reason: HOSPADM

## 2022-03-24 RX ADMIN — PROPOFOL 20 MG: 10 INJECTION, EMULSION INTRAVENOUS at 13:57

## 2022-03-24 RX ADMIN — SODIUM CHLORIDE, POTASSIUM CHLORIDE, SODIUM LACTATE AND CALCIUM CHLORIDE: 600; 310; 30; 20 INJECTION, SOLUTION INTRAVENOUS at 11:58

## 2022-03-24 RX ADMIN — PROPOFOL 200 MCG/KG/MIN: 10 INJECTION, EMULSION INTRAVENOUS at 13:35

## 2022-03-24 RX ADMIN — PROPOFOL 70 MG: 10 INJECTION, EMULSION INTRAVENOUS at 13:35

## 2022-03-24 RX ADMIN — PROPOFOL 20 MG: 10 INJECTION, EMULSION INTRAVENOUS at 13:59

## 2022-03-24 RX ADMIN — INSULIN LISPRO 5 UNITS: 100 INJECTION, SOLUTION INTRAVENOUS; SUBCUTANEOUS at 12:33

## 2022-03-24 ASSESSMENT — PULMONARY FUNCTION TESTS
PIF_VALUE: 1
PIF_VALUE: 0
PIF_VALUE: 0
PIF_VALUE: 1
PIF_VALUE: 0
PIF_VALUE: 1
PIF_VALUE: 0
PIF_VALUE: 1
PIF_VALUE: 0
PIF_VALUE: 1
PIF_VALUE: 0
PIF_VALUE: 0
PIF_VALUE: 1
PIF_VALUE: 1
PIF_VALUE: 0
PIF_VALUE: 1
PIF_VALUE: 0
PIF_VALUE: 1

## 2022-03-24 ASSESSMENT — PAIN SCALES - WONG BAKER: WONGBAKER_NUMERICALRESPONSE: 0

## 2022-03-24 ASSESSMENT — PAIN - FUNCTIONAL ASSESSMENT: PAIN_FUNCTIONAL_ASSESSMENT: 0-10

## 2022-03-24 ASSESSMENT — PAIN SCALES - GENERAL
PAINLEVEL_OUTOF10: 0
PAINLEVEL_OUTOF10: 0

## 2022-03-24 NOTE — OP NOTE
Operative Note      Patient: Anita Singh  YOB: 1965  MRN: [id number]    Date of Procedure: 3/24/2022    Pre-Op Diagnosis: SCREENING    Post-Op Diagnosis: Same       Procedure(s):  COLONOSCOPY DIAGNOSTIC    Surgeon(s):  Agustin Gill IV, DO    Assistant:   Resident: Rianna Ball DO; Rosanne Aguillon DO    Anesthesia: Monitor Anesthesia Care    Estimated Blood Loss (mL): 0 cc     Complications: None    Specimens:   * No specimens in log *    Implants:  * No implants in log *      Drains: * No LDAs found *    HISTORY: The patient is a 55y.o. year old female with history of above preop diagnosis. Colonoscopy with possible biopsy or polypectomy has been recommended and I explained the risk, benefits, expected outcome, and alternatives to the procedure. Risks included but are not limited to bleeding, infection, respiratory distress, hypotension, and perforation of the colon. The patient understands and is in agreement. PROCEDURE: The patient was given monitored anesthesia care. The patient was given oxygen by nasal cannula. A digital rectal exam was performed. The colonoscope was inserted per rectum and advanced under direct vision to the cecum without difficulty, however, poor prep was noted. Findings:  Cecum/Ascending colon: normal    Transverse colon: normal    Descending/Sigmoid colon: normal    Rectum/Anus: normal    The colon was decompressed and the scope was removed. The patient tolerated the procedure well.      Recommendations:  Poor prep, recommend repeat colonoscopy with Go-Lytely prep in one year       Electronically signed by Rosanne Aguillon DO on 3/24/2022 at 3:59 PM

## 2022-03-24 NOTE — H&P
H&P  General Surgery        Pt Name: Reza Gomez  MRN: [id number]  [place]: 1965  Date of evaluation: 3/24/2022      [x] I have examined the patient and reviewed the H&P/Consult completed 3/2/2022, and there are no changes to the patient or plans. [] I have examined the patient and reviewed the H&P/Consult and have noted the following changes:     I have included the previous office H&P below:    Irma   History and Physical        PATIENT NAME: Sangita [place] OF BIRTH: 1965     TODAY'S DATE: 3/2/2022     CHIEF COMPLAINT:  Screening colonoscopy        HISTORY OF PRESENT ILLNESS:  This is a 55 y.o. female w/ hx of breast cancer, HTN, DM type II presenting to be evaluated for screening colonoscopy. Patient denies any abdominal pain, constipation, diarrhea, change in color of stool, or blood in stool. Patient has no family history of colorectal cancer or polyps. Denies taking any anticoagulants. No other complaints noted at this time.   Patient initially did not want to take a Cologuard test for screening colonoscopy as she has a history of breast cancer and would like to ensure that she has the best chance for colon cancer detection.        Past Medical History:    Past Medical History []Expand by Default            Diagnosis Date    Breast cancer ([facility] [address] 7/16/2012    Cancer ([facility] [address] 05-16-06     L breast - invasive ductal adenocarcinoma    Conjunctivitis       susceptible to conjunctivitis    Depression      Disorder of patellofemoral joint      DJD (degenerative joint disease) of knee       bilateral    Hypertension      Malignant neoplasm of nipple of left breast in female, estrogen receptor positive ([facility] [address] 9/24/2015    Pain, knee 10-06     R lateral meniscus tear    Primary osteoarthritis of left knee      Sleep apnea      Type II or unspecified type diabetes mellitus without mention of complication, not stated as uncontrolled       resolved            Past Surgical History:    Past Surgical History[]Expand by Default             Procedure Laterality Date    BREAST SURGERY   2006     L lumpectomy, [place] lymph node bx, L axillary node dissection     SECTION        INCISION AND DRAINAGE N/A 2017     INCISION AND DRAINAGE LEFT PERIANAL ABSCESS performed by Lavern Vaughan DO at [address] [facility]         left breath lymph node removed    MASTECTOMY Bilateral 08/10/2012     bilateral     MASTECTOMY Bilateral       bilateral    OTHER SURGICAL HISTORY N/A 2017     I&D perirectal abcess    TUBAL LIGATION                Medications:  Scheduled Meds:  Continuous Infusions:  PRN Meds:.     Allergies:  Patient has no known allergies.     Social History:   Social History   []Expand by Default            Socioeconomic History    Marital status:        Spouse name: Not on file    Number of children: Not on file    Years of education: Not on file    Highest education level: Not on file   Occupational History    Not on file   Tobacco Use    Smoking status: Passive Smoke Exposure - Never Smoker    Smokeless tobacco: Never Used   Vaping Use    Vaping Use: Never used   Substance and Sexual Activity    Alcohol use: No    Drug use: No    Sexual activity: Never   Other Topics Concern    Not on file   Social History Narrative    Not on file      Social Determinants of Health          Financial Resource Strain:     Difficulty of Paying Living Expenses: Not on file   Food Insecurity:     Worried About Running Out of Food in the Last Year: Not on file    Velvet of Food in the Last Year: Not on file   Transportation Needs:     Lack of Transportation (Medical): Not on file    Lack of Transportation (Non-Medical):  Not on file   Physical Activity:     Days of Exercise per Week: Not on file    Minutes of Exercise per Session: Not on file   Stress:     Feeling of Stress : Not on file   Social Connections:     Frequency of Communication with Friends and Family: Not on file    Frequency of Social Gatherings with Friends and Family: Not on file    Attends Buddhism Services: Not on file    Active Member of Clubs or Organizations: Not on file    Attends Club or Organization Meetings: Not on file    Marital Status: Not on file   Intimate Partner Violence:     Fear of Current or Ex-Partner: Not on file    Emotionally Abused: Not on file    Physically Abused: Not on file    Sexually Abused: Not on file   Housing Stability:     Unable to Pay for Housing in the Last Year: Not on file    Number of [place] in the Last Year: Not on file    Unstable Housing in the Last Year: Not on file            Family History:   Family History[]Expand by Default             Problem Relation Age of Onset    Cancer Father              REVIEW OF SYSTEMS:    CONSTITUTIONAL:  No recent weight gain/loss. Energy level normal for pt. HEENT:  negative  CARDIOVASCULAR:  No chest pain  GASTROINTESTINAL:  See HPI  GENITOURINARY:  No dysuria  HEMATOLOGIC/LYMPHATIC:  No easy bruising.  + History of breast cancer  ENDOCRINE:  negative   Review of systems negative unless above.     PHYSICAL EXAM:    VITALS:  /82 (Site: Right Upper Arm, Position: Sitting, Cuff Size: Medium Adult)   Pulse 116   Temp 97.2 °F (36.2 °C)   Ht 5' 3\" (1.6 m)   Wt 170 lb 9.6 oz (77.4 kg)   LMP 12/08/2015 (Approximate)   BMI 30.22 kg/m²      CONSTITUTIONAL:  awake, alert, not distressed  LUNGS:  CTA bilaterally  CARDIOVASCULAR: S1S2  ABDOMEN: Soft, nondistended, No TTP, BS present  NEUROLOGIC:  Mental Status Exam:  Level of Alertness:   alert  Orientation:   oriented to person, place, and time        ASSESSMENT   1.  55 y.o. F needing screening colonoscopy        PLAN     1.  Patient will be scheduled for screening colonoscopy. Patient was given a thorough bowel prep instruction. The risks, benefits, suspected outcome and alternatives to the procedure were explained. The risk included but are not limited to bleeding, infection, respiratory distress, hypertension and perforation of the colon. The patient understands and is in agreement.   All questions were answered appropriately.     Patient's case was discussed with Dr. Edwar Alva     Electronically signed by Delfina Allen DO  on 3/2/2022 at 9:29 AM

## 2022-03-24 NOTE — ANESTHESIA PRE PROCEDURE
Department of Anesthesiology  Preprocedure Note       Name:  Kanchan Gallardo   Age:  55 y.o.  :  1965                                          MRN:  [id number]         Date:  3/24/2022      Surgeon: Cassandra Vanessa):  Elizabeth Burrell IV, DO    Procedure: Procedure(s):  COLORECTAL CANCER SCREENING, NOT HIGH RISK, POSSIBLE POLYPECTOMY, POSSIBLE BIOPSY - GI SCHEDULED    Medications prior to admission:   Prior to Admission medications    Medication Sig Start Date End Date Taking?  Authorizing Provider   polyethylene glycol (GLYCOLAX) 17 GM/SCOOP powder Use your bowel preparation sheet for instructions on how to use miralax for bowel preparation prior to colonoscopy 3/2/22   Camila Liu DO   polyethylene glycol (GLYCOLAX) 17 GM/SCOOP powder MIX 17 GRAMS IN LIQUID AND TAKE BY MOUTH DAILY AS NEEDED FOR CONSTIPATION 3/1/22   Vane Olsen MD   ferrous sulfate (FEROSUL) 325 (65 Fe) MG tablet TAKE 1 TABLET BY MOUTH THREE TIMES DAILY 22   Nidhi Cunha MD   potassium chloride (KLOR-CON M) 10 MEQ extended release tablet TAKE 1 TABLET BY MOUTH TWICE DAILY 1/3/22   Nidhi Cunha MD   topiramate (TOPAMAX) 200 MG tablet TAKE TWO (2) TABLETS BY MOUTH TWICE DAILY 1/3/22   Jenni Silvestre MD   Lancets (ONETOUCH DELICA PLUS [id number]) MISC USE TO TEST BLOOD SUGAR DAILY 1/3/22   Jenni Silvestre MD   blood glucose test strips (ONETOUCH ULTRA) strip USE AS DIRECTED AS NEEDED 1/3/22   Jenni Silvestre MD   Insulin Pen Needle 31G X 8 MM MISC 1 each by Does not apply route daily 21   Collin Fontenot MD   Insulin Pen Needle 31G X 6 MM MISC 1 each by Does not apply route daily 21   Collin Fontenot MD   hydroCHLOROthiazide (HYDRODIURIL) 25 MG tablet TAKE 1 TABLET BY MOUTH DAILY 21   Dana Grace MD   Insulin Pen Needle ([employer] PEN NEEDLES 31G) 31G X 8 MM MISC 1 each by Does not apply route daily 21   Jemal Garcia MD   insulin glargine (LANTUS SOLOSTAR) 100 UNIT/ML injection pen Inject 40 Units into the skin 2 times daily 11/8/21   Abundio Tao MD   aspirin (ASPIRIN LOW DOSE) 81 MG EC tablet TAKE 1 TABLET BY MOUTH DAILY 11/4/21   Abundio Tao MD   docusate sodium (COLACE) 100 MG capsule TAKE ONE (1) CAPSULE BY MOUTH TWICE DAILY AS NEEDED FOR CONSTIPATION 9/9/21   Kandace Hannon MD   gabapentin (NEURONTIN) 300 MG capsule TAKE 1 CAPSULE BY MOUTH THREE TIMES A DAY 7/1/21 10/12/21  Ryder Betancur MD   JANUVIA 100 MG tablet TAKE (1) TABLET BY MOUTH DAILY 6/1/21   Jayme Vela MD   atorvastatin (LIPITOR) 20 MG tablet TAKE 1 TABLET BY MOUTH DAILY 6/1/21   Jayme Vela MD   OneTouch Delica Lancets 71I MISC USE TO TEST BLOOD SUGAR DAILY 12/18/20   Claudia Rousseau MD   vitamin D (ERGOCALCIFEROL) 1.25 MG (28840 UT) CAPS capsule TAKE ONE (1) CAPSULE BY MOUTH ONCE A WEEK 9/16/20   Houston Coronado MD   NOVOLOG FLEXPEN 100 UNIT/ML injection pen INJECT 4 UNITS SUBCUTANEOUSLY THREE TIMES A DAY BEFORE MEALS 8/13/20   MD Miles Mcginnis [facility] PLUS [id number]) MISC Check blood sugars 3 times daily 3/17/20   Constantine Nyhan, MD   Multiple Vitamins-Minerals (MULTIVITAMIN-MINERALS) TABS tablet TAKE (1) TABLET BY MOUTH DAILY 9/25/19   Constantine Nyhan, MD   TRUEPLUS PEN NEEDLES 31G X 6 MM MISC USE DAILY AS DIRECTED 6/27/19   Constantine Nyhan, MD   Melatonin 10 MG CAPS TAKE 1 CAPSULE BY MOUTH AT NIGHT 8/7/18   Historical Provider, MD   ibuprofen (ADVIL;MOTRIN) 600 MG tablet Take 1 tablet by mouth every 6 hours as needed for Pain 9/11/17   Ronnie Bryant MD   zolpidem (AMBIEN) 5 MG tablet Take 10 mg by mouth nightly as needed for Sleep.      Historical Provider, MD   risperiDONE (RISPERDAL) 4 MG tablet Take 4 mg by mouth daily  6/13/16   Historical Provider, MD ODONNELL 3CC LUER-AMBAR SYR 26RP0-5/2 22G X 1-1/2\" 3 ML MISC  4/14/16   Historical Provider, MD   benztropine (COGENTIN) 0.5 MG tablet Take 1 tablet by mouth 2 times daily 8/27/15   Historical Provider, MD       Current medications: Current Facility-Administered Medications   Medication Dose Route Frequency Provider Last Rate Last Admin    lactated ringers infusion   IntraVENous Continuous Rosa Maria Doshi  mL/hr at 03/24/22 1158 New Bag at 03/24/22 1158       Allergies:  No Known Allergies    Problem List:    Patient Active Problem List   Diagnosis Code    History of breast cancer Z85.3    Pain, knee M25.569    Depression F32. 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    Headache disorder R51.9    [place] L60.3    Class 2 severe obesity due to excess calories with serious comorbidity in adult ([facility] [address] E66.01    Episode of syncope R55       Past Medical History:        Diagnosis Date    Breast cancer ([facility] [address] 7/16/2012    Cancer ([facility] [address] 05-16-06    L breast - invasive ductal adenocarcinoma    Conjunctivitis     susceptible to conjunctivitis    Depression     Disorder of patellofemoral joint     DJD (degenerative joint disease) of knee     bilateral    Hyperlipidemia     Hypertension     Malignant neoplasm of nipple of left breast in female, estrogen receptor positive ([facility] [address] 9/24/2015    Pain, knee 10-06    R lateral meniscus tear    Primary osteoarthritis of left knee     Type II or unspecified type diabetes mellitus without mention of complication, not stated as uncontrolled     Under care of team 03/21/2022    PCP - DR. MAN - LAST VISIT 1/2022    Under care of team 03/21/2022    ONCOLOGY - DR. Tobias Spaulding - LAST VISIT 10.2021    Under care of team 03/21/2022    GENERAL SURGERY - DR. MCKEE - LAST VISIT 3/2022    Wears dentures 03/21/2022    FULL    Wears glasses 03/21/2022    Weight loss 03/21/2022    70 LBS POST BARIATRIC SURGERY.        Past Surgical History:        Procedure Laterality Date    BARIATRIC SURGERY  2019    BREAST SURGERY  2006    L lumpectomy, [place] lymph node bx, L axillary node dissection     SECTION      X2    INCISION AND DRAINAGE N/A 2017    INCISION AND DRAINAGE LEFT PERIANAL ABSCESS performed by Fay Cabrera DO at [address] [facility]      left breath lymph node removed    MASTECTOMY      OTHER SURGICAL HISTORY N/A 2017    I&D perirectal abcess    TUBAL LIGATION         Social History:    Social History     Tobacco Use    Smoking status: Passive Smoke Exposure - Never Smoker    Smokeless tobacco: Never Used   Substance Use Topics    Alcohol use: No                                Counseling given: Not Answered      Vital Signs (Current):   Vitals:    22 1152   BP: 131/79   Pulse: 93   Resp: 16   Temp: 97.9 °F (36.6 °C)   TempSrc: Temporal   SpO2: 98%   Weight: 155 lb (70.3 kg)   Height: 5' 3\" (1.6 m)                                              BP Readings from Last 3 Encounters:   22 131/79   22 131/82   01/10/22 (!) 148/89       NPO Status: Time of last liquid consumption:                         Time of last solid consumption:                         Date of last liquid consumption: 22                        Date of last solid food consumption: 22    BMI:   Wt Readings from Last 3 Encounters:   22 155 lb (70.3 kg)   22 170 lb 9.6 oz (77.4 kg)   01/10/22 174 lb (78.9 kg)     Body mass index is 27.46 kg/m².     CBC:   Lab Results   Component Value Date    WBC 11.2 10/05/2021    RBC 4.31 10/05/2021    RBC 4.00 2012    HGB 11.8 10/05/2021    HCT 36.5 10/05/2021    MCV 84.7 10/05/2021    RDW 12.0 10/05/2021     10/05/2021     2012       CMP:   Lab Results   Component Value Date     10/05/2021    K 4.0 10/05/2021     10/05/2021    CO2 23 10/05/2021    BUN 16 10/05/2021    CREATININE 0.66 2022    CREATININE 0.85 10/05/2021    GFRAA >60 10/05/2021    LABBERNARDOM >60 03/24/2022    GLUCOSE 277 10/05/2021    GLUCOSE 95 03/01/2012    PROT 7.8 10/05/2021    CALCIUM 9.0 10/05/2021    BILITOT 0.45 10/05/2021    ALKPHOS 95 10/05/2021    AST 17 10/05/2021    ALT 11 10/05/2021       POC Tests:   Recent Labs     03/24/22  1150   POCGLU 369*   POCK 4.0   POCBUN 8       Coags: No results found for: PROTIME, INR, APTT    HCG (If Applicable):   Lab Results   Component Value Date    PREGTESTUR negative 04/28/2015    HCG NEGATIVE 08/10/2012        ABGs: No results found for: PHART, PO2ART, IPZ2QZY, ZQL6TBF, BEART, [id number]     Type & Screen (If Applicable):  No results found for: LABABO, LABRH    Drug/Infectious Status (If Applicable):  No results found for: HIV, HEPCAB    COVID-19 Screening (If Applicable): No results found for: COVID19        Anesthesia Evaluation    Airway: Mallampati: II        Dental: normal exam         Pulmonary:Negative Pulmonary ROS                              Cardiovascular:    (+) hypertension:,         Rhythm: regular  Rate: normal                    Neuro/Psych:   (+) seizures:, headaches:, psychiatric history:            GI/Hepatic/Renal:             Endo/Other:    (+) Diabetes, . Abdominal:         (-) obese       Vascular: negative vascular ROS. Other Findings:             Anesthesia Plan      MAC     ASA 3       Induction: intravenous. Anesthetic plan and risks discussed with patient. Plan discussed with CRNA.                   New Dueñas MD   3/24/2022

## 2022-03-25 NOTE — ANESTHESIA POSTPROCEDURE EVALUATION
POST- ANESTHESIA EVALUATION       Pt Name: Frederick Meyer  MRN: [id number]  [place]: 1965  Date of evaluation: 3/25/2022  Time:  6:09 AM      /85   Pulse 74   Temp 96.8 °F (36 °C) (Tympanic)   Resp 16   Ht 5' 3\" (1.6 m)   Wt 155 lb (70.3 kg)   LMP 12/08/2015 (Approximate)   SpO2 99%   BMI 27.46 kg/m²      Consciousness Level  Awake  Cardiopulmonary Status  Stable  Pain Adequately Treated YES  Nausea / Vomiting  NO  Adequate Hydration  YES  Anesthesia Related Complications NONE      Electronically signed by Alex Trevizo MD on 3/25/2022 at 6:09 AM       Department of Anesthesiology  Postprocedure Note    Patient: Frederick Meyer  MRN: [id number]  [place]: 1965  Date of evaluation: 3/25/2022  Time:  6:09 AM     Procedure Summary     Date: 03/24/22 Room / Location: [address]    Anesthesia Start: 3247 Anesthesia Stop: 1283    Procedure: COLONOSCOPY DIAGNOSTIC (N/A ) Diagnosis: (SCREENING)    Surgeons: Paul Burrell IV, DO Responsible Provider: Alex Trevizo MD    Anesthesia Type: MAC ASA Status: 3          Anesthesia Type: MAC    Rosemary Phase I:      Rosemary Phase II: Rosemary Score: 9    Last vitals: Reviewed and per EMR flowsheets.        Anesthesia Post Evaluation

## 2022-05-05 DIAGNOSIS — R51.9 HEADACHE DISORDER: ICD-10-CM

## 2022-05-05 DIAGNOSIS — K59.09 OTHER CONSTIPATION: ICD-10-CM

## 2022-05-05 RX ORDER — LANCETS 33 GAUGE
EACH MISCELLANEOUS
Qty: 100 EACH | Refills: 0 | Status: SHIPPED | OUTPATIENT
Start: 2022-05-05 | End: 2022-05-13

## 2022-05-05 NOTE — TELEPHONE ENCOUNTER
Please address the medication refill and close the encounter. If I can be of assistance, please route to the applicable pool. Thank you. Last visit: 11/8/2021  Last Med refill: 1-3-22  Does patient have enough medication for 72 hours: No:     Next Visit Date:  Future Appointments   Date Time Provider Kota Sanz   10/11/2022 11:00 AM Cheikh Major MD [id number] [facility] Maintenance   Topic Date Due    Hepatitis C screen  Never done    Shingles vaccine (1 of 2) Never done    Pneumococcal 0-64 years Vaccine (2 - PCV) 02/06/2018    Diabetic retinal exam  12/31/2020    Hepatitis B vaccine (3 of 3 - Risk 3-dose series) 02/23/2021    Lipids  12/24/2021    A1C test (Diabetic or Prediabetic)  02/08/2022    Depression Monitoring  02/19/2022    Diabetic foot exam  04/09/2022    Diabetic microalbuminuria test  04/09/2022    Potassium  03/24/2023    Creatinine  03/24/2023    Cervical cancer screen  06/21/2023    DTaP/Tdap/Td vaccine (4 - Td or Tdap) 04/23/2031    Colorectal Cancer Screen  03/24/2032    Breast cancer screen  Completed    Flu vaccine  Completed    COVID-19 Vaccine  Completed    HIV screen  Completed    Hepatitis A vaccine  Aged Out    Hib vaccine  Aged Out    Meningococcal (ACWY) vaccine  Aged Out       Hemoglobin A1C (%)   Date Value   11/08/2021 12.6   09/23/2021 11.6   12/24/2020 12.6 (H)             ( goal A1C is < 7)   Microalb/Crt.  Ratio (mcg/mg creat)   Date Value   04/09/2021 CANNOT BE CALCULATED     LDL Cholesterol (mg/dL)   Date Value   12/24/2020 56   12/24/2020 56       (goal LDL is <100)   AST (U/L)   Date Value   10/05/2021 17     ALT (U/L)   Date Value   10/05/2021 11     BUN (mg/dL)   Date Value   10/05/2021 16     BP Readings from Last 3 Encounters:   03/24/22 130/85   03/24/22 117/71   03/02/22 131/82          (goal 120/80)    All Future Testing planned in [facility]  Lab Frequency Next Occurrence   CBC With Auto Differential Once 10/12/2022   Ferritin Once 10/12/2022   Comprehensive Metabolic Panel Once 95/27/1741   Cologuard (For External Results Only) Once 11/08/2022               Patient Active Problem List:     History of breast cancer     Pain, knee     Depression     DJD (degenerative joint disease) of knee     Headache     Iron deficiency anemia     HTN (hypertension)     DM w/o complication type II     Left shoulder pain     Diabetes 1.5, managed as type 2 ([address]     Headache disorder     [place]ia     Class 2 severe obesity due to excess calories with serious comorbidity in adult [facility])     Episode of syncope

## 2022-05-06 RX ORDER — POLYETHYLENE GLYCOL 3350 17 G/17G
POWDER, FOR SOLUTION ORAL
Qty: 510 G | Refills: 10 | Status: SHIPPED | OUTPATIENT
Start: 2022-05-06 | End: 2022-06-28 | Stop reason: ALTCHOICE

## 2022-05-06 RX ORDER — ASPIRIN 81 MG/1
TABLET ORAL
Qty: 30 TABLET | Refills: 10 | Status: SHIPPED | OUTPATIENT
Start: 2022-05-06

## 2022-05-06 NOTE — TELEPHONE ENCOUNTER
E-scribe request for med refill. Please review and e-scribe if applicable. Last Visit Date:  11/08/2021  Next Visit Date:  Visit date not found    Hemoglobin A1C (%)   Date Value   11/08/2021 12.6   09/23/2021 11.6   12/24/2020 12.6 (H)             ( goal A1C is < 7)   Microalb/Crt.  Ratio (mcg/mg creat)   Date Value   04/09/2021 CANNOT BE CALCULATED     LDL Cholesterol (mg/dL)   Date Value   12/24/2020 56       (goal LDL is <100)   AST (U/L)   Date Value   10/05/2021 17     ALT (U/L)   Date Value   10/05/2021 11     BUN (mg/dL)   Date Value   10/05/2021 16     BP Readings from Last 3 Encounters:   03/24/22 130/85   03/24/22 117/71   03/02/22 131/82          (goal 120/80)        Patient Active Problem List:     History of breast cancer     Pain, knee     Depression     DJD (degenerative joint disease) of knee     Headache     Iron deficiency anemia     HTN (hypertension)     DM w/o complication type II     Left shoulder pain     Diabetes 1.5, managed as type 2 (HCC)     Headache disorder     [place]     Class 2 severe obesity due to excess calories with serious comorbidity in adult [facility])     Episode of syncope      ----Fabian Velez

## 2022-05-06 NOTE — TELEPHONE ENCOUNTER
E-scribe request for med refill. Please review and e-scribe if applicable. Last Visit Date:  11/08/2021  Next Visit Date:  Visit date not found    Hemoglobin A1C (%)   Date Value   11/08/2021 12.6   09/23/2021 11.6   12/24/2020 12.6 (H)             ( goal A1C is < 7)   Microalb/Crt.  Ratio (mcg/mg creat)   Date Value   04/09/2021 CANNOT BE CALCULATED     LDL Cholesterol (mg/dL)   Date Value   12/24/2020 56       (goal LDL is <100)   AST (U/L)   Date Value   10/05/2021 17     ALT (U/L)   Date Value   10/05/2021 11     BUN (mg/dL)   Date Value   10/05/2021 16     BP Readings from Last 3 Encounters:   03/24/22 130/85   03/24/22 117/71   03/02/22 131/82          (goal 120/80)        Patient Active Problem List:     History of breast cancer     Pain, knee     Depression     DJD (degenerative joint disease) of knee     Headache     Iron deficiency anemia     HTN (hypertension)     DM w/o complication type II     Left shoulder pain     Diabetes 1.5, managed as type 2 (HCC)     Headache disorder     [place]     Class 2 severe obesity due to excess calories with serious comorbidity in adult [facility])     Episode of syncope      ----Sisi Mojica

## 2022-05-09 RX ORDER — POTASSIUM CHLORIDE 750 MG/1
TABLET, EXTENDED RELEASE ORAL
Qty: 60 TABLET | Refills: 10 | Status: SHIPPED | OUTPATIENT
Start: 2022-05-09

## 2022-05-13 RX ORDER — LANCETS 33 GAUGE
EACH MISCELLANEOUS
Qty: 100 EACH | Refills: 5 | Status: SHIPPED | OUTPATIENT
Start: 2022-05-13

## 2022-05-13 NOTE — TELEPHONE ENCOUNTER
Please address the medication refill and close the encounter. If I can be of assistance, please route to the applicable pool. Thank you. Last visit: 11-8-2021  Last Med refill: 5-5-22  Does patient have enough medication for 72 hours: Yes      Next Visit Date:  Future Appointments   Date Time Provider Kota Sanz   6/2/2022  9:00 AM Zehra Ayoub MD [facility]   10/11/2022 11:00 AM Abi Pierre MD [id number] [facility] Maintenance   Topic Date Due    Hepatitis C screen  Never done    Shingles vaccine (1 of 2) Never done    Pneumococcal 0-64 years Vaccine (2 - PCV) 02/06/2018    Diabetic retinal exam  12/31/2020    Hepatitis B vaccine (3 of 3 - Risk 3-dose series) 02/23/2021    Lipids  12/24/2021    A1C test (Diabetic or Prediabetic)  02/08/2022    Depression Monitoring  02/19/2022    Diabetic foot exam  04/09/2022    Diabetic microalbuminuria test  04/09/2022    Cervical cancer screen  06/21/2023    DTaP/Tdap/Td vaccine (4 - Td or Tdap) 04/23/2031    Colorectal Cancer Screen  03/24/2032    Breast cancer screen  Completed    Flu vaccine  Completed    COVID-19 Vaccine  Completed    HIV screen  Completed    Hepatitis A vaccine  Aged Out    Hib vaccine  Aged Out    Meningococcal (ACWY) vaccine  Aged Out       Hemoglobin A1C (%)   Date Value   11/08/2021 12.6   09/23/2021 11.6   12/24/2020 12.6 (H)             ( goal A1C is < 7)   Microalb/Crt.  Ratio (mcg/mg creat)   Date Value   04/09/2021 CANNOT BE CALCULATED     LDL Cholesterol (mg/dL)   Date Value   12/24/2020 56   12/24/2020 56       (goal LDL is <100)   AST (U/L)   Date Value   10/05/2021 17     ALT (U/L)   Date Value   10/05/2021 11     BUN (mg/dL)   Date Value   10/05/2021 16     BP Readings from Last 3 Encounters:   03/24/22 130/85   03/24/22 117/71   03/02/22 131/82          (goal 120/80)    All Future Testing planned in [facility]  Lab Frequency Next Occurrence   CBC With Auto Differential Once 10/12/2022   Ferritin Once 10/12/2022   Comprehensive Metabolic Panel Once 88/54/3145   Cologuard (For External Results Only) Once 11/08/2022               Patient Active Problem List:     History of breast cancer     Pain, knee     Depression     DJD (degenerative joint disease) of knee     Headache     Iron deficiency anemia     HTN (hypertension)     DM w/o complication type II     Left shoulder pain     Diabetes 1.5, managed as type 2 ([address]     Headache disorder     [place]     Class 2 severe obesity due to excess calories with serious comorbidity in adult [facility])     Episode of syncope

## 2022-05-17 DIAGNOSIS — E11.9 TYPE 2 DIABETES MELLITUS WITHOUT COMPLICATION, WITH LONG-TERM CURRENT USE OF INSULIN (HCC): ICD-10-CM

## 2022-05-17 DIAGNOSIS — E78.00 HYPERCHOLESTEREMIA: ICD-10-CM

## 2022-05-17 DIAGNOSIS — E11.00 TYPE 2 DIABETES MELLITUS WITH HYPEROSMOLARITY WITHOUT COMA, WITHOUT LONG-TERM CURRENT USE OF INSULIN (HCC): ICD-10-CM

## 2022-05-17 DIAGNOSIS — Z79.4 TYPE 2 DIABETES MELLITUS WITHOUT COMPLICATION, WITH LONG-TERM CURRENT USE OF INSULIN (HCC): ICD-10-CM

## 2022-05-17 RX ORDER — ATORVASTATIN CALCIUM 20 MG/1
20 TABLET, FILM COATED ORAL DAILY
Qty: 30 TABLET | Refills: 0 | Status: SHIPPED | OUTPATIENT
Start: 2022-05-17 | End: 2022-06-30

## 2022-05-17 NOTE — TELEPHONE ENCOUNTER
Please address the medication refill and close the encounter. If I can be of assistance, please route to the applicable pool. Thank you. Last visit: 11/08/21  Last Med refill: 06/01/21  Does patient have enough medication for 72 hours: No:     Next Visit Date:  Future Appointments   Date Time Provider Kota Sanz   6/2/2022  9:00 AM Hakan Sánchez MD [facility]es   10/11/2022 11:00 AM Nadira Oseguera MD [id number] [facility] Maintenance   Topic Date Due    Hepatitis C screen  Never done    Shingles vaccine (1 of 2) Never done    Pneumococcal 0-64 years Vaccine (2 - PCV) 02/06/2018    Diabetic retinal exam  12/31/2020    Hepatitis B vaccine (3 of 3 - Risk 3-dose series) 02/23/2021    Lipids  12/24/2021    A1C test (Diabetic or Prediabetic)  02/08/2022    Depression Monitoring  02/19/2022    Diabetic foot exam  04/09/2022    Diabetic microalbuminuria test  04/09/2022    Cervical cancer screen  06/21/2023    DTaP/Tdap/Td vaccine (4 - Td or Tdap) 04/23/2031    Colorectal Cancer Screen  03/24/2032    Breast cancer screen  Completed    Flu vaccine  Completed    COVID-19 Vaccine  Completed    HIV screen  Completed    Hepatitis A vaccine  Aged Out    Hib vaccine  Aged Out    Meningococcal (ACWY) vaccine  Aged Out       Hemoglobin A1C (%)   Date Value   11/08/2021 12.6   09/23/2021 11.6   12/24/2020 12.6 (H)             ( goal A1C is < 7)   Microalb/Crt.  Ratio (mcg/mg creat)   Date Value   04/09/2021 CANNOT BE CALCULATED     LDL Cholesterol (mg/dL)   Date Value   12/24/2020 56   12/24/2020 56       (goal LDL is <100)   AST (U/L)   Date Value   10/05/2021 17     ALT (U/L)   Date Value   10/05/2021 11     BUN (mg/dL)   Date Value   10/05/2021 16     BP Readings from Last 3 Encounters:   03/24/22 130/85   03/24/22 117/71   03/02/22 131/82          (goal 120/80)    All Future Testing planned in [facility]  Lab Frequency Next Occurrence   CBC With Auto Differential Once 10/12/2022 Ferritin Once 10/12/2022   Comprehensive Metabolic Panel Once 01/89/3452   Cologuard (For External Results Only) Once 11/08/2022               Patient Active Problem List:     History of breast cancer     Pain, knee     Depression     DJD (degenerative joint disease) of knee     Headache     Iron deficiency anemia     HTN (hypertension)     DM w/o complication type II     Left shoulder pain     Diabetes 1.5, managed as type 2 ([address]     Headache disorder     [place]     Class 2 severe obesity due to excess calories with serious comorbidity in adult [facility])     Episode of syncope

## 2022-06-02 ENCOUNTER — OFFICE VISIT (OUTPATIENT)
Dept: FAMILY MEDICINE CLINIC | Age: 57
End: 2022-06-02
Payer: MEDICARE

## 2022-06-02 VITALS
HEART RATE: 116 BPM | TEMPERATURE: 96.6 F | SYSTOLIC BLOOD PRESSURE: 138 MMHG | HEIGHT: 63 IN | BODY MASS INDEX: 29.7 KG/M2 | WEIGHT: 167.6 LBS | DIASTOLIC BLOOD PRESSURE: 84 MMHG

## 2022-06-02 DIAGNOSIS — E13.9 DIABETES 1.5, MANAGED AS TYPE 2 (HCC): Primary | ICD-10-CM

## 2022-06-02 DIAGNOSIS — Z23 NEED FOR PROPHYLACTIC VACCINATION AGAINST STREPTOCOCCUS PNEUMONIAE (PNEUMOCOCCUS): ICD-10-CM

## 2022-06-02 DIAGNOSIS — I10 ESSENTIAL HYPERTENSION: ICD-10-CM

## 2022-06-02 DIAGNOSIS — Z23 NEED FOR PROPHYLACTIC VACCINATION AND INOCULATION AGAINST VARICELLA: ICD-10-CM

## 2022-06-02 DIAGNOSIS — Z13.220 SCREENING FOR HYPERLIPIDEMIA: ICD-10-CM

## 2022-06-02 LAB — HBA1C MFR BLD: 13.2 %

## 2022-06-02 PROCEDURE — G8427 DOCREV CUR MEDS BY ELIG CLIN: HCPCS | Performed by: FAMILY MEDICINE

## 2022-06-02 PROCEDURE — 90732 PPSV23 VACC 2 YRS+ SUBQ/IM: CPT

## 2022-06-02 PROCEDURE — 2022F DILAT RTA XM EVC RTNOPTHY: CPT | Performed by: FAMILY MEDICINE

## 2022-06-02 PROCEDURE — 3046F HEMOGLOBIN A1C LEVEL >9.0%: CPT | Performed by: FAMILY MEDICINE

## 2022-06-02 PROCEDURE — G8417 CALC BMI ABV UP PARAM F/U: HCPCS | Performed by: FAMILY MEDICINE

## 2022-06-02 PROCEDURE — 1036F TOBACCO NON-USER: CPT | Performed by: FAMILY MEDICINE

## 2022-06-02 PROCEDURE — 99213 OFFICE O/P EST LOW 20 MIN: CPT | Performed by: FAMILY MEDICINE

## 2022-06-02 PROCEDURE — 83036 HEMOGLOBIN GLYCOSYLATED A1C: CPT

## 2022-06-02 PROCEDURE — 3017F COLORECTAL CA SCREEN DOC REV: CPT | Performed by: FAMILY MEDICINE

## 2022-06-02 PROCEDURE — G0010 ADMIN HEPATITIS B VACCINE: HCPCS

## 2022-06-02 RX ORDER — HYDROCHLOROTHIAZIDE 25 MG/1
TABLET ORAL
Qty: 30 TABLET | Refills: 10 | Status: SHIPPED | OUTPATIENT
Start: 2022-06-02

## 2022-06-02 RX ORDER — MIRTAZAPINE 15 MG/1
TABLET, FILM COATED ORAL
COMMUNITY
Start: 2022-05-18

## 2022-06-02 RX ORDER — HYDROXYZINE HYDROCHLORIDE 25 MG/1
TABLET, FILM COATED ORAL
COMMUNITY
Start: 2022-05-18

## 2022-06-02 RX ORDER — ESCITALOPRAM OXALATE 10 MG/1
10 TABLET ORAL DAILY
COMMUNITY
Start: 2022-05-13

## 2022-06-02 SDOH — ECONOMIC STABILITY: FOOD INSECURITY: WITHIN THE PAST 12 MONTHS, THE FOOD YOU BOUGHT JUST DIDN'T LAST AND YOU DIDN'T HAVE MONEY TO GET MORE.: SOMETIMES TRUE

## 2022-06-02 SDOH — ECONOMIC STABILITY: FOOD INSECURITY: WITHIN THE PAST 12 MONTHS, YOU WORRIED THAT YOUR FOOD WOULD RUN OUT BEFORE YOU GOT MONEY TO BUY MORE.: SOMETIMES TRUE

## 2022-06-02 ASSESSMENT — ENCOUNTER SYMPTOMS
SHORTNESS OF BREATH: 0
PHOTOPHOBIA: 0
APNEA: 0
DIARRHEA: 0
NAUSEA: 0
COUGH: 0
WHEEZING: 0
VOMITING: 0
ABDOMINAL PAIN: 0
COLOR CHANGE: 0
CONSTIPATION: 0

## 2022-06-02 ASSESSMENT — PATIENT HEALTH QUESTIONNAIRE - PHQ9
SUM OF ALL RESPONSES TO PHQ9 QUESTIONS 1 & 2: 3
1. LITTLE INTEREST OR PLEASURE IN DOING THINGS: 2
2. FEELING DOWN, DEPRESSED OR HOPELESS: 1
SUM OF ALL RESPONSES TO PHQ QUESTIONS 1-9: 3

## 2022-06-02 ASSESSMENT — SOCIAL DETERMINANTS OF HEALTH (SDOH): HOW HARD IS IT FOR YOU TO PAY FOR THE VERY BASICS LIKE FOOD, HOUSING, MEDICAL CARE, AND HEATING?: SOMEWHAT HARD

## 2022-06-02 NOTE — TELEPHONE ENCOUNTER
E-scribe request for med refill. Please review and e-scribe if applicable. Last Visit Date:  06/01/2022  Next Visit Date:  6/30/2022    Hemoglobin A1C (%)   Date Value   06/02/2022 13.2   11/08/2021 12.6   09/23/2021 11.6             ( goal A1C is < 7)   Microalb/Crt.  Ratio (mcg/mg creat)   Date Value   04/09/2021 CANNOT BE CALCULATED     LDL Cholesterol (mg/dL)   Date Value   12/24/2020 56       (goal LDL is <100)   AST (U/L)   Date Value   10/05/2021 17     ALT (U/L)   Date Value   10/05/2021 11     BUN (mg/dL)   Date Value   10/05/2021 16     BP Readings from Last 3 Encounters:   06/02/22 138/84   03/24/22 130/85   03/24/22 117/71          (goal 120/80)        Patient Active Problem List:     History of breast cancer     Pain, knee     Depression     DJD (degenerative joint disease) of knee     Headache     Iron deficiency anemia     HTN (hypertension)     DM w/o complication type II     Left shoulder pain     Diabetes 1.5, managed as type 2 ([address]     Headache disorder     [place]     Class 2 severe obesity due to excess calories with serious comorbidity in adult [facility])     Episode of syncope      ----Corinna Quintana

## 2022-06-02 NOTE — PROGRESS NOTES
Subjective:    Kristin Milligan is a 65 y.o. female with  has a past medical history of Breast cancer ([facility] [address] Cancer ([address] Conjunctivitis, Depression, Disorder of patellofemoral joint, DJD (degenerative joint disease) of knee, Hyperlipidemia, Hypertension, Malignant neoplasm of nipple of left breast in female, estrogen receptor positive ([address] Pain, knee, Primary osteoarthritis of left knee, Type II or unspecified type diabetes mellitus without mention of complication, not stated as uncontrolled, Under care of team, Under care of team, Under care of team, Wears dentures, Wears glasses, and Weight loss. Presented to the office today for:  Chief Complaint   Patient presents with    Diabetes     A1C check     Hypertension    Health Maintenance     pt had DM eye exam at [facility], Positive PHQ-9       HPI    DM  Inuslin Lantus 40 units in AM and 40 units at night time  Hbaqc today is 13.2 from 12.6 last time  PATIENT IS NOT COMPLIANT  Needs education  Also takes Novolog 4 units with meals but is NOT compliant  Januvia      HTN  Hctz 25  /84      Review of Systems   Constitutional: Negative for activity change, appetite change, fatigue and fever. HENT: Negative for congestion. Eyes: Negative for photophobia and visual disturbance. Respiratory: Negative for apnea, cough, shortness of breath and wheezing. Cardiovascular: Negative for chest pain, palpitations and leg swelling. Gastrointestinal: Negative for abdominal pain, constipation, diarrhea, nausea and vomiting. Endocrine: Negative for polyphagia and polyuria. Genitourinary: Negative for dysuria and urgency. Skin: Negative for color change, pallor and rash. Neurological: Negative for dizziness, tremors and weakness. Psychiatric/Behavioral: Negative for agitation, behavioral problems, confusion, decreased concentration and dysphoric mood.                  The patient has a   Family History   Problem Relation Age of Onset    Cancer Father        Objective:    /84 (Site: Right Upper Arm, Position: Sitting, Cuff Size: Large Adult) Comment: machine  Pulse (!) 116   Temp (!) 96.6 °F (35.9 °C) (Temporal)   Ht 5' 3\" (1.6 m)   Wt 167 lb 9.6 oz (76 kg)   LMP 12/08/2015 (Approximate)   BMI 29.69 kg/m²    BP Readings from Last 3 Encounters:   06/02/22 138/84   03/24/22 130/85   03/24/22 117/71       Physical Exam  Constitutional:       General: She is not in acute distress. Appearance: She is not ill-appearing, toxic-appearing or diaphoretic. HENT:      Nose: No congestion. Cardiovascular:      Rate and Rhythm: Normal rate and regular rhythm. Pulses: Normal pulses. Heart sounds: No murmur heard. Pulmonary:      Effort: Pulmonary effort is normal.      Breath sounds: Normal breath sounds. No wheezing. Abdominal:      Tenderness: There is no abdominal tenderness. There is no guarding. Musculoskeletal:         General: No swelling or tenderness. Normal range of motion. Right lower leg: No edema. Left lower leg: No edema. Skin:     General: Skin is warm. Coloration: Skin is not jaundiced. Findings: No erythema. Comments: Diabetic foot exam was normal   Neurological:      Mental Status: She is alert and oriented to person, place, and time. Motor: No weakness. Psychiatric:         Mood and Affect: Mood normal.         Behavior: Behavior normal.         Thought Content:  Thought content normal.         Judgment: Judgment normal.         Lab Results   Component Value Date    WBC 11.2 10/05/2021    HGB 11.8 (L) 10/05/2021    HCT 36.5 10/05/2021     10/05/2021    CHOL 126 12/24/2020    TRIG 68 12/24/2020    HDL 56 12/24/2020    ALT 11 10/05/2021    AST 17 10/05/2021     10/05/2021    K 4.0 10/05/2021     10/05/2021    CREATININE 0.66 03/24/2022    BUN 16 10/05/2021    CO2 23 10/05/2021    TSH 3.46 12/24/2020    LABA1C 13.2 06/02/2022    LABMICR CANNOT BE CALCULATED 04/09/2021     Lab Results   Component Value Date    CALCIUM 9.0 10/05/2021    PHOS 3.8 08/12/2012     Lab Results   Component Value Date    LDLCHOLESTEROL 56 12/24/2020       Assessment and Plan:    1. Screening for hyperlipidemia  - Lipid Panel; Future    2. Need for prophylactic vaccination against Streptococcus pneumoniae (pneumococcus)  - Pneumococcal polysaccharide vaccine 23-valent PPSV23    3. Need for prophylactic vaccination and inoculation against varicella  - zoster recombinant adjuvanted vaccine [facility]) 50 MCG/0.5ML SUSR injection; Inject 0.5 mLs into the muscle once for 1 dose 50 MCG IM then repeat 2-6 months. Dispense: 1 each; Refill: 1    4. Diabetes 1.5, managed as type 2 ([facility] [address]  Patient is not compliant with her regimen  Explained about 15 minutes explaining how diabetes work  I also counseled the patient on how to give herself insulin injections and the importance of compliance  I will also be sending her to diabetic education and we will see her back in a month to see if there is a difference with her A1c otherwise the plan is to also send her to an endocrinologist after her A1c does not come down  - Microalbumin, Ur; Future  - POCT glycosylated hemoglobin (Hb A1C) 13.2 from 12.6 last time  - [address]          Requested Prescriptions     Signed Prescriptions Disp Refills    zoster recombinant adjuvanted vaccine [facility]) 50 MCG/0.5ML SUSR injection 1 each 1     Sig: Inject 0.5 mLs into the muscle once for 1 dose 50 MCG IM then repeat 2-6 months. There are no discontinued medications. Humble Sol received counseling on the following healthy behaviors: nutrition, exercise and medication adherence    Discussed use,benefit, and side effects of prescribed medications. Barriers to medication compliance addressed. All patient questions answered. Pt voiced understanding.      Return in about 1 month (around 7/2/2022) for Hba1c .        Disclaimer: Some orall of this note was transcribed using voice-recognition software. This may cause typographical errors occasionally. Although all effort is made to fix these errors, please do not hesitate to contact our office if there Vito Newton concern with the understanding of this note.

## 2022-06-02 NOTE — PATIENT INSTRUCTIONS
Thank you for letting us take care of you today. We hope all your questions were addressed. If a question was overlooked or something else comes to mind after you return home, please contact a member of your Care Team listed below. Your Care Team at [address] is Team #4  Mayco Lynne MD (Faculty)  Ralph Hoffmann MD (Resident)  Gina Coley MD (Resident)  Tito Banks MD (Resident)  Mt Kemp MD (Resident)  BETZAIDA Deleon., Madeline Albright., Misael Lynn [facility] office)  Xiomara Bates (Clinical Practice Manager)  Roby Walsh [facility] (Clinical Pharmacist)       Office phone number: [phone]    If you need to get in right away due to illness, please be advised we have \"Same Day\" appointments available Monday-Friday. Please call us at [phone] option #3 to schedule your \"Same Day\" appointment. Patient Education        Pneumococcal Polysaccharide Vaccine: What You Need to Know  Why get vaccinated? Pneumococcal polysaccharide vaccine (PPSV23) can prevent pneumococcal disease. Pneumococcal disease refers to any illness caused by pneumococcal bacteria. These bacteria can cause many types of illnesses, including pneumonia, which is an infection ofthe lungs. Pneumococcal bacteria are one of the most common causes of pneumonia. Besides pneumonia, pneumococcal bacteria can also cause:   Ear infections,   Sinus infections   Meningitis (infection of the tissue covering the brain and spinal cord)   Bacteremia (bloodstream infection)  Anyone can get pneumococcal disease, but children under 3years of age, people with certain medical conditions, adults 72 years or older, and cigarettesmokers are at the highest risk. Most pneumococcal infections are mild. However, some can result in long-term problems, such as brain damage or hearing loss.  Meningitis, bacteremia, andpneumonia caused by pneumococcal disease can be fatal.  PPSV23  PPSV23 protects against 23 types of bacteria that cause pneumococcal disease. PPSV23 is recommended for:   All adults 72 years or older,   Anyone 2 years or older with certain medical conditions that can lead to an increased risk for pneumococcal disease. Most people need only one dose of PPSV23. A second dose of PPSV23, and another type of pneumococcal vaccine called PCV13, are recommended for certainhigh-risk groups. Your health care provider can give you more information. People 65 years or older should get a dose of PPSV23 even if they have alreadygotten one or more doses of the vaccine before they turned 65. Talk with your health care provider  Tell your vaccine provider if the person getting the vaccine:   Has had an allergic reaction after a previous dose of PPSV23, or has any severe, life-threatening allergies. In some cases, your health care provider may decide to postpone [id number] to a future visit. People with minor illnesses, such as a cold, may be vaccinated. People who are moderately or severely ill should usually wait until they recover beforegetting PPSV23. Your health care provider can give you more information. Risks of a vaccine reaction   Redness or pain where the shot is given, feeling tired, fever, or muscle aches can happen after PPSV23. People sometimes faint after medical procedures, including vaccination. Tellyour provider if you feel dizzy or have vision changes or ringing in the ears. As with any medicine, there is a very remote chance of a vaccine causing asevere allergic reaction, other serious injury, or death. What if there is a serious problem? An allergic reaction could occur after the vaccinated person leaves the clinic.  If you see signs of a severe allergic reaction (hives, swelling of the face and throat, difficulty breathing, a fast heartbeat, dizziness, or weakness), call 9-1-1 and get the person to the nearest hospital.  For other signs that concern you, call your health care provider. Adverse reactions should be reported to the Vaccine Adverse Event Reporting System (VAERS). Your health care provider will usually file this report, or you can do it yourself. Visit the VAERS website at www.vaers. hhs.gov at www.vaers. hhs.gov or call 2-791.416.8301. VAERS is only for reporting reactions, and VAERS staff do not give medical advice. How can I learn more?  Ask your health care provider.  Call your local or state health department.  Contact the Centers for Disease Control and Prevention (CDC):  ? Call 4-727.310.9047 (1-800-CDC-INFO) or  ? Visit CDC's website at www.cdc.gov/vaccines  Vaccine Information Statement  PPSV23 Vaccine  10/30/2019  formerly [facility] and [facility] for Disease Control and Prevention  Many Vaccine Information Statements are available in [place] and other languages. See www.immunize.org/vis. Hojas de información Sobre Vacunas están disponibles en español y en muchos otros idiomas. Visite Anjelica.si. Care instructions adapted under license by [facility] ([facility]). If you have questions about a medical condition or this instruction, always ask your healthcare professional. [place] [address] any warranty or liability for your use of this information. Patient Education        hepatitis B adult vaccine  Pronunciation: HEP a MAGNO tis B a DULT VAX een  Brand: Engerix-B, Heplisav-B, Recombivax HB Adult, Recombivax HB Dialysis Formulation  What is the most important information I should know about this vaccine? You should not receive hepatitis B vaccine if you are allergic to yeast.  This vaccine will not protect against hepatitis B if you are already infectedwith the virus, even if you do not yet show symptoms. What is hepatitis B vaccine? Hepatitis B is a serious disease caused by a virus. Hepatitis causes inflammation of the liver, vomiting, and jaundice (yellowing of the skin oreyes).  Hepatitis can lead to liver cancer, cirrhosis, or death. Hepatitis B is spread through blood or bodily fluids, sexual contact, and by sharing items such as a razor, toothbrush, or IV drug needle with an infected person. Hepatitis B can also be passed to a baby during childbirth when themother is infected. The hepatitis B adult vaccine is used to help prevent this disease in adults. The dialysis form of this vaccine is for adults receiving dialysis. This vaccine helps your body develop immunity to hepatitis B, but it will nottreat an active infection you already have. Vaccination with hepatitis B adult vaccine is recommended for all adults who are at risk of getting hepatitis B. Risk factors include: living with someone infected with hepatitis B virus; having more than one sex partner; men who have sex with men; having sexual contact with infected people; having hepatitis C, chronic liver disease, kidney disease, diabetes, HIV or AIDS; being on dialysis; using intravenous (IV) drugs; living or working in a facility for developmentally disabled people; working in healthcare or public safety and being exposed to blood or body fluids; living or working in a correctional facility; being a victim of sexualabuse or assault; and traveling to areas where hepatitis B is common. Like any vaccine, the hepatitis B vaccine may not provide protection fromdisease in every person. What should I discuss with my healthcare provider before receiving this vaccine? Hepatitis B vaccine will not protect against infection with hepatitis A, C, and E, or other viruses that affect the liver. It may also not protect against hepatitis B if you are already infected with the virus, even if you do not yetshow symptoms.   You should not receive this vaccine if you have ever had a life-threatening allergic reaction to any vaccine containing hepatitis B, or if you are allergicto yeast.  If you have any of these other conditions, your vaccine may need to bepostponed or not given at all:   multiple sclerosis;   kidney disease (or if you are on dialysis);   a bleeding or blood clotting disorder such as hemophilia or easy bruising;   weak immune system (caused by disease or by using certain medicine);   an allergy to latex; or   a neurologic disorder or disease affecting the brain (or if this was a reaction to a previous vaccine). You can still receive a vaccine if you have a minor cold. If you have a more severe illness with a fever or any type of infection, your doctor may recommendwaiting until you get better before you receive this vaccine. It is not known whether this vaccine will harm an unborn baby. However, if you are at a high risk for infection with hepatitis B during pregnancy, your doctorshould determine whether you need this vaccine. If you are pregnant, your name may be listed on a pregnancy registry to trackthe effects of this vaccine on the baby. It may not be safe to breastfeed while receiving this medicine. Ask your doctorabout any risk. How is this vaccine given? This vaccine is given as an injection (shot) into a muscle. A healthcareprovider will give you this injection. The hepatitis B vaccine is given in a series of 2 to 4 shots. The booster shots are sometimes given 1 month and 6 months after the first shot. If you have a high risk of hepatitis B infection, you may be given an additional booster 1 to2 months after the third shot. Your individual booster schedule may be different from these guidelines. Follow your doctor's instructions or the schedule recommended by the health departmentof the state you live in. What happens if I miss a dose? Contact your doctor if you will miss a booster dose or if you get behind schedule. The next dose should be given as soon as possible. There is no needto start over. Be sure to receive all recommended doses of this vaccine or you may not befully protected against disease. What happens if I overdose?   An overdose of this vaccine is unlikely to occur. What should I avoid before or after receiving this vaccine? Follow your doctor's instructions about any restrictions on food, beverages, oractivity. What are the possible side effects of this vaccine? Get emergency medical help if you have signs of an allergic reaction (hives, difficult breathing, swelling in your face or throat) or a severe skin reaction (fever, sore throat, burning eyes, skin pain, red or purple skin rash withblistering and peeling). You should not receive a booster vaccine if you had a life-threatening allergicreaction after the first shot. Keep track of any and all side effects you have after receiving this vaccine. When you receive a booster dose, you will need to tell the doctor if theprevious shot caused any side effects. Becoming infected with hepatitis B is much more dangerous to your health than receiving this vaccine. However, like any medicine, this vaccine can cause sideeffects but the risk of serious side effects is extremely low. Call your doctor at once if you have:   a light-headed feeling, like you might pass out;   seizure-like muscle movements; or   fever, swollen glands. Common side effects include:   headache;   feeling tired; or   redness, pain, swelling, or a lump where the shot was given. This is not a complete list of side effects and others may occur. Call your doctor for medical advice about side effects. You may report vaccine sideeffects to the [address] [place] [address] [place] at [phone]. What other drugs will affect hepatitis B vaccine? Other drugs may affect this vaccine, including prescription and over-the-counter medicines, vitamins, and herbal products. Tell your doctorabout all your current medicines and any medicine you start or stop using. Where can I get more information? Your doctor or pharmacist can provide more information about this vaccine.  Additional information is available from your local health department or theCenters for Disease Control and Prevention. Remember, keep this and all other medicines out of the reach of children, never share your medicines with others, and use this medication only for the indication prescribed. Every effort has been made to ensure that the information provided by [address] is accurate, up-to-date, and complete, but no guarantee is made to that effect. Drug information contained herein may be time sensitive. [facility] information has been compiled for use by healthcare practitioners and consumers in the [place] and therefore [facility] does not warrant that uses outside of the [place] are appropriate, unless specifically indicated otherwise. [facility]'s drug information does not endorse drugs, diagnose patients or recommend therapy. [facility]'s drug information is an informational resource designed to assist licensed healthcare practitioners in caring for their patients and/or to serve consumers viewing this service as a supplement to, and not a substitute for, the expertise, skill, knowledge and judgment of healthcare practitioners. The absence of a warning for a given drug or drug combination in no way should be construed to indicate that the drug or drug combination is safe, effective or appropriate for any given patient. [facility] does not assume any responsibility for any aspect of healthcare administered with the aid of information [facility] provides. The information contained herein is not intended to cover all possible uses, directions, precautions, warnings, drug interactions, allergic reactions, or adverse effects. If you have questions about the drugs you are taking, check with yourdoctor, nurse or pharmacist.  Copyright 8662-8918 [address]. Version: 7.01. Revision date: 4/1/2020. Care instructions adapted under license by [facility] ([facility]).  If you have questions about a medical condition or this instruction, always ask your healthcare professional. [place] [address] any warranty or liability for your use of this information.

## 2022-06-14 ENCOUNTER — TELEPHONE (OUTPATIENT)
Dept: FAMILY MEDICINE CLINIC | Age: 57
End: 2022-06-14

## 2022-06-27 NOTE — PROGRESS NOTES
Medication Management Service  [facility]  [phone]      Vianey Maher is a 65 y.o. female that presents for an initial diabetes mellitus telephone visit with Medication Management Service per referral from Dr. Buffy Naik for Diabetes Management under Collaborative Practice Agreement with A1c goal < 7 %. Subjective/Objective     New Problems/Changes:   Pertinent information from recent PCP visit 6/2/22:  · A1c increased from 12.6% on 11/8/21 to 13.2% on 6/2/22  · Patient not compliant with medications  · Counseled patient on giving insulin injections  · Plan to send to endocrinologist if A1c does not decrease at follow up visit in 1 month    DIET  Breakfast: 2 eggs scrambled, OJ  Lunch: 1/2 cold cut sandwich or salad, sometimes fried food or cheeseburger  Dinner: fried chicken wings, potato salad, greens, macaroni, roast w/potatoes and carrots  Snacks: ritz crackers with cheese, a cup of ice cream, boiled eggs  Sweets: key lime greek yogurt, applesauce  Beverages: sweet tea, lemonade, does not drink pop, apple juice, a lot of water while at work    Patient recently had weight loss surgery and says she rarely eats big portions. Patient states she used to weigh 294 lbs. EXERCISE  Patient does not have any formal exercise routine. Patient works at [employer] and states she gets a lot of walking in at her job.      WEIGHT  Weight: 167 lbs  BMI: 29.69 kg/m2    DIABETES MANAGEMENT    Diabetes Goals: Using ADA Standards of Care     Goal A1c:  Less than 7 %   Fasting Blood Sugars:  80-130mg/dL  Postprandial glucose:  Less than 180mg/dL     Lab Results   Component Value Date    LABA1C 13.2 06/02/2022    LABA1C 12.6 11/08/2021    LABA1C 11.6 09/23/2021     Current DM Medications   Insulin lantus 40 units in AM and 40 units in PM - last filled 7/20/21   Insulin aspart 4 units three times a day before meals - last filled 12/16/21   Januvia 100mg daily - last filled 6/14/22    Home Glucose Readings:   Patient states she checks her blood sugars twice a day - once in the AM before work and once at home before bed   Patient does not have blood glucose log with her since she is at work, but states her AM averages have been in the 140-160s and her before bedtime averages have been in the 190-210s    Hypoglycemia:    Patient states she had one episode last week where she got dizzy and shaky   Patient drank some OJ and felt better afterward   Patient also states she had one incidence of waking up during the night with symptoms     Renal monitoring:  Lab Results   Component Value Date    MICROALBUR <12 04/09/2021    LABCREA 151.4 04/09/2021    LABMICR CANNOT BE CALCULATED 04/09/2021     Estimated Creatinine Clearance: 93 mL/min (based on SCr of 0.66 mg/dL).     MEDICATIONS    Current Outpatient Medications   Medication Sig Dispense Refill    escitalopram (LEXAPRO) 10 MG tablet Take 10 mg by mouth daily       hydrOXYzine (ATARAX) 25 MG tablet TAKE 1 TABLET BY MOUTH TWICE DAILY AS NEEDED      mirtazapine (REMERON) 15 MG tablet TAKE 1 TABLET BY MOUTH AT BEDTIME AS NEEDED      hydroCHLOROthiazide (HYDRODIURIL) 25 MG tablet TAKE 1 TABLET BY MOUTH DAILY 30 tablet 10    SITagliptin (JANUVIA) 100 MG tablet TAKE (1) TABLET BY MOUTH DAILY 30 tablet 0    atorvastatin (LIPITOR) 20 MG tablet Take 1 tablet by mouth daily 30 tablet 0    potassium chloride (KLOR-CON M) 10 MEQ extended release tablet TAKE 1 TABLET BY MOUTH TWICE DAILY 60 tablet 10    aspirin (ASPIRIN LOW DOSE) 81 MG EC tablet TAKE 1 TABLET BY MOUTH ONCE DAILY 30 tablet 10    topiramate (TOPAMAX) 200 MG tablet TAKE TWO (2) TABLETS BY MOUTH TWICE DAILY 60 tablet 5    ferrous sulfate (FEROSUL) 325 (65 Fe) MG tablet TAKE 1 TABLET BY MOUTH THREE TIMES DAILY 90 tablet 10    insulin glargine (LANTUS SOLOSTAR) 100 UNIT/ML injection pen Inject 40 Units into the skin 2 times daily 15 mL 10    vitamin D (ERGOCALCIFEROL) 1.25 MG (23003 UT) CAPS capsule TAKE ONE (1) CAPSULE BY MOUTH ONCE A WEEK 12 capsule 10    NOVOLOG FLEXPEN 100 UNIT/ML injection pen INJECT 4 UNITS SUBCUTANEOUSLY THREE TIMES A DAY BEFORE MEALS 15 mL 10    Multiple Vitamins-Minerals (MULTIVITAMIN-MINERALS) TABS tablet TAKE (1) TABLET BY MOUTH DAILY 60 tablet 11    Melatonin 10 MG CAPS TAKE 1 CAPSULE BY MOUTH AT NIGHT  2    ibuprofen (ADVIL;MOTRIN) 600 MG tablet Take 1 tablet by mouth every 6 hours as needed for Pain 60 tablet 1    zolpidem (AMBIEN) 10 MG tablet Take 10 mg by mouth nightly as needed for Sleep.  risperiDONE (RISPERDAL) 4 MG tablet Take 4 mg by mouth daily       benztropine (COGENTIN) 0.5 MG tablet Take 1 tablet by mouth 2 times daily  2    Lancets (ONETOUCH DELICA PLUS [id number]) MISC USE TO TEST BLOOD SUGAR DAILY 100 each 5    blood glucose test strips (ONETOUCH ULTRA) strip USE AS DIRECTED AS NEEDED 100 each 5    Insulin Pen Needle 31G X 8 MM MISC 1 each by Does not apply route daily 100 each 3    Insulin Pen Needle 31G X 6 MM MISC 1 each by Does not apply route daily 100 each 3    Insulin Pen Needle ([employer] PEN NEEDLES 31G) 31G X 8 MM MISC 1 each by Does not apply route daily 100 each 3    gabapentin (NEURONTIN) 300 MG capsule TAKE 1 CAPSULE BY MOUTH THREE TIMES A DAY 90 capsule 11    OneTouch Delica Lancets 58V MISC USE TO TEST BLOOD SUGAR DAILY 100 each 5    Lancets (ONETOUCH DELICA PLUS [id number]) MISC Check blood sugars 3 times daily 100 each 3    TRUEPLUS PEN NEEDLES 31G X 6 MM MISC USE DAILY AS DIRECTED 100 each 2    B-D 3CC LUER-AMBAR SYR 86WX6-8/2 22G X 1-1/2\" 3 ML MISC        No current facility-administered medications for this visit. On Statin: Yes    On ACE-I or ARB for HTN or Microalbuminuria: No - lisinopril allergy (angioedema)    Medication Adherence/Cost/Adverse Events:    Patient denies any barriers to adherence and states she is a bubble pack patient.     Last fill dates on dispense report for insulin aspart are 7/20/21 and 12/16/21 for insulin glargine. Patient states she has been getting refills through [facility] regularly and is not out of insulin.  After calling [facility], they did not have any record of filling insulin aspart and confirmed the last fill date for insulin glargine was 12/16/21. Assessment/Plan     Diabetes Management:   Uncontrolled diabetes mellitus secondary to lifestyle and medication non-adherence as evidenced by A1c of 13.2% on 6/2/22. Patient did not have blood glucose log with her for today's appointment.  Diet  o Patient had bariatric surgery 4/2/15 where she states she has lost a lot of weight and is unable to eat large portions of food. o Of note, A1c has continued to fluctuate after bariatric surgery. o Educated patient on carbohydrate goals (45-60g per meal and 15g for snacks) and incorporating a protein, vegetable, and starch with meals. o Also instructed patient to cut down on sugary beverages. Provided cutting sugary drinks with water as an option for decreasing sugar content in apple juice, sweet tea, and lemonade.  Physical Activity  o Patient does not have a formal exercise routine. Patient works at [employer] where she states she gets enough walking in there.   o Encouraged patient with improving weather to incorporate 10-15min walks on weekends.  Glucose Testing  o Patient states she tests her blood sugars twice a day - once in the AM and once before bedtime. o No formal glucose readings to go off of today. Patient states AM averages have been around 140-160s and before bedtime averages around 190-210s. o Reinforced FBG goals 80-130mg/dL, post-prandial goals <180mg/dL, and A1c <7%. o Continue testing twice a day at minimum. Instructed patient to write down blood sugars and bring log with her to next appointment to better assess trends in blood sugars and assist with making appropriate therapy changes.     Pharmacologic Therapy  o No changes today as patient did not have blood sugar log with her and unsure if patient is really taking insulin since fill dates are from 2021. o Stressed importance on lifestyle changes and recording blood sugars for next appointment. o Patient states at last PCP appointment, she was educated on insulin technique and feels comfortable giving herself injections. Reinforced importance of rotating injection sites each time. o Continue insulin lantus 40 units in AM and 40 units in PM, insulin aspart 4 units three times a day before meals, and Januvia 100mg daily    Other: If at next appointment it is decided there is a high likelihood of patient not taking insulin for a long time, consider restarting insulin at a lower dose. Would recommend starting patient on 10-15 units total of insulin lantus (0.13-0.20u/kg)    The following education was provided during today's visit:     [x] Medication adherence   [x] Insulin technique and storage   [x] Healthy lifestyle including diet and exercise changes   [x] Hypoglycemia symptoms and management   [x] Blood glucose goals    [] Introduction to FreeStyle Won 2 and continuing glucose monitoring   [] Interpreting Ambulatory Glucose Profile (AGP) Report with focus on page 1, average number of scans and glucose levels per day, and snapshot     Next Follow-Up: 7/26/22 @3:30pm - follow up on blood sugars, adherence, and lifestyle changes    Patient verbalized understanding of care plan. Patient advised to call Medication Management with any questions, concerns, or changes prior to next appointment. Progress note sent to referring provider. (Dr. Smiley Barron and Cortney Sierra MD)   Patient acknowledges working in a consult agreement with the pharmacist as referred by his/her physician. Tianna Francisco, BrandenD 6/29/2022 9:09 AM    ==================================================================    For Pharmacy Admin Tracking Only     CPA in place:   Yes   Recommendation Provided To: Patient/Caregiver: 2 via In person   Intervention Detail: Adherence Monitorin and Scheduled Appointment   Gap Closed?: No    Intervention Accepted By: Patient/Caregiver: 2   Time Spent (min): 60

## 2022-06-28 ENCOUNTER — PHARMACY VISIT (OUTPATIENT)
Dept: FAMILY MEDICINE CLINIC | Age: 57
End: 2022-06-28

## 2022-06-28 DIAGNOSIS — E13.9 DIABETES 1.5, MANAGED AS TYPE 2 (HCC): ICD-10-CM

## 2022-06-28 PROCEDURE — 99999 PR OFFICE/OUTPT VISIT,PROCEDURE ONLY: CPT | Performed by: PHARMACIST

## 2022-06-29 DIAGNOSIS — C50.012 MALIGNANT NEOPLASM OF NIPPLE OF LEFT BREAST IN FEMALE, ESTROGEN RECEPTOR POSITIVE (HCC): ICD-10-CM

## 2022-06-29 DIAGNOSIS — Z17.0 MALIGNANT NEOPLASM OF NIPPLE OF LEFT BREAST IN FEMALE, ESTROGEN RECEPTOR POSITIVE (HCC): ICD-10-CM

## 2022-06-30 DIAGNOSIS — Z79.4 TYPE 2 DIABETES MELLITUS WITHOUT COMPLICATION, WITH LONG-TERM CURRENT USE OF INSULIN (HCC): ICD-10-CM

## 2022-06-30 DIAGNOSIS — E78.00 HYPERCHOLESTEREMIA: ICD-10-CM

## 2022-06-30 DIAGNOSIS — E11.00 TYPE 2 DIABETES MELLITUS WITH HYPEROSMOLARITY WITHOUT COMA, WITHOUT LONG-TERM CURRENT USE OF INSULIN (HCC): ICD-10-CM

## 2022-06-30 DIAGNOSIS — E11.9 TYPE 2 DIABETES MELLITUS WITHOUT COMPLICATION, WITH LONG-TERM CURRENT USE OF INSULIN (HCC): ICD-10-CM

## 2022-06-30 RX ORDER — ATORVASTATIN CALCIUM 20 MG/1
20 TABLET, FILM COATED ORAL DAILY
Qty: 30 TABLET | Refills: 10 | Status: SHIPPED | OUTPATIENT
Start: 2022-06-30

## 2022-06-30 NOTE — TELEPHONE ENCOUNTER
Last visit: 6/2/22  Last Med refill:   Does patient have enough medication for 72 hours: No:     Next Visit Date:  Future Appointments   Date Time Provider Kota Sanz   7/26/2022  3:30 PM Yesy Blue, [address]   7/28/2022  1:30 PM MD Yahaira Thomas [facility]   10/11/2022 11:00 AM Haley Pedroza MD [facility]       Health Maintenance   Topic Date Due    Hepatitis C screen  Never done    Shingles vaccine (1 of 2) Never done    Diabetic retinal exam  12/31/2020    Lipids  12/24/2021    Diabetic foot exam  04/09/2022    Diabetic microalbuminuria test  04/09/2022    A1C test (Diabetic or Prediabetic)  09/02/2022    Depression Monitoring  06/02/2023    Pneumococcal 0-64 years Vaccine (2 - PCV) 06/02/2023    Cervical cancer screen  06/21/2023    DTaP/Tdap/Td vaccine (4 - Td or Tdap) 04/23/2031    Colorectal Cancer Screen  03/24/2032    Hepatitis B vaccine  Completed    Breast cancer screen  Completed    Flu vaccine  Completed    COVID-19 Vaccine  Completed    HIV screen  Completed    Hepatitis A vaccine  Aged Out    Hib vaccine  Aged Out    Meningococcal (ACWY) vaccine  Aged Out       Hemoglobin A1C (%)   Date Value   06/02/2022 13.2   11/08/2021 12.6   09/23/2021 11.6             ( goal A1C is < 7)   Microalb/Crt.  Ratio (mcg/mg creat)   Date Value   04/09/2021 CANNOT BE CALCULATED     LDL Cholesterol (mg/dL)   Date Value   12/24/2020 56   12/24/2020 56       (goal LDL is <100)   AST (U/L)   Date Value   10/05/2021 17     ALT (U/L)   Date Value   10/05/2021 11     BUN (mg/dL)   Date Value   10/05/2021 16     BP Readings from Last 3 Encounters:   06/02/22 138/84   03/24/22 130/85   03/24/22 117/71          (goal 120/80)    All Future Testing planned in [facility]  Lab Frequency Next Occurrence   CBC With Auto Differential Once 10/12/2022   Ferritin Once 10/12/2022   Comprehensive Metabolic Panel Once 54/26/0713   Cologuard (For External Results Only) Once 11/08/2022   Microalbumin, Ur Once 07/02/2022   Lipid Panel Once 07/02/2022               Patient Active Problem List:     History of breast cancer     Pain, knee     Depression     DJD (degenerative joint disease) of knee     Headache     Iron deficiency anemia     HTN (hypertension)     DM w/o complication type II     Left shoulder pain     Diabetes 1.5, managed as type 2 ([facility] [address]     Headache disorder     K[place]onychia     Class 2 severe obesity due to excess calories with serious comorbidity in adult [facility])     Episode of syncope

## 2022-07-01 NOTE — TELEPHONE ENCOUNTER
Electronic refill request per pharmacy. Last prescribed 7/1/2021. Last MD visit 10/12/21; scheduled for follow up 10/11/22. Routed to MD for review.

## 2022-07-02 RX ORDER — GABAPENTIN 300 MG/1
CAPSULE ORAL
Qty: 90 CAPSULE | Refills: 10 | Status: SHIPPED | OUTPATIENT
Start: 2022-07-02 | End: 2022-10-11

## 2022-07-22 ENCOUNTER — TELEPHONE (OUTPATIENT)
Dept: FAMILY MEDICINE CLINIC | Age: 57
End: 2022-07-22

## 2022-07-26 ENCOUNTER — HOSPITAL ENCOUNTER (EMERGENCY)
Age: 57
Discharge: HOME | End: 2022-07-26
Attending: EMERGENCY MEDICINE

## 2022-07-26 ENCOUNTER — HOSPITAL ENCOUNTER (EMERGENCY)
Age: 57
Discharge: HOME | End: 2022-07-26
Attending: EMERGENCY MEDICINE
Payer: MEDICARE

## 2022-07-26 VITALS
SYSTOLIC BLOOD PRESSURE: 125 MMHG | DIASTOLIC BLOOD PRESSURE: 82 MMHG | OXYGEN SATURATION: 98 % | RESPIRATION RATE: 16 BRPM | HEART RATE: 112 BPM | TEMPERATURE: 97.9 F

## 2022-07-26 VITALS
HEART RATE: 95 BPM | BODY MASS INDEX: 29.23 KG/M2 | TEMPERATURE: 97.3 F | HEIGHT: 63 IN | SYSTOLIC BLOOD PRESSURE: 132 MMHG | OXYGEN SATURATION: 99 % | RESPIRATION RATE: 15 BRPM | WEIGHT: 165 LBS | DIASTOLIC BLOOD PRESSURE: 82 MMHG

## 2022-07-26 DIAGNOSIS — N30.01 ACUTE CYSTITIS WITH HEMATURIA: Primary | ICD-10-CM

## 2022-07-26 DIAGNOSIS — J06.9 VIRAL URI: Primary | ICD-10-CM

## 2022-07-26 LAB
-: ABNORMAL
BACTERIA: ABNORMAL
BILIRUBIN URINE: NEGATIVE
CASTS UA: ABNORMAL /LPF (ref 0–8)
COLOR: YELLOW
EPITHELIAL CELLS UA: ABNORMAL /HPF (ref 0–5)
FLU A ANTIGEN: NEGATIVE
FLU B ANTIGEN: NEGATIVE
GLUCOSE URINE: ABNORMAL
KETONES, URINE: ABNORMAL
LEUKOCYTE ESTERASE, URINE: ABNORMAL
NITRITE, URINE: NEGATIVE
PH UA: 5.5 (ref 5–8)
PROTEIN UA: NEGATIVE
RBC UA: ABNORMAL /HPF (ref 0–4)
SARS-COV-2, RAPID: NOT DETECTED
SPECIFIC GRAVITY UA: 1.03 (ref 1–1.03)
SPECIMEN DESCRIPTION: NORMAL
TURBIDITY: CLEAR
URINE HGB: NEGATIVE
UROBILINOGEN, URINE: NORMAL
WBC UA: ABNORMAL /HPF (ref 0–5)

## 2022-07-26 PROCEDURE — 81001 URINALYSIS AUTO W/SCOPE: CPT

## 2022-07-26 PROCEDURE — 6360000002 HC RX W HCPCS: Performed by: STUDENT IN AN ORGANIZED HEALTH CARE EDUCATION/TRAINING PROGRAM

## 2022-07-26 PROCEDURE — 6370000000 HC RX 637 (ALT 250 FOR IP): Performed by: STUDENT IN AN ORGANIZED HEALTH CARE EDUCATION/TRAINING PROGRAM

## 2022-07-26 PROCEDURE — 87804 INFLUENZA ASSAY W/OPTIC: CPT

## 2022-07-26 PROCEDURE — 96372 THER/PROPH/DIAG INJ SC/IM: CPT

## 2022-07-26 PROCEDURE — 99284 EMERGENCY DEPT VISIT MOD MDM: CPT

## 2022-07-26 PROCEDURE — 87077 CULTURE AEROBIC IDENTIFY: CPT

## 2022-07-26 PROCEDURE — 87086 URINE CULTURE/COLONY COUNT: CPT

## 2022-07-26 PROCEDURE — 87635 SARS-COV-2 COVID-19 AMP PRB: CPT

## 2022-07-26 RX ORDER — IBUPROFEN 800 MG/1
800 TABLET ORAL ONCE
Status: DISCONTINUED | OUTPATIENT
Start: 2022-07-26 | End: 2022-07-26 | Stop reason: HOSPADM

## 2022-07-26 RX ORDER — KETOROLAC TROMETHAMINE 30 MG/ML
30 INJECTION, SOLUTION INTRAMUSCULAR; INTRAVENOUS ONCE
Status: COMPLETED | OUTPATIENT
Start: 2022-07-26 | End: 2022-07-26

## 2022-07-26 RX ORDER — CEPHALEXIN 250 MG/1
250 CAPSULE ORAL ONCE
Status: COMPLETED | OUTPATIENT
Start: 2022-07-26 | End: 2022-07-26

## 2022-07-26 RX ORDER — CEPHALEXIN 250 MG/1
250 CAPSULE ORAL 4 TIMES DAILY
Qty: 28 CAPSULE | Refills: 0 | Status: SHIPPED | OUTPATIENT
Start: 2022-07-26 | End: 2022-08-02

## 2022-07-26 RX ORDER — ONDANSETRON 4 MG/1
4 TABLET, ORALLY DISINTEGRATING ORAL ONCE
Status: DISCONTINUED | OUTPATIENT
Start: 2022-07-26 | End: 2022-07-26 | Stop reason: HOSPADM

## 2022-07-26 RX ORDER — ONDANSETRON 4 MG/1
4 TABLET, ORALLY DISINTEGRATING ORAL EVERY 8 HOURS PRN
Qty: 20 TABLET | Refills: 0 | Status: SHIPPED | OUTPATIENT
Start: 2022-07-26

## 2022-07-26 RX ORDER — KETOROLAC TROMETHAMINE 30 MG/ML
30 INJECTION, SOLUTION INTRAMUSCULAR; INTRAVENOUS ONCE
Status: DISCONTINUED | OUTPATIENT
Start: 2022-07-26 | End: 2022-07-26

## 2022-07-26 RX ADMIN — CEPHALEXIN 250 MG: 250 CAPSULE ORAL at 16:10

## 2022-07-26 RX ADMIN — KETOROLAC TROMETHAMINE 30 MG: 30 INJECTION, SOLUTION INTRAMUSCULAR at 16:10

## 2022-07-26 ASSESSMENT — ENCOUNTER SYMPTOMS
ABDOMINAL PAIN: 0
CONSTIPATION: 0
PHOTOPHOBIA: 0
DIARRHEA: 1
SHORTNESS OF BREATH: 0
BLOOD IN STOOL: 0
NAUSEA: 1
VOMITING: 1

## 2022-07-26 ASSESSMENT — PAIN - FUNCTIONAL ASSESSMENT
PAIN_FUNCTIONAL_ASSESSMENT: 0-10
PAIN_FUNCTIONAL_ASSESSMENT: NONE - DENIES PAIN

## 2022-07-26 ASSESSMENT — PAIN SCALES - GENERAL
PAINLEVEL_OUTOF10: 7
PAINLEVEL_OUTOF10: 8

## 2022-07-26 NOTE — ED PROVIDER NOTES
[address] [facility]     Emergency Department     Faculty Attestation        I performed a history and physical examination of the patient and discussed management with the resident. I reviewed the residents note and agree with the documented findings and plan of care. Any areas of disagreement are noted on the chart. I was personally present for the key portions of any procedures. I have documented in the chart those procedures where I was not present during the key portions. I have reviewed the emergency nurses triage note. I agree with the chief complaint, past medical history, past surgical history, allergies, medications, social and family history as documented unless otherwise noted below. For mid-level providers such as nurse practitioners as well as physicians assistants:    I have personally seen and evaluated the patient. I find the patient's history and physical exam are consistent with NP/PA documentation. I agree with the care provided, treatment rendered, disposition, & follow-up plan. Additional findings are as noted. Vital Signs: /82   Pulse (!) 112   Temp 97.9 °F (36.6 °C) (Oral)   Resp 16   LMP 12/08/2015 (Approximate)   SpO2 98%   PCP:  Luanna Favre, MD    Pertinent Comments:     Is sick for the past couple days with a subjective fever chills myalgias and some diarrhea. Vaccinated from [employer].   She is afebrile nontoxic on exam resting comfortably no acute distress not tachypneic or hypoxic      Critical Care  None          Elvis Adames MD    Attending Emergency Medicine Physician          Blaise Brown MD  07/26/22 5789

## 2022-07-26 NOTE — DISCHARGE INSTRUCTIONS
Take your medication as indicated and prescribed. If you are given an antibiotic then, make sure you get the prescription filled and take the antibiotics until finished. Drink plenty of water while taking the antibiotics. Avoid drinking alcohol or drinks that have caffeine in it while taking antibiotics. If you were given the medication pyridium (or take over the counter Azo) - do not wear any contacts for the next week since this medication will turn your tears an orange color and will stain the contacts. For pain use acetaminophen (Tylenol) or ibuprofen (Motrin / Advil), unless prescribed medications that have acetaminophen or ibuprofen (or similar medications) in it. You can take over the counter acetaminophen tablets (1 - 2 tablets of the 500-mg strength every 6 hours) or ibuprofen tablets (2 tablets every 4 hours). PLEASE RETURN TO THE EMERGENCY DEPARTMENT IMMEDIATELY for worsening symptoms, inability to urinate, worsening of blood in your urine, or if you develop any concerning symptoms such as: high fever not relieved by acetaminophen (Tylenol) and/or ibuprofen (Motrin / Advil), chills, shortness of breath, chest pain, feeling of your heart fluttering or racing, persistent nausea and/or vomiting, vomiting up blood, blood in your stool, loss of consciousness, numbness, weakness or tingling in the arms or legs or change in color of the extremities, changes in mental status, persistent headache, blurry vision, loss of bladder / bowel.

## 2022-07-26 NOTE — ED PROVIDER NOTES
children: Not on file    Years of education: Not on file    Highest education level: Not on file   Occupational History    Not on file   Tobacco Use    Smoking status: Passive Smoke Exposure - Never Smoker    Smokeless tobacco: Never   Vaping Use    Vaping Use: Never used   Substance and Sexual Activity    Alcohol use: No    Drug use: No    Sexual activity: Never   Other Topics Concern    Not on file   Social History Narrative    Not on file     Social Determinants of Health     Financial Resource Strain: Medium Risk    Difficulty of Paying Living Expenses: Somewhat hard   Food Insecurity: Food Insecurity Present    Worried About Running Out of Food in the Last Year: Sometimes true    Ran Out of Food in the Last Year: Sometimes true   Transportation Needs: Not on file   Physical Activity: Not on file   Stress: Not on file   Social Connections: Not on file   Intimate Partner Violence: Not on file   Housing Stability: Not on file       Family History   Problem Relation Age of Onset    Cancer Father         Allergies:  Lisinopril    Home Medications:  Prior to Admission medications    Medication Sig Start Date End Date Taking? Authorizing Provider   cephALEXin (KEFLEX) 250 MG capsule Take 1 capsule by mouth in the morning and 1 capsule at noon and 1 capsule in the evening and 1 capsule before bedtime. Do all this for 7 days.  7/26/22 8/2/22 Yes Carl Cruz, DO   ondansetron (ZOFRAN ODT) 4 MG disintegrating tablet Place 1 tablet under the tongue every 8 hours as needed for Nausea 7/26/22  Yes Carl Cruz, DO   gabapentin (NEURONTIN) 300 MG capsule TAKE 1 CAPSULE BY MOUTH THREE TIMES DAILY 7/2/22 8/1/22  Marlon Betancur MD   atorvastatin (LIPITOR) 20 MG tablet TAKE 1 TABLET BY MOUTH DAILY 6/30/22   Anastasiya Seth MD   SITagliptin (JANUVIA) 100 MG tablet TAKE 1 TABLET BY MOUTH DAILY 6/30/22   Anastasiya Seth MD   escitalopram (LEXAPRO) 10 MG tablet Take 10 mg by mouth daily  5/13/22   Historical Provider, MD   hydrOXYzine (ATARAX) 25 MG tablet TAKE 1 TABLET BY MOUTH TWICE DAILY AS NEEDED 5/18/22   Historical Provider, MD   mirtazapine (REMERON) 15 MG tablet TAKE 1 TABLET BY MOUTH AT BEDTIME AS NEEDED 5/18/22   Historical Provider, MD   hydroCHLOROthiazide (HYDRODIURIL) 25 MG tablet TAKE 1 TABLET BY MOUTH DAILY 6/2/22   Ag Gonzalez MD   [facility] [id number] [address] [facility] USE TO TEST BLOOD SUGAR DAILY 5/13/22   Thad Dye MD   potassium chloride (KLOR-CON M) 10 MEQ extended release tablet TAKE 1 TABLET BY MOUTH TWICE DAILY 5/9/22   Patricio Haney MD   aspirin (ASPIRIN LOW DOSE) 81 MG EC tablet TAKE 1 TABLET BY MOUTH ONCE DAILY 5/6/22   Thad Dye MD   topiramate (TOPAMAX) 200 MG tablet TAKE TWO (2) TABLETS BY MOUTH TWICE DAILY 5/5/22   Dana Garza MD   ferrous sulfate (FEROSUL) 325 (65 Fe) MG tablet TAKE 1 TABLET BY MOUTH THREE TIMES DAILY 2/2/22   Davide Betancur MD   blood glucose test strips (ONETOUCH ULTRA) strip USE AS DIRECTED AS NEEDED 1/3/22   Peace Barrett MD   Insulin Pen Needle 31G X 8 MM MISC 1 each by Does not apply route daily 12/31/21   Hina Knight MD   Insulin Pen Needle 31G X 6 MM MISC 1 each by Does not apply route daily 12/31/21   Hina Knight MD   Insulin Pen Needle ([employer] PEN NEEDLES 31G) 31G X 8 MM MISC 1 each by Does not apply route daily 11/17/21   Jemal Garcia MD   insulin glargine (LANTUS SOLOSTAR) 100 UNIT/ML injection pen Inject 40 Units into the skin 2 times daily 11/8/21   Abundio Tao MD   [facility] Lancets 69O MISC USE TO TEST BLOOD SUGAR DAILY 12/18/20   Hina Knight MD   vitamin D (ERGOCALCIFEROL) 1.25 MG (56201 UT) CAPS capsule TAKE ONE (1) CAPSULE BY MOUTH ONCE A WEEK 9/16/20   Tony Odom MD   NOVOLOG FLEXPEN 100 UNIT/ML injection pen INJECT 4 UNITS SUBCUTANEOUSLY THREE TIMES A DAY BEFORE MEALS 8/13/20   Marissa Garcia MD   Lancets ([address]) [address] [facility] Check blood sugars 3 times daily 3/17/20   Howard Garduno MD   Multiple Vitamins-Minerals (MULTIVITAMIN-MINERALS) TABS tablet TAKE (1) TABLET BY MOUTH DAILY 9/25/19   Howard Garduno MD   TRUEPLUS PEN NEEDLES 31G X 6 MM MISC USE DAILY AS DIRECTED 6/27/19   Howard Garduno MD   Melatonin 10 MG CAPS TAKE 1 CAPSULE BY MOUTH AT NIGHT 8/7/18   Historical Provider, MD   ibuprofen (ADVIL;MOTRIN) 600 MG tablet Take 1 tablet by mouth every 6 hours as needed for Pain 9/11/17   Froy Alejandra MD   zolpidem (AMBIEN) 10 MG tablet Take 10 mg by mouth nightly as needed for Sleep. Historical Provider, MD   risperiDONE (RISPERDAL) 4 MG tablet Take 4 mg by mouth daily  6/13/16   Historical Provider, MD ODONNELL 3CC LUER-AMBAR SYR 17JD6-2/2 22G X 1-1/2\" 3 ML MISC  4/14/16   Historical Provider, MD   benztropine (COGENTIN) 0.5 MG tablet Take 1 tablet by mouth 2 times daily 8/27/15   Historical Provider, MD       REVIEW OFSYSTEMS    (2-9 systems for level 4, 10 or more for level 5)      Review of Systems   Constitutional:  Negative for chills, diaphoresis, fatigue and fever. HENT:  Negative for congestion, sinus pressure, sinus pain, sore throat and trouble swallowing. Eyes:  Negative for photophobia, redness and visual disturbance. Respiratory:  Negative for cough, chest tightness and shortness of breath. Cardiovascular:  Negative for chest pain, palpitations and leg swelling. Gastrointestinal:  Positive for diarrhea and nausea. Negative for abdominal pain, constipation and vomiting. Genitourinary:  Negative for difficulty urinating, dysuria, flank pain and urgency. Musculoskeletal:  Positive for myalgias. Negative for back pain, neck pain and neck stiffness. Skin:  Negative for color change, pallor and rash. Neurological:  Positive for headaches. Negative for dizziness, tremors, speech difficulty, weakness and light-headedness.      PHYSICAL EXAM   (up to 7 for level 4, 8 or more forlevel 5)      INITIAL VITALS:   ED Triage Vitals [07/26/22 1438]   BP Temp Temp Source Heart Rate Resp SpO2 Height Weight   132/82 97.3 °F (36.3 °C) Oral 95 15 99 % 5' 3\" (1.6 m) 165 lb (74.8 kg)       Physical Exam  Constitutional:       General: She is not in acute distress. HENT:      Head: Normocephalic and atraumatic. Right Ear: External ear normal.      Left Ear: External ear normal.      Nose: Nose normal. No rhinorrhea. Eyes:      Extraocular Movements: Extraocular movements intact. Pupils: Pupils are equal, round, and reactive to light. Cardiovascular:      Rate and Rhythm: Normal rate and regular rhythm. Pulses: Normal pulses. Heart sounds: No murmur heard. Pulmonary:      Effort: Pulmonary effort is normal.      Breath sounds: Normal breath sounds. Abdominal:      Palpations: Abdomen is soft. Tenderness: There is no abdominal tenderness. Musculoskeletal:         General: No swelling. Normal range of motion. Cervical back: Normal range of motion and neck supple. Skin:     General: Skin is warm and dry. Neurological:      General: No focal deficit present. Mental Status: She is alert and oriented to person, place, and time. DIFFERENTIAL  DIAGNOSIS     PLAN (LABS / IMAGING / EKG):  Orders Placed This Encounter   Procedures    COVID-19, Rapid    RAPID INFLUENZA A/B ANTIGENS    Culture, Urine    Urinalysis with Reflex to Culture    Microscopic Urinalysis       MEDICATIONS ORDERED:  Orders Placed This Encounter   Medications    DISCONTD: ketorolac (TORADOL) injection 30 mg    cephALEXin (KEFLEX) capsule 250 mg     Order Specific Question:   Antimicrobial Indications     Answer:   Urinary Tract Infection    ketorolac (TORADOL) injection 30 mg    cephALEXin (KEFLEX) 250 MG capsule     Sig: Take 1 capsule by mouth in the morning and 1 capsule at noon and 1 capsule in the evening and 1 capsule before bedtime. Do all this for 7 days.      Dispense:  28 capsule     Refill:  0    ondansetron (ZOFRAN ODT) 4 MG disintegrating tablet     Sig: Place 1 tablet under the tongue every 8 hours as needed for Nausea     Dispense:  20 tablet     Refill:  0       DDX: Viral illness, COVID-19, influenza    Initial MDM/Plan: 65 y.o. female who presents with viral symptoms including body aches nausea, diarrhea. 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 Was seen earlier but left prior to work-up being completed. We will plan for COVID and flu testing. We will also get a urine to evaluate for urinary tract infection    DIAGNOSTIC RESULTS / EMERGENCYDEPARTMENT COURSE / MDM     LABS:  Labs Reviewed   URINALYSIS WITH REFLEX TO CULTURE - Abnormal; Notable for the following components:       Result Value    Glucose, Ur 3+ (*)     Ketones, Urine TRACE (*)     Leukocyte Esterase, Urine MODERATE (*)     All other components within normal limits   MICROSCOPIC URINALYSIS - Abnormal; Notable for the following components:    Bacteria, UA FEW (*)     All other components within normal limits   COVID-19, RAPID   RAPID INFLUENZA A/B ANTIGENS   CULTURE, URINE         RADIOLOGY:  No results found. EKG      All EKG's are interpreted by the Emergency Department Physicianwho either signs or Co-signs this chart in the absence of a cardiologist.    EMERGENCY DEPARTMENT COURSE:  ED Course as of 07/28/22 1519   Tue Jul 26, 2022   1500 Patient was here earlier for the same symptoms but left to go to urgent care is now back. [CD]   7057 UTI noted will treat and d/c home  [CD]      ED Course User Index  [CD] Lillian Argueta DO          PROCEDURES:  None    CONSULTS:  None    CRITICAL CARE:      FINAL IMPRESSION      1. Acute cystitis with hematuria          DISPOSITION / PLAN     DISPOSITION Decision To Discharge 07/26/2022 04:06:38 PM      PATIENT REFERRED TO:  Tristen Jackson MD  [facility], [address]   [facility] [address] [phone]    Call in 2 days  For ER follow-up    [facility]  [address]   [phone]  Go to   If symptoms worsen    DISCHARGE MEDICATIONS:  Discharge Medication List as of 7/26/2022  4:08 PM        START taking these medications    Details   cephALEXin (KEFLEX) 250 MG capsule Take 1 capsule by mouth in the morning and 1 capsule at noon and 1 capsule in the evening and 1 capsule before bedtime. Do all this for 7 days. , Disp-28 capsule, R-0Print      ondansetron (ZOFRAN ODT) 4 MG disintegrating tablet Place 1 tablet under the tongue every 8 hours as needed for Nausea, Disp-20 tablet, R-0Print             Dilshad Dueñas DO  Emergency Medicine Resident    (Please note that portions of this note were completed with a voice recognition program.Efforts were made to edit the dictations but occasionally words are mis-transcribed.)        Dilshad Dueñas DO  Resident  07/28/22 4230

## 2022-07-26 NOTE — Clinical Note
Jayson Gtz was seen and treated in our emergency department on 7/26/2022. She may return to work on 07/27/2022. If you have any questions or concerns, please don't hesitate to call.       Khanh Olson, DO

## 2022-07-26 NOTE — ED PROVIDER NOTES
[facility] ED     Emergency Department     Faculty Attestation    I performed a history and physical examination of the patient and discussed management with the resident. I reviewed the residents note and agree with the documented findings and plan of care. Any areas of disagreement are noted on the chart. I was personally present for the key portions of any procedures. I have documented in the chart those procedures where I was not present during the key portions. I have reviewed the emergency nurses triage note. I agree with the chief complaint, past medical history, past surgical history, allergies, medications, social and family history as documented unless otherwise noted below. For Physician Assistant/ Nurse Practitioner cases/documentation I have personally evaluated this patient and have completed at least one if not all key elements of the E/M (history, physical exam, and MDM). Additional findings are as noted. Patient presents with headache, nausea, diarrhea, body aches, fatigue that she is had for the past couple of days. She denies fever, congestion, chest pain, shortness of breath, cough. On exam, patient is resting comfortably in the bed. She is alert and oriented and answering questions appropriately. Neck is supple and nontender. Lungs are clear to auscultation bilaterally and heart sounds are normal.  Abdomen is soft and nontender. We will check a rapid COVID and flu swabs as well as a urinalysis. Will reassess.       Nazia Fulton MD  Attending Emergency  Physician            Avery Enriquez MD  07/26/22 2691

## 2022-07-26 NOTE — ED PROVIDER NOTES
[facility] ED  Emergency Department Encounter  Emergency Medicine Resident     Pt Name: Jai Edgar  K:[id number]  [place] 1965  Date of evaluation: 22  PCP:  Corinna Padron MD    CHIEF COMPLAINT       No chief complaint on file. HISTORY OF PRESENT ILLNESS  (Location/Symptom, Timing/Onset, Context/Setting, Quality, Duration, ModifyingFactors, Severity.)      Jai Edgar is a 65 y.o. female who presents for evaluation of headache, generalized weakness, nausea, vomiting, diarrhea, myalgias x1 day. She did complains of chronic urinary frequency, no she does have history of diabetes, however frequency is worse than baseline. No change in vision, numbness, focal weakness, chest pain, shortness of breath, abdominal pain, vaginal bleeding/discharge, hematemesis, blood in stool. Patient is vaccinated for both flu/COVID. Has not taken any medications at home for symptoms. Requesting COVID test today. PAST MEDICAL / SURGICAL / SOCIAL /FAMILY HISTORY      has a past medical history of Breast cancer ([address] Cancer ([address] Conjunctivitis, Depression, Disorder of patellofemoral joint, DJD (degenerative joint disease) of knee, Hyperlipidemia, Hypertension, Malignant neoplasm of nipple of left breast in female, estrogen receptor positive ([address] Pain, knee, Primary osteoarthritis of left knee, Type II or unspecified type diabetes mellitus without mention of complication, not stated as uncontrolled, Under care of team, Under care of team, Under care of team, Wears dentures, Wears glasses, and Weight loss. No other pertinent PMH on review with patient/guardian. has a past surgical history that includes Breast surgery (2006); Tubal ligation;  section; lymphadenectomy; other surgical history (N/A, 2017); incision and drainage (N/A, 2017); Mastectomy; Bariatric Surgery (); and Colonoscopy (N/A, 3/24/2022).   No other pertinent PSH on review with patient/guardian. Social History     Socioeconomic History    Marital status:      Spouse name: Not on file    Number of children: Not on file    Years of education: Not on file    Highest education level: Not on file   Occupational History    Not on file   Tobacco Use    Smoking status: Passive Smoke Exposure - Never Smoker    Smokeless tobacco: Never   Vaping Use    Vaping Use: Never used   Substance and Sexual Activity    Alcohol use: No    Drug use: No    Sexual activity: Never   Other Topics Concern    Not on file   Social History Narrative    Not on file     Social Determinants of Health     Financial Resource Strain: Medium Risk    Difficulty of Paying Living Expenses: Somewhat hard   Food Insecurity: Food Insecurity Present    Worried About Running Out of Food in the Last Year: Sometimes true    Ran Out of Food in the Last Year: Sometimes true   Transportation Needs: Not on file   Physical Activity: Not on file   Stress: Not on file   Social Connections: Not on file   Intimate Partner Violence: Not on file   Housing Stability: Not on file       I counseled the patient against using tobacco products. Family History   Problem Relation Age of Onset    Cancer Father      No other pertinent FamHx on review with patient/guardian. Allergies:  Lisinopril    Home Medications:  Prior to Admission medications    Medication Sig Start Date End Date Taking?  Authorizing Provider   gabapentin (NEURONTIN) 300 MG capsule TAKE 1 CAPSULE BY MOUTH THREE TIMES DAILY 7/2/22 8/1/22  Joel Betancur MD   atorvastatin (LIPITOR) 20 MG tablet TAKE 1 TABLET BY MOUTH DAILY 6/30/22   Kathleen Eldridge MD   SITagliptin (JANUVIA) 100 MG tablet TAKE 1 TABLET BY MOUTH DAILY 6/30/22   Kathleen Eldridge MD   escitalopram (LEXAPRO) 10 MG tablet Take 10 mg by mouth daily  5/13/22   Historical Provider, MD   hydrOXYzine (ATARAX) 25 MG tablet TAKE 1 TABLET BY MOUTH TWICE DAILY AS NEEDED 5/18/22   Historical Provider, MD mirtazapine (REMERON) 15 MG tablet TAKE 1 TABLET BY MOUTH AT BEDTIME AS NEEDED 5/18/22   Historical Provider, MD   hydroCHLOROthiazide (HYDRODIURIL) 25 MG tablet TAKE 1 TABLET BY MOUTH DAILY 6/2/22   Alexia Alicia MD   [facility] PLUS [id number]) [address] USE TO TEST BLOOD SUGAR DAILY 5/13/22   Penny Carney MD   potassium chloride (KLOR-CON M) 10 MEQ extended release tablet TAKE 1 TABLET BY MOUTH TWICE DAILY 5/9/22   Maribel Buchanan MD   aspirin (ASPIRIN LOW DOSE) 81 MG EC tablet TAKE 1 TABLET BY MOUTH ONCE DAILY 5/6/22   Penny Carney MD   topiramate (TOPAMAX) 200 MG tablet TAKE TWO (2) TABLETS BY MOUTH TWICE DAILY 5/5/22   Dana Burton MD   ferrous sulfate (FEROSUL) 325 (65 Fe) MG tablet TAKE 1 TABLET BY MOUTH THREE TIMES DAILY 2/2/22   Justine Betancur MD   blood glucose test strips (ONETOUCH ULTRA) strip USE AS DIRECTED AS NEEDED 1/3/22   Jannie Barron MD   Insulin Pen Needle 31G X 8 MM MISC 1 each by Does not apply route daily 12/31/21   Tamara Clayton MD   Insulin Pen Needle 31G X 6 MM MISC 1 each by Does not apply route daily 12/31/21   Tamara Clayton MD   Insulin Pen Needle ([employer] PEN NEEDLES 31G) 31G X 8 MM MISC 1 each by Does not apply route daily 11/17/21   Jemal Garcia MD   insulin glargine (LANTUS SOLOSTAR) 100 UNIT/ML injection pen Inject 40 Units into the skin 2 times daily 11/8/21   Abundio Tao MD   [facility] Lancets 09I MISC USE TO TEST BLOOD SUGAR DAILY 12/18/20   Tamara Clayton MD   vitamin D (ERGOCALCIFEROL) 1.25 MG (27176 UT) CAPS capsule TAKE ONE (1) CAPSULE BY MOUTH ONCE A WEEK 9/16/20   Gwen Sen MD   NOVOLOG FLEXPEN 100 UNIT/ML injection pen INJECT 4 UNITS SUBCUTANEOUSLY THREE TIMES A DAY BEFORE MEALS 8/13/20   JohnMD Miles Carrera [facility] PLUS [id number]) MISC Check blood sugars 3 times daily 3/17/20   Serenity Degroot MD   Multiple Vitamins-Minerals (MULTIVITAMIN-MINERALS) TABS tablet TAKE (1) TABLET BY MOUTH DAILY 9/25/19   Isak Lawrence MD   TRUEPLUS PEN NEEDLES 31G X 6 MM MISC USE DAILY AS DIRECTED 6/27/19   Isak Lawrence MD   Melatonin 10 MG CAPS TAKE 1 CAPSULE BY MOUTH AT NIGHT 8/7/18   Historical Provider, MD   ibuprofen (ADVIL;MOTRIN) 600 MG tablet Take 1 tablet by mouth every 6 hours as needed for Pain 9/11/17   Viviana Holt MD   zolpidem (AMBIEN) 10 MG tablet Take 10 mg by mouth nightly as needed for Sleep. Historical Provider, MD   risperiDONE (RISPERDAL) 4 MG tablet Take 4 mg by mouth daily  6/13/16   Historical Provider, MD ODONNELL 3CC LUER-AMBAR SYR 51LF3-2/2 22G X 1-1/2\" 3 ML MISC  4/14/16   Historical Provider, MD   benztropine (COGENTIN) 0.5 MG tablet Take 1 tablet by mouth 2 times daily 8/27/15   Historical Provider, MD       REVIEW OF SYSTEMS    (2-9 systems for level 4, 10 ormore for level 5)      Review of Systems   Constitutional:  Positive for fatigue. Negative for chills and fever. Eyes:  Negative for photophobia and visual disturbance. Respiratory:  Negative for shortness of breath. Cardiovascular:  Negative for chest pain. Gastrointestinal:  Positive for diarrhea, nausea and vomiting. Negative for abdominal pain, blood in stool and constipation. Genitourinary:  Positive for frequency. Negative for dysuria, urgency, vaginal bleeding and vaginal discharge. Musculoskeletal:  Negative for neck pain and neck stiffness. Skin:  Negative for rash. Allergic/Immunologic: Negative for immunocompromised state. Neurological:  Positive for headaches. Negative for dizziness, weakness and numbness. Hematological:  Does not bruise/bleed easily. PHYSICAL EXAM   (up to 7 for level 4, 8 or more for level 5)      INITIAL VITALS:   LMP 12/08/2015 (Approximate)     Physical Exam  Constitutional:       General: She is not in acute distress. Appearance: Normal appearance. She is not ill-appearing, toxic-appearing or diaphoretic. HENT:      Head: Normocephalic and atraumatic. Right Ear: External ear normal.      Left Ear: External ear normal.   Eyes:      General:         Right eye: No discharge. Left eye: No discharge. Extraocular Movements: Extraocular movements intact. Pupils: Pupils are equal, round, and reactive to light. Cardiovascular:      Rate and Rhythm: Normal rate and regular rhythm. Pulses: Normal pulses. Heart sounds: No murmur heard. Pulmonary:      Effort: Pulmonary effort is normal. No respiratory distress. Breath sounds: Normal breath sounds. No wheezing, rhonchi or rales. Abdominal:      Palpations: Abdomen is soft. Tenderness: There is no abdominal tenderness. There is no right CVA tenderness, left CVA tenderness, guarding or rebound. Musculoskeletal:      Cervical back: No rigidity or tenderness. Skin:     Capillary Refill: Capillary refill takes less than 2 seconds. Findings: No rash. Neurological:      General: No focal deficit present. Mental Status: She is alert. Comments: Process 5 strength BUE/BLE, sensation intact. DIFFERENTIAL  DIAGNOSIS     PLAN (LABS / IMAGING / EKG):  No orders of the defined types were placed in this encounter. MEDICATIONS ORDERED:  No orders of the defined types were placed in this encounter. DIAGNOSTIC RESULTS / EMERGENCY DEPARTMENT COURSE / MDM     LABS:  No results found for this visit on 07/26/22. IMPRESSION/MDM/ED COURSE:  65 y.o. female presented with headache, generalized weakness, nausea, vomiting, diarrhea, myalgias x1 day. Patient slightly tachycardic at 112. Afebrile vitals otherwise WNL. On exam patient resting company no acute distress, nontoxic-appearing. Heart tachycardic but regular, lungs clear. Abdomen soft nontender. No focal neurologic deficits. Will obtain COVID, influenza, and UA. Symptomatic treatment with ibuprofen and Zofran. Plan to recheck heart rate once symptoms better controlled.     Patient eloped without telling ED staff. RADIOLOGY:  No orders to display       EKG  None    All EKG's are interpreted by the Emergency Department Physician who either signs or Co-signs this chart in the absence of a cardiologist.    PROCEDURES:  None    CONSULTS:  None    FINAL IMPRESSION      1. Viral URI          DISPOSITION / PLAN     DISPOSITION        PATIENT REFERREDTO:  No follow-up provider specified.     DISCHARGE MEDICATIONS:  New Prescriptions    No medications on file       Riky Mathews DO  PGY 3  Resident Physician Emergency Medicine  07/26/22 1:19 PM    (Please note that portions of this note were completed with a voice recognition program.Efforts were made to edit the dictations but occasionally words are mis-transcribed.)       Leticia Rivero DO  Resident  07/26/22 [id number]

## 2022-07-26 NOTE — ED NOTES
Pt to ER for c/o diarrhea, emesis, and a headache that has lasted around 3 days ago. Pt states she went to urgent care and they sent her here. Pt states that she had around 6 episodes of emesis and around 8-10 episodes of diarrhea since this started. 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  Pt states she has taken tylenol and motrin for the headaches and it has not helped. No acute distress noted, RR even and NL.       Lubna Martinez RN  07/26/22 [id number]

## 2022-07-28 ASSESSMENT — ENCOUNTER SYMPTOMS
SHORTNESS OF BREATH: 0
VOMITING: 0
CONSTIPATION: 0
COUGH: 0
SINUS PRESSURE: 0
NAUSEA: 1
SINUS PAIN: 0
COLOR CHANGE: 0
SORE THROAT: 0
CHEST TIGHTNESS: 0
ABDOMINAL PAIN: 0
DIARRHEA: 1
BACK PAIN: 0
PHOTOPHOBIA: 0
TROUBLE SWALLOWING: 0
EYE REDNESS: 0

## 2022-07-29 DIAGNOSIS — K59.00 CONSTIPATION, UNSPECIFIED CONSTIPATION TYPE: ICD-10-CM

## 2022-07-29 LAB
CULTURE: NORMAL
SPECIMEN DESCRIPTION: NORMAL

## 2022-07-29 RX ORDER — LANCETS 33 GAUGE
EACH MISCELLANEOUS
Refills: 10 | OUTPATIENT
Start: 2022-07-29

## 2022-07-29 NOTE — TELEPHONE ENCOUNTER
Please address the medication refill and close the encounter. If I can be of assistance, please route to the applicable pool. Thank you. Last visit: 6-2-22  Last Med refill: 5-13-22  Does patient have enough medication for 72 hours: No:     Next Visit Date:  Future Appointments   Date Time Provider Kota Sanz   10/11/2022 11:00 AM Daquan Rahman MD [id number] [facility] [employer] Maintenance   Topic Date Due    Hepatitis C screen  Never done    Shingles vaccine (1 of 2) Never done    Diabetic retinal exam  12/31/2020    Lipids  12/24/2021    COVID-19 Vaccine (4 - Booster for Pfizer series) 04/07/2022    Diabetic foot exam  04/09/2022    Diabetic microalbuminuria test  04/09/2022    Flu vaccine (1) 09/01/2022    A1C test (Diabetic or Prediabetic)  09/02/2022    Depression Monitoring  06/02/2023    Pneumococcal 0-64 years Vaccine (2 - PCV) 06/02/2023    Cervical cancer screen  06/21/2023    DTaP/Tdap/Td vaccine (4 - Td or Tdap) 04/23/2031    Colorectal Cancer Screen  03/24/2032    Hepatitis B vaccine  Completed    Breast cancer screen  Completed    HIV screen  Completed    Hepatitis A vaccine  Aged Out    Hib vaccine  Aged Out    Meningococcal (ACWY) vaccine  Aged Out       Hemoglobin A1C (%)   Date Value   06/02/2022 13.2   11/08/2021 12.6   09/23/2021 11.6             ( goal A1C is < 7)   Microalb/Crt.  Ratio (mcg/mg creat)   Date Value   04/09/2021 CANNOT BE CALCULATED     LDL Cholesterol (mg/dL)   Date Value   12/24/2020 56   12/24/2020 56       (goal LDL is <100)   AST (U/L)   Date Value   10/05/2021 17     ALT (U/L)   Date Value   10/05/2021 11     BUN (mg/dL)   Date Value   10/05/2021 16     BP Readings from Last 3 Encounters:   07/26/22 132/82   07/26/22 125/82   06/02/22 138/84          (goal 120/80)    All Future Testing planned in [facility]  Lab Frequency Next Occurrence   CBC With Auto Differential Once 10/12/2022   Ferritin Once 10/12/2022   Comprehensive Metabolic Panel Once 10/12/2022   Cologuard (For External Results Only) Once 11/08/2022   Microalbumin, Ur Once 07/02/2022   Lipid Panel Once 07/02/2022               Patient Active Problem List:     History of breast cancer     Pain, knee     Depression     DJD (degenerative joint disease) of knee     Headache     Iron deficiency anemia     HTN (hypertension)     DM w/o complication type II     Left shoulder pain     Diabetes 1.5, managed as type 2 ([address]     Headache disorder     Koilonychia     Class 2 severe obesity due to excess calories with serious comorbidity in adult [facility])     Episode of syncope

## 2022-07-29 NOTE — TELEPHONE ENCOUNTER
Brynn Denied the One Touch Ultra Strips. Insulin users are limited to a quantity of 7 test strips per day.  Please Advise

## 2022-08-01 RX ORDER — DOCUSATE SODIUM 100 MG/1
CAPSULE, LIQUID FILLED ORAL
Qty: 60 CAPSULE | Refills: 10 | OUTPATIENT
Start: 2022-08-01

## 2022-08-12 DIAGNOSIS — K59.00 CONSTIPATION, UNSPECIFIED CONSTIPATION TYPE: ICD-10-CM

## 2022-08-12 RX ORDER — DOCUSATE SODIUM 100 MG/1
CAPSULE, LIQUID FILLED ORAL
Qty: 60 CAPSULE | Refills: 10 | Status: SHIPPED | OUTPATIENT
Start: 2022-08-12 | End: 2022-10-11

## 2022-08-12 RX ORDER — LANCETS 33 GAUGE
EACH MISCELLANEOUS
Qty: 100 EACH | Refills: 5 | Status: SHIPPED | OUTPATIENT
Start: 2022-08-12

## 2022-08-12 RX ORDER — BLOOD SUGAR DIAGNOSTIC
STRIP MISCELLANEOUS
Qty: 100 EACH | Refills: 5 | Status: CANCELLED | OUTPATIENT
Start: 2022-08-12

## 2022-08-12 NOTE — TELEPHONE ENCOUNTER
Last visit: 6/2/22  Last Med refill: 5/2021  Does patient have enough medication for 72 hours: No:     Next Visit Date:  Future Appointments   Date Time Provider Kota Sanz   10/11/2022 11:00 AM Isha Farrell MD [id number] [facility] Maintenance   Topic Date Due    Hepatitis C screen  Never done    Shingles vaccine (1 of 2) Never done    Diabetic retinal exam  12/31/2020    Lipids  12/24/2021    COVID-19 Vaccine (4 - Booster for Pfizer series) 04/07/2022    Diabetic foot exam  04/09/2022    Diabetic microalbuminuria test  04/09/2022    A1C test (Diabetic or Prediabetic)  09/02/2022    Flu vaccine (1) 09/01/2022    Depression Monitoring  06/02/2023    Pneumococcal 0-64 years Vaccine (2 - PCV) 06/02/2023    Cervical cancer screen  06/21/2023    DTaP/Tdap/Td vaccine (4 - Td or Tdap) 04/23/2031    Colorectal Cancer Screen  03/24/2032    Hepatitis B vaccine  Completed    Breast cancer screen  Completed    HIV screen  Completed    Hepatitis A vaccine  Aged Out    Hib vaccine  Aged Out    Meningococcal (ACWY) vaccine  Aged Out       Hemoglobin A1C (%)   Date Value   06/02/2022 13.2   11/08/2021 12.6   09/23/2021 11.6             ( goal A1C is < 7)   Microalb/Crt.  Ratio (mcg/mg creat)   Date Value   04/09/2021 CANNOT BE CALCULATED     LDL Cholesterol (mg/dL)   Date Value   12/24/2020 56   12/24/2020 56       (goal LDL is <100)   AST (U/L)   Date Value   10/05/2021 17     ALT (U/L)   Date Value   10/05/2021 11     BUN (mg/dL)   Date Value   10/05/2021 16     BP Readings from Last 3 Encounters:   07/26/22 132/82   07/26/22 125/82   06/02/22 138/84          (goal 120/80)    All Future Testing planned in [facility]  Lab Frequency Next Occurrence   CBC With Auto Differential Once 10/12/2022   Ferritin Once 10/12/2022   Comprehensive Metabolic Panel Once 58/78/7216   Cologuard (For External Results Only) Once 11/08/2022   Microalbumin, Ur Once 07/02/2022   Lipid Panel Once 07/02/2022               Patient Active Problem List:     History of breast cancer     Pain, knee     Depression     DJD (degenerative joint disease) of knee     Headache     Iron deficiency anemia     HTN (hypertension)     DM w/o complication type II     Left shoulder pain     Diabetes 1.5, managed as type 2 ([address]     Headache disorder     Koilonychia     Class 2 severe obesity due to excess calories with serious comorbidity in adult [facility])     Episode of syncope

## 2022-08-12 NOTE — TELEPHONE ENCOUNTER
E-scribe request for med refill. Please review and e-scribe if applicable. Last Visit Date:  06/02/2022  Next Visit Date:  Visit date not found    Hemoglobin A1C (%)   Date Value   06/02/2022 13.2   11/08/2021 12.6   09/23/2021 11.6             ( goal A1C is < 7)   Microalb/Crt.  Ratio (mcg/mg creat)   Date Value   04/09/2021 CANNOT BE CALCULATED     LDL Cholesterol (mg/dL)   Date Value   12/24/2020 56       (goal LDL is <100)   AST (U/L)   Date Value   10/05/2021 17     ALT (U/L)   Date Value   10/05/2021 11     BUN (mg/dL)   Date Value   10/05/2021 16     BP Readings from Last 3 Encounters:   07/26/22 132/82   07/26/22 125/82   06/02/22 138/84          (goal 120/80)        Patient Active Problem List:     History of breast cancer     Pain, knee     Depression     DJD (degenerative joint disease) of knee     Headache     Iron deficiency anemia     HTN (hypertension)     DM w/o complication type II     Left shoulder pain     Diabetes 1.5, managed as type 2 ([address]     Headache disorder     Koilonychia     Class 2 severe obesity due to excess calories with serious comorbidity in adult [facility])     Episode of syncope      ----Cirilo Love

## 2022-08-16 DIAGNOSIS — E11.00 TYPE 2 DIABETES MELLITUS WITH HYPEROSMOLARITY WITHOUT COMA, WITHOUT LONG-TERM CURRENT USE OF INSULIN (HCC): ICD-10-CM

## 2022-08-16 DIAGNOSIS — E13.9 DIABETES 1.5, MANAGED AS TYPE 2 (HCC): Primary | ICD-10-CM

## 2022-08-16 NOTE — TELEPHONE ENCOUNTER
Message received from Randee Palomo MD that patient's test strips are not being covered. Request was made for One Touch Ultra, PA filed, and then denial received. Phone call to [facility]. Confirmed that One Touch Ultra is not listed as preferred testing supplies. Recommendation would be to send a generic Rx that the pharmacy could fill based on insurance preference. Below prescriptions pended for PCP review. Jatin Vigil is calling to request a refill on the following medication(s):    Medication Request:  Requested Prescriptions     Pending Prescriptions Disp Refills    blood glucose monitor kit and supplies 1 kit 0     Sig: Use glucometer for testing blood glucose 4 times daily as needed. blood glucose monitor strips 200 strip 0     Sig: Use strip to test blood glucose four times daily as needed. Lancets MISC 200 each 3     Sig: Use lancet to test blood glucose four times daily as needed. Last Visit Date (If Applicable):  Visit date not found    Next Visit Date:    Visit date not found    Jessica Bernard, Pharm. D., [address] Medication Management Service  [phone]  8/16/2022  3:52 PM

## 2022-09-30 RX ORDER — LANCETS 30 GAUGE
EACH MISCELLANEOUS
Qty: 200 EACH | Refills: 3 | Status: SHIPPED | OUTPATIENT
Start: 2022-09-30

## 2022-09-30 RX ORDER — GLUCOSAMINE HCL/CHONDROITIN SU 500-400 MG
CAPSULE ORAL
Qty: 200 STRIP | Refills: 0 | Status: SHIPPED | OUTPATIENT
Start: 2022-09-30

## 2022-10-04 ENCOUNTER — HOSPITAL ENCOUNTER (OUTPATIENT)
Age: 57
Setting detail: SPECIMEN
Discharge: HOME | End: 2022-10-04
Payer: MEDICARE

## 2022-10-04 DIAGNOSIS — Z17.0 MALIGNANT NEOPLASM OF NIPPLE OF LEFT BREAST IN FEMALE, ESTROGEN RECEPTOR POSITIVE (HCC): ICD-10-CM

## 2022-10-04 DIAGNOSIS — C50.012 MALIGNANT NEOPLASM OF NIPPLE OF LEFT BREAST IN FEMALE, ESTROGEN RECEPTOR POSITIVE (HCC): ICD-10-CM

## 2022-10-04 LAB
ABSOLUTE EOS #: 0.27 K/UL (ref 0–0.44)
ABSOLUTE IMMATURE GRANULOCYTE: 0.03 K/UL (ref 0–0.3)
ABSOLUTE LYMPH #: 4.27 K/UL (ref 1.1–3.7)
ABSOLUTE MONO #: 0.6 K/UL (ref 0.1–1.2)
ALBUMIN SERPL-MCNC: 4.1 G/DL (ref 3.5–5.2)
ALBUMIN/GLOBULIN RATIO: 1.3 (ref 1–2.5)
ALP BLD-CCNC: 90 U/L (ref 35–104)
ALT SERPL-CCNC: 14 U/L (ref 5–33)
ANION GAP SERPL CALCULATED.3IONS-SCNC: 13 MMOL/L (ref 9–17)
AST SERPL-CCNC: 16 U/L
BASOPHILS # BLD: 1 % (ref 0–2)
BASOPHILS ABSOLUTE: 0.05 K/UL (ref 0–0.2)
BILIRUB SERPL-MCNC: 0.4 MG/DL (ref 0.3–1.2)
BUN BLDV-MCNC: 12 MG/DL (ref 6–20)
CALCIUM SERPL-MCNC: 9.1 MG/DL (ref 8.6–10.4)
CHLORIDE BLD-SCNC: 96 MMOL/L (ref 98–107)
CO2: 27 MMOL/L (ref 20–31)
CREAT SERPL-MCNC: 0.83 MG/DL (ref 0.5–0.9)
EOSINOPHILS RELATIVE PERCENT: 3 % (ref 1–4)
FERRITIN: 1112 NG/ML (ref 13–150)
GFR SERPL CREATININE-BSD FRML MDRD: >60 ML/MIN/1.73M2
GLUCOSE BLD-MCNC: 287 MG/DL (ref 70–99)
HCT VFR BLD CALC: 34 % (ref 36.3–47.1)
HEMOGLOBIN: 11.3 G/DL (ref 11.9–15.1)
IMMATURE GRANULOCYTES: 0 %
LYMPHOCYTES # BLD: 41 % (ref 24–43)
MCH RBC QN AUTO: 29.1 PG (ref 25.2–33.5)
MCHC RBC AUTO-ENTMCNC: 33.2 G/DL (ref 28.4–34.8)
MCV RBC AUTO: 87.6 FL (ref 82.6–102.9)
MONOCYTES # BLD: 6 % (ref 3–12)
NRBC AUTOMATED: 0 PER 100 WBC
PDW BLD-RTO: 12.1 % (ref 11.8–14.4)
PLATELET # BLD: 244 K/UL (ref 138–453)
PMV BLD AUTO: 10.1 FL (ref 8.1–13.5)
POTASSIUM SERPL-SCNC: 4.1 MMOL/L (ref 3.7–5.3)
RBC # BLD: 3.88 M/UL (ref 3.95–5.11)
SEG NEUTROPHILS: 49 % (ref 36–65)
SEGMENTED NEUTROPHILS ABSOLUTE COUNT: 5.29 K/UL (ref 1.5–8.1)
SODIUM BLD-SCNC: 136 MMOL/L (ref 135–144)
TOTAL PROTEIN: 7.3 G/DL (ref 6.4–8.3)
WBC # BLD: 10.5 K/UL (ref 3.5–11.3)

## 2022-10-04 PROCEDURE — 82728 ASSAY OF FERRITIN: CPT

## 2022-10-04 PROCEDURE — 36415 COLL VENOUS BLD VENIPUNCTURE: CPT

## 2022-10-04 PROCEDURE — 85025 COMPLETE CBC W/AUTO DIFF WBC: CPT

## 2022-10-04 PROCEDURE — 80053 COMPREHEN METABOLIC PANEL: CPT

## 2022-10-11 ENCOUNTER — TELEPHONE (OUTPATIENT)
Dept: ONCOLOGY | Age: 57
End: 2022-10-11

## 2022-10-11 ENCOUNTER — OFFICE VISIT (OUTPATIENT)
Dept: ONCOLOGY | Age: 57
End: 2022-10-11
Payer: MEDICARE

## 2022-10-11 VITALS
WEIGHT: 168.5 LBS | BODY MASS INDEX: 29.85 KG/M2 | HEART RATE: 90 BPM | TEMPERATURE: 97.3 F | SYSTOLIC BLOOD PRESSURE: 124 MMHG | RESPIRATION RATE: 16 BRPM | DIASTOLIC BLOOD PRESSURE: 81 MMHG

## 2022-10-11 DIAGNOSIS — Z85.3 HISTORY OF BREAST CANCER: ICD-10-CM

## 2022-10-11 DIAGNOSIS — C50.012 MALIGNANT NEOPLASM OF NIPPLE OF LEFT BREAST IN FEMALE, ESTROGEN RECEPTOR POSITIVE (HCC): Primary | ICD-10-CM

## 2022-10-11 DIAGNOSIS — Z17.0 MALIGNANT NEOPLASM OF NIPPLE OF LEFT BREAST IN FEMALE, ESTROGEN RECEPTOR POSITIVE (HCC): Primary | ICD-10-CM

## 2022-10-11 PROCEDURE — G8428 CUR MEDS NOT DOCUMENT: HCPCS | Performed by: INTERNAL MEDICINE

## 2022-10-11 PROCEDURE — 3017F COLORECTAL CA SCREEN DOC REV: CPT | Performed by: INTERNAL MEDICINE

## 2022-10-11 PROCEDURE — G8417 CALC BMI ABV UP PARAM F/U: HCPCS | Performed by: INTERNAL MEDICINE

## 2022-10-11 PROCEDURE — G8484 FLU IMMUNIZE NO ADMIN: HCPCS | Performed by: INTERNAL MEDICINE

## 2022-10-11 PROCEDURE — 1036F TOBACCO NON-USER: CPT | Performed by: INTERNAL MEDICINE

## 2022-10-11 PROCEDURE — 99214 OFFICE O/P EST MOD 30 MIN: CPT | Performed by: INTERNAL MEDICINE

## 2022-10-11 PROCEDURE — 99211 OFF/OP EST MAY X REQ PHY/QHP: CPT | Performed by: INTERNAL MEDICINE

## 2022-10-11 RX ORDER — CYCLOBENZAPRINE HCL 10 MG
TABLET ORAL
Qty: 60 TABLET | Refills: 10 | Status: SHIPPED | OUTPATIENT
Start: 2022-10-11

## 2022-10-11 NOTE — TELEPHONE ENCOUNTER
Jaguar Gonzalez MD VISIT  RV one year with labs at RV  LABS CDP FERRITIN 10/10/23  MD VISIT 10/10/23 @ 10AM  AVS PRINTED W/ INSTRUCTIONS AND GIVEN TO PT ON EXIT

## 2022-10-12 DIAGNOSIS — R51.9 HEADACHE DISORDER: ICD-10-CM

## 2022-10-12 NOTE — PROGRESS NOTES
_      Chief Complaint   Patient presents with    Follow-up    Discuss Labs    Pain     Legs     Medication Refill         DIAGNOSIS:   Relapse of left breast cancer with original diagnosis of a stage [id number], left breast invasive ductal carcinoma with staging after neoadjuvant chemotherapy, ER/AR positive, HER2-jacinta negative, originally diagnosed in the spring of 2006 with recent recurrence of left breast cancer and a small tumor [id number], left breast invasive ductal carcinoma, ER/AR positive, and HER2-jacinta negative. Iron Deficiency Anemia. CURRENT THERAPY:    Status post bilateral mastectomy done on 10/10/2012 with no evidence of residual malignancy. Status post 5 years of tamoxifen  between 2006 and 2011. Status post neoadjuvant chemotherapy with Adriamycin and Cytoxan for 4 cycles followed by weekly Taxol for 8 weeks. Chemotherapy was given between June 2006 and October 2006. Status post lumpectomy and lymph node dissection November 2006. Radiation therapy following the lumpectomy. Iron replacement. INTERIM HISTORY:    The patient is here for follow up for her history of left sided invasive ductal carcinoma with recurrence. She had a bilateral mastectomy in 2012. She had bariatric surgery in April 2015    No recent problems related to anemia. No weakness or fatigue. No active bleeding. Patient was on oral iron. She has high ferritin. PO Iron discontinued. REVIEW OF SYSTEMS:   General: She has weakness and fatigue. No weight loss or decreased appetite. No fever or chills. Eyes: No blurred vision, eye pain or double vision. Ears: No hearing problems or drainage. No tinnitus. Throat: No sore throat, problems with swallowing or dysphagia. Respiratory: No cough, sputum or hemoptysis. No shortness of breath. Cardiovascular: No chest pain, orthopnea or PND. No lower extremity edema. No palpitation. Gastrointestinal: No problems with swallowing. No abdominal pain or bloating.  No nausea or vomiting. No diarrhea or constipation. No GI bleeding. Genitourinary: No dysuria, hematuria, frequency or urgency. Musculoskeletal: + muscle aches or pains. No limitation of movement. No back pain. Dermatologic: No skin rashes or pruritus. No skin lesions or discolorations. Psychiatric: No depression, anxiety, or stress or signs of schizophrenia. Hematologic: No history of bleeding tendency. No bruises or ecchymosis. No history of clotting problems. Infectious disease: No fever, chills or frequent infections. Endocrine: No problems with opacity. No polydipsia or polyuria. Neurologic: No headaches or dizziness. No weakness or numbness of the extremities. SOCIAL HISTORY:  No smoking and no alcohol drinking. PHYSICAL EXAM:  The patient is not in acute distress. Vital signs:  Blood pressure 124/81, pulse 90, temperature 97.3 °F (36.3 °C), temperature source Temporal, resp. rate 16, weight 168 lb 8 oz (76.4 kg), last menstrual period 12/08/2015, not currently breastfeeding. HEENT:  Eyes are normal. Ears, nose and throat are normal.  Neck: Supple. No lymph node enlargement. No thyroid enlargement. Trachea is centrally located. Breasts:  Status post bilateral mastectomy, no masses are felt, no axillary lymph node enlargement. Chest:  Clear to auscultation. No wheezes or crepitations. Heart: Regular sinus rhythm. Abdomen: Soft, nontender obese. No hepatosplenomegaly. No masses. Extremities:  With no edema. Lymph Nodes:  No cervical, axillary or inguinal lymph node enlargement. Neurologic:  Conscious and oriented. No focal neurological deficits. Psychosocial: No depression, anxiety or stress. Skin: No rashes, bruises or ecchymoses.     REVIEW OF DIAGNOSTIC DATA:     Lab Results   Component Value Date    WBC 10.5 10/04/2022    HGB 11.3 (L) 10/04/2022    HCT 34.0 (L) 10/04/2022    MCV 87.6 10/04/2022     10/04/2022     Lab Results   Component Value Date    IRON 76 10/10/2017    TIBC 210 (L) 10/10/2017    FERRITIN 1,112 (H) 10/04/2022       ASSESSMENT:     Recurrence of breast cancer as stated above status post mastectomy. Currently in remission  Iron deficiency anemia secondary to heavy menstrual periods and possibly malabsorption. Stable. S/p Bariatric surgery April 2015  Chest pain s/p mastectomy controlled with Norco and Neurontin  Uncontrolled DM on insulin   Bone aches. Will do bone scan    PLAN:      The patient is doing well in regard to her breast cancer with no evidence of recurrence. We will continue observation. Hb is stable. No recent drop. No signs of bleeding. Continue observation. High ferritin. Discontinued iron. Patient has significant problem with hypoglycemia and hypokalemia. Undergoing management by her PCP. Explained importance of compliance with medications and glucose control. Patient will have follow-up with her PCP. Patient will be seen in 1 year. Sooner for any problems. Patient's questions were answered to the best of her satisfaction and she verbalized full understanding and agreement. [address] [place] [address] [facility]                            This note is created with the assistance of a speech recognition program.  While intending to generate a document that actually reflects the content of the visit, the document can still have some errors including those of syntax and sound a like substitutions which may escape proof reading. It such instances, actual meaning can be extrapolated by contextual diversion.

## 2022-10-13 NOTE — TELEPHONE ENCOUNTER
E-scribe request for med refill. Please review and e-scribe if applicable. Last Visit Date:  06/02/2022  Next Visit Date:  10/20/2022    Hemoglobin A1C (%)   Date Value   06/02/2022 13.2   11/08/2021 12.6   09/23/2021 11.6             ( goal A1C is < 7)   Microalb/Crt.  Ratio (mcg/mg creat)   Date Value   04/09/2021 CANNOT BE CALCULATED     LDL Cholesterol (mg/dL)   Date Value   12/24/2020 56       (goal LDL is <100)   AST (U/L)   Date Value   10/04/2022 16     ALT (U/L)   Date Value   10/04/2022 14     BUN (mg/dL)   Date Value   10/04/2022 12     BP Readings from Last 3 Encounters:   10/11/22 124/81   07/26/22 132/82   07/26/22 125/82          (goal 120/80)        Patient Active Problem List:     History of breast cancer     Pain, knee     Depression     DJD (degenerative joint disease) of knee     Headache     Iron deficiency anemia     HTN (hypertension)     DM w/o complication type II     Left shoulder pain     Diabetes 1.5, managed as type 2 ([address]     Headache disorder     [place]     Class 2 severe obesity due to excess calories with serious comorbidity in adult [facility])     Episode of syncope      ----Andria Álvarez

## 2022-11-16 DIAGNOSIS — E11.00 TYPE 2 DIABETES MELLITUS WITH HYPEROSMOLARITY WITHOUT COMA, WITHOUT LONG-TERM CURRENT USE OF INSULIN (HCC): ICD-10-CM

## 2022-11-17 NOTE — TELEPHONE ENCOUNTER
E-scribe request for med refills. Please review and e-scribe if applicable. Last Visit Date:  6/2//22  Next Visit Date:  Visit date not found    Hemoglobin A1C (%)   Date Value   06/02/2022 13.2   11/08/2021 12.6   09/23/2021 11.6             ( goal A1C is < 7)   Microalb/Crt.  Ratio (mcg/mg creat)   Date Value   04/09/2021 CANNOT BE CALCULATED     LDL Cholesterol (mg/dL)   Date Value   12/24/2020 56       (goal LDL is <100)   AST (U/L)   Date Value   10/04/2022 16     ALT (U/L)   Date Value   10/04/2022 14     BUN (mg/dL)   Date Value   10/04/2022 12     BP Readings from Last 3 Encounters:   10/11/22 124/81   07/26/22 132/82   07/26/22 125/82          (goal 120/80)        Patient Active Problem List:     History of breast cancer     Pain, knee     Depression     DJD (degenerative joint disease) of knee     Headache     Iron deficiency anemia     HTN (hypertension)     DM w/o complication type II     Left shoulder pain     Diabetes 1.5, managed as type 2 ([address]     Headache disorder     [place]     Class 2 severe obesity due to excess calories with serious comorbidity in adult [facility])     Episode of syncope      ----Latisha Gupta

## 2022-12-12 DIAGNOSIS — E11.00 TYPE 2 DIABETES MELLITUS WITH HYPEROSMOLARITY WITHOUT COMA, WITHOUT LONG-TERM CURRENT USE OF INSULIN (HCC): ICD-10-CM

## 2022-12-13 RX ORDER — CALCIUM CITRATE/VITAMIN D3 200MG-6.25
TABLET ORAL
Qty: 100 EACH | Refills: 10 | Status: SHIPPED | OUTPATIENT
Start: 2022-12-13

## 2022-12-13 NOTE — TELEPHONE ENCOUNTER
[employer] testing strips pending refill      Health Maintenance   Topic Date Due    Hepatitis C screen  Never done    Shingles vaccine (1 of 2) Never done    Diabetic retinal exam  12/31/2020    Lipids  12/24/2021    COVID-19 Vaccine (4 - Booster for Pfizer series) 02/01/2022    Diabetic foot exam  04/09/2022    Diabetic microalbuminuria test  04/09/2022    Flu vaccine (1) 08/01/2022    A1C test (Diabetic or Prediabetic)  09/02/2022    Depression Monitoring  06/02/2023    Pneumococcal 0-64 years Vaccine (2 - PCV) 06/02/2023    Cervical cancer screen  06/21/2023    DTaP/Tdap/Td vaccine (4 - Td or Tdap) 04/23/2031    Colorectal Cancer Screen  03/24/2032    Hepatitis B vaccine  Completed    Breast cancer screen  Completed    HIV screen  Completed    Hepatitis A vaccine  Aged Out    Hib vaccine  Aged Out    Meningococcal (ACWY) vaccine  Aged Out             (applicable per patient's age: Cancer Screenings, Depression Screening, Fall Risk Screening, Immunizations)    Hemoglobin A1C (%)   Date Value   06/02/2022 13.2   11/08/2021 12.6   09/23/2021 11.6     Microalb/Crt.  Ratio (mcg/mg creat)   Date Value   04/09/2021 CANNOT BE CALCULATED     LDL Cholesterol (mg/dL)   Date Value   12/24/2020 56     AST (U/L)   Date Value   10/04/2022 16     ALT (U/L)   Date Value   10/04/2022 14     BUN (mg/dL)   Date Value   10/04/2022 12      (goal A1C is < 7)   (goal LDL is <100) need 30-50% reduction from baseline     BP Readings from Last 3 Encounters:   10/11/22 124/81   07/26/22 132/82   07/26/22 125/82    (goal /80)      All Future Testing planned in [facility]:  Lab Frequency Next Occurrence   Microalbumin, Ur Once 07/02/2022   Lipid Panel Once 07/02/2022       Next Visit Date:  Future Appointments   Date Time Provider Kota Sanz   12/29/2022  2:30 PM Jeannene Alpers, MD [facility]   10/10/2023 10:00 AM Dilip Argueta MD [facility]            Patient Active Problem List:     History of breast cancer     Pain, knee     Depression     DJD (degenerative joint disease) of knee     Headache     Iron deficiency anemia     HTN (hypertension)     DM w/o complication type II     Left shoulder pain     Diabetes 1.5, managed as type 2 (HCC)     Headache disorder     [place]     Class 2 severe obesity due to excess calories with serious comorbidity in adult [facility])     Episode of syncope

## 2022-12-20 RX ORDER — FERROUS SULFATE 325(65) MG
TABLET ORAL
Qty: 90 TABLET | Refills: 10 | OUTPATIENT
Start: 2022-12-20

## 2022-12-29 ENCOUNTER — HOSPITAL ENCOUNTER (OUTPATIENT)
Age: 57
Setting detail: SPECIMEN
Discharge: HOME | End: 2022-12-29

## 2022-12-29 ENCOUNTER — OFFICE VISIT (OUTPATIENT)
Dept: FAMILY MEDICINE CLINIC | Age: 57
End: 2022-12-29
Payer: COMMERCIAL

## 2022-12-29 DIAGNOSIS — M79.604 LEG PAIN, BILATERAL: Primary | ICD-10-CM

## 2022-12-29 DIAGNOSIS — E11.00 TYPE 2 DIABETES MELLITUS WITH HYPEROSMOLARITY WITHOUT COMA, WITH LONG-TERM CURRENT USE OF INSULIN (HCC): ICD-10-CM

## 2022-12-29 DIAGNOSIS — Z79.4 TYPE 2 DIABETES MELLITUS WITH HYPEROSMOLARITY WITHOUT COMA, WITH LONG-TERM CURRENT USE OF INSULIN (HCC): ICD-10-CM

## 2022-12-29 DIAGNOSIS — Z79.4 TYPE 2 DIABETES MELLITUS WITHOUT COMPLICATION, WITH LONG-TERM CURRENT USE OF INSULIN (HCC): ICD-10-CM

## 2022-12-29 DIAGNOSIS — Z13.220 SCREENING FOR HYPERLIPIDEMIA: ICD-10-CM

## 2022-12-29 DIAGNOSIS — E11.9 TYPE 2 DIABETES MELLITUS WITHOUT COMPLICATION, WITH LONG-TERM CURRENT USE OF INSULIN (HCC): ICD-10-CM

## 2022-12-29 DIAGNOSIS — E13.9 DIABETES 1.5, MANAGED AS TYPE 2 (HCC): ICD-10-CM

## 2022-12-29 DIAGNOSIS — M79.605 LEG PAIN, BILATERAL: Primary | ICD-10-CM

## 2022-12-29 DIAGNOSIS — I10 ESSENTIAL HYPERTENSION: ICD-10-CM

## 2022-12-29 LAB
CHOLESTEROL/HDL RATIO: 2.3
CHOLESTEROL: 168 MG/DL
CREATININE URINE: 119.6 MG/DL (ref 28–217)
HBA1C MFR BLD: 13.9 %
HDLC SERPL-MCNC: 74 MG/DL
LDL CHOLESTEROL: 71 MG/DL (ref 0–130)
MICROALBUMIN/CREAT 24H UR: 13 MG/L
MICROALBUMIN/CREAT UR-RTO: 11 MCG/MG CREAT
TRIGL SERPL-MCNC: 114 MG/DL

## 2022-12-29 PROCEDURE — 83036 HEMOGLOBIN GLYCOSYLATED A1C: CPT

## 2022-12-29 RX ORDER — HYDROCHLOROTHIAZIDE 25 MG/1
TABLET ORAL
Qty: 30 TABLET | Refills: 3 | Status: SHIPPED | OUTPATIENT
Start: 2022-12-29

## 2022-12-29 RX ORDER — INSULIN ASPART 100 [IU]/ML
INJECTION, SOLUTION INTRAVENOUS; SUBCUTANEOUS
Qty: 15 ML | Refills: 10 | Status: SHIPPED | OUTPATIENT
Start: 2022-12-29

## 2022-12-29 RX ORDER — INSULIN GLARGINE 100 [IU]/ML
40 INJECTION, SOLUTION SUBCUTANEOUS 2 TIMES DAILY
Qty: 15 ML | Refills: 3 | Status: SHIPPED | OUTPATIENT
Start: 2022-12-29

## 2022-12-29 ASSESSMENT — ENCOUNTER SYMPTOMS
ABDOMINAL PAIN: 0
SORE THROAT: 0
VOMITING: 0
CONSTIPATION: 0
RHINORRHEA: 0
DIARRHEA: 0
NAUSEA: 0
SHORTNESS OF BREATH: 0

## 2022-12-29 NOTE — PATIENT INSTRUCTIONS
Thank you for letting us take care of you today. We hope all your questions were addressed. If a question was overlooked or something else comes to mind after you return home, please contact a member of your Care Team listed below. Your Care Team at [place] [address] is Team #4  Barry Iyer MD (Faculty)  Usha Wagner MD (Resident)  Wesley Jimenez MD (Resident)  Saintclair Marlin, MD (Resident)  Rubia Diaz MD (Resident)  DALTON Hewitt,JOSE LUIS Tello, [facility] office)  Vasu Enamorado, [address] (Clinical Practice Manager)  Kemar Lee [facility] (Clinical Pharmacist)       Office phone number: [phone]    If you need to get in right away due to illness, please be advised we have \"Same Day\" appointments available Monday-Friday. Please call us at [phone] option #3 to schedule your \"Same Day\" appointment.

## 2022-12-29 NOTE — PROGRESS NOTES
[facility] [address]    Family Medicine Residency Program - Outpatient Note      Subjective:    Zoya Edgar is a 65 y.o. female with  has a past medical history of Breast cancer ([address] Cancer ([address] Conjunctivitis, Depression, Disorder of patellofemoral joint, DJD (degenerative joint disease) of knee, Hyperlipidemia, Hypertension, Malignant neoplasm of nipple of left breast in female, estrogen receptor positive ([address] Pain, knee, Primary osteoarthritis of left knee, Type II or unspecified type diabetes mellitus without mention of complication, not stated as uncontrolled, Under care of team, Under care of team, Under care of team, Wears dentures, Wears glasses, and Weight loss. Presented to the office today for:  Chief Complaint   Patient presents with    Follow-up     Patient is following up on her dm    Leg Pain     Patient states she is having bilateral leg pain. HPI  11year-old female with past medical history of type 2 diabetes, depression, hypertension, breast cancer s/p mastectomy and chemotherapy in 2011 seen today for HTN and diabetes follow-up. T2DM  - Last A1c 6/2/22 was 13.2  - A1c today 13.9   - Checks blood sugar at home- 170-200  - At last office visit, Lantus was increased to 40 units bid (from 35 units bid)- misses 1 dose lantus daily due to work. Wakes up at 5:15am, and gets to work by 6:30am.   - Taking Novolog 9 unit twice a day before each meal, januvia 100mg od  - Had bariatric surgery 5 years ago. Lost over 100 lbs in the last year after starting keto diet. - Last retinal eye exam a year ago. Will set up appointment. - Complains of pain in b/l legs that started 2 months ago. Describes as burning pain when she touches her legs. Radiates from toes to buttocks.  Pain in throbbing and constant.    - Takes Gabapentin twice a day in morning and before bed prescribed by oncologist for nerve pain in chest.     Visual inspection:  Deformity/amputation: absent  Skin lesions/pre-ulcerative calluses: absent  Edema: right- negative, left- negative    Sensory exam:  Monofilament sensation: normal  (minimum of 5 random plantar locations tested, avoiding callused areas - > 1 area with absence of sensation is + for neuropathy)    Plus at least one of the following:  Pulses: normal,   Pinprick: N/A  Proprioception: N/A  Vibration (128 Hz): Intact       HTN  - BP today 145/91,   - Repeat /80  - HCTZ 25mg po daily   - Denies headache, chest pain, abdominal pain, visual disturbances       Review of Systems   Constitutional:  Negative for fatigue. HENT:  Negative for rhinorrhea and sore throat. Eyes:  Negative for visual disturbance. Respiratory:  Negative for shortness of breath. Cardiovascular:  Negative for chest pain and palpitations. Gastrointestinal:  Negative for abdominal pain, constipation, diarrhea, nausea and vomiting. Endocrine: Negative. Genitourinary:  Negative for dysuria. Musculoskeletal:  Positive for myalgias (burning pain in b/l LE). Skin:  Negative for rash. Neurological:  Negative for headaches. Psychiatric/Behavioral: Negative. The patient has a   Family History   Problem Relation Age of Onset    Cancer Father        Objective:    LMP 12/08/2015 (Approximate)    BP Readings from Last 3 Encounters:   10/11/22 124/81   07/26/22 132/82   07/26/22 125/82       Physical Exam  Constitutional:       General: She is not in acute distress. Appearance: She is not ill-appearing. HENT:      Head: Normocephalic and atraumatic. Cardiovascular:      Rate and Rhythm: Tachycardia present. Pulses: Normal pulses. Heart sounds: Normal heart sounds. Pulmonary:      Effort: Pulmonary effort is normal. No respiratory distress. Breath sounds: Normal breath sounds. No wheezing. Abdominal:      General: There is no distension. Palpations: Abdomen is soft. Tenderness: There is no abdominal tenderness. Musculoskeletal:      Right lower leg: Tenderness present. No edema. Left lower leg: Tenderness present. No edema. Right foot: Normal range of motion. Normal pulse. Left foot: Normal range of motion. Normal pulse. Neurological:      Mental Status: She is alert. Lab Results   Component Value Date    WBC 10.5 10/04/2022    HGB 11.3 (L) 10/04/2022    HCT 34.0 (L) 10/04/2022     10/04/2022    CHOL 126 12/24/2020    TRIG 68 12/24/2020    HDL 56 12/24/2020    ALT 14 10/04/2022    AST 16 10/04/2022     10/04/2022    K 4.1 10/04/2022    CL 96 (L) 10/04/2022    CREATININE 0.83 10/04/2022    BUN 12 10/04/2022    CO2 27 10/04/2022    TSH 3.46 12/24/2020    LABA1C 13.9 12/29/2022    LABMICR CANNOT BE CALCULATED 04/09/2021     Lab Results   Component Value Date    CALCIUM 9.1 10/04/2022    PHOS 3.8 08/12/2012     Lab Results   Component Value Date    LDLCHOLESTEROL 56 12/24/2020       Assessment and Plan:    1. Leg pain, bilateral  -Patient presents with bilateral lower extremity burning pain and itching from her buttocks to her toes that started a month ago. Denies numbness and tingling. Alleviated with Tylenol, and gabapentin. Ordered x-ray of lumbar spine to rule out any acute etiologies. - XR LUMBAR SPINE (2-3 VIEWS); Future    2. Essential hypertension  -BP today 145/95, repeat 130/80  -Continue hydrochlorothiazide 25 mg daily  -Advised to check blood pressure daily at home. Ordered blood pressure monitor for patient    - DME Order for (Specify) as OP  - hydroCHLOROthiazide (HYDRODIURIL) 25 MG tablet; TAKE 1 TABLET BY MOUTH DAILY  Dispense: 30 tablet; Refill: 3    3. Type 2 diabetes mellitus without complication, with long-term current use of insulin ([facility])  -A1c today 13.9. Patient has been missing her morning dose of Lantus 40 units because of work. Informed her to try taking it before leaving for work.   She is understanding and will be compliant with medications.  -Normal diabetic foot exam.  Will arrange appointment for retinal eye exam  -Continue Januvia 100 mg daily, Lantus 40 units twice daily, NovoLog 4 units with meals  -Ordered urine microalbumin    - HM DIABETES FOOT EXAM  - POCT glycosylated hemoglobin (Hb A1C)  - SITagliptin (JANUVIA) 100 MG tablet; TAKE 1 TABLET BY MOUTH DAILY  Dispense: 30 tablet; Refill: 3  - insulin glargine (LANTUS SOLOSTAR) 100 UNIT/ML injection pen; Inject 40 Units into the skin 2 times daily  Dispense: 15 mL; Refill: 3  - insulin aspart (NOVOLOG FLEXPEN) 100 UNIT/ML injection pen; INJECT 4 UNITS SUBCUTANEOUSLY THREE TIMES A DAY BEFORE MEALS  Dispense: 15 mL; Refill: 10          Requested Prescriptions     Signed Prescriptions Disp Refills    hydroCHLOROthiazide (HYDRODIURIL) 25 MG tablet 30 tablet 3     Sig: TAKE 1 TABLET BY MOUTH DAILY    insulin glargine (LANTUS SOLOSTAR) 100 UNIT/ML injection pen 15 mL 3     Sig: Inject 40 Units into the skin 2 times daily    SITagliptin (JANUVIA) 100 MG tablet 30 tablet 3     Sig: TAKE 1 TABLET BY MOUTH DAILY    insulin aspart (NOVOLOG FLEXPEN) 100 UNIT/ML injection pen 15 mL 10     Sig: INJECT 4 UNITS SUBCUTANEOUSLY THREE TIMES A DAY BEFORE MEALS       Medications Discontinued During This Encounter   Medication Reason    NOVOLOG FLEXPEN 100 UNIT/ML injection pen REORDER    insulin glargine (LANTUS SOLOSTAR) 100 UNIT/ML injection pen REORDER    hydroCHLOROthiazide (HYDRODIURIL) 25 MG tablet REORDER    SITagliptin (JANUVIA) 100 MG tablet REORDER       Magui received counseling on the following healthy behaviors: nutrition, exercise and medication adherence    Discussed use,benefit, and side effects of prescribed medications. Barriers to medication compliance addressed. All patient questions answered. Pt voiced understanding. No follow-ups on file. Disclaimer: Some orall of this note was transcribed using voice-recognition software. This may cause typographical errors occasionally.  Although all effort is made to fix these errors, please do not hesitate to contact our office if there Henri Britt concern with the understanding of this note.

## 2022-12-29 NOTE — PROGRESS NOTES
Diabetic visit information    BP Readings from Last 3 Encounters:   10/11/22 124/81   07/26/22 132/82   07/26/22 125/82       Hemoglobin A1C (%)   Date Value   06/02/2022 13.2   11/08/2021 12.6   09/23/2021 11.6     Microalb/Crt. Ratio (mcg/mg creat)   Date Value   04/09/2021 CANNOT BE CALCULATED     LDL Cholesterol (mg/dL)   Date Value   12/24/2020 56               Have you changed or started any medications since your last visit including any over-the-counter medicines, vitamins, or herbal medicines? no   Have you stopped taking any of your medications? Is so, why? -  no  Are you having any side effects from any of your medications? - no    Have you seen any other physician or provider since your last visit?  no   Have you had any other diagnostic tests since your last visit?  no   Have you been seen in the emergency room and/or had an admission in a hospital since we last saw you?  no     Have you had your annual diabetic retinal (eye) exam?   (ensure copy of exam is in the chart)    Have you had your routine dental cleaning in the past 6 months? no    Do you have an active MyChart account? If not, what are your barriers? No:     Patient Care Team:  Rangel Cerda MD as PCP - General (Family Medicine)  Ele Butler MD as Consulting Physician (Hematology and Oncology)  Vinay Abbasi MD (General Surgery)  Janey Mortensen DO as Consulting Physician (General Surgery)  Kwadwo Miguel MD as Consulting Physician (Pulmonology)    Medical history Review  Past Medical, Family, and Social History reviewed and does not contribute to the patient presenting condition.     Health Maintenance   Topic Date Due    Diabetic Alb to Cr ratio (uACR) test  Never done    Hepatitis C screen  Never done    Shingles vaccine (1 of 2) Never done    Diabetic retinal exam  12/31/2020    Lipids  12/24/2021    COVID-19 Vaccine (4 - Booster for Pfizer series) 02/01/2022    Diabetic foot exam  04/09/2022    A1C test (Diabetic

## 2022-12-30 NOTE — PROGRESS NOTES
Attending Physician Statement    Wt Readings from Last 3 Encounters:   10/11/22 168 lb 8 oz (76.4 kg)   07/26/22 165 lb (74.8 kg)   06/02/22 167 lb 9.6 oz (76 kg)     Temp Readings from Last 3 Encounters:   10/11/22 97.3 °F (36.3 °C) (Temporal)   07/26/22 97.3 °F (36.3 °C) (Oral)   07/26/22 97.9 °F (36.6 °C) (Oral)     BP Readings from Last 3 Encounters:   10/11/22 124/81   07/26/22 132/82   07/26/22 125/82     Pulse Readings from Last 3 Encounters:   10/11/22 90   07/26/22 95   07/26/22 (!) 112         I have discussed the care of Betty Serrano, including pertinent history and exam findings with the resident. I have reviewed the key elements of all parts of the encounter with the resident. I agree with the assessment, plan and orders as documented by the resident.   (GE Modifier)

## 2023-01-10 RX ORDER — FERROUS SULFATE 325(65) MG
TABLET ORAL
Qty: 90 TABLET | Refills: 10 | Status: SHIPPED | OUTPATIENT
Start: 2023-01-10

## 2023-01-30 DIAGNOSIS — E11.00 TYPE 2 DIABETES MELLITUS WITH HYPEROSMOLARITY WITHOUT COMA, WITH LONG-TERM CURRENT USE OF INSULIN (HCC): ICD-10-CM

## 2023-01-30 DIAGNOSIS — Z79.4 TYPE 2 DIABETES MELLITUS WITH HYPEROSMOLARITY WITHOUT COMA, WITH LONG-TERM CURRENT USE OF INSULIN (HCC): ICD-10-CM

## 2023-01-30 RX ORDER — INSULIN GLARGINE 100 [IU]/ML
INJECTION, SOLUTION SUBCUTANEOUS
Qty: 15 ML | Refills: 10 | Status: SHIPPED | OUTPATIENT
Start: 2023-01-30

## 2023-01-30 NOTE — TELEPHONE ENCOUNTER
Last visit: 12/29/22  Last Med refill: 12/29/22  Does patient have enough medication for 72 hours: Yes    Next Visit Date:  Future Appointments   Date Time Provider Kota Sanz   10/10/2023 10:00 AM Jie Ny MD [id number] [facility] Maintenance   Topic Date Due    Hepatitis C screen  Never done    Shingles vaccine (1 of 2) Never done    Diabetic retinal exam  12/31/2020    COVID-19 Vaccine (4 - Booster for Pfizer series) 02/01/2022    A1C test (Diabetic or Prediabetic)  03/29/2023    Depression Monitoring  06/02/2023    Pneumococcal 0-64 years Vaccine (2 - PCV) 06/02/2023    Cervical cancer screen  06/21/2023    GFR test (Diabetes, CKD 3-4, OR last GFR 15-59)  10/04/2023    Diabetic foot exam  12/29/2023    Diabetic Alb to Cr ratio (uACR) test  12/29/2023    Lipids  12/29/2023    DTaP/Tdap/Td vaccine (4 - Td or Tdap) 04/23/2031    Colorectal Cancer Screen  03/24/2032    Hepatitis B vaccine  Completed    Breast cancer screen  Completed    Flu vaccine  Completed    HIV screen  Completed    Hepatitis A vaccine  Aged Out    Hib vaccine  Aged Out    Meningococcal (ACWY) vaccine  Aged Out       Hemoglobin A1C (%)   Date Value   12/29/2022 13.9   06/02/2022 13.2   11/08/2021 12.6             ( goal A1C is < 7)   Microalb/Crt.  Ratio (mcg/mg creat)   Date Value   12/29/2022 11     LDL Cholesterol (mg/dL)   Date Value   12/29/2022 71   12/24/2020 56       (goal LDL is <100)   AST (U/L)   Date Value   10/04/2022 16     ALT (U/L)   Date Value   10/04/2022 14     BUN (mg/dL)   Date Value   10/04/2022 12     BP Readings from Last 3 Encounters:   10/11/22 124/81   07/26/22 132/82   07/26/22 125/82          (goal 120/80)    All Future Testing planned in [facility]  Lab Frequency Next Occurrence   XR LUMBAR SPINE (2-3 VIEWS) Once 12/29/2022               Patient Active Problem List:     History of breast cancer     Pain, knee     Depression     DJD (degenerative joint disease) of knee     Headache     Iron deficiency anemia     HTN (hypertension)     DM w/o complication type II     Left shoulder pain     Diabetes 1.5, managed as type 2 (HCC)     Headache disorder     Koilonychia     Class 2 severe obesity due to excess calories with serious comorbidity in adult [facility])     Episode of syncope

## 2023-02-07 ENCOUNTER — TELEPHONE (OUTPATIENT)
Dept: ONCOLOGY | Age: 58
End: 2023-02-07

## 2023-02-07 NOTE — TELEPHONE ENCOUNTER
PT CALLED 2/6/23 AND WRITER CALLED BACK ON 2/7/23, BUT PT'S PHONE MALFUNCTIONING AND SHE CAN NOT HEAR WRITER, BUT I CAN HEAR HER. WILL AWAIT PT TO LEAVE A MESSAGE AGAIN. DID NOT UNDERSTAND MESSAGE LEFT ON 2/6/23 AS FOLLOWS: FAX  [facility] [place], ([phone], NEED SCRIPT? PT CALLED BACK AND CLARIFIED NEED NEW MASTECTOMY BRAS AND FORMS. ORDER TO RACK FOR [employer] AFTER REVIEWED PREVIOUS SCRIPT FROM 2015, PT NOTIFIED TO MD THURMAN TO COMPLETE.

## 2023-02-20 DIAGNOSIS — E11.00 TYPE 2 DIABETES MELLITUS WITH HYPEROSMOLARITY WITHOUT COMA, WITH LONG-TERM CURRENT USE OF INSULIN (HCC): ICD-10-CM

## 2023-02-20 DIAGNOSIS — Z79.4 TYPE 2 DIABETES MELLITUS WITH HYPEROSMOLARITY WITHOUT COMA, WITH LONG-TERM CURRENT USE OF INSULIN (HCC): ICD-10-CM

## 2023-02-20 RX ORDER — INSULIN ASPART 100 [IU]/ML
INJECTION, SOLUTION INTRAVENOUS; SUBCUTANEOUS
Qty: 15 ML | Refills: 10 | Status: SHIPPED | OUTPATIENT
Start: 2023-02-20

## 2023-02-20 NOTE — TELEPHONE ENCOUNTER
Last visit: 12/29/22  Last Med refill: 12/29/22  Does patient have enough medication for 72 hours: No: PATIENT SCHEDULED    Next Visit Date:  Future Appointments   Date Time Provider Kota Sanz   3/10/2023  3:30 PM MD Yahaira Mac FP [id number]   10/10/2023 10:00 AM Ulices Laird MD [address] [facility] Maintenance   Topic Date Due    Hepatitis C screen  Never done    Shingles vaccine (1 of 2) Never done    Diabetic retinal exam  12/31/2020    COVID-19 Vaccine (4 - Booster for Pfizer series) 02/01/2022    A1C test (Diabetic or Prediabetic)  03/29/2023    Depression Monitoring  06/02/2023    Pneumococcal 0-64 years Vaccine (2 - PCV) 06/02/2023    Cervical cancer screen  06/21/2023    GFR test (Diabetes, CKD 3-4, OR last GFR 15-59)  10/04/2023    Diabetic foot exam  12/29/2023    Diabetic Alb to Cr ratio (uACR) test  12/29/2023    Lipids  12/29/2023    DTaP/Tdap/Td vaccine (4 - Td or Tdap) 04/23/2031    Colorectal Cancer Screen  03/24/2032    Hepatitis B vaccine  Completed    Breast cancer screen  Completed    Flu vaccine  Completed    HIV screen  Completed    Hepatitis A vaccine  Aged Out    Hib vaccine  Aged Out    Meningococcal (ACWY) vaccine  Aged Out       Hemoglobin A1C (%)   Date Value   12/29/2022 13.9   06/02/2022 13.2   11/08/2021 12.6             ( goal A1C is < 7)   Microalb/Crt.  Ratio (mcg/mg creat)   Date Value   12/29/2022 11     LDL Cholesterol (mg/dL)   Date Value   12/29/2022 71   12/24/2020 56       (goal LDL is <100)   AST (U/L)   Date Value   10/04/2022 16     ALT (U/L)   Date Value   10/04/2022 14     BUN (mg/dL)   Date Value   10/04/2022 12     BP Readings from Last 3 Encounters:   10/11/22 124/81   07/26/22 132/82   07/26/22 125/82          (goal 120/80)    All Future Testing planned in [facility]  Lab Frequency Next Occurrence   XR LUMBAR SPINE (2-3 VIEWS) Once 12/29/2022               Patient Active Problem List:     History of breast cancer     Pain, knee Depression     DJD (degenerative joint disease) of knee     Headache     Iron deficiency anemia     HTN (hypertension)     DM w/o complication type II     Left shoulder pain     Diabetes 1.5, managed as type 2 (HCC)     Headache disorder     Koilonychia     Class 2 severe obesity due to excess calories with serious comorbidity in adult [facility])     Episode of syncope

## 2023-03-09 DIAGNOSIS — K59.09 OTHER CONSTIPATION: ICD-10-CM

## 2023-03-10 RX ORDER — POLYETHYLENE GLYCOL 3350 17 G/17G
POWDER, FOR SOLUTION ORAL
Qty: 510 G | Refills: 10 | OUTPATIENT
Start: 2023-03-10

## 2023-03-10 RX ORDER — ASPIRIN 81 MG/1
TABLET ORAL
Qty: 30 TABLET | Refills: 10 | Status: SHIPPED | OUTPATIENT
Start: 2023-03-10

## 2023-03-10 RX ORDER — POTASSIUM CHLORIDE 750 MG/1
TABLET, EXTENDED RELEASE ORAL
Qty: 60 TABLET | Refills: 10 | Status: SHIPPED | OUTPATIENT
Start: 2023-03-10

## 2023-03-15 ENCOUNTER — APPOINTMENT (OUTPATIENT)
Dept: GENERAL RADIOLOGY | Age: 58
End: 2023-03-15
Payer: COMMERCIAL

## 2023-03-15 ENCOUNTER — HOSPITAL ENCOUNTER (EMERGENCY)
Age: 58
Discharge: HOME | End: 2023-03-15
Attending: EMERGENCY MEDICINE
Payer: COMMERCIAL

## 2023-03-15 VITALS
OXYGEN SATURATION: 99 % | SYSTOLIC BLOOD PRESSURE: 123 MMHG | TEMPERATURE: 97.3 F | HEART RATE: 100 BPM | DIASTOLIC BLOOD PRESSURE: 87 MMHG | RESPIRATION RATE: 17 BRPM

## 2023-03-15 DIAGNOSIS — M54.42 ACUTE LEFT-SIDED LOW BACK PAIN WITH LEFT-SIDED SCIATICA: Primary | ICD-10-CM

## 2023-03-15 PROCEDURE — 6370000000 HC RX 637 (ALT 250 FOR IP): Performed by: STUDENT IN AN ORGANIZED HEALTH CARE EDUCATION/TRAINING PROGRAM

## 2023-03-15 PROCEDURE — 99283 EMERGENCY DEPT VISIT LOW MDM: CPT

## 2023-03-15 PROCEDURE — 72100 X-RAY EXAM L-S SPINE 2/3 VWS: CPT

## 2023-03-15 RX ORDER — CYCLOBENZAPRINE HCL 5 MG
5 TABLET ORAL 2 TIMES DAILY PRN
Qty: 10 TABLET | Refills: 0 | Status: SHIPPED | OUTPATIENT
Start: 2023-03-15 | End: 2023-03-25

## 2023-03-15 RX ORDER — IBUPROFEN 800 MG/1
800 TABLET ORAL ONCE
Status: COMPLETED | OUTPATIENT
Start: 2023-03-15 | End: 2023-03-15

## 2023-03-15 RX ORDER — IBUPROFEN 200 MG
200 TABLET ORAL EVERY 8 HOURS PRN
Qty: 30 TABLET | Refills: 0 | Status: SHIPPED | OUTPATIENT
Start: 2023-03-15

## 2023-03-15 RX ORDER — LIDOCAINE 50 MG/G
1 PATCH TOPICAL DAILY
Qty: 30 PATCH | Refills: 0 | Status: SHIPPED | OUTPATIENT
Start: 2023-03-15

## 2023-03-15 RX ORDER — LIDOCAINE 4 G/G
1 PATCH TOPICAL ONCE
Status: DISCONTINUED | OUTPATIENT
Start: 2023-03-15 | End: 2023-03-15 | Stop reason: HOSPADM

## 2023-03-15 RX ORDER — ACETAMINOPHEN 325 MG/1
325 TABLET ORAL EVERY 6 HOURS PRN
Qty: 30 TABLET | Refills: 0 | Status: SHIPPED | OUTPATIENT
Start: 2023-03-15

## 2023-03-15 RX ORDER — CYCLOBENZAPRINE HCL 10 MG
5 TABLET ORAL ONCE
Status: DISCONTINUED | OUTPATIENT
Start: 2023-03-15 | End: 2023-03-15

## 2023-03-15 RX ORDER — CYCLOBENZAPRINE HCL 10 MG
10 TABLET ORAL ONCE
Status: COMPLETED | OUTPATIENT
Start: 2023-03-15 | End: 2023-03-15

## 2023-03-15 RX ADMIN — IBUPROFEN 800 MG: 800 TABLET, FILM COATED ORAL at 13:10

## 2023-03-15 RX ADMIN — CYCLOBENZAPRINE 10 MG: 10 TABLET, FILM COATED ORAL at 13:10

## 2023-03-15 ASSESSMENT — ENCOUNTER SYMPTOMS
BACK PAIN: 1
NAUSEA: 0
ABDOMINAL PAIN: 0
SHORTNESS OF BREATH: 0
VOMITING: 0
COUGH: 0

## 2023-03-15 ASSESSMENT — PAIN SCALES - GENERAL: PAINLEVEL_OUTOF10: 3

## 2023-03-15 ASSESSMENT — PAIN - FUNCTIONAL ASSESSMENT: PAIN_FUNCTIONAL_ASSESSMENT: 0-10

## 2023-03-15 NOTE — ED TRIAGE NOTES
pt arrived to triage via ambulatory per self   pt c/o back pain in low left back pain for a couple weeks. PT in NAD  S/S started approximately 2-3 weeks ago  Will continue monitor while in triage and place in appropriate room once available.

## 2023-03-15 NOTE — ED PROVIDER NOTES
[facility] ED  Emergency Department Encounter  Emergency Medicine Resident     Pt Name:Magui Vasquez  MRN: [id number]  [place] 1965  Date of evaluation: 3/15/23  PCP:  Bryn Foster MD      [address]       Chief Complaint   Patient presents with    Lower Back Pain       HISTORY OF PRESENT ILLNESS  (Location/Symptom, Timing/Onset, Context/Setting, Quality, Duration, Modifying Factors, Severity.)      Millicent Lazar is a 65 y.o. female who presents with 1 week of left-sided back pain rating down her left lower extremity. No injury or trauma. No fevers, chills, nausea or vomiting. Patient is able to ambulate, denies any numbness/tingling/weakness of the extremity. No saddle anesthesia or incontinence. No history of IV drug use, however she does have a history of breast cancer previously. PAST MEDICAL / SURGICAL / SOCIAL / FAMILY HISTORY      has a past medical history of Breast cancer ([address] Cancer ([address] Conjunctivitis, Depression, Disorder of patellofemoral joint, DJD (degenerative joint disease) of knee, Hyperlipidemia, Hypertension, Malignant neoplasm of nipple of left breast in female, estrogen receptor positive ([address] Pain, knee, Primary osteoarthritis of left knee, Type II or unspecified type diabetes mellitus without mention of complication, not stated as uncontrolled, Under care of team, Under care of team, Under care of team, Wears dentures, Wears glasses, and Weight loss. has a past surgical history that includes Breast surgery (2006); Tubal ligation;  section; lymphadenectomy; other surgical history (N/A, 2017); incision and drainage (N/A, 2017); Mastectomy; Bariatric Surgery (); and Colonoscopy (N/A, 3/24/2022).       Social History     Socioeconomic History    Marital status:      Spouse name: Not on file    Number of children: Not on file    Years of education: Not on file    Highest education level: Not on file Occupational History    Not on file   Tobacco Use    Smoking status: Never     Passive exposure: Yes    Smokeless tobacco: Never   Vaping Use    Vaping Use: Never used   Substance and Sexual Activity    Alcohol use: No    Drug use: No    Sexual activity: Never   Other Topics Concern    Not on file   Social History Narrative    Not on file     Social Determinants of Health     Financial Resource Strain: Medium Risk    Difficulty of Paying Living Expenses: Somewhat hard   Food Insecurity: Food Insecurity Present    Worried About Running Out of Food in the Last Year: Sometimes true    Ran Out of Food in the Last Year: Sometimes true   Transportation Needs: Not on file   Physical Activity: Not on file   Stress: Not on file   Social Connections: Not on file   Intimate Partner Violence: Not on file   Housing Stability: Not on file       Family History   Problem Relation Age of Onset    Cancer Father        Allergies:  Lisinopril    Home Medications:  Prior to Admission medications    Medication Sig Start Date End Date Taking?  Authorizing Provider   lidocaine (LIDODERM) 5 % Place 1 patch onto the skin daily 12 hours on, 12 hours off. 3/15/23  Yes Barbara Ferreira MD   acetaminophen (TYLENOL) 325 MG tablet Take 1 tablet by mouth every 6 hours as needed for Pain 3/15/23  Yes Barbara Ferreira MD   ibuprofen (ADVIL;MOTRIN) 200 MG tablet Take 1 tablet by mouth every 8 hours as needed for Pain or Fever 3/15/23  Yes Barbara Ferreira MD   cyclobenzaprine (FLEXERIL) 5 MG tablet Take 1 tablet by mouth 2 times daily as needed for Muscle spasms 3/15/23 3/25/23 Yes Barbara Ferreira MD   insulin aspart (NOVOLOG FLEXPEN) 100 UNIT/ML injection pen INJECT 4 UNITS SUBCUTANEOUSLY THREE TIMES A DAY BEFORE MEALS 2/20/23   Dinesh Newton MD   aspirin (ASPIRIN LOW DOSE) 81 MG EC tablet TAKE 1 TABLET BY MOUTH ONCE DAILY 3/10/23   Delfina Chung MD   potassium chloride (KLOR-CON M) 10 MEQ extended release tablet TAKE 1 TABLET BY MOUTH TWICE DAILY 3/10/23   MD LUCÍA ArellanoUS SOLOSTAR 100 UNIT/ML injection pen INJECT 40 UNITS SUBCUTANEOUSLY TWICE DAILY 1/30/23   Moshe Gonzales DO   ferrous sulfate (FEROSUL) 325 (65 Fe) MG tablet TAKE 1 TABLET BY MOUTH THREE TIMES A DAY 1/10/23   Wing Betancur MD   hydroCHLOROthiazide (HYDRODIURIL) 25 MG tablet TAKE 1 TABLET BY MOUTH DAILY 12/29/22   Thalia Gonsalez MD   SITagliptin (JANUVIA) 100 MG tablet TAKE 1 TABLET BY MOUTH DAILY 12/29/22   Thalia Gonsalez MD   blood glucose test strips (TRUE METRIX BLOOD GLUCOSE TEST) strip USE STRIP TO TEST BLOOD GLUCOSE FOUR TIMES DAILY AS NEEDED. 12/13/22   Mami Wells MD   Blood Glucose Monitoring Suppl (TRUE METRIX METER) w/Device KIT USE GLUCOMETER FOR TESTING BLOOD GLUCOSE 4 TIMES DAILY AS NEEDED. 11/17/22   Abundio Tao MD   topiramate (TOPAMAX) 200 MG tablet TAKE TWO (2) TABLETS BY MOUTH TWICE DAILY 10/13/22   Mami Wells MD   Lancets MISC Use lancet to test blood glucose four times daily as needed.  9/30/22   Thalia Gonsalez MD   OneTouch Delica Lancets 15H MISC USE TO TEST BLOOD SUGAR DAILY 8/12/22   Abundio Tao MD   ondansetron (ZOFRAN ODT) 4 MG disintegrating tablet Place 1 tablet under the tongue every 8 hours as needed for Nausea 7/26/22   Jasmin Ayoub DO   gabapentin (NEURONTIN) 300 MG capsule TAKE 1 CAPSULE BY MOUTH THREE TIMES DAILY 7/2/22 10/11/22  Wing Betancur MD   atorvastatin (LIPITOR) 20 MG tablet TAKE 1 TABLET BY MOUTH DAILY 6/30/22   Hortencia Espino MD   escitalopram (LEXAPRO) 10 MG tablet Take 10 mg by mouth daily  5/13/22   Historical Provider, MD   hydrOXYzine (ATARAX) 25 MG tablet TAKE 1 TABLET BY MOUTH TWICE DAILY AS NEEDED 5/18/22   Historical Provider, MD   mirtazapine (REMERON) 15 MG tablet TAKE 1 TABLET BY MOUTH AT BEDTIME AS NEEDED 5/18/22   Historical Provider, MD   Lancets (ONETOUCH DELICA PLUS [id number]) [facility] USE TO TEST BLOOD SUGAR DAILY 5/13/22   Richa Steele MD   Insulin Pen Needle 31G X 8 MM MISC 1 each by Does not apply route daily 12/31/21   Aneta Jorgensen MD   Insulin Pen Needle 31G X 6 MM MISC 1 each by Does not apply route daily 12/31/21   Aneta Jorgensen MD   Lancets (ONETOUCH DELICA PLUS [id number]) MISC Check blood sugars 3 times daily 3/17/20   Leticia Jackman MD   Multiple Vitamins-Minerals (MULTIVITAMIN-MINERALS) TABS tablet TAKE (1) TABLET BY MOUTH DAILY 9/25/19   Leticia Jackman MD   TRUEPLUS PEN NEEDLES 31G X 6 MM MISC USE DAILY AS DIRECTED 6/27/19   Leticia Jackman MD   Melatonin 10 MG CAPS TAKE 1 CAPSULE BY MOUTH AT NIGHT 8/7/18   Historical Provider, MD   zolpidem (AMBIEN) 10 MG tablet Take 10 mg by mouth nightly as needed for Sleep. Historical Provider, MD   risperiDONE (RISPERDAL) 4 MG tablet Take 4 mg by mouth daily  6/13/16   Historical Provider, MD ODONNELL 3CC LUER-AMBAR SYR 08SA5-9/2 22G X 1-1/2\" 3 ML MISC  4/14/16   Historical Provider, MD   benztropine (COGENTIN) 0.5 MG tablet Take 1 tablet by mouth 2 times daily 8/27/15   Historical Provider, MD       REVIEW OF SYSTEMS       Review of Systems   Constitutional:  Negative for chills and fever. Respiratory:  Negative for cough and shortness of breath. Gastrointestinal:  Negative for abdominal pain, nausea and vomiting. Genitourinary:  Negative for decreased urine volume, difficulty urinating, dysuria and flank pain. Musculoskeletal:  Positive for back pain. Negative for neck pain and neck stiffness. Skin:  Negative for rash. Neurological:  Negative for weakness and numbness. PHYSICAL EXAM      INITIAL VITALS:   /87   Pulse 100   Temp 97.3 °F (36.3 °C) (Oral)   Resp 17   LMP 12/08/2015 (Approximate)   SpO2 99%     Physical Exam  Vitals and nursing note reviewed. Constitutional:       General: She is not in acute distress. Appearance: Normal appearance. She is not ill-appearing or toxic-appearing.    HENT:      Nose: Nose normal.      Mouth/Throat:      Mouth: Mucous membranes are moist.   Eyes:      Extraocular Movements: Extraocular movements intact. Pupils: Pupils are equal, round, and reactive to light. Cardiovascular:      Rate and Rhythm: Normal rate. Pulses: Normal pulses. Pulmonary:      Effort: Pulmonary effort is normal.   Musculoskeletal:         General: Normal range of motion. Cervical back: Normal range of motion. No rigidity or tenderness. Skin:     General: Skin is warm. Capillary Refill: Capillary refill takes less than 2 seconds. Neurological:      Mental Status: She is alert and oriented to person, place, and time. Cranial Nerves: Cranial nerves 2-12 are intact. Sensory: Sensation is intact. Motor: Motor function is intact. No weakness. Gait: Gait is intact. Deep Tendon Reflexes:      Reflex Scores:       Patellar reflexes are 2+ on the right side and 2+ on the left side. Achilles reflexes are 2+ on the right side and 2+ on the left side. DDX/DIAGNOSTIC RESULTS / EMERGENCY DEPARTMENT COURSE / MDM     Medical Decision Making  Patient is a 50-year-old female with medical history significant for remote breast cancer presenting with left sided back pain rating down her left lower extremity with no injury or trauma. No fevers, chills, saddle anesthesia or incontinence. No IV drug use history, no fevers or chills. However, she does have a history of breast cancer. Vital signs reviewed and are within normal limits. Physical examination showing full strength and range of motion of lower extremity, no midline tenderness palpation, tenderness of left paraspinal musculature. DTRs for attending are within normal limits. Since she has a history of breast cancer, will obtain x-ray, however MRI is not indicated at this time. We will administer analgesia and reassess. Amount and/or Complexity of Data Reviewed  Radiology: ordered. Decision-making details documented in ED Course.     Risk  Prescription drug management. EMERGENCY DEPARTMENT COURSE:      ED Course as of 03/15/23 1350   Wed Mar 15, 2023   1345 X-ray negative for acute process. Plan for discharge and will provide prescription for Tylenol, Motrin, Flexeril and Lidoderm patches [JT]      ED Course User Index  [JT] Farhana Yo MD       PROCEDURES:      CONSULTS:  None    CRITICAL CARE:  There was significant risk of life threatening deterioration of patient's condition requiring my direct management. Critical care time 0 minutes, excluding any documented procedures. FINAL IMPRESSION      1. Acute left-sided low back pain with left-sided sciatica          DISPOSITION / PLAN     DISPOSITION Decision To Discharge 03/15/2023 01:45:38 PM      PATIENT REFERRED TO:  Analia Gay MD  [facility] [place][note 2][address] [facility].   [place] [address]  [phone]    In 1 week      [facility] ED  [address] [facility] [facility] [address] [address] [phone]  Go to   If symptoms worsen    DISCHARGE MEDICATIONS:  New Prescriptions    ACETAMINOPHEN (TYLENOL) 325 MG TABLET    Take 1 tablet by mouth every 6 hours as needed for Pain    CYCLOBENZAPRINE (FLEXERIL) 5 MG TABLET    Take 1 tablet by mouth 2 times daily as needed for Muscle spasms    IBUPROFEN (ADVIL;MOTRIN) 200 MG TABLET    Take 1 tablet by mouth every 8 hours as needed for Pain or Fever    LIDOCAINE (LIDODERM) 5 %    Place 1 patch onto the skin daily 12 hours on, 12 hours off.       Farhana Yo MD  Emergency Medicine Resident    (Please note that portions of thisnote were completed with a voice recognition program.  Efforts were made to edit the dictations but occasionally words are mis-transcribed.)       Farhana Yo MD  Resident  03/15/23 [id number]

## 2023-03-15 NOTE — Clinical Note
Tyshawn Welch was seen and treated in our emergency department on 3/15/2023. She may return to work on 03/16/2023. If you have any questions or concerns, please don't hesitate to call.       Sadi Morrison MD

## 2023-03-15 NOTE — DISCHARGE INSTRUCTIONS
You were seen in the ER for back pain. While you are here we got an x-ray which did not show any signs of cancer in the spine or broken bones. We recommend you follow-up with your primary care doctor. You can take Tylenol, Motrin, muscle relaxers, and lidocaine patches. You are provided with prescriptions for all these medications. Come back to the ER if you have severe worsening pain, bowel or bladder incontinence, fevers, chills, weakness of the leg. Carina Dotson!!!    From [facility] Emergency Department    On behalf of the Emergency Department staff at [facility]. [facility] Emergency Department, I would like to thank you for giving [facility] the opportunity to address your health care needs and concerns. We hope that during your visit, our service was delivered in a professional and caring manner. Please keep [facility] in mind as we walk with you down the path to your own personal wellness. Please expect an automated phone call from [phone] so we can ask a few questions about your health and progress. Based on your answers, a clinician may call you back to offer help and instructions. If you notice any concerning symptoms please return to the ER immediately. These can include but are not limited to: fevers, chills, shortness of breath, vomiting, weakness of the extremities, changes in your mental status, numbness, pale extremities, or chest pain.

## 2023-03-15 NOTE — ED PROVIDER NOTES
[address]     Emergency Department     Faculty Attestation    I performed a history and physical examination of the patient and discussed management with the resident. I reviewed the residents note and agree with the documented findings and plan of care. Any areas of disagreement are noted on the chart. I was personally present for the key portions of any procedures. I have documented in the chart those procedures where I was not present during the key portions. I have reviewed the emergency nurses triage note. I agree with the chief complaint, past medical history, past surgical history, allergies, medications, social and family history as documented unless otherwise noted below. Documentation of the HPI, Physical Exam and Medical Decision Making performed by medical students or scribes is based on my personal performance of the HPI, PE and MDM. For Physician Assistant/ Nurse Practitioner cases/documentation I have personally evaluated this patient and have completed at least one if not all key elements of the E/M (history, physical exam, and MDM). Additional findings are as noted. Vital signs:   Vitals:    03/15/23 1238   BP: 123/87   Pulse: 100   Resp: 17   Temp: 97.3 °F (36.3 °C)   SpO2: 80       58-year-old female presents complaining of low back pain. She reports a little bit of numbness in her left posterior thigh. No tingling. She states she sometimes feels like her leg is going to give out. No incontinence or urinary retention. No saddle anesthesia. No recent trauma. No history of IV drug abuse. She is not anticoagulated. She does have a history of breast cancer in the past.  On physical exam, her motor function is 5 out of 5 in the bilateral lower extremities. Sensation is grossly intact. DTRs are 2+ at the patella tendons bilaterally. Normal proprioception. Plan for x-rays of the lumbar spine in light of her history of breast cancer.           Gunnar Thompson, M.D,  Attending Emergency  Physician            Maggi Crandall MD  03/15/23 [id number]

## 2023-03-30 DIAGNOSIS — K59.09 OTHER CONSTIPATION: ICD-10-CM

## 2023-04-03 RX ORDER — POLYETHYLENE GLYCOL 3350 17 G/17G
POWDER, FOR SOLUTION ORAL
Qty: 510 G | Refills: 10 | OUTPATIENT
Start: 2023-04-03

## 2023-04-05 ENCOUNTER — HOSPITAL ENCOUNTER (EMERGENCY)
Age: 58
Discharge: HOME | End: 2023-04-05
Attending: EMERGENCY MEDICINE
Payer: COMMERCIAL

## 2023-04-05 ENCOUNTER — APPOINTMENT (OUTPATIENT)
Dept: GENERAL RADIOLOGY | Age: 58
End: 2023-04-05
Payer: COMMERCIAL

## 2023-04-05 VITALS
SYSTOLIC BLOOD PRESSURE: 132 MMHG | TEMPERATURE: 98.4 F | OXYGEN SATURATION: 98 % | RESPIRATION RATE: 18 BRPM | DIASTOLIC BLOOD PRESSURE: 82 MMHG | HEART RATE: 97 BPM

## 2023-04-05 DIAGNOSIS — M25.511 ACUTE PAIN OF RIGHT SHOULDER: Primary | ICD-10-CM

## 2023-04-05 PROCEDURE — 6370000000 HC RX 637 (ALT 250 FOR IP)

## 2023-04-05 PROCEDURE — 73030 X-RAY EXAM OF SHOULDER: CPT

## 2023-04-05 RX ORDER — IBUPROFEN 600 MG/1
600 TABLET ORAL 3 TIMES DAILY PRN
Qty: 21 TABLET | Refills: 0 | Status: SHIPPED | OUTPATIENT
Start: 2023-04-05 | End: 2023-04-12

## 2023-04-05 RX ORDER — ACETAMINOPHEN 325 MG/1
650 TABLET ORAL EVERY 6 HOURS PRN
Qty: 120 TABLET | Refills: 0 | Status: SHIPPED | OUTPATIENT
Start: 2023-04-05 | End: 2023-04-12

## 2023-04-05 RX ORDER — IBUPROFEN 400 MG/1
600 TABLET ORAL ONCE
Status: COMPLETED | OUTPATIENT
Start: 2023-04-05 | End: 2023-04-05

## 2023-04-05 RX ORDER — CYCLOBENZAPRINE HCL 5 MG
5 TABLET ORAL 2 TIMES DAILY PRN
Qty: 10 TABLET | Refills: 0 | Status: SHIPPED | OUTPATIENT
Start: 2023-04-05 | End: 2023-04-10

## 2023-04-05 RX ADMIN — IBUPROFEN 600 MG: 400 TABLET, FILM COATED ORAL at 14:11

## 2023-04-05 NOTE — ED TRIAGE NOTES
Patient presented to the ED with RT arm pain states she fell out the tub 1 week ago but the pain has gotten worse. Patient rates the pain 10 on 0-10 pain scale. Vitals obtained and WNL. Patient has been assessed by the resident and physician orders to be followed as directed.

## 2023-04-05 NOTE — DISCHARGE INSTRUCTIONS
Thank you for visiting [facility] Emergency Department. You need to call Zahira Snowden MD to make an appointment as directed for follow up. Should you have any questions regarding your care or further treatment, please call [facility] Emergency Department at [phone]. Please return to emergency department for any new or worrisome symptoms including any numbness, weakness, tingling.

## 2023-04-05 NOTE — Clinical Note
Galo Dale was seen and treated in our emergency department on 4/5/2023. She may return to work on 04/06/2023. If you have any questions or concerns, please don't hesitate to call.       Mirta Noel MD

## 2023-04-05 NOTE — Clinical Note
Arthur Chilel was seen and treated in our emergency department on 4/5/2023. She may return to work on 04/06/2023. If you have any questions or concerns, please don't hesitate to call.       Darnell Quach MD

## 2023-04-05 NOTE — ED PROVIDER NOTES
[address] [facility]     Emergency Department     Faculty Attestation    I performed a history and physical examination of the patient and discussed management with the resident. I have reviewed and agree with the residents findings including all diagnostic interpretations, and treatment plans as written at the time of my review. Any areas of disagreement are noted on the chart. I was personally present for the key portions of any procedures. I have documented in the chart those procedures where I was not present during the key portions. For Physician Assistant/ Nurse Practitioner cases/documentation I have personally evaluated this patient and have completed at least one if not all key elements of the E/M (history, physical exam, and MDM). Additional findings are as noted. Primary Care Physician: Jeannene Alpers, MD    Note Started: 1:38 PM EDT    History: This is a 65 y.o. female who presents to the Emergency Department with complaint of the pain. Patient was getting out of the bath when she twisted her arm. Occurred over the weekend. Patient complained of pain to the right shoulder. Physical:   oral temperature is 98.4 °F (36.9 °C). Her blood pressure is 132/82 and her pulse is 97. Her respiration is 18 and oxygen saturation is 98%. Tenderness to palpation along the anterior and posterior right shoulder. Axillary nerve is intact. Radial pulses 2/4. Impression: Shoulder pain    Plan: X-ray, analgesia      Medical Decision Making  Amount and/or Complexity of Data Reviewed  Radiology: ordered. Risk  OTC drugs. Prescription drug management. (Please note that portions of this note were completed with a voice recognition program.  Efforts were made to edit the dictations but occasionally words are mis-transcribed.)    Nato Dueñas.  Giuseppe Byrd MD, [facility]  Attending Emergency Medicine Physician        Nolene Najjar, MD  04/05/23 0644
and frequency. Musculoskeletal:  Negative for back pain and neck pain. Positive for shoulder pain  Skin:  Negative for rash and wound. Neurological:  Negative for dizziness and headaches. PHYSICAL EXAM   (up to 7 for level 4, 8 or more for level 5)      INITIAL VITALS:   /82   Pulse 97   Temp 98.4 °F (36.9 °C) (Oral)   Resp 18   LMP 12/08/2015 (Approximate)   SpO2 98%     Physical Exam  Vitals and nursing note reviewed. Constitutional:       General: She is not in acute distress. HENT:      Head: Atraumatic. Right Ear: External ear normal.      Left Ear: External ear normal.      Nose: Nose normal.      Mouth/Throat:      Mouth: Mucous membranes are moist.      Pharynx: Oropharynx is clear. Eyes:      Conjunctiva/sclera: Conjunctivae normal.   Cardiovascular:      Rate and Rhythm: Normal rate and regular rhythm. Pulses: Normal pulses. Pulmonary:      Effort: Pulmonary effort is normal. No respiratory distress. Breath sounds: Normal breath sounds. No wheezing. Musculoskeletal:         General: Normal range of motion. Cervical back: Normal range of motion. tenderness to anterior aspect of right shoulder to palpation  Skin:     General: Skin is warm and dry. Capillary Refill: Capillary refill takes less than 2 seconds. Neurological:      General: No focal deficit present. Mental Status: She is alert and oriented to person, place, and time. DDX/DIAGNOSTIC RESULTS / EMERGENCY DEPARTMENT COURSE / MDM     Medical Decision Making  Muscle strain, partial dislocation versus subluxation, humeral head fracture. X-ray nonacute. Pain likely secondary to muscle strain. Amount and/or Complexity of Data Reviewed  Radiology: ordered. Risk  OTC drugs. Prescription drug management. EMERGENCY DEPARTMENT COURSE:  52-year-old female presented to ED with complaints of right shoulder pain status post attempting to get out of her tub 2 days ago.   Denies

## 2023-05-08 ENCOUNTER — APPOINTMENT (OUTPATIENT)
Dept: GENERAL RADIOLOGY | Age: 58
End: 2023-05-08
Payer: COMMERCIAL

## 2023-05-08 ENCOUNTER — HOSPITAL ENCOUNTER (EMERGENCY)
Age: 58
Discharge: HOME | End: 2023-05-08
Attending: EMERGENCY MEDICINE
Payer: COMMERCIAL

## 2023-05-08 VITALS
RESPIRATION RATE: 16 BRPM | HEART RATE: 98 BPM | OXYGEN SATURATION: 97 % | SYSTOLIC BLOOD PRESSURE: 145 MMHG | DIASTOLIC BLOOD PRESSURE: 92 MMHG | TEMPERATURE: 97.9 F
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
## 2023-05-08 PROCEDURE — 73030 X-RAY EXAM OF SHOULDER: CPT

## 2023-05-08 PROCEDURE — 6370000000 HC RX 637 (ALT 250 FOR IP)

## 2023-05-08 PROCEDURE — 99283 EMERGENCY DEPT VISIT LOW MDM: CPT

## 2023-05-08 PROCEDURE — 71045 X-RAY EXAM CHEST 1 VIEW: CPT

## 2023-05-08 RX ORDER — ACETAMINOPHEN 325 MG/1
650 TABLET ORAL EVERY 6 HOURS PRN
Qty: 40 TABLET | Refills: 1 | Status: SHIPPED | OUTPATIENT
Start: 2023-05-08 | End: 2023-06-07

## 2023-05-08 RX ORDER — IBUPROFEN 600 MG/1
600 TABLET ORAL EVERY 8 HOURS PRN
Qty: 30 TABLET | Refills: 1 | Status: SHIPPED | OUTPATIENT
Start: 2023-05-08 | End: 2023-06-07

## 2023-05-08 RX ORDER — ACETAMINOPHEN 500 MG
1000 TABLET ORAL ONCE
Status: COMPLETED | OUTPATIENT
Start: 2023-05-08 | End: 2023-05-08

## 2023-05-08 RX ADMIN — ACETAMINOPHEN 1000 MG: 500 TABLET ORAL at 09:48

## 2023-05-08 ASSESSMENT — ENCOUNTER SYMPTOMS
CHEST TIGHTNESS: 0
CONSTIPATION: 0
CHOKING: 0
NAUSEA: 0
DIARRHEA: 0
BACK PAIN: 0
COUGH: 0
VOMITING: 0
SHORTNESS OF BREATH: 0
ABDOMINAL DISTENTION: 0
RHINORRHEA: 0
ABDOMINAL PAIN: 0
TROUBLE SWALLOWING: 0
PHOTOPHOBIA: 0
VOICE CHANGE: 0

## 2023-05-08 ASSESSMENT — PAIN SCALES - GENERAL
PAINLEVEL_OUTOF10: 9
PAINLEVEL_OUTOF10: 9

## 2023-05-08 ASSESSMENT — PAIN DESCRIPTION - LOCATION
LOCATION: ARM;RIB CAGE
LOCATION: HEAD;ARM

## 2023-05-08 ASSESSMENT — PAIN - FUNCTIONAL ASSESSMENT: PAIN_FUNCTIONAL_ASSESSMENT: 0-10
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
## 2023-05-09 DIAGNOSIS — E78.00 HYPERCHOLESTEREMIA: ICD-10-CM

## 2023-05-09 DIAGNOSIS — Z17.0 MALIGNANT NEOPLASM OF NIPPLE OF LEFT BREAST IN FEMALE, ESTROGEN RECEPTOR POSITIVE (HCC): ICD-10-CM

## 2023-05-09 DIAGNOSIS — C50.012 MALIGNANT NEOPLASM OF NIPPLE OF LEFT BREAST IN FEMALE, ESTROGEN RECEPTOR POSITIVE (HCC): ICD-10-CM

## 2023-05-09 DIAGNOSIS — E11.00 TYPE 2 DIABETES MELLITUS WITH HYPEROSMOLARITY WITHOUT COMA, WITHOUT LONG-TERM CURRENT USE OF INSULIN (HCC): ICD-10-CM

## 2023-05-10 RX ORDER — ATORVASTATIN CALCIUM 20 MG/1
20 TABLET, FILM COATED ORAL DAILY
Qty: 30 TABLET | Refills: 2 | Status: SHIPPED | OUTPATIENT
Start: 2023-05-10

## 2023-05-10 RX ORDER — GABAPENTIN 300 MG/1
CAPSULE ORAL
Qty: 90 CAPSULE | Refills: 2 | Status: SHIPPED | OUTPATIENT
Start: 2023-05-10 | End: 2023-05-10

## 2023-05-10 NOTE — TELEPHONE ENCOUNTER
E-scribe request for LIPITOR 20 MG. Please review and e-scribe if applicable. Last Visit Date:  12/29/2022  Next Visit Date:  Visit date not found    Hemoglobin A1C (%)   Date Value   12/29/2022 13.9   06/02/2022 13.2   11/08/2021 12.6             ( goal A1C is < 7)   Microalb/Crt.  Ratio (mcg/mg creat)   Date Value   12/29/2022 11     LDL Cholesterol (mg/dL)   Date Value   12/29/2022 71       (goal LDL is <100)   AST (U/L)   Date Value   10/04/2022 16     ALT (U/L)   Date Value   10/04/2022 14     BUN (mg/dL)   Date Value   10/04/2022 12     BP Readings from Last 3 Encounters:   05/08/23 (!) 145/92   04/05/23 132/82   03/15/23 123/87          (goal 120/80)        Patient Active Problem List:     History of breast cancer     Pain, knee     Depression     DJD (degenerative joint disease) of knee     Headache     Iron deficiency anemia     HTN (hypertension)     DM w/o complication type II     Left shoulder pain     Diabetes 1.5, managed as type 2 (HCC)     Headache disorder     Koilonychia     Class 2 severe obesity due to excess calories with serious comorbidity in adult [facility])     Episode of syncope      ----JF

## 2023-05-11 ENCOUNTER — APPOINTMENT (OUTPATIENT)
Dept: CT IMAGING | Age: 58
End: 2023-05-11
Payer: COMMERCIAL

## 2023-05-11 ENCOUNTER — HOSPITAL ENCOUNTER (EMERGENCY)
Age: 58
Discharge: HOME | End: 2023-05-11
Attending: EMERGENCY MEDICINE
Payer: COMMERCIAL

## 2023-05-11 VITALS
DIASTOLIC BLOOD PRESSURE: 85 MMHG | OXYGEN SATURATION: 95 % | RESPIRATION RATE: 17 BRPM | HEART RATE: 115 BPM | TEMPERATURE: 98.5 F | SYSTOLIC BLOOD PRESSURE: 146 MMHG
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
## 2023-05-11 PROCEDURE — 82947 ASSAY GLUCOSE BLOOD QUANT: CPT

## 2023-05-11 PROCEDURE — 80051 ELECTROLYTE PANEL: CPT

## 2023-05-11 PROCEDURE — 99285 EMERGENCY DEPT VISIT HI MDM: CPT

## 2023-05-11 PROCEDURE — 70498 CT ANGIOGRAPHY NECK: CPT

## 2023-05-11 PROCEDURE — 83605 ASSAY OF LACTIC ACID: CPT

## 2023-05-11 PROCEDURE — 70450 CT HEAD/BRAIN W/O DYE: CPT

## 2023-05-11 PROCEDURE — 82565 ASSAY OF CREATININE: CPT

## 2023-05-11 PROCEDURE — 82803 BLOOD GASES ANY COMBINATION: CPT

## 2023-05-11 PROCEDURE — 85014 HEMATOCRIT: CPT

## 2023-05-11 PROCEDURE — 6360000004 HC RX CONTRAST MEDICATION

## 2023-05-11 PROCEDURE — 82330 ASSAY OF CALCIUM: CPT

## 2023-05-11 PROCEDURE — 84520 ASSAY OF UREA NITROGEN: CPT

## 2023-05-11 RX ADMIN — IOPAMIDOL 90 ML: 755 INJECTION, SOLUTION INTRAVENOUS at 18:18

## 2023-05-11 ASSESSMENT — PAIN - FUNCTIONAL ASSESSMENT: PAIN_FUNCTIONAL_ASSESSMENT: NONE - DENIES PAIN
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
## 2023-05-12 LAB
ANION GAP: 12 MMOL/L (ref 7–16)
EGFR, POC: 39 ML/MIN/1.73M2
GLUCOSE BLD-MCNC: 423 MG/DL (ref 74–100)
HCO3 VENOUS: 26.8 MMOL/L (ref 22–29)
O2 SAT, VEN: 76 % (ref 60–85)
PCO2, VEN: 42.5 MM HG (ref 41–51)
PH VENOUS: 7.41 (ref 7.32–7.43)
PO2, VEN: 40.8 MM HG (ref 30–50)
POC BUN: 19 MG/DL (ref 8–26)
POC CHLORIDE: 98 MMOL/L (ref 98–107)
POC CREATININE: 1.53 MG/DL (ref 0.51–1.19)
POC HEMATOCRIT: 36 % (ref 36–46)
POC HEMOGLOBIN: 12.1 G/DL (ref 12–16)
POC IONIZED CALCIUM: 1.11 MMOL/L (ref 1.15–1.33)
POC LACTIC ACID: 0.91 MMOL/L (ref 0.56–1.39)
POC POTASSIUM: 4 MMOL/L (ref 3.5–4.5)
POC SODIUM: 135 MMOL/L (ref 138–146)
POC TCO2: 26 MMOL/L (ref 22–30)
POSITIVE BASE EXCESS, VEN: 2 (ref 0–3)

## 2023-05-14 ASSESSMENT — ENCOUNTER SYMPTOMS
BACK PAIN: 0
EYE DISCHARGE: 0
ABDOMINAL PAIN: 0
EYE ITCHING: 0
COUGH: 0
RHINORRHEA: 0
SHORTNESS OF BREATH: 0
NAUSEA: 0
SORE THROAT: 0
VOMITING: 0

## 2023-05-17 DIAGNOSIS — R51.9 HEADACHE DISORDER: ICD-10-CM

## 2023-05-17 NOTE — TELEPHONE ENCOUNTER
E-scribe request for Topiramate. Please review and e-scribe if applicable. Last Visit Date:  [id number]  Next Visit Date:  Visit date not found    Hemoglobin A1C (%)   Date Value   12/29/2022 13.9   06/02/2022 13.2   11/08/2021 12.6             ( goal A1C is < 7)   Microalb/Crt.  Ratio (mcg/mg creat)   Date Value   12/29/2022 11     LDL Cholesterol (mg/dL)   Date Value   12/29/2022 71       (goal LDL is <100)   AST (U/L)   Date Value   10/04/2022 16     ALT (U/L)   Date Value   10/04/2022 14     BUN (mg/dL)   Date Value   10/04/2022 12     BP Readings from Last 3 Encounters:   05/11/23 (!) 146/85   05/08/23 (!) 145/92   04/05/23 132/82          (goal 120/80)        Patient Active Problem List:     History of breast cancer     Pain, knee     Depression     DJD (degenerative joint disease) of knee     Headache     Iron deficiency anemia     HTN (hypertension)     DM w/o complication type II     Left shoulder pain     Diabetes 1.5, managed as type 2 (HCC)     Headache disorder     Koilonychia     Class 2 severe obesity due to excess calories with serious comorbidity in [facility])     Episode of syncope

## 2023-05-22 ENCOUNTER — TELEPHONE (OUTPATIENT)
Dept: FAMILY MEDICINE CLINIC | Age: 58
End: 2023-05-22

## 2023-07-05 ENCOUNTER — HOSPITAL ENCOUNTER (INPATIENT)
Age: 58
LOS: 1 days | Discharge: HOME | DRG: 425 | End: 2023-07-06
Attending: EMERGENCY MEDICINE | Admitting: FAMILY MEDICINE
Payer: MEDICAID

## 2023-07-05 DIAGNOSIS — E11.65 HYPERGLYCEMIA DUE TO DIABETES MELLITUS (HCC): Primary | ICD-10-CM

## 2023-07-05 DIAGNOSIS — E87.6 HYPOKALEMIA: ICD-10-CM

## 2023-07-05 LAB
ANION GAP SERPL CALCULATED.3IONS-SCNC: 11 MMOL/L (ref 9–17)
ANION GAP SERPL CALCULATED.3IONS-SCNC: 14 MMOL/L (ref 9–17)
B-OH-BUTYR SERPL-MCNC: 0.22 MMOL/L (ref 0.02–0.27)
BASOPHILS # BLD: <0.03 K/UL (ref 0–0.2)
BASOPHILS NFR BLD: 0 % (ref 0–2)
BUN SERPL-MCNC: 11 MG/DL (ref 6–20)
BUN SERPL-MCNC: 14 MG/DL (ref 6–20)
CALCIUM SERPL-MCNC: 8.1 MG/DL (ref 8.6–10.4)
CALCIUM SERPL-MCNC: 9.1 MG/DL (ref 8.6–10.4)
CHLORIDE SERPL-SCNC: 86 MMOL/L (ref 98–107)
CHLORIDE SERPL-SCNC: 93 MMOL/L (ref 98–107)
CHP ED QC CHECK: NORMAL
CHP ED QC CHECK: YES
CHP ED QC CHECK: YES
CO2 SERPL-SCNC: 24 MMOL/L (ref 20–31)
CO2 SERPL-SCNC: 27 MMOL/L (ref 20–31)
CREAT SERPL-MCNC: 0.92 MG/DL (ref 0.5–0.9)
CREAT SERPL-MCNC: 1.39 MG/DL (ref 0.5–0.9)
EOSINOPHIL # BLD: 0.07 K/UL (ref 0–0.44)
EOSINOPHILS RELATIVE PERCENT: 1 % (ref 1–4)
ERYTHROCYTE [DISTWIDTH] IN BLOOD BY AUTOMATED COUNT: 11.6 % (ref 11.8–14.4)
GFR SERPL CREATININE-BSD FRML MDRD: 44 ML/MIN/1.73M2
GFR SERPL CREATININE-BSD FRML MDRD: >60 ML/MIN/1.73M2
GLUCOSE BLD-MCNC: 368 MG/DL (ref 65–105)
GLUCOSE BLD-MCNC: 425 MG/DL
GLUCOSE BLD-MCNC: 425 MG/DL (ref 65–105)
GLUCOSE BLD-MCNC: 540 MG/DL
GLUCOSE BLD-MCNC: 540 MG/DL (ref 65–105)
GLUCOSE BLD-MCNC: 549 MG/DL
GLUCOSE BLD-MCNC: 549 MG/DL (ref 65–105)
GLUCOSE BLD-MCNC: 588 MG/DL
GLUCOSE BLD-MCNC: 588 MG/DL (ref 65–105)
GLUCOSE BLD-MCNC: 598 MG/DL
GLUCOSE BLD-MCNC: 598 MG/DL (ref 65–105)
GLUCOSE BLD-MCNC: >600 MG/DL (ref 65–105)
GLUCOSE SERPL-MCNC: 446 MG/DL (ref 70–99)
GLUCOSE SERPL-MCNC: 674 MG/DL (ref 70–99)
HCT VFR BLD AUTO: 37.6 % (ref 36.3–47.1)
HGB BLD-MCNC: 12.5 G/DL (ref 11.9–15.1)
IMM GRANULOCYTES # BLD AUTO: 0.04 K/UL (ref 0–0.3)
IMM GRANULOCYTES NFR BLD: 0 %
LYMPHOCYTES # BLD: 10 % (ref 24–43)
LYMPHOCYTES NFR BLD: 0.96 K/UL (ref 1.1–3.7)
MAGNESIUM SERPL-MCNC: 1.9 MG/DL (ref 1.6–2.6)
MAGNESIUM SERPL-MCNC: 2.1 MG/DL (ref 1.6–2.6)
MCH RBC QN AUTO: 27.4 PG (ref 25.2–33.5)
MCHC RBC AUTO-ENTMCNC: 33.2 G/DL (ref 28.4–34.8)
MCV RBC AUTO: 82.5 FL (ref 82.6–102.9)
MONOCYTES NFR BLD: 0.3 K/UL (ref 0.1–1.2)
MONOCYTES NFR BLD: 3 % (ref 3–12)
NEUTROPHILS NFR BLD: 86 % (ref 36–65)
NEUTS SEG NFR BLD: 7.82 K/UL (ref 1.5–8.1)
NRBC BLD-RTO: 0 PER 100 WBC
PHOSPHATE SERPL-MCNC: 1.6 MG/DL (ref 2.6–4.5)
PLATELET # BLD AUTO: 275 K/UL (ref 138–453)
PMV BLD AUTO: 10.1 FL (ref 8.1–13.5)
POTASSIUM SERPL-SCNC: 2.9 MMOL/L (ref 3.7–5.3)
POTASSIUM SERPL-SCNC: 2.9 MMOL/L (ref 3.7–5.3)
POTASSIUM SERPL-SCNC: 3.2 MMOL/L (ref 3.7–5.3)
RBC # BLD AUTO: 4.56 M/UL (ref 3.95–5.11)
RBC # BLD: ABNORMAL 10*6/UL
SODIUM SERPL-SCNC: 124 MMOL/L (ref 135–144)
SODIUM SERPL-SCNC: 131 MMOL/L (ref 135–144)
TROPONIN I SERPL HS-MCNC: 18 NG/L (ref 0–14)
TROPONIN I SERPL HS-MCNC: 20 NG/L (ref 0–14)
WBC OTHER # BLD: 9.2 K/UL (ref 3.5–11.3)

## 2023-07-05 PROCEDURE — 2580000003 HC RX 258: Performed by: EMERGENCY MEDICINE

## 2023-07-05 PROCEDURE — 6360000002 HC RX W HCPCS

## 2023-07-05 PROCEDURE — 82947 ASSAY GLUCOSE BLOOD QUANT: CPT

## 2023-07-05 PROCEDURE — 6370000000 HC RX 637 (ALT 250 FOR IP)

## 2023-07-05 PROCEDURE — 2580000003 HC RX 258

## 2023-07-05 PROCEDURE — 6370000000 HC RX 637 (ALT 250 FOR IP): Performed by: EMERGENCY MEDICINE

## 2023-07-05 PROCEDURE — 84132 ASSAY OF SERUM POTASSIUM: CPT

## 2023-07-05 PROCEDURE — 6360000002 HC RX W HCPCS: Performed by: EMERGENCY MEDICINE

## 2023-07-05 PROCEDURE — 2060000000 HC ICU INTERMEDIATE R&B

## 2023-07-05 PROCEDURE — 96374 THER/PROPH/DIAG INJ IV PUSH: CPT

## 2023-07-05 PROCEDURE — 85027 COMPLETE CBC AUTOMATED: CPT

## 2023-07-05 PROCEDURE — 96361 HYDRATE IV INFUSION ADD-ON: CPT

## 2023-07-05 PROCEDURE — 84484 ASSAY OF TROPONIN QUANT: CPT

## 2023-07-05 PROCEDURE — 80048 BASIC METABOLIC PNL TOTAL CA: CPT

## 2023-07-05 PROCEDURE — 84100 ASSAY OF PHOSPHORUS: CPT

## 2023-07-05 PROCEDURE — 83735 ASSAY OF MAGNESIUM: CPT

## 2023-07-05 PROCEDURE — 82010 KETONE BODYS QUAN: CPT

## 2023-07-05 PROCEDURE — 99285 EMERGENCY DEPT VISIT HI MDM: CPT

## 2023-07-05 RX ORDER — POTASSIUM CHLORIDE 20 MEQ/1
40 TABLET, EXTENDED RELEASE ORAL PRN
Status: DISCONTINUED | OUTPATIENT
Start: 2023-07-05 | End: 2023-07-06 | Stop reason: HOSPADM

## 2023-07-05 RX ORDER — POLYETHYLENE GLYCOL 3350 17 G/17G
17 POWDER, FOR SOLUTION ORAL DAILY PRN
Status: DISCONTINUED | OUTPATIENT
Start: 2023-07-05 | End: 2023-07-06 | Stop reason: HOSPADM

## 2023-07-05 RX ORDER — SODIUM CHLORIDE 9 MG/ML
INJECTION, SOLUTION INTRAVENOUS PRN
Status: DISCONTINUED | OUTPATIENT
Start: 2023-07-05 | End: 2023-07-06 | Stop reason: HOSPADM

## 2023-07-05 RX ORDER — INSULIN GLARGINE 100 [IU]/ML
25 INJECTION, SOLUTION SUBCUTANEOUS NIGHTLY
Status: DISCONTINUED | OUTPATIENT
Start: 2023-07-05 | End: 2023-07-06 | Stop reason: HOSPADM

## 2023-07-05 RX ORDER — ASPIRIN 81 MG/1
81 TABLET ORAL DAILY
Status: DISCONTINUED | OUTPATIENT
Start: 2023-07-05 | End: 2023-07-06 | Stop reason: HOSPADM

## 2023-07-05 RX ORDER — SODIUM CHLORIDE 0.9 % (FLUSH) 0.9 %
5-40 SYRINGE (ML) INJECTION PRN
Status: DISCONTINUED | OUTPATIENT
Start: 2023-07-05 | End: 2023-07-06 | Stop reason: HOSPADM

## 2023-07-05 RX ORDER — ACETAMINOPHEN 650 MG/1
650 SUPPOSITORY RECTAL EVERY 6 HOURS PRN
Status: DISCONTINUED | OUTPATIENT
Start: 2023-07-05 | End: 2023-07-06 | Stop reason: HOSPADM

## 2023-07-05 RX ORDER — POTASSIUM CHLORIDE 20 MEQ/1
40 TABLET, EXTENDED RELEASE ORAL ONCE
Status: COMPLETED | OUTPATIENT
Start: 2023-07-05 | End: 2023-07-05

## 2023-07-05 RX ORDER — ONDANSETRON 4 MG/1
4 TABLET, ORALLY DISINTEGRATING ORAL EVERY 8 HOURS PRN
Status: DISCONTINUED | OUTPATIENT
Start: 2023-07-05 | End: 2023-07-06 | Stop reason: HOSPADM

## 2023-07-05 RX ORDER — HYDROCHLOROTHIAZIDE 25 MG/1
25 TABLET ORAL DAILY
Status: DISCONTINUED | OUTPATIENT
Start: 2023-07-05 | End: 2023-07-06

## 2023-07-05 RX ORDER — POTASSIUM CHLORIDE 7.45 MG/ML
10 INJECTION INTRAVENOUS ONCE
Status: COMPLETED | OUTPATIENT
Start: 2023-07-05 | End: 2023-07-05

## 2023-07-05 RX ORDER — HEPARIN SODIUM 5000 [USP'U]/ML
5000 INJECTION, SOLUTION INTRAVENOUS; SUBCUTANEOUS EVERY 8 HOURS SCHEDULED
Status: DISCONTINUED | OUTPATIENT
Start: 2023-07-05 | End: 2023-07-06 | Stop reason: HOSPADM

## 2023-07-05 RX ORDER — ENOXAPARIN SODIUM 100 MG/ML
40 INJECTION SUBCUTANEOUS DAILY
Status: DISCONTINUED | OUTPATIENT
Start: 2023-07-05 | End: 2023-07-05

## 2023-07-05 RX ORDER — INSULIN LISPRO 100 [IU]/ML
0-8 INJECTION, SOLUTION INTRAVENOUS; SUBCUTANEOUS
Status: DISCONTINUED | OUTPATIENT
Start: 2023-07-06 | End: 2023-07-06 | Stop reason: HOSPADM

## 2023-07-05 RX ORDER — SODIUM CHLORIDE 9 MG/ML
INJECTION, SOLUTION INTRAVENOUS CONTINUOUS
Status: DISCONTINUED | OUTPATIENT
Start: 2023-07-05 | End: 2023-07-06 | Stop reason: HOSPADM

## 2023-07-05 RX ORDER — SODIUM CHLORIDE 0.9 % (FLUSH) 0.9 %
5-40 SYRINGE (ML) INJECTION EVERY 12 HOURS SCHEDULED
Status: DISCONTINUED | OUTPATIENT
Start: 2023-07-05 | End: 2023-07-06 | Stop reason: HOSPADM

## 2023-07-05 RX ORDER — ONDANSETRON 2 MG/ML
4 INJECTION INTRAMUSCULAR; INTRAVENOUS EVERY 6 HOURS PRN
Status: DISCONTINUED | OUTPATIENT
Start: 2023-07-05 | End: 2023-07-06 | Stop reason: HOSPADM

## 2023-07-05 RX ORDER — ACETAMINOPHEN 325 MG/1
650 TABLET ORAL EVERY 6 HOURS PRN
Status: DISCONTINUED | OUTPATIENT
Start: 2023-07-05 | End: 2023-07-06 | Stop reason: HOSPADM

## 2023-07-05 RX ORDER — 0.9 % SODIUM CHLORIDE 0.9 %
1000 INTRAVENOUS SOLUTION INTRAVENOUS ONCE
Status: COMPLETED | OUTPATIENT
Start: 2023-07-05 | End: 2023-07-05

## 2023-07-05 RX ORDER — ACETAMINOPHEN 325 MG/1
650 TABLET ORAL EVERY 4 HOURS PRN
Status: DISCONTINUED | OUTPATIENT
Start: 2023-07-05 | End: 2023-07-06 | Stop reason: HOSPADM

## 2023-07-05 RX ORDER — ATORVASTATIN CALCIUM 40 MG/1
20 TABLET, FILM COATED ORAL DAILY
Status: DISCONTINUED | OUTPATIENT
Start: 2023-07-05 | End: 2023-07-06 | Stop reason: HOSPADM

## 2023-07-05 RX ORDER — INSULIN LISPRO 100 [IU]/ML
0-4 INJECTION, SOLUTION INTRAVENOUS; SUBCUTANEOUS NIGHTLY
Status: DISCONTINUED | OUTPATIENT
Start: 2023-07-05 | End: 2023-07-06 | Stop reason: HOSPADM

## 2023-07-05 RX ORDER — POTASSIUM CHLORIDE 7.45 MG/ML
10 INJECTION INTRAVENOUS PRN
Status: DISCONTINUED | OUTPATIENT
Start: 2023-07-05 | End: 2023-07-05

## 2023-07-05 RX ORDER — DEXTROSE MONOHYDRATE 100 MG/ML
INJECTION, SOLUTION INTRAVENOUS CONTINUOUS PRN
Status: DISCONTINUED | OUTPATIENT
Start: 2023-07-05 | End: 2023-07-06 | Stop reason: HOSPADM

## 2023-07-05 RX ORDER — POTASSIUM CHLORIDE 7.45 MG/ML
10 INJECTION INTRAVENOUS
Status: COMPLETED | OUTPATIENT
Start: 2023-07-05 | End: 2023-07-05

## 2023-07-05 RX ORDER — POTASSIUM CHLORIDE 7.45 MG/ML
10 INJECTION INTRAVENOUS PRN
Status: DISCONTINUED | OUTPATIENT
Start: 2023-07-05 | End: 2023-07-06 | Stop reason: HOSPADM

## 2023-07-05 RX ORDER — ESCITALOPRAM OXALATE 10 MG/1
10 TABLET ORAL DAILY
Status: DISCONTINUED | OUTPATIENT
Start: 2023-07-05 | End: 2023-07-06 | Stop reason: HOSPADM

## 2023-07-05 RX ADMIN — HYDROCHLOROTHIAZIDE 25 MG: 25 TABLET ORAL at 15:55

## 2023-07-05 RX ADMIN — SODIUM CHLORIDE: 9 INJECTION, SOLUTION INTRAVENOUS at 14:07

## 2023-07-05 RX ADMIN — HEPARIN SODIUM 5000 UNITS: 5000 INJECTION INTRAVENOUS; SUBCUTANEOUS at 23:36

## 2023-07-05 RX ADMIN — ASPIRIN 81 MG: 81 TABLET, COATED ORAL at 15:55

## 2023-07-05 RX ADMIN — POTASSIUM CHLORIDE 40 MEQ: 1500 TABLET, EXTENDED RELEASE ORAL at 23:35

## 2023-07-05 RX ADMIN — SODIUM CHLORIDE, PRESERVATIVE FREE 10 ML: 5 INJECTION INTRAVENOUS at 23:36

## 2023-07-05 RX ADMIN — SODIUM CHLORIDE 1000 ML: 9 INJECTION, SOLUTION INTRAVENOUS at 08:46

## 2023-07-05 RX ADMIN — HEPARIN SODIUM 5000 UNITS: 5000 INJECTION INTRAVENOUS; SUBCUTANEOUS at 14:34

## 2023-07-05 RX ADMIN — POTASSIUM CHLORIDE 10 MEQ: 10 INJECTION, SOLUTION INTRAVENOUS at 18:44

## 2023-07-05 RX ADMIN — POTASSIUM BICARBONATE 40 MEQ: 782 TABLET, EFFERVESCENT ORAL at 09:52

## 2023-07-05 RX ADMIN — POTASSIUM CHLORIDE 10 MEQ: 10 INJECTION, SOLUTION INTRAVENOUS at 14:02

## 2023-07-05 RX ADMIN — SODIUM CHLORIDE 1000 ML: 9 INJECTION, SOLUTION INTRAVENOUS at 09:56

## 2023-07-05 RX ADMIN — POTASSIUM BICARBONATE 40 MEQ: 782 TABLET, EFFERVESCENT ORAL at 18:59

## 2023-07-05 RX ADMIN — POTASSIUM CHLORIDE 10 MEQ: 7.46 INJECTION, SOLUTION INTRAVENOUS at 09:53

## 2023-07-05 RX ADMIN — ESCITALOPRAM OXALATE 10 MG: 10 TABLET ORAL at 15:55

## 2023-07-05 RX ADMIN — POTASSIUM CHLORIDE 10 MEQ: 10 INJECTION, SOLUTION INTRAVENOUS at 17:06

## 2023-07-05 RX ADMIN — POTASSIUM CHLORIDE 10 MEQ: 10 INJECTION, SOLUTION INTRAVENOUS at 15:53

## 2023-07-05 RX ADMIN — INSULIN LISPRO 4 UNITS: 100 INJECTION, SOLUTION INTRAVENOUS; SUBCUTANEOUS at 21:58

## 2023-07-05 RX ADMIN — ATORVASTATIN CALCIUM 20 MG: 20 TABLET, FILM COATED ORAL at 15:55

## 2023-07-05 RX ADMIN — INSULIN GLARGINE 25 UNITS: 100 INJECTION, SOLUTION SUBCUTANEOUS at 21:58

## 2023-07-05 ASSESSMENT — ENCOUNTER SYMPTOMS
SORE THROAT: 0
DIARRHEA: 0
COUGH: 0
RHINORRHEA: 0
BACK PAIN: 0
BLOOD IN STOOL: 0
CONSTIPATION: 0
NAUSEA: 0
DIARRHEA: 1
VOMITING: 0
ABDOMINAL PAIN: 0
SHORTNESS OF BREATH: 0
WHEEZING: 0

## 2023-07-05 ASSESSMENT — PAIN - FUNCTIONAL ASSESSMENT: PAIN_FUNCTIONAL_ASSESSMENT: NONE - DENIES PAIN

## 2023-07-05 ASSESSMENT — PAIN SCALES - GENERAL
PAINLEVEL_OUTOF10: 0
PAINLEVEL_OUTOF10: 0

## 2023-07-05 NOTE — ED NOTES
Pt's BS >600. Pt states she did not take her BS last night like she initially told us and the last time she took it was 6/3.       Jass Jeffries RN  07/05/23 0300

## 2023-07-05 NOTE — ED NOTES
Admitting team messaged regarding blood sugar 540. No further orders at this time.       Kanu Moreland RN  07/05/23 3608

## 2023-07-05 NOTE — ED NOTES
Report received from [facility] Gearcandelaria Kid, [address]  07/05/23 7338

## 2023-07-05 NOTE — ED TRIAGE NOTES
Pt to ED for extremity weakness, dizziness and blurry vision. Pt states she has had dizziness for he past 3 days but the extremity weakness an blurry vision started today. Pt states her dizziness is not triggered by anything and she is dizzy all day. Pt states no headache, chest pain, or SOB. Pt denies any falls. Pt states multiple complaints on triage, Pt appears to be a poor historian.
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
## 2023-07-05 NOTE — ED NOTES
Patient to bathroom with standby assist. Gait even and steady. Denies dizziness.      Don Mcleod RN  07/05/23 1958

## 2023-07-06 VITALS
SYSTOLIC BLOOD PRESSURE: 98 MMHG | HEIGHT: 63 IN | BODY MASS INDEX: 27.93 KG/M2 | DIASTOLIC BLOOD PRESSURE: 61 MMHG | OXYGEN SATURATION: 98 % | WEIGHT: 157.63 LBS | HEART RATE: 99 BPM | TEMPERATURE: 98.1 F | RESPIRATION RATE: 16 BRPM

## 2023-07-06 PROBLEM — E11.65 HYPERGLYCEMIA DUE TO DIABETES MELLITUS (HCC): Status: ACTIVE | Noted: 2023-07-06

## 2023-07-06 LAB
ANION GAP SERPL CALCULATED.3IONS-SCNC: 13 MMOL/L (ref 9–17)
BASOPHILS # BLD: <0.03 K/UL (ref 0–0.2)
BASOPHILS NFR BLD: 0 % (ref 0–2)
BUN SERPL-MCNC: 9 MG/DL (ref 6–20)
CALCIUM SERPL-MCNC: 8.1 MG/DL (ref 8.6–10.4)
CHLORIDE SERPL-SCNC: 101 MMOL/L (ref 98–107)
CO2 SERPL-SCNC: 22 MMOL/L (ref 20–31)
CREAT SERPL-MCNC: 0.84 MG/DL (ref 0.5–0.9)
EOSINOPHIL # BLD: 0.1 K/UL (ref 0–0.44)
EOSINOPHILS RELATIVE PERCENT: 2 % (ref 1–4)
ERYTHROCYTE [DISTWIDTH] IN BLOOD BY AUTOMATED COUNT: 11.5 % (ref 11.8–14.4)
GFR SERPL CREATININE-BSD FRML MDRD: >60 ML/MIN/1.73M2
GLUCOSE BLD-MCNC: 194 MG/DL (ref 65–105)
GLUCOSE BLD-MCNC: 246 MG/DL (ref 65–105)
GLUCOSE BLD-MCNC: 247 MG/DL (ref 65–105)
GLUCOSE SERPL-MCNC: 212 MG/DL (ref 70–99)
HCT VFR BLD AUTO: 32.8 % (ref 36.3–47.1)
HGB BLD-MCNC: 10.8 G/DL (ref 11.9–15.1)
IMM GRANULOCYTES # BLD AUTO: <0.03 K/UL (ref 0–0.3)
IMM GRANULOCYTES NFR BLD: 0 %
LYMPHOCYTES # BLD: 32 % (ref 24–43)
LYMPHOCYTES NFR BLD: 1.65 K/UL (ref 1.1–3.7)
MAGNESIUM SERPL-MCNC: 1.9 MG/DL (ref 1.6–2.6)
MCH RBC QN AUTO: 27.5 PG (ref 25.2–33.5)
MCHC RBC AUTO-ENTMCNC: 32.9 G/DL (ref 28.4–34.8)
MCV RBC AUTO: 83.5 FL (ref 82.6–102.9)
MONOCYTES NFR BLD: 0.5 K/UL (ref 0.1–1.2)
MONOCYTES NFR BLD: 10 % (ref 3–12)
NEUTROPHILS NFR BLD: 55 % (ref 36–65)
NEUTS SEG NFR BLD: 2.81 K/UL (ref 1.5–8.1)
NRBC BLD-RTO: 0 PER 100 WBC
PHOSPHATE SERPL-MCNC: 3.9 MG/DL (ref 2.6–4.5)
PLATELET # BLD AUTO: 252 K/UL (ref 138–453)
PMV BLD AUTO: 10 FL (ref 8.1–13.5)
POTASSIUM SERPL-SCNC: 2.8 MMOL/L (ref 3.7–5.3)
POTASSIUM SERPL-SCNC: 2.8 MMOL/L (ref 3.7–5.3)
POTASSIUM SERPL-SCNC: 4 MMOL/L (ref 3.7–5.3)
RBC # BLD AUTO: 3.93 M/UL (ref 3.95–5.11)
SODIUM SERPL-SCNC: 136 MMOL/L (ref 135–144)
WBC OTHER # BLD: 5.1 K/UL (ref 3.5–11.3)

## 2023-07-06 PROCEDURE — 6370000000 HC RX 637 (ALT 250 FOR IP)

## 2023-07-06 PROCEDURE — 6370000000 HC RX 637 (ALT 250 FOR IP): Performed by: EMERGENCY MEDICINE

## 2023-07-06 PROCEDURE — 2500000003 HC RX 250 WO HCPCS

## 2023-07-06 PROCEDURE — 82947 ASSAY GLUCOSE BLOOD QUANT: CPT

## 2023-07-06 PROCEDURE — 84132 ASSAY OF SERUM POTASSIUM: CPT

## 2023-07-06 PROCEDURE — 6360000002 HC RX W HCPCS

## 2023-07-06 PROCEDURE — 80048 BASIC METABOLIC PNL TOTAL CA: CPT

## 2023-07-06 PROCEDURE — 83735 ASSAY OF MAGNESIUM: CPT

## 2023-07-06 PROCEDURE — 2580000003 HC RX 258

## 2023-07-06 PROCEDURE — 36415 COLL VENOUS BLD VENIPUNCTURE: CPT

## 2023-07-06 PROCEDURE — 84100 ASSAY OF PHOSPHORUS: CPT

## 2023-07-06 PROCEDURE — 85027 COMPLETE CBC AUTOMATED: CPT

## 2023-07-06 RX ORDER — POTASSIUM CHLORIDE 7.45 MG/ML
10 INJECTION INTRAVENOUS
Status: COMPLETED | OUTPATIENT
Start: 2023-07-06 | End: 2023-07-06

## 2023-07-06 RX ORDER — GLUCOSAMINE HCL/CHONDROITIN SU 500-400 MG
CAPSULE ORAL
Qty: 100 STRIP | Refills: 2 | Status: SHIPPED | OUTPATIENT
Start: 2023-07-06

## 2023-07-06 RX ORDER — LANCETS 30 GAUGE
1 EACH MISCELLANEOUS DAILY
Qty: 100 EACH | Refills: 5 | Status: SHIPPED | OUTPATIENT
Start: 2023-07-06

## 2023-07-06 RX ORDER — AMLODIPINE BESYLATE 5 MG/1
5 TABLET ORAL DAILY
Qty: 30 TABLET | Refills: 3 | Status: SHIPPED | OUTPATIENT
Start: 2023-07-07

## 2023-07-06 RX ORDER — AMLODIPINE BESYLATE 5 MG/1
5 TABLET ORAL DAILY
Status: DISCONTINUED | OUTPATIENT
Start: 2023-07-06 | End: 2023-07-06 | Stop reason: HOSPADM

## 2023-07-06 RX ORDER — POTASSIUM CHLORIDE 750 MG/1
10 TABLET, EXTENDED RELEASE ORAL DAILY
Qty: 10 TABLET | Refills: 0 | Status: SHIPPED | OUTPATIENT
Start: 2023-07-06

## 2023-07-06 RX ORDER — BLOOD-GLUCOSE METER
KIT MISCELLANEOUS
Qty: 1 KIT | Refills: 0 | Status: SHIPPED | OUTPATIENT
Start: 2023-07-06

## 2023-07-06 RX ADMIN — SODIUM CHLORIDE, PRESERVATIVE FREE 10 ML: 5 INJECTION INTRAVENOUS at 08:13

## 2023-07-06 RX ADMIN — SODIUM PHOSPHATE, MONOBASIC, MONOHYDRATE AND SODIUM PHOSPHATE, DIBASIC, ANHYDROUS 30 MMOL: 276; 142 INJECTION, SOLUTION INTRAVENOUS at 01:07

## 2023-07-06 RX ADMIN — ACETAMINOPHEN 650 MG: 325 TABLET ORAL at 03:18

## 2023-07-06 RX ADMIN — INSULIN LISPRO 2 UNITS: 100 INJECTION, SOLUTION INTRAVENOUS; SUBCUTANEOUS at 16:51

## 2023-07-06 RX ADMIN — POTASSIUM CHLORIDE 10 MEQ: 10 INJECTION, SOLUTION INTRAVENOUS at 10:30

## 2023-07-06 RX ADMIN — POTASSIUM BICARBONATE 60 MEQ: 782 TABLET, EFFERVESCENT ORAL at 12:25

## 2023-07-06 RX ADMIN — POTASSIUM CHLORIDE 10 MEQ: 10 INJECTION, SOLUTION INTRAVENOUS at 11:35

## 2023-07-06 RX ADMIN — ESCITALOPRAM OXALATE 10 MG: 10 TABLET ORAL at 08:23

## 2023-07-06 RX ADMIN — SODIUM CHLORIDE: 9 INJECTION, SOLUTION INTRAVENOUS at 02:32

## 2023-07-06 RX ADMIN — ATORVASTATIN CALCIUM 20 MG: 20 TABLET, FILM COATED ORAL at 08:23

## 2023-07-06 RX ADMIN — ONDANSETRON 4 MG: 4 TABLET, ORALLY DISINTEGRATING ORAL at 09:24

## 2023-07-06 RX ADMIN — SODIUM CHLORIDE: 9 INJECTION, SOLUTION INTRAVENOUS at 09:28

## 2023-07-06 RX ADMIN — HEPARIN SODIUM 5000 UNITS: 5000 INJECTION INTRAVENOUS; SUBCUTANEOUS at 06:35

## 2023-07-06 RX ADMIN — POTASSIUM CHLORIDE 10 MEQ: 10 INJECTION, SOLUTION INTRAVENOUS at 09:28

## 2023-07-06 RX ADMIN — SODIUM CHLORIDE: 9 INJECTION, SOLUTION INTRAVENOUS at 13:34

## 2023-07-06 RX ADMIN — ASPIRIN 81 MG: 81 TABLET, COATED ORAL at 08:23

## 2023-07-06 RX ADMIN — HEPARIN SODIUM 5000 UNITS: 5000 INJECTION INTRAVENOUS; SUBCUTANEOUS at 13:36

## 2023-07-06 RX ADMIN — ACETAMINOPHEN 650 MG: 325 TABLET ORAL at 12:24

## 2023-07-06 RX ADMIN — AMLODIPINE BESYLATE 5 MG: 5 TABLET ORAL at 13:36

## 2023-07-06 RX ADMIN — POTASSIUM CHLORIDE 10 MEQ: 10 INJECTION, SOLUTION INTRAVENOUS at 12:41

## 2023-07-06 ASSESSMENT — ENCOUNTER SYMPTOMS
NAUSEA: 0
CHEST TIGHTNESS: 0
ABDOMINAL PAIN: 0
VOMITING: 0
SHORTNESS OF BREATH: 0

## 2023-07-06 ASSESSMENT — PAIN SCALES - GENERAL
PAINLEVEL_OUTOF10: 3
PAINLEVEL_OUTOF10: 0
PAINLEVEL_OUTOF10: 0
PAINLEVEL_OUTOF10: 3
PAINLEVEL_OUTOF10: 0
PAINLEVEL_OUTOF10: 3

## 2023-07-06 ASSESSMENT — PAIN DESCRIPTION - LOCATION
LOCATION: HEAD
LOCATION: HEAD

## 2023-07-06 ASSESSMENT — PAIN DESCRIPTION - ORIENTATION: ORIENTATION: RIGHT;LEFT;MID

## 2023-07-06 ASSESSMENT — PAIN SCALES - WONG BAKER
WONGBAKER_NUMERICALRESPONSE: 0

## 2023-07-06 ASSESSMENT — PAIN - FUNCTIONAL ASSESSMENT: PAIN_FUNCTIONAL_ASSESSMENT: ACTIVITIES ARE NOT PREVENTED

## 2023-07-06 ASSESSMENT — PAIN DESCRIPTION - DESCRIPTORS: DESCRIPTORS: ACHING
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
## 2023-07-06 NOTE — PLAN OF CARE
Problem: Discharge Planning  Goal: Discharge to home or other facility with appropriate resources  Outcome: Progressing     Problem: ABCDS Injury Assessment  Goal: Absence of physical injury  Outcome: Progressing     Problem: Pain  Goal: Verbalizes/displays adequate comfort level or baseline comfort level  Outcome: Progressing     Problem: Chronic Conditions and Co-morbidities  Goal: Patient's chronic conditions and co-morbidity symptoms are monitored and maintained or improved  Outcome: Progressing       Electronically signed by Emma Oswald RN on 7/6/2023 at 11:17 AM

## 2023-07-06 NOTE — DISCHARGE INSTR - COC
Schedule:  Phone:  Fax:    / signature: {Esignature:[id number]}    PHYSICIAN SECTION    Prognosis: Fair    Condition at Discharge: Stable    Rehab Potential (if transferring to Rehab): Fair    Recommended Labs or Other Treatments After Discharge:    Physician Certification: I certify the above information and transfer of Tariq Stuart  is necessary for the continuing treatment of the diagnosis listed and that she requires Home Care  or Acute Rehab for less 30 days.      Update Admission H&P: No change in H&P    PHYSICIAN SIGNATURE:  Electronically signed by Amarilys Farrar MD on 7/6/23 at 9:30 AM EDT

## 2023-07-06 NOTE — ED NOTES
Patient to [facility] with [facility] GEORGIE Ochoa RN  07/05/23 2107       Brooks Ochoa RN  07/05/23 2108

## 2023-07-06 NOTE — CARE COORDINATION
Case Management Assessment  Initial Evaluation    Date/Time of Evaluation: 7/6/2023 1:11 PM  Assessment Completed by: Aria Jimenez RN    If patient is discharged prior to next notation, then this note serves as note for discharge by case management. Patient Name: oLida Laird                   YOB: 1965  Diagnosis: Hypokalemia [E87.6]  Hyperglycemia due to diabetes mellitus ([address]) [E11.65]                   Date / Time: 7/5/2023  8:10 AM    Patient Admission Status: Inpatient   Readmission Risk (Low < 19, Mod (19-27), High > 27): Readmission Risk Score: 12.3    Current PCP: Rosario Hickman MD  PCP verified by CM? (P) Yes    Chart Reviewed: Yes      History Provided by: (P) Patient  Patient Orientation: (P) Alert and Oriented    Patient Cognition: (P) Alert    Hospitalization in the last 30 days (Readmission):  No    If yes, Readmission Assessment in CM Navigator will be completed. Advance Directives:      Code Status: Full Code   Patient's Primary Decision Maker is: (P) Legal Next of Kin      Discharge Planning:    Patient lives with:   Type of Home: (P) Shelter  Primary Care Giver: (P) Self  Patient Support Systems include: (P) Family Members   Current Financial resources: (P) Medicaid  Current community resources:    Current services prior to admission: (P) None            Current DME:              Type of Home Care services:  (P) None    ADLS  Prior functional level: (P) Independent in ADLs/IADLs  Current functional level: (P) Independent in ADLs/IADLs    PT AM-PAC:   /24  OT AM-PAC:   /24    Family can provide assistance at DC: (P) No  Would you like Case Management to discuss the discharge plan with any other family members/significant others, and if so, who?     Plans to Return to Present Housing: (P) Yes  Other Identified Issues/Barriers to RETURNING to current housing: none  Potential Assistance needed at discharge: (P) N/A            Potential DME:    Patient expects to discharge to:

## 2023-07-06 NOTE — PROGRESS NOTES
Comprehensive Nutrition Assessment    Type and Reason for Visit:  Positive Nutrition Screen (wt loss)    Nutrition Recommendations/Plan:   Modify current diet to Easy to Chew with CCHO Medium  Will send Diabetic ONS BID (Glucerna), strawberry preferred     Malnutrition Assessment:  Malnutrition Status: At risk for malnutrition (Comment) (07/06/23 1215)    Context:  Acute Illness     Findings of the 6 clinical characteristics of malnutrition:  Energy Intake:  Mild decrease in energy intake (Comment)  Weight Loss:  Greater than 5% over 1 month     Body Fat Loss:  No significant body fat loss     Muscle Mass Loss:  No significant muscle mass loss    Fluid Accumulation:  No significant fluid accumulation     Strength:  Not Performed    Nutrition Assessment:    62 yo F adm hypokalemia, hyperglycemia. PMH significant for T2DM, HTN, HLD. Pt reports a UBW = 160 lbs, for the past few months. Pt states she had a wt loss surgery 4 years ago. At that time she weighed 310 lbs. Per chart, wt hx: 165 lb (7/26/22), 168 lb 8 oz (10/11/22), indicating no significant wt loss x 1 year. Pt reports she does not have her dentures at this time, agreeable to trial Easy to [employer]. Pt takes a daily MVI and iron supplement since her wt loss surgery. Pt believes she has lost 16 lbs over the past 3 weeks d/t stress, 9.2% wt loss, significant if accurate. Pt is agreeable to ONS, given recent potential wt loss, prefers strawberry. LBM 7/4. No edema noted.     Nutrition Related Findings:    labs/meds reviewed Wound Type: None       Current Nutrition Intake & Therapies:    Average Meal Intake: Unable to assess  Average Supplements Intake: None Ordered  ADULT ORAL NUTRITION SUPPLEMENT; Breakfast, Dinner; Diabetic Oral Supplement  ADULT DIET; Easy to Chew; 4 carb choices (60 gm/meal)    Anthropometric Measures:  Height: 5' 3\" (160 cm)  Ideal Body Weight (IBW): 115 lbs (52 kg)    Admission Body Weight: 157 lb 10.1 oz (71.5 kg) (7/5/23)  Current

## 2023-07-06 NOTE — PROGRESS NOTES
Physician Progress Note      PATIENT:               Sandra Romero  CSN #:                  [id number]  :                       1965  ADMIT DATE:       2023 8:10 AM  DISCH DATE:  RESPONDING  PROVIDER #:        KAROLINA MONGE          QUERY TEXT:    Patient admitted with DM 2 with hyperglycemia, KYM, noted to have low sodium   level. If possible, please document in progress notes and discharge summary   if you are evaluating and/or treating any of the following: The medical record reflects the following:  Risk Factors: DM 2 with hyperglycemia, hypokalemia  Clinical Indicators: sodium - 124,  131, 136  Treatment: IVF- NS @ 100 ml/hr,  2L bolus. Thank you, please contact me for any questions.   Jocelin Adame RN, [facility]  cell- [phone]  office hours - [id number]  Options provided:  -- hyponatremia  -- low sodium is not clinically significant  -- Other - I will add my own diagnosis  -- Disagree - Not applicable / Not valid  -- Disagree - Clinically unable to determine / Unknown  -- Refer to Clinical Documentation Reviewer    PROVIDER RESPONSE TEXT:    pseudohyponatremia    Query created by: Joeclin Adame on 2023 12:19 PM      Electronically signed by:  Mary Stout 2023 1:10 PM

## 2023-07-06 NOTE — PROGRESS NOTES
Patient discharged to previous shelter independently. Meds to beds delivered. Went over discharge instructions with patient including medication administration details and follow up appointments. Patient had full understanding with no follow up questions. Patient walked to main hospital to be picked up by son. Offered patient a wheelchair or to walk with her, but patient works here and says she will be fine. Patient had all belongings accounted for at discharge.        Electronically signed by Dmitri Stanton RN on 7/6/2023 at 5:44 PM

## 2023-07-09 LAB
EKG ATRIAL RATE: 109 BPM
EKG P AXIS: 44 DEGREES
EKG P-R INTERVAL: 176 MS
EKG Q-T INTERVAL: 356 MS
EKG QRS DURATION: 106 MS
EKG QTC CALCULATION (BAZETT): 479 MS
EKG R AXIS: -16 DEGREES
EKG T AXIS: 136 DEGREES
EKG VENTRICULAR RATE: 109 BPM

## 2023-07-10 ENCOUNTER — TELEPHONE (OUTPATIENT)
Dept: FAMILY MEDICINE CLINIC | Age: 58
End: 2023-07-10

## 2023-07-10 NOTE — TELEPHONE ENCOUNTER
Care Transitions Initial Follow Up Call    Outreach made within 2 business days of discharge: Yes    Patient: Ruel Gillis Patient : 1965   MRN: [id number]  Reason for Admission: There are no discharge diagnoses documented for the most recent discharge. Discharge Date: 23       Spoke with: Patient    Discharge department/facility: [facility] Interactive Patient Contact:  Was patient able to fill all prescriptions: Yes  Was patient instructed to bring all medications to the follow-up visit: Yes  Is patient taking all medications as directed in the discharge summary?  Yes  Does patient understand their discharge instructions: Yes  Does patient have questions or concerns that need addressed prior to 7-14 day follow up office visit: no    Scheduled appointment with PCP within 7-14 days    Follow Up  Future Appointments   Date Time Provider [address] [place] [address]   2023 11:15 AM Xochilt Zavala MD [address] [place] [address]   10/10/2023 10:00 AM Aster De Leon MD [address] [place] [address] [facility]

## 2023-07-13 DIAGNOSIS — K59.09 OTHER CONSTIPATION: ICD-10-CM

## 2023-07-14 RX ORDER — POLYETHYLENE GLYCOL 3350 17 G/17G
POWDER, FOR SOLUTION ORAL
Qty: 510 G | Refills: 10 | OUTPATIENT
Start: 2023-07-14

## 2023-07-14 NOTE — TELEPHONE ENCOUNTER
Pt stated does not this refill of miralax as has to much. Writer removed request and signed encounter.

## 2023-07-14 NOTE — TELEPHONE ENCOUNTER
Please address the medication refill and close the encounter. If I can be of assistance, please route to the applicable pool. Thank you. Last visit: 12-29-22  Last Med refill: 3-9-23  Does patient have enough medication for 72 hours: No:     Next Visit Date:  Future Appointments   Date Time Provider [address]   10/10/2023 10:00 AM Sowmya Lester MD [address] Maintenance   Topic Date Due    Hepatitis C screen  Never done    Shingles vaccine (1 of 2) Never done    Diabetic retinal exam  12/31/2020    COVID-19 Vaccine (4 - Booster for Pfizer series) 02/01/2022    A1C test (Diabetic or Prediabetic)  03/29/2023    Depression Monitoring  06/02/2023    Pneumococcal 0-64 years Vaccine (2 - PCV) 06/02/2023    Cervical cancer screen  06/21/2023    Flu vaccine (1) 08/01/2023    Diabetic foot exam  12/29/2023    Diabetic Alb to Cr ratio (uACR) test  12/29/2023    Lipids  12/29/2023    GFR test (Diabetes, CKD 3-4, OR last GFR 15-59)  07/06/2024    DTaP/Tdap/Td vaccine (4 - Td or Tdap) 04/23/2031    Colorectal Cancer Screen  03/24/2032    Hepatitis B vaccine  Completed    Breast cancer screen  Completed    HIV screen  Completed    Hepatitis A vaccine  Aged Out    Hib vaccine  Aged Out    Meningococcal (ACWY) vaccine  Aged Out       Hemoglobin A1C (%)   Date Value   12/29/2022 13.9   06/02/2022 13.2   11/08/2021 12.6             ( goal A1C is < 7)   Microalb/Crt.  Ratio (mcg/mg creat)   Date Value   12/29/2022 11     LDL Cholesterol (mg/dL)   Date Value   12/29/2022 71   12/24/2020 56       (goal LDL is <100)   AST (U/L)   Date Value   10/04/2022 16     ALT (U/L)   Date Value   10/04/2022 14     BUN (mg/dL)   Date Value   07/06/2023 9     BP Readings from Last 3 Encounters:   07/06/23 98/61   05/11/23 (!) 146/85   05/08/23 (!) 145/92          (goal 120/80)    All Future Testing planned in [facility]  Lab Frequency Next Occurrence   XR LUMBAR SPINE (2-3 VIEWS) Once 12/29/2022   Basic

## 2023-08-08 DIAGNOSIS — Z17.0 MALIGNANT NEOPLASM OF NIPPLE OF LEFT BREAST IN FEMALE, ESTROGEN RECEPTOR POSITIVE (HCC): ICD-10-CM

## 2023-08-08 DIAGNOSIS — E78.00 HYPERCHOLESTEREMIA: ICD-10-CM

## 2023-08-08 DIAGNOSIS — C50.012 MALIGNANT NEOPLASM OF NIPPLE OF LEFT BREAST IN FEMALE, ESTROGEN RECEPTOR POSITIVE (HCC): ICD-10-CM

## 2023-08-08 DIAGNOSIS — E11.00 TYPE 2 DIABETES MELLITUS WITH HYPEROSMOLARITY WITHOUT COMA, WITHOUT LONG-TERM CURRENT USE OF INSULIN (HCC): ICD-10-CM

## 2023-08-09 RX ORDER — ATORVASTATIN CALCIUM 20 MG/1
20 TABLET, FILM COATED ORAL DAILY
Qty: 30 TABLET | Refills: 10 | Status: SHIPPED | OUTPATIENT
Start: 2023-08-09

## 2023-08-09 NOTE — TELEPHONE ENCOUNTER
E-scribe request for LIPITOR. Please review and e-scribe if applicable.      Last Visit Date:  12/29/2022  Next Visit Date:  Visit date not found    Hemoglobin A1C (%)   Date Value   12/29/2022 13.9   06/02/2022 13.2   11/08/2021 12.6             ( goal A1C is < 7)   No components found for: LABMICR  LDL Cholesterol (mg/dL)   Date Value   12/29/2022 71       (goal LDL is <100)   AST (U/L)   Date Value   10/04/2022 16     ALT (U/L)   Date Value   10/04/2022 14     BUN (mg/dL)   Date Value   07/06/2023 9     BP Readings from Last 3 Encounters:   07/06/23 98/61   05/11/23 (!) 146/85   05/08/23 (!) 145/92          (goal 120/80)        Patient Active Problem List:     History of breast cancer     Pain, knee     Depression     DJD (degenerative joint disease) of knee     Headache     Iron deficiency anemia     HTN (hypertension)     DM w/o complication type II     Left shoulder pain     Diabetes 1.5, managed as type 2 (HCC)     Headache disorder     Koilonychia     Class 2 severe obesity due to excess calories with serious comorbidity in adult [facility])     Episode of syncope     Hypokalemia     Hyperglycemia due to diabetes mellitus ([address])      ----JF

## 2023-08-10 RX ORDER — GABAPENTIN 300 MG/1
CAPSULE ORAL
Qty: 90 CAPSULE | Refills: 10 | OUTPATIENT
Start: 2023-08-10

## 2023-08-14 DIAGNOSIS — Z17.0 MALIGNANT NEOPLASM OF NIPPLE OF LEFT BREAST IN FEMALE, ESTROGEN RECEPTOR POSITIVE (HCC): ICD-10-CM

## 2023-08-14 DIAGNOSIS — C50.012 MALIGNANT NEOPLASM OF NIPPLE OF LEFT BREAST IN FEMALE, ESTROGEN RECEPTOR POSITIVE (HCC): ICD-10-CM

## 2023-08-15 DIAGNOSIS — Z17.0 MALIGNANT NEOPLASM OF NIPPLE OF LEFT BREAST IN FEMALE, ESTROGEN RECEPTOR POSITIVE (HCC): ICD-10-CM

## 2023-08-15 DIAGNOSIS — C50.012 MALIGNANT NEOPLASM OF NIPPLE OF LEFT BREAST IN FEMALE, ESTROGEN RECEPTOR POSITIVE (HCC): ICD-10-CM

## 2023-08-16 NOTE — TELEPHONE ENCOUNTER
Electronic request for refill of med per pharmacy. Was discontinued March 2023 per other MD for list clean up. Last prescribed 10/2022. Routed to MD for review.

## 2023-08-17 ENCOUNTER — TELEPHONE (OUTPATIENT)
Dept: FAMILY MEDICINE CLINIC | Age: 58
End: 2023-08-17

## 2023-08-17 RX ORDER — CYCLOBENZAPRINE HCL 10 MG
TABLET ORAL
Qty: 60 TABLET | Refills: 10 | Status: SHIPPED | OUTPATIENT
Start: 2023-08-17

## 2023-08-17 RX ORDER — GABAPENTIN 300 MG/1
300 CAPSULE ORAL 3 TIMES DAILY
Qty: 90 CAPSULE | Refills: 2 | Status: SHIPPED | OUTPATIENT
Start: 2023-08-17 | End: 2023-09-16

## 2023-08-17 NOTE — TELEPHONE ENCOUNTER
----- Message from Chris Walsh sent at 8/17/2023  2:15 PM EDT -----  Subject: Message to Provider    QUESTIONS  Information for Provider? Pt called stating that she needs a copy of proof   to show her Covid vaccinations. Would she be able to get a copy from PCP   or should she call the Health Dept? Please advise.   ---------------------------------------------------------------------------  --------------  Buffy Cervantes YWVF  [phone]; OK to leave message on voicemail  ---------------------------------------------------------------------------  --------------  SCRIPT ANSWERS  Relationship to Patient?  Self

## 2023-09-13 DIAGNOSIS — E11.9 TYPE 2 DIABETES MELLITUS WITHOUT COMPLICATION, WITH LONG-TERM CURRENT USE OF INSULIN (HCC): ICD-10-CM

## 2023-09-13 DIAGNOSIS — Z79.4 TYPE 2 DIABETES MELLITUS WITHOUT COMPLICATION, WITH LONG-TERM CURRENT USE OF INSULIN (HCC): ICD-10-CM

## 2023-09-13 NOTE — TELEPHONE ENCOUNTER
Last visit: 12/29/22   Last Med refill: 12/29/22  Does patient have enough medication for 72 hours: No:     Next Visit Date:  Future Appointments   Date Time Provider [address]   10/10/2023 10:00 AM Sowmya Lester MD [address] Maintenance   Topic Date Due    Hepatitis C screen  Never done    Shingles vaccine (1 of 2) Never done    Diabetic retinal exam  12/31/2020    COVID-19 Vaccine (4 - Pfizer series) 02/01/2022    A1C test (Diabetic or Prediabetic)  03/29/2023    Depression Monitoring  06/02/2023    Pneumococcal 0-64 years Vaccine (2 - PCV) 06/02/2023    Cervical cancer screen  06/21/2023    Flu vaccine (1) 08/01/2023    Diabetic foot exam  12/29/2023    Diabetic Alb to Cr ratio (uACR) test  12/29/2023    Lipids  12/29/2023    GFR test (Diabetes, CKD 3-4, OR last GFR 15-59)  07/06/2024    DTaP/Tdap/Td vaccine (4 - Td or Tdap) 04/23/2031    Colorectal Cancer Screen  03/24/2032    Hepatitis B vaccine  Completed    Breast cancer screen  Completed    HIV screen  Completed    Hepatitis A vaccine  Aged Out    Hib vaccine  Aged Out    Meningococcal (ACWY) vaccine  Aged Out       Hemoglobin A1C (%)   Date Value   12/29/2022 13.9   06/02/2022 13.2   11/08/2021 12.6             ( goal A1C is < 7)   No components found for: \"LABMICR\"  LDL Cholesterol (mg/dL)   Date Value   12/29/2022 71   12/24/2020 56       (goal LDL is <100)   AST (U/L)   Date Value   10/04/2022 16     ALT (U/L)   Date Value   10/04/2022 14     BUN (mg/dL)   Date Value   07/06/2023 9     BP Readings from Last 3 Encounters:   07/06/23 98/61   05/11/23 (!) 146/85   05/08/23 (!) 145/92          (goal 120/80)    All Future Testing planned in [facility]  Lab Frequency Next Occurrence   XR LUMBAR SPINE (2-3 VIEWS) Once 19/23/2870   Basic Metabolic Panel Once 08/99/9824               Patient Active Problem List:     History of breast cancer     Pain, knee     Depression     DJD (degenerative joint disease) of knee

## 2023-10-03 ENCOUNTER — HOSPITAL ENCOUNTER (OUTPATIENT)
Age: 58
Setting detail: SPECIMEN
Discharge: HOME | End: 2023-10-03
Payer: MEDICAID

## 2023-10-03 DIAGNOSIS — Z17.0 MALIGNANT NEOPLASM OF NIPPLE OF LEFT BREAST IN FEMALE, ESTROGEN RECEPTOR POSITIVE (HCC): Primary | ICD-10-CM

## 2023-10-03 DIAGNOSIS — C50.012 MALIGNANT NEOPLASM OF NIPPLE OF LEFT BREAST IN FEMALE, ESTROGEN RECEPTOR POSITIVE (HCC): Primary | ICD-10-CM

## 2023-10-03 DIAGNOSIS — C50.012 MALIGNANT NEOPLASM OF NIPPLE OF LEFT BREAST IN FEMALE, ESTROGEN RECEPTOR POSITIVE (HCC): ICD-10-CM

## 2023-10-03 DIAGNOSIS — Z17.0 MALIGNANT NEOPLASM OF NIPPLE OF LEFT BREAST IN FEMALE, ESTROGEN RECEPTOR POSITIVE (HCC): ICD-10-CM

## 2023-10-03 LAB
BASOPHILS # BLD: 0.06 K/UL (ref 0–0.2)
BASOPHILS NFR BLD: 1 % (ref 0–2)
EOSINOPHIL # BLD: 0.1 K/UL (ref 0–0.44)
EOSINOPHILS RELATIVE PERCENT: 1 % (ref 1–4)
ERYTHROCYTE [DISTWIDTH] IN BLOOD BY AUTOMATED COUNT: 11.9 % (ref 11.8–14.4)
FERRITIN SERPL-MCNC: 2507 NG/ML (ref 13–150)
HCT VFR BLD AUTO: 36.4 % (ref 36.3–47.1)
HGB BLD-MCNC: 11.8 G/DL (ref 11.9–15.1)
IMM GRANULOCYTES # BLD AUTO: 0.02 K/UL (ref 0–0.3)
IMM GRANULOCYTES NFR BLD: 0 %
LYMPHOCYTES NFR BLD: 3.61 K/UL (ref 1.1–3.7)
LYMPHOCYTES RELATIVE PERCENT: 40 % (ref 24–43)
MCH RBC QN AUTO: 27.3 PG (ref 25.2–33.5)
MCHC RBC AUTO-ENTMCNC: 32.4 G/DL (ref 28.4–34.8)
MCV RBC AUTO: 84.3 FL (ref 82.6–102.9)
MONOCYTES NFR BLD: 0.48 K/UL (ref 0.1–1.2)
MONOCYTES NFR BLD: 5 % (ref 3–12)
NEUTROPHILS NFR BLD: 53 % (ref 36–65)
NEUTS SEG NFR BLD: 4.7 K/UL (ref 1.5–8.1)
NRBC BLD-RTO: 0 PER 100 WBC
PLATELET # BLD AUTO: 224 K/UL (ref 138–453)
PMV BLD AUTO: 10.5 FL (ref 8.1–13.5)
RBC # BLD AUTO: 4.32 M/UL (ref 3.95–5.11)
WBC OTHER # BLD: 9 K/UL (ref 3.5–11.3)

## 2023-10-03 PROCEDURE — 85025 COMPLETE CBC W/AUTO DIFF WBC: CPT

## 2023-10-03 PROCEDURE — 82728 ASSAY OF FERRITIN: CPT

## 2023-10-03 PROCEDURE — 36415 COLL VENOUS BLD VENIPUNCTURE: CPT

## 2023-10-10 ENCOUNTER — TELEPHONE (OUTPATIENT)
Dept: ONCOLOGY | Age: 58
End: 2023-10-10

## 2023-10-10 ENCOUNTER — HOSPITAL ENCOUNTER (OUTPATIENT)
Age: 58
Discharge: HOME | End: 2023-10-10
Payer: COMMERCIAL

## 2023-10-10 ENCOUNTER — TELEPHONE (OUTPATIENT)
Dept: INFUSION THERAPY | Facility: MEDICAL CENTER | Age: 58
End: 2023-10-10

## 2023-10-10 ENCOUNTER — OFFICE VISIT (OUTPATIENT)
Dept: ONCOLOGY | Age: 58
End: 2023-10-10
Payer: COMMERCIAL

## 2023-10-10 VITALS
SYSTOLIC BLOOD PRESSURE: 115 MMHG | TEMPERATURE: 97.5 F | HEART RATE: 104 BPM | WEIGHT: 150.3 LBS | RESPIRATION RATE: 16 BRPM | DIASTOLIC BLOOD PRESSURE: 80 MMHG | BODY MASS INDEX: 26.62 KG/M2

## 2023-10-10 DIAGNOSIS — Z17.0 MALIGNANT NEOPLASM OF NIPPLE OF LEFT BREAST IN FEMALE, ESTROGEN RECEPTOR POSITIVE (HCC): ICD-10-CM

## 2023-10-10 DIAGNOSIS — Z17.0 MALIGNANT NEOPLASM OF NIPPLE OF LEFT BREAST IN FEMALE, ESTROGEN RECEPTOR POSITIVE (HCC): Primary | ICD-10-CM

## 2023-10-10 DIAGNOSIS — R73.9 HYPERGLYCEMIA: ICD-10-CM

## 2023-10-10 DIAGNOSIS — C50.012 MALIGNANT NEOPLASM OF NIPPLE OF LEFT BREAST IN FEMALE, ESTROGEN RECEPTOR POSITIVE (HCC): ICD-10-CM

## 2023-10-10 DIAGNOSIS — C50.012 MALIGNANT NEOPLASM OF NIPPLE OF LEFT BREAST IN FEMALE, ESTROGEN RECEPTOR POSITIVE (HCC): Primary | ICD-10-CM

## 2023-10-10 DIAGNOSIS — E87.6 HYPOKALEMIA: ICD-10-CM

## 2023-10-10 LAB
ALBUMIN SERPL-MCNC: 4.1 G/DL (ref 3.5–5.2)
ALP SERPL-CCNC: 126 U/L (ref 35–104)
ALT SERPL-CCNC: 12 U/L (ref 5–33)
ANION GAP SERPL CALCULATED.3IONS-SCNC: 11 MMOL/L (ref 9–17)
AST SERPL-CCNC: 13 U/L
BILIRUB SERPL-MCNC: 0.3 MG/DL (ref 0.3–1.2)
BUN SERPL-MCNC: 8 MG/DL (ref 6–20)
BUN/CREAT SERPL: 10 (ref 9–20)
CALCIUM SERPL-MCNC: 9.2 MG/DL (ref 8.6–10.4)
CHLORIDE SERPL-SCNC: 101 MMOL/L (ref 98–107)
CO2 SERPL-SCNC: 22 MMOL/L (ref 20–31)
CREAT SERPL-MCNC: 0.8 MG/DL (ref 0.5–0.9)
GFR SERPL CREATININE-BSD FRML MDRD: >60 ML/MIN/1.73M2
GLUCOSE SERPL-MCNC: 493 MG/DL (ref 70–99)
POTASSIUM SERPL-SCNC: 3.7 MMOL/L (ref 3.7–5.3)
PROT SERPL-MCNC: 7.5 G/DL (ref 6.4–8.3)
SODIUM SERPL-SCNC: 134 MMOL/L (ref 135–144)

## 2023-10-10 PROCEDURE — 80053 COMPREHEN METABOLIC PANEL: CPT

## 2023-10-10 PROCEDURE — 3074F SYST BP LT 130 MM HG: CPT | Performed by: INTERNAL MEDICINE

## 2023-10-10 PROCEDURE — 99214 OFFICE O/P EST MOD 30 MIN: CPT | Performed by: INTERNAL MEDICINE

## 2023-10-10 PROCEDURE — G8484 FLU IMMUNIZE NO ADMIN: HCPCS | Performed by: INTERNAL MEDICINE

## 2023-10-10 PROCEDURE — G8427 DOCREV CUR MEDS BY ELIG CLIN: HCPCS | Performed by: INTERNAL MEDICINE

## 2023-10-10 PROCEDURE — 3017F COLORECTAL CA SCREEN DOC REV: CPT | Performed by: INTERNAL MEDICINE

## 2023-10-10 PROCEDURE — 99211 OFF/OP EST MAY X REQ PHY/QHP: CPT | Performed by: INTERNAL MEDICINE

## 2023-10-10 PROCEDURE — G8417 CALC BMI ABV UP PARAM F/U: HCPCS | Performed by: INTERNAL MEDICINE

## 2023-10-10 PROCEDURE — 36415 COLL VENOUS BLD VENIPUNCTURE: CPT

## 2023-10-10 PROCEDURE — 1036F TOBACCO NON-USER: CPT | Performed by: INTERNAL MEDICINE

## 2023-10-10 PROCEDURE — 3079F DIAST BP 80-89 MM HG: CPT | Performed by: INTERNAL MEDICINE

## 2023-10-10 RX ORDER — BREXPIPRAZOLE 1 MG/1
1 TABLET ORAL DAILY
COMMUNITY
Start: 2023-08-08

## 2023-10-10 NOTE — PROGRESS NOTES
(L) 10/10/2017    FERRITIN 2,507 (H) 10/03/2023       ASSESSMENT:     Recurrence of breast cancer as stated above status post mastectomy. Currently in remission  Iron deficiency anemia secondary to heavy menstrual periods and possibly malabsorption. Stable. S/p Bariatric surgery April 2015  Chest pain s/p mastectomy controlled with Norco and Neurontin  Uncontrolled DM on insulin   Bone aches. Will do bone scan    PLAN:      The patient is doing well in regard to her breast cancer with no evidence of recurrence. We will continue observation. Hb is stable. No recent drop. No signs of bleeding. Continue observation. High ferritin. Discontinued iron. Patient has significant problem with hypoglycemia and hypokalemia. Undergoing management by her PCP. Explained importance of compliance with medications and glucose control. Patient will have follow-up with her PCP. Patient will be seen in 1 year. Sooner for any problems. Patient's questions were answered to the best of her satisfaction and she verbalized full understanding and agreement. [address] [facility]                            This note is created with the assistance of a speech recognition program.  While intending to generate a document that actually reflects the content of the visit, the document can still have some errors including those of syntax and sound a like substitutions which may escape proof reading. It such instances, actual meaning can be extrapolated by contextual diversion.

## 2023-10-10 NOTE — TELEPHONE ENCOUNTER
YULY ARRIVES AMBULATORY FOR MD VISIT  DR Baltazar Wilhelm IN TO SEE PATIENT  ORDERS RECEIVED  CMP TODAY  RV 1 YEAR WITH LABS  CMP ON EXIT  LABS CDP CMP FERRITIN DUE 10/10/24, ORDERS MAILED TO PT  MD VISIT 10/17/24 @11AM  AVS PRINTED AND GIVEN TO PATIENT WITH INSTRUCTIONS  PATIENT DISCHARGED AMBULATORY

## 2023-10-12 ENCOUNTER — NURSE TRIAGE (OUTPATIENT)
Dept: OTHER | Facility: CLINIC | Age: 58
End: 2023-10-12

## 2023-10-12 RX ORDER — POTASSIUM CHLORIDE 750 MG/1
10 TABLET, EXTENDED RELEASE ORAL 2 TIMES DAILY
Qty: 60 TABLET | Refills: 10 | OUTPATIENT
Start: 2023-10-12

## 2023-10-12 NOTE — TELEPHONE ENCOUNTER
Location of patient: [place]    Location of employment: [address]    Department where injury occurred:nurse station 4A    Location of injury (body part involved): head    Time of injury: 9:50    Last 4 of SSN: [id number]    Subjective: Caller states \"fell and hit head\"     Current Symptoms: tripped over foot and fell and head hit the wall. Denies loss of consciousness, dizziness  Arm feels stiff  She is able to continue working. No other symptoms at this time. Denies bruising or knot on head. Pain Severity: denies pain at this time. Temperature:      What has been tried: nothing yet    LMP: NA Pregnant: NA    Recommended disposition: [facility] advice provided, caller verbalizes understanding; denies any other questions or concerns. Care advise given, will call back as needed. SafeCare to be completed.       Reason for Disposition   Minor head injury    Protocols used: Head Injury-ADULT-[place]
done

## 2023-10-30 DIAGNOSIS — Z17.0 MALIGNANT NEOPLASM OF NIPPLE OF LEFT BREAST IN FEMALE, ESTROGEN RECEPTOR POSITIVE (HCC): Primary | ICD-10-CM

## 2023-10-30 DIAGNOSIS — C50.012 MALIGNANT NEOPLASM OF NIPPLE OF LEFT BREAST IN FEMALE, ESTROGEN RECEPTOR POSITIVE (HCC): Primary | ICD-10-CM

## 2023-11-13 DIAGNOSIS — C50.012 MALIGNANT NEOPLASM OF NIPPLE OF LEFT BREAST IN FEMALE, ESTROGEN RECEPTOR POSITIVE (HCC): ICD-10-CM

## 2023-11-13 DIAGNOSIS — Z17.0 MALIGNANT NEOPLASM OF NIPPLE OF LEFT BREAST IN FEMALE, ESTROGEN RECEPTOR POSITIVE (HCC): ICD-10-CM

## 2023-11-13 RX ORDER — GABAPENTIN 300 MG/1
300 CAPSULE ORAL 3 TIMES DAILY
Qty: 90 CAPSULE | Refills: 10 | Status: SHIPPED | OUTPATIENT
Start: 2023-11-13 | End: 2023-12-13

## 2023-12-12 DIAGNOSIS — E11.00 TYPE 2 DIABETES MELLITUS WITH HYPEROSMOLARITY WITHOUT COMA, WITHOUT LONG-TERM CURRENT USE OF INSULIN (HCC): ICD-10-CM

## 2023-12-12 RX ORDER — CALCIUM CITRATE/VITAMIN D3 200MG-6.25
TABLET ORAL
Qty: 100 EACH | Refills: 10 | Status: SHIPPED | OUTPATIENT
Start: 2023-12-12

## 2023-12-12 NOTE — TELEPHONE ENCOUNTER
E-scribe request for TRUE METRIX TEST STRIPS. Please review and e-scribe if applicable.      Last Visit Date:  12/29/2022  Next Visit Date:  Visit date not found    Hemoglobin A1C (%)   Date Value   12/29/2022 13.9   06/02/2022 13.2   11/08/2021 12.6             ( goal A1C is < 7)   No components found for: \"LABMICR\"  LDL Cholesterol (mg/dL)   Date Value   12/29/2022 71       (goal LDL is <100)   AST (U/L)   Date Value   10/10/2023 13     ALT (U/L)   Date Value   10/10/2023 12     BUN (mg/dL)   Date Value   10/10/2023 8     BP Readings from Last 3 Encounters:   10/10/23 115/80   07/06/23 98/61   05/11/23 (!) 146/85          (goal 120/80)        Patient Active Problem List:     History of breast cancer     Pain, knee     Depression     DJD (degenerative joint disease) of knee     Headache     Iron deficiency anemia     HTN (hypertension)     DM w/o complication type II     Left shoulder pain     Diabetes 1.5, managed as type 2 (HCC)     Headache disorder     Koilonychia     Class 2 severe obesity due to excess calories with serious comorbidity in adult [facility])     Episode of syncope     Hypokalemia     Hyperglycemia due to diabetes mellitus ([address] [facility])      ----JF

## 2024-03-26 ENCOUNTER — HOSPITAL ENCOUNTER (EMERGENCY)
Age: 59
Discharge: HOME | End: 2024-03-26
Attending: EMERGENCY MEDICINE
Payer: COMMERCIAL

## 2024-03-26 VITALS
WEIGHT: 138 LBS | DIASTOLIC BLOOD PRESSURE: 96 MMHG | OXYGEN SATURATION: 99 % | TEMPERATURE: 97.3 F | HEART RATE: 92 BPM | HEIGHT: 63 IN | BODY MASS INDEX: 24.45 KG/M2 | SYSTOLIC BLOOD PRESSURE: 141 MMHG | RESPIRATION RATE: 16 BRPM

## 2024-03-26 DIAGNOSIS — R73.9 HYPERGLYCEMIA: ICD-10-CM

## 2024-03-26 DIAGNOSIS — L73.2 HYDRADENITIS: Primary | ICD-10-CM

## 2024-03-26 LAB
ANION GAP SERPL CALCULATED.3IONS-SCNC: 10 MMOL/L (ref 9–16)
BUN SERPL-MCNC: 10 MG/DL (ref 6–20)
CALCIUM SERPL-MCNC: 8.7 MG/DL (ref 8.6–10.4)
CHLORIDE SERPL-SCNC: 107 MMOL/L (ref 98–107)
CO2 SERPL-SCNC: 21 MMOL/L (ref 20–31)
CREAT SERPL-MCNC: 0.9 MG/DL (ref 0.5–0.9)
CRP SERPL HS-MCNC: <3 MG/L (ref 0–5)
ERYTHROCYTE [DISTWIDTH] IN BLOOD BY AUTOMATED COUNT: 13.3 % (ref 11.8–14.4)
GFR SERPL CREATININE-BSD FRML MDRD: 79 ML/MIN/1.73M2
GLUCOSE BLD-MCNC: 252 MG/DL (ref 65–105)
GLUCOSE BLD-MCNC: 359 MG/DL (ref 65–105)
GLUCOSE SERPL-MCNC: 427 MG/DL (ref 74–99)
HCT VFR BLD AUTO: 39 % (ref 36.3–47.1)
HGB BLD-MCNC: 12.4 G/DL (ref 11.9–15.1)
MCH RBC QN AUTO: 28.4 PG (ref 25.2–33.5)
MCHC RBC AUTO-ENTMCNC: 31.8 G/DL (ref 28.4–34.8)
MCV RBC AUTO: 89.4 FL (ref 82.6–102.9)
NRBC BLD-RTO: 0 PER 100 WBC
PLATELET # BLD AUTO: 254 K/UL (ref 138–453)
PMV BLD AUTO: 10.3 FL (ref 8.1–13.5)
POTASSIUM SERPL-SCNC: 3.4 MMOL/L (ref 3.7–5.3)
RBC # BLD AUTO: 4.36 M/UL (ref 3.95–5.11)
SODIUM SERPL-SCNC: 138 MMOL/L (ref 136–145)
WBC OTHER # BLD: 11.1 K/UL (ref 3.5–11.3)

## 2024-03-26 PROCEDURE — 96360 HYDRATION IV INFUSION INIT: CPT

## 2024-03-26 PROCEDURE — 96372 THER/PROPH/DIAG INJ SC/IM: CPT

## 2024-03-26 PROCEDURE — 6370000000 HC RX 637 (ALT 250 FOR IP): Performed by: PEDIATRICS

## 2024-03-26 PROCEDURE — 82947 ASSAY GLUCOSE BLOOD QUANT: CPT

## 2024-03-26 PROCEDURE — 80048 BASIC METABOLIC PNL TOTAL CA: CPT

## 2024-03-26 PROCEDURE — 99284 EMERGENCY DEPT VISIT MOD MDM: CPT

## 2024-03-26 PROCEDURE — 2580000003 HC RX 258: Performed by: PEDIATRICS

## 2024-03-26 PROCEDURE — 85027 COMPLETE CBC AUTOMATED: CPT

## 2024-03-26 PROCEDURE — 86140 C-REACTIVE PROTEIN: CPT

## 2024-03-26 RX ORDER — POTASSIUM CHLORIDE 20 MEQ/1
20 TABLET, EXTENDED RELEASE ORAL ONCE
Status: COMPLETED | OUTPATIENT
Start: 2024-03-26 | End: 2024-03-26

## 2024-03-26 RX ORDER — BACITRACIN ZINC AND POLYMYXIN B SULFATE 500; 1000 [USP'U]/G; [USP'U]/G
OINTMENT TOPICAL
Qty: 28.4 G | Refills: 1 | Status: SHIPPED | OUTPATIENT
Start: 2024-03-26 | End: 2024-04-02

## 2024-03-26 RX ORDER — SODIUM CHLORIDE, SODIUM LACTATE, POTASSIUM CHLORIDE, AND CALCIUM CHLORIDE .6; .31; .03; .02 G/100ML; G/100ML; G/100ML; G/100ML
1000 INJECTION, SOLUTION INTRAVENOUS ONCE
Status: COMPLETED | OUTPATIENT
Start: 2024-03-26 | End: 2024-03-26

## 2024-03-26 RX ADMIN — INSULIN HUMAN 10 UNITS: 100 INJECTION, SOLUTION PARENTERAL at 09:35

## 2024-03-26 RX ADMIN — SODIUM CHLORIDE, POTASSIUM CHLORIDE, SODIUM LACTATE AND CALCIUM CHLORIDE 1000 ML: 600; 310; 30; 20 INJECTION, SOLUTION INTRAVENOUS at 09:37

## 2024-03-26 RX ADMIN — POTASSIUM CHLORIDE 20 MEQ: 1500 TABLET, EXTENDED RELEASE ORAL at 08:21

## 2024-03-26 RX ADMIN — INSULIN HUMAN 10 UNITS: 100 INJECTION, SOLUTION PARENTERAL at 08:21

## 2024-03-26 ASSESSMENT — PAIN SCALES - GENERAL: PAINLEVEL_OUTOF10: 6

## 2024-03-26 ASSESSMENT — PAIN - FUNCTIONAL ASSESSMENT: PAIN_FUNCTIONAL_ASSESSMENT: 0-10

## 2024-03-26 NOTE — ED PROVIDER NOTES
[facility] ED  Emergency Department Encounter  Emergency Medicine Resident     Pt Name:Magui Thayer  MRN: [id number]  Birthdate 1965  Date of evaluation: 3/26/24  PCP:  Keesha Matias MD    6:41 AM EDT     CHIEF COMPLAINT       Chief Complaint   Patient presents with    Abscess       HISTORY OF PRESENT ILLNESS  (Location/Symptom, Timing/Onset, Context/Setting, Quality, Duration, Modifying Factors, Severity.)      Magui Thayer is a 58 y.o. female who presents with complains of infection in her stomach skin fold.  States \"knot\" that opens and closes and leaks pus/fluid intermittently.  Reports her daughter told her to use an alcohol wipe on the area and leave it there and then will help the pus come out.  Patient states she has been doing this in the process, and states there is no hole there.      PAST MEDICAL / SURGICAL / SOCIAL / FAMILY HISTORY      has a past medical history of Breast cancer (HCC), Cancer (HCC), Conjunctivitis, Depression, Disorder of patellofemoral joint, DJD (degenerative joint disease) of knee, Hyperlipidemia, Hypertension, Malignant neoplasm of nipple of left breast in female, estrogen receptor positive (HCC), Pain, knee, Primary osteoarthritis of left knee, Type II or unspecified type diabetes mellitus without mention of complication, not stated as uncontrolled, Under care of team, Under care of team, Under care of team, Wears dentures, Wears glasses, and Weight loss.       has a past surgical history that includes Breast surgery (2006); Tubal ligation;  section; lymphadenectomy; other surgical history (N/A, 2017); incision and drainage (N/A, 2017); Mastectomy; Bariatric Surgery (); and Colonoscopy (N/A, 3/24/2022).      Social History     Socioeconomic History    Marital status:      Spouse name: Not on file    Number of children: Not on file    Years of education: Not on file    Highest education level: Not on file   Abundio Tao MD   ondansetron (ZOFRAN ODT) 4 MG disintegrating tablet Place 1 tablet under the tongue every 8 hours as needed for Nausea  Patient not taking: Reported on 10/10/2023 7/26/22   Lizeth Bentley DO   escitalopram (LEXAPRO) 10 MG tablet Take 1 tablet by mouth daily 5/13/22   Flores Oneill MD   hydrOXYzine (ATARAX) 25 MG tablet TAKE 1 TABLET BY MOUTH TWICE DAILY AS NEEDED 5/18/22   Flores Oneill MD   mirtazapine (REMERON) 15 MG tablet TAKE 1 TABLET BY MOUTH AT BEDTIME AS NEEDED 5/18/22   Flores Oneill MD   Lancets (ONETOUCH DELICA PLUS [id number]) MISC USE TO TEST BLOOD SUGAR DAILY 5/13/22   Addi Epps MD   Insulin Pen Needle 31G X 8 MM MISC 1 each by Does not apply route daily 12/31/21   Kelsey Hodgson MD   Insulin Pen Needle 31G X 6 MM MISC 1 each by Does not apply route daily 12/31/21   Kelsey Hodgson MD   Lancets (ONETOUCH DELICA PLUS [id number]) MISC Check blood sugars 3 times daily 3/17/20   Itzel Moseley MD   Multiple Vitamins-Minerals (MULTIVITAMIN-MINERALS) TABS tablet TAKE (1) TABLET BY MOUTH DAILY 9/25/19   Itzel Moseley MD   TRUEPLUS PEN NEEDLES 31G X 6 MM MISC USE DAILY AS DIRECTED 6/27/19   Itzel Moseley MD   Melatonin 10 MG CAPS TAKE 1 CAPSULE BY MOUTH AT NIGHT  Patient not taking: Reported on 10/10/2023 8/7/18   Flores Oneill MD   zolpidem (AMBIEN) 10 MG tablet Take 1 tablet by mouth nightly as needed for Sleep.    Flores Oneill MD   risperiDONE (RISPERDAL) 4 MG tablet Take 1 tablet by mouth daily 6/13/16   Flores Oneill MD B-D 3CC LUER-AMBAR SYR 90UB1-8/2 22G X 1-1/2\" 3 ML MISC  4/14/16   Flores Oneill MD   benztropine (COGENTIN) 0.5 MG tablet Take 1 tablet by mouth 2 times daily 8/27/15   Provider, MD Flores       REVIEW OF SYSTEMS       Review of Systems   Skin:         Skin lesion lower abdomen       PHYSICAL EXAM      INITIAL VITALS:   BP (!) 141/96   Pulse 92   Temp 97.3 °F (36.3 °C) (Oral)

## 2024-03-26 NOTE — DISCHARGE INSTRUCTIONS
Please keep your diabetes under control.  Use the antibiotic ointment until the infection is gone.  Do not stop this medication early.  Keep using this until your skin is completely healed.    Please feel free return to the hospital if your symptoms worsen or any new concerning symptoms develop.  Follow-up with your primary care physician as needed for all other the concerns.

## 2024-03-26 NOTE — ED NOTES
Pt resting on cot, RR even and NL, a/o x4, no distress noted. Call light within reach. Will continue with plan of care

## 2024-03-26 NOTE — ED TRIAGE NOTES
Pt arriving to ed 49 via triage  Pt states they have been feeling unwell for about a week  Pt had a \"boil\" about the perineal area   Pt stated they poured rubbing alcohol over it and a bandaid and that made the abscess opened up and drained   Pt is resting on stretcher with call light within reach.  Breathing is non labored and no acute distress is noted.   Will continue to follow plan of care

## 2024-03-26 NOTE — ED PROVIDER NOTES
[facility]  Emergency Department  Faculty Attestation     I performed a history and physical examination of the patient and discussed management with the resident. I reviewed the resident’s note and agree with the documented findings and plan of care. Any areas of disagreement are noted on the chart. I was personally present for the key portions of any procedures. I have documented in the chart those procedures where I was not present during the key portions. I have reviewed the emergency nurses triage note. I agree with the chief complaint, past medical history, past surgical history, allergies, medications, social and family history as documented unless otherwise noted below.    For Physician Assistant/ Nurse Practitioner cases/documentation I have personally evaluated this patient and have completed at least one if not all key elements of the E/M (history, physical exam, and MDM). Additional findings are as noted.    Preliminary note started at 7:25 AM EDT    Primary Care Physician:  Keesha Matias MD    Screenings:  [unfilled]    CHIEF COMPLAINT       Chief Complaint   Patient presents with    Abscess       RECENT VITALS:   BP (!) 145/93   Pulse 92   Temp 97.3 °F (36.3 °C) (Oral)   Resp 18   Ht 1.6 m (5' 3\")   Wt 62.6 kg (138 lb)   LMP 12/08/2015 (Approximate)   SpO2 98%   BMI 24.45 kg/m²     LABS:  Labs Reviewed   BASIC METABOLIC PANEL - Abnormal; Notable for the following components:       Result Value    Potassium 3.4 (*)     Glucose 427 (*)     All other components within normal limits   CBC   C-REACTIVE PROTEIN       Radiology  No orders to display         Attending Physician Additional  Notes    Has had several weeks of lower abdominal boil which spontaneously drained after use of alcohol compresses.  She still using alcohol wipes.  She now has new chills and URI symptoms.  No sputum production.  No myalgias.  No intra-abdominal pain nausea vomiting

## 2024-04-18 ENCOUNTER — HOSPITAL ENCOUNTER (INPATIENT)
Age: 59
LOS: 2 days | Discharge: HOME | DRG: 291 | End: 2024-04-20
Attending: EMERGENCY MEDICINE | Admitting: INTERNAL MEDICINE
Payer: COMMERCIAL

## 2024-04-18 ENCOUNTER — OFFICE VISIT (OUTPATIENT)
Dept: FAMILY MEDICINE CLINIC | Age: 59
End: 2024-04-18
Payer: COMMERCIAL

## 2024-04-18 ENCOUNTER — APPOINTMENT (OUTPATIENT)
Dept: GENERAL RADIOLOGY | Age: 59
DRG: 291 | End: 2024-04-18
Payer: COMMERCIAL

## 2024-04-18 VITALS
BODY MASS INDEX: 28.77 KG/M2 | HEIGHT: 63 IN | SYSTOLIC BLOOD PRESSURE: 135 MMHG | WEIGHT: 162.4 LBS | HEART RATE: 86 BPM | TEMPERATURE: 98 F | DIASTOLIC BLOOD PRESSURE: 90 MMHG | OXYGEN SATURATION: 100 %

## 2024-04-18 DIAGNOSIS — M79.89 LEG SWELLING: Primary | ICD-10-CM

## 2024-04-18 DIAGNOSIS — C50.012 MALIGNANT NEOPLASM OF NIPPLE OF LEFT BREAST IN FEMALE, ESTROGEN RECEPTOR POSITIVE (HCC): ICD-10-CM

## 2024-04-18 DIAGNOSIS — Z13.220 SCREENING FOR HYPERLIPIDEMIA: ICD-10-CM

## 2024-04-18 DIAGNOSIS — I50.9 CONGESTIVE HEART FAILURE, UNSPECIFIED HF CHRONICITY, UNSPECIFIED HEART FAILURE TYPE (HCC): ICD-10-CM

## 2024-04-18 DIAGNOSIS — E11.00 TYPE 2 DIABETES MELLITUS WITH HYPEROSMOLARITY WITHOUT COMA, WITHOUT LONG-TERM CURRENT USE OF INSULIN (HCC): ICD-10-CM

## 2024-04-18 DIAGNOSIS — I50.33 CHF (CONGESTIVE HEART FAILURE), NYHA CLASS I, ACUTE ON CHRONIC, DIASTOLIC (HCC): ICD-10-CM

## 2024-04-18 DIAGNOSIS — R60.0 LOWER EXTREMITY EDEMA: Primary | ICD-10-CM

## 2024-04-18 DIAGNOSIS — Z17.0 MALIGNANT NEOPLASM OF NIPPLE OF LEFT BREAST IN FEMALE, ESTROGEN RECEPTOR POSITIVE (HCC): ICD-10-CM

## 2024-04-18 DIAGNOSIS — I10 PRIMARY HYPERTENSION: ICD-10-CM

## 2024-04-18 PROBLEM — I50.43 CHF (CONGESTIVE HEART FAILURE), NYHA CLASS I, ACUTE ON CHRONIC, COMBINED (HCC): Status: ACTIVE | Noted: 2024-04-18

## 2024-04-18 PROBLEM — Z91.199 NONCOMPLIANCE: Status: ACTIVE | Noted: 2024-04-18

## 2024-04-18 LAB
ANION GAP SERPL CALCULATED.3IONS-SCNC: 10 MMOL/L (ref 9–16)
BNP SERPL-MCNC: 6154 PG/ML (ref 0–300)
BUN SERPL-MCNC: 9 MG/DL (ref 6–20)
CALCIUM SERPL-MCNC: 8.5 MG/DL (ref 8.6–10.4)
CHLORIDE SERPL-SCNC: 108 MMOL/L (ref 98–107)
CO2 SERPL-SCNC: 19 MMOL/L (ref 20–31)
CREAT SERPL-MCNC: 0.9 MG/DL (ref 0.5–0.9)
D DIMER PPP FEU-MCNC: 0.35 UG/ML FEU (ref 0–0.57)
ERYTHROCYTE [DISTWIDTH] IN BLOOD BY AUTOMATED COUNT: 14.5 % (ref 11.8–14.4)
GFR SERPL CREATININE-BSD FRML MDRD: 77 ML/MIN/1.73M2
GLUCOSE BLD-MCNC: 108 MG/DL (ref 65–105)
GLUCOSE BLD-MCNC: 354 MG/DL (ref 65–105)
GLUCOSE SERPL-MCNC: 335 MG/DL (ref 74–99)
HBA1C MFR BLD: 12.7 %
HCT VFR BLD AUTO: 37.7 % (ref 36.3–47.1)
HGB BLD-MCNC: 12.1 G/DL (ref 11.9–15.1)
MCH RBC QN AUTO: 29.8 PG (ref 25.2–33.5)
MCHC RBC AUTO-ENTMCNC: 32.1 G/DL (ref 28.4–34.8)
MCV RBC AUTO: 92.9 FL (ref 82.6–102.9)
NRBC BLD-RTO: 0 PER 100 WBC
PLATELET # BLD AUTO: 229 K/UL (ref 138–453)
PMV BLD AUTO: 10.1 FL (ref 8.1–13.5)
POTASSIUM SERPL-SCNC: 3.6 MMOL/L (ref 3.7–5.3)
RBC # BLD AUTO: 4.06 M/UL (ref 3.95–5.11)
SODIUM SERPL-SCNC: 137 MMOL/L (ref 136–145)
TROPONIN I SERPL HS-MCNC: 14 NG/L (ref 0–14)
TSH SERPL DL<=0.05 MIU/L-ACNC: 2.51 UIU/ML (ref 0.27–4.2)
WBC OTHER # BLD: 8.4 K/UL (ref 3.5–11.3)

## 2024-04-18 PROCEDURE — 85379 FIBRIN DEGRADATION QUANT: CPT

## 2024-04-18 PROCEDURE — 1200000000 HC SEMI PRIVATE

## 2024-04-18 PROCEDURE — 93005 ELECTROCARDIOGRAM TRACING: CPT | Performed by: INTERNAL MEDICINE

## 2024-04-18 PROCEDURE — 99214 OFFICE O/P EST MOD 30 MIN: CPT

## 2024-04-18 PROCEDURE — 85027 COMPLETE CBC AUTOMATED: CPT

## 2024-04-18 PROCEDURE — 36415 COLL VENOUS BLD VENIPUNCTURE: CPT

## 2024-04-18 PROCEDURE — 84484 ASSAY OF TROPONIN QUANT: CPT

## 2024-04-18 PROCEDURE — 6370000000 HC RX 637 (ALT 250 FOR IP)

## 2024-04-18 PROCEDURE — 99211 OFF/OP EST MAY X REQ PHY/QHP: CPT | Performed by: FAMILY MEDICINE

## 2024-04-18 PROCEDURE — 80048 BASIC METABOLIC PNL TOTAL CA: CPT

## 2024-04-18 PROCEDURE — 3080F DIAST BP >= 90 MM HG: CPT

## 2024-04-18 PROCEDURE — 82947 ASSAY GLUCOSE BLOOD QUANT: CPT

## 2024-04-18 PROCEDURE — 83036 HEMOGLOBIN GLYCOSYLATED A1C: CPT

## 2024-04-18 PROCEDURE — 99223 1ST HOSP IP/OBS HIGH 75: CPT | Performed by: INTERNAL MEDICINE

## 2024-04-18 PROCEDURE — 83880 ASSAY OF NATRIURETIC PEPTIDE: CPT

## 2024-04-18 PROCEDURE — 99285 EMERGENCY DEPT VISIT HI MDM: CPT

## 2024-04-18 PROCEDURE — 2580000003 HC RX 258

## 2024-04-18 PROCEDURE — 6360000002 HC RX W HCPCS: Performed by: STUDENT IN AN ORGANIZED HEALTH CARE EDUCATION/TRAINING PROGRAM

## 2024-04-18 PROCEDURE — 3075F SYST BP GE 130 - 139MM HG: CPT

## 2024-04-18 PROCEDURE — 3046F HEMOGLOBIN A1C LEVEL >9.0%: CPT

## 2024-04-18 PROCEDURE — 71045 X-RAY EXAM CHEST 1 VIEW: CPT

## 2024-04-18 PROCEDURE — 93005 ELECTROCARDIOGRAM TRACING: CPT | Performed by: STUDENT IN AN ORGANIZED HEALTH CARE EDUCATION/TRAINING PROGRAM

## 2024-04-18 PROCEDURE — 84443 ASSAY THYROID STIM HORMONE: CPT

## 2024-04-18 PROCEDURE — 96374 THER/PROPH/DIAG INJ IV PUSH: CPT

## 2024-04-18 RX ORDER — GABAPENTIN 300 MG/1
300 CAPSULE ORAL 3 TIMES DAILY
Qty: 90 CAPSULE | Refills: 0 | Status: SHIPPED | OUTPATIENT
Start: 2024-04-18 | End: 2024-05-18

## 2024-04-18 RX ORDER — INSULIN LISPRO 100 [IU]/ML
0-4 INJECTION, SOLUTION INTRAVENOUS; SUBCUTANEOUS
Status: DISCONTINUED | OUTPATIENT
Start: 2024-04-18 | End: 2024-04-18

## 2024-04-18 RX ORDER — SODIUM CHLORIDE 9 MG/ML
INJECTION, SOLUTION INTRAVENOUS PRN
Status: DISCONTINUED | OUTPATIENT
Start: 2024-04-18 | End: 2024-04-20 | Stop reason: HOSPADM

## 2024-04-18 RX ORDER — AMLODIPINE BESYLATE 5 MG/1
5 TABLET ORAL DAILY
Status: DISCONTINUED | OUTPATIENT
Start: 2024-04-18 | End: 2024-04-20 | Stop reason: HOSPADM

## 2024-04-18 RX ORDER — INSULIN LISPRO 100 [IU]/ML
0-4 INJECTION, SOLUTION INTRAVENOUS; SUBCUTANEOUS NIGHTLY
Status: DISCONTINUED | OUTPATIENT
Start: 2024-04-18 | End: 2024-04-18

## 2024-04-18 RX ORDER — SODIUM CHLORIDE 0.9 % (FLUSH) 0.9 %
5-40 SYRINGE (ML) INJECTION PRN
Status: DISCONTINUED | OUTPATIENT
Start: 2024-04-18 | End: 2024-04-20 | Stop reason: HOSPADM

## 2024-04-18 RX ORDER — INSULIN LISPRO 100 [IU]/ML
0-4 INJECTION, SOLUTION INTRAVENOUS; SUBCUTANEOUS NIGHTLY
Status: DISCONTINUED | OUTPATIENT
Start: 2024-04-18 | End: 2024-04-19

## 2024-04-18 RX ORDER — GLUCAGON 1 MG/ML
1 KIT INJECTION PRN
Status: DISCONTINUED | OUTPATIENT
Start: 2024-04-18 | End: 2024-04-20 | Stop reason: HOSPADM

## 2024-04-18 RX ORDER — MAGNESIUM SULFATE IN WATER 40 MG/ML
2000 INJECTION, SOLUTION INTRAVENOUS PRN
Status: DISCONTINUED | OUTPATIENT
Start: 2024-04-18 | End: 2024-04-20 | Stop reason: HOSPADM

## 2024-04-18 RX ORDER — DEXTROSE MONOHYDRATE 100 MG/ML
INJECTION, SOLUTION INTRAVENOUS CONTINUOUS PRN
Status: DISCONTINUED | OUTPATIENT
Start: 2024-04-18 | End: 2024-04-20 | Stop reason: HOSPADM

## 2024-04-18 RX ORDER — INSULIN LISPRO 100 [IU]/ML
4 INJECTION, SOLUTION INTRAVENOUS; SUBCUTANEOUS
Qty: 3 ML | Refills: 2 | Status: SHIPPED | OUTPATIENT
Start: 2024-04-18

## 2024-04-18 RX ORDER — ALOGLIPTIN 25 MG/1
25 TABLET, FILM COATED ORAL DAILY
Qty: 30 TABLET | Refills: 2 | Status: ON HOLD | OUTPATIENT
Start: 2024-04-18 | End: 2024-04-19 | Stop reason: HOSPADM

## 2024-04-18 RX ORDER — ACETAMINOPHEN 650 MG/1
650 SUPPOSITORY RECTAL EVERY 6 HOURS PRN
Status: DISCONTINUED | OUTPATIENT
Start: 2024-04-18 | End: 2024-04-20 | Stop reason: HOSPADM

## 2024-04-18 RX ORDER — FUROSEMIDE 10 MG/ML
40 INJECTION INTRAMUSCULAR; INTRAVENOUS ONCE
Status: COMPLETED | OUTPATIENT
Start: 2024-04-18 | End: 2024-04-18

## 2024-04-18 RX ORDER — ENOXAPARIN SODIUM 100 MG/ML
40 INJECTION SUBCUTANEOUS DAILY
Status: DISCONTINUED | OUTPATIENT
Start: 2024-04-18 | End: 2024-04-20 | Stop reason: HOSPADM

## 2024-04-18 RX ORDER — CYCLOBENZAPRINE HCL 10 MG
10 TABLET ORAL EVERY 8 HOURS PRN
Qty: 60 TABLET | Refills: 10 | Status: SHIPPED | OUTPATIENT
Start: 2024-04-18

## 2024-04-18 RX ORDER — SODIUM CHLORIDE 0.9 % (FLUSH) 0.9 %
5-40 SYRINGE (ML) INJECTION EVERY 12 HOURS SCHEDULED
Status: DISCONTINUED | OUTPATIENT
Start: 2024-04-18 | End: 2024-04-20 | Stop reason: HOSPADM

## 2024-04-18 RX ORDER — ONDANSETRON 2 MG/ML
4 INJECTION INTRAMUSCULAR; INTRAVENOUS EVERY 6 HOURS PRN
Status: DISCONTINUED | OUTPATIENT
Start: 2024-04-18 | End: 2024-04-20 | Stop reason: HOSPADM

## 2024-04-18 RX ORDER — POTASSIUM CHLORIDE 7.45 MG/ML
10 INJECTION INTRAVENOUS PRN
Status: DISCONTINUED | OUTPATIENT
Start: 2024-04-18 | End: 2024-04-20 | Stop reason: HOSPADM

## 2024-04-18 RX ORDER — INSULIN GLARGINE 100 [IU]/ML
40 INJECTION, SOLUTION SUBCUTANEOUS 2 TIMES DAILY
Status: DISCONTINUED | OUTPATIENT
Start: 2024-04-18 | End: 2024-04-19

## 2024-04-18 RX ORDER — POTASSIUM CHLORIDE 750 MG/1
10 TABLET, EXTENDED RELEASE ORAL DAILY
Qty: 10 TABLET | Refills: 0 | Status: SHIPPED | OUTPATIENT
Start: 2024-04-18

## 2024-04-18 RX ORDER — POLYETHYLENE GLYCOL 3350 17 G/17G
17 POWDER, FOR SOLUTION ORAL DAILY PRN
Status: DISCONTINUED | OUTPATIENT
Start: 2024-04-18 | End: 2024-04-20 | Stop reason: HOSPADM

## 2024-04-18 RX ORDER — FUROSEMIDE 10 MG/ML
40 INJECTION INTRAMUSCULAR; INTRAVENOUS DAILY
Status: DISCONTINUED | OUTPATIENT
Start: 2024-04-19 | End: 2024-04-20 | Stop reason: HOSPADM

## 2024-04-18 RX ORDER — INSULIN GLARGINE 100 [IU]/ML
INJECTION, SOLUTION SUBCUTANEOUS
Qty: 15 ML | Refills: 10 | Status: ON HOLD | OUTPATIENT
Start: 2024-04-18 | End: 2024-04-19

## 2024-04-18 RX ORDER — ACETAMINOPHEN 325 MG/1
650 TABLET ORAL EVERY 6 HOURS PRN
Status: DISCONTINUED | OUTPATIENT
Start: 2024-04-18 | End: 2024-04-20 | Stop reason: HOSPADM

## 2024-04-18 RX ORDER — POTASSIUM CHLORIDE 20 MEQ/1
40 TABLET, EXTENDED RELEASE ORAL PRN
Status: DISCONTINUED | OUTPATIENT
Start: 2024-04-18 | End: 2024-04-20 | Stop reason: HOSPADM

## 2024-04-18 RX ORDER — FUROSEMIDE 20 MG/1
20 TABLET ORAL DAILY
Qty: 60 TABLET | Refills: 3 | Status: SHIPPED | OUTPATIENT
Start: 2024-04-18

## 2024-04-18 RX ORDER — ONDANSETRON 4 MG/1
4 TABLET, ORALLY DISINTEGRATING ORAL EVERY 8 HOURS PRN
Status: DISCONTINUED | OUTPATIENT
Start: 2024-04-18 | End: 2024-04-20 | Stop reason: HOSPADM

## 2024-04-18 RX ORDER — INSULIN LISPRO 100 [IU]/ML
0-16 INJECTION, SOLUTION INTRAVENOUS; SUBCUTANEOUS
Status: DISCONTINUED | OUTPATIENT
Start: 2024-04-18 | End: 2024-04-19

## 2024-04-18 RX ADMIN — FUROSEMIDE 40 MG: 10 INJECTION, SOLUTION INTRAMUSCULAR; INTRAVENOUS at 12:46

## 2024-04-18 RX ADMIN — INSULIN LISPRO 16 UNITS: 100 INJECTION, SOLUTION INTRAVENOUS; SUBCUTANEOUS at 18:12

## 2024-04-18 RX ADMIN — SODIUM CHLORIDE, PRESERVATIVE FREE 10 ML: 5 INJECTION INTRAVENOUS at 21:06

## 2024-04-18 RX ADMIN — AMLODIPINE BESYLATE 5 MG: 5 TABLET ORAL at 21:03

## 2024-04-18 RX ADMIN — INSULIN GLARGINE 40 UNITS: 100 INJECTION, SOLUTION SUBCUTANEOUS at 21:02

## 2024-04-18 SDOH — ECONOMIC STABILITY: FOOD INSECURITY: WITHIN THE PAST 12 MONTHS, YOU WORRIED THAT YOUR FOOD WOULD RUN OUT BEFORE YOU GOT MONEY TO BUY MORE.: NEVER TRUE

## 2024-04-18 SDOH — ECONOMIC STABILITY: FOOD INSECURITY: WITHIN THE PAST 12 MONTHS, THE FOOD YOU BOUGHT JUST DIDN'T LAST AND YOU DIDN'T HAVE MONEY TO GET MORE.: NEVER TRUE

## 2024-04-18 SDOH — ECONOMIC STABILITY: HOUSING INSECURITY
IN THE LAST 12 MONTHS, WAS THERE A TIME WHEN YOU DID NOT HAVE A STEADY PLACE TO SLEEP OR SLEPT IN A SHELTER (INCLUDING NOW)?: NO

## 2024-04-18 SDOH — ECONOMIC STABILITY: INCOME INSECURITY: HOW HARD IS IT FOR YOU TO PAY FOR THE VERY BASICS LIKE FOOD, HOUSING, MEDICAL CARE, AND HEATING?: NOT HARD AT ALL

## 2024-04-18 ASSESSMENT — PATIENT HEALTH QUESTIONNAIRE - PHQ9
8. MOVING OR SPEAKING SO SLOWLY THAT OTHER PEOPLE COULD HAVE NOTICED. OR THE OPPOSITE, BEING SO FIGETY OR RESTLESS THAT YOU HAVE BEEN MOVING AROUND A LOT MORE THAN USUAL: NOT AT ALL
SUM OF ALL RESPONSES TO PHQ QUESTIONS 1-9: 10
SUM OF ALL RESPONSES TO PHQ QUESTIONS 1-9: 10
5. POOR APPETITE OR OVEREATING: SEVERAL DAYS
7. TROUBLE CONCENTRATING ON THINGS, SUCH AS READING THE NEWSPAPER OR WATCHING TELEVISION: NOT AT ALL
1. LITTLE INTEREST OR PLEASURE IN DOING THINGS: NEARLY EVERY DAY
6. FEELING BAD ABOUT YOURSELF - OR THAT YOU ARE A FAILURE OR HAVE LET YOURSELF OR YOUR FAMILY DOWN: NOT AT ALL
SUM OF ALL RESPONSES TO PHQ9 QUESTIONS 1 & 2: 3
4. FEELING TIRED OR HAVING LITTLE ENERGY: NEARLY EVERY DAY
2. FEELING DOWN, DEPRESSED OR HOPELESS: NOT AT ALL
9. THOUGHTS THAT YOU WOULD BE BETTER OFF DEAD, OR OF HURTING YOURSELF: NOT AT ALL
SUM OF ALL RESPONSES TO PHQ QUESTIONS 1-9: 10
10. IF YOU CHECKED OFF ANY PROBLEMS, HOW DIFFICULT HAVE THESE PROBLEMS MADE IT FOR YOU TO DO YOUR WORK, TAKE CARE OF THINGS AT HOME, OR GET ALONG WITH OTHER PEOPLE: NOT DIFFICULT AT ALL
3. TROUBLE FALLING OR STAYING ASLEEP: NEARLY EVERY DAY
SUM OF ALL RESPONSES TO PHQ QUESTIONS 1-9: 10

## 2024-04-18 ASSESSMENT — PAIN - FUNCTIONAL ASSESSMENT
PAIN_FUNCTIONAL_ASSESSMENT: PREVENTS OR INTERFERES SOME ACTIVE ACTIVITIES AND ADLS
PAIN_FUNCTIONAL_ASSESSMENT: 0-10

## 2024-04-18 ASSESSMENT — ENCOUNTER SYMPTOMS
DIARRHEA: 0
SORE THROAT: 0
ABDOMINAL PAIN: 0
COUGH: 1
ABDOMINAL PAIN: 0
VOMITING: 0
RHINORRHEA: 0
NAUSEA: 0
SHORTNESS OF BREATH: 1
WHEEZING: 0
CONSTIPATION: 0
SHORTNESS OF BREATH: 1
NAUSEA: 0

## 2024-04-18 ASSESSMENT — PAIN DESCRIPTION - PAIN TYPE: TYPE: ACUTE PAIN

## 2024-04-18 ASSESSMENT — PAIN SCALES - GENERAL: PAINLEVEL_OUTOF10: 8

## 2024-04-18 ASSESSMENT — PAIN DESCRIPTION - DESCRIPTORS: DESCRIPTORS: TIGHTNESS

## 2024-04-18 NOTE — ED NOTES
UNITS SUBCUTANEOUSLY TWICE DAILY    INSULIN LISPRO, 1 UNIT DIAL, (HUMALOG/ADMELOG) 100 UNIT/ML SOPN    Inject 4 Units into the skin 3 times daily (before meals)    INSULIN PEN NEEDLE 31G X 6 MM MISC    1 each by Does not apply route daily    INSULIN PEN NEEDLE 31G X 8 MM MISC    1 each by Does not apply route daily    LANCETS (ONETOUCH DELICA PLUS [id number]) MISC    Check blood sugars 3 times daily    LANCETS (ONETOUCH DELICA PLUS [id number]) MISC    USE TO TEST BLOOD SUGAR DAILY    LANCETS MISC    1 each by Does not apply route daily    MELATONIN 10 MG CAPS        MIRTAZAPINE (REMERON) 15 MG TABLET    TAKE 1 TABLET BY MOUTH AT BEDTIME AS NEEDED    MULTIPLE VITAMINS-MINERALS (MULTIVITAMIN-MINERALS) TABS TABLET    TAKE (1) TABLET BY MOUTH DAILY    ONDANSETRON (ZOFRAN ODT) 4 MG DISINTEGRATING TABLET    Place 1 tablet under the tongue every 8 hours as needed for Nausea    ONETOUCH DELICA LANCETS 33G MISC    USE TO TEST BLOOD SUGAR DAILY    POTASSIUM CHLORIDE (KLOR-CON M) 10 MEQ EXTENDED RELEASE TABLET    Take 1 tablet by mouth daily    REXULTI 1 MG TABS TABLET    Take 1 tablet by mouth daily    RISPERIDONE (RISPERDAL) 4 MG TABLET    Take 1 tablet by mouth daily    TOPIRAMATE (TOPAMAX) 200 MG TABLET    TAKE TWO (2) TABLETS BY MOUTH TWICE DAILY    TRUEPLUS PEN NEEDLES 31G X 6 MM MISC    USE DAILY AS DIRECTED    ZOLPIDEM (AMBIEN) 10 MG TABLET    Take 1 tablet by mouth nightly as needed for Sleep.     Orders Placed This Encounter   Medications    furosemide (LASIX) injection 40 mg       SURGICAL HISTORY       Past Surgical History:   Procedure Laterality Date    BARIATRIC SURGERY  2019    BREAST SURGERY  2006    L lumpectomy, [place] lymph node bx, L axillary node dissection     SECTION      X2    COLONOSCOPY N/A 3/24/2022    COLONOSCOPY DIAGNOSTIC performed by Yuriy Burrell IV DO at [facility] OR    INCISION AND DRAINAGE N/A 2017    INCISION AND DRAINAGE LEFT PERIANAL ABSCESS performed by Thao SIDHU  DO Katie at [facility] OR    LYMPHADENECTOMY      left breath lymph node removed    MASTECTOMY      OTHER SURGICAL HISTORY N/A 07/21/2017    I&D perirectal abcess    TUBAL LIGATION         PAST MEDICAL HISTORY       Past Medical History:   Diagnosis Date    Breast cancer ([facility]) 7/16/2012    Cancer ([facility]) 05-16-06    L breast - invasive ductal adenocarcinoma    Conjunctivitis     susceptible to conjunctivitis    Depression     Disorder of patellofemoral joint     DJD (degenerative joint disease) of knee     bilateral    Hyperlipidemia     Hypertension     Malignant neoplasm of nipple of left breast in female, estrogen receptor positive ([facility]) 9/24/2015    Pain, knee 10-06    R lateral meniscus tear    Primary osteoarthritis of left knee     Type II or unspecified type diabetes mellitus without mention of complication, not stated as uncontrolled     Under care of team 03/21/2022    PCP - DR. MAN - LAST VISIT 1/2022    Under care of team 03/21/2022    ONCOLOGY - DR. OJEDA - LAST VISIT 10.2021    Under care of team 03/21/2022    GENERAL SURGERY - DR. MKCEE - LAST VISIT 3/2022    Wears dentures 03/21/2022    FULL    Wears glasses 03/21/2022    Weight loss 03/21/2022    70 LBS POST BARIATRIC SURGERY.       Labs:  Labs Reviewed   CBC - Abnormal; Notable for the following components:       Result Value    RDW 14.5 (*)     All other components within normal limits   BASIC METABOLIC PANEL - Abnormal; Notable for the following components:    Potassium 3.6 (*)     Chloride 108 (*)     CO2 19 (*)     Glucose 335 (*)     Calcium 8.5 (*)     All other components within normal limits   BRAIN NATRIURETIC PEPTIDE - Abnormal; Notable for the following components:    Pro-BNP 6,154 (*)     All other components within normal limits   TROPONIN       Electronically signed by Elodia Dior RN on 4/18/2024 at 1:24 PM

## 2024-04-18 NOTE — ED NOTES
Patient presents to ED from [facility] for bilateral leg and foot swelling. States it has been ongoing for some time now. Patient reports pain 8/10 and describes as tightness. Patient denies any known CHF or heart issues. Patient a/o x 4 with even non labored respirations, NAD noted.

## 2024-04-18 NOTE — PROGRESS NOTES
Visit Information    Have you changed or started any medications since your last visit including any over-the-counter medicines, vitamins, or herbal medicines? no   Have you stopped taking any of your medications? Is so, why? -  no  Are you having any side effects from any of your medications? - no    Have you seen any other physician or provider since your last visit?  no   Have you had any other diagnostic tests since your last visit?  no   Have you been seen in the emergency room and/or had an admission in a hospital since we last saw you? Yes, [facility] 3-26-24  Have you had your routine dental cleaning in the past 6 months?  no     Do you have an active MyChart account? If no, what is the barrier?  Yes    Patient Care Team:  Keesha Matias MD as PCP - General (Family Medicine)  Ryder Betancur MD as Consulting Physician (Hematology and Oncology)  Leonel Simon MD (General Surgery)  Thao Wilson DO as Consulting Physician (General Surgery)  Rene Harding MD as Consulting Physician (Pulmonology)    Medical History Review  Past Medical, Family, and Social History reviewed and does not contribute to the patient presenting condition    Health Maintenance   Topic Date Due    Hepatitis C screen  Never done    Shingles vaccine (1 of 2) Never done    Diabetic retinal exam  12/31/2020    Depression Monitoring  06/02/2023    Pneumococcal 0-64 years Vaccine (2 of 2 - PCV) 06/02/2023    Cervical cancer screen  06/21/2023    COVID-19 Vaccine (4 - 2023-24 season) 09/01/2023    Diabetic foot exam  12/29/2023    A1C test (Diabetic or Prediabetic)  12/29/2023    Diabetic Alb to Cr ratio (uACR) test  12/29/2023    Lipids  12/29/2023    GFR test (Diabetes, CKD 3-4, OR last GFR 15-59)  03/26/2025    DTaP/Tdap/Td vaccine (4 - Td or Tdap) 04/23/2031    Colorectal Cancer Screen  03/24/2032    Hepatitis B vaccine  Completed    Breast cancer screen  Completed    Flu vaccine  Completed    HIV screen  
FERRITIN 2,507 (H) 10/03/2023     Lab Results   Component Value Date    LDLCHOLESTEROL 71 12/29/2022     I agree with the assessment, plan and the diagnosis of      Diagnosis Orders   1. Lower extremity edema  Echo (TTE) complete (PRN contrast/bubble/strain/3D)    furosemide (LASIX) 20 MG tablet    potassium chloride (KLOR-CON M) 10 MEQ extended release tablet      2. Type 2 diabetes mellitus with hyperosmolarity without coma, without long-term current use of insulin (HCC)  Microalbumin, Ur    POCT glycosylated hemoglobin (Hb A1C)    HM DIABETES FOOT EXAM    [facility] Primary Care Pharmacist    insulin lispro, 1 Unit Dial, (HUMALOG/ADMELOG) 100 UNIT/ML SOPN    [facility] Referral for Social Determinants of Health (Primary Care Practices)    DISCONTINUED: insulin glargine (LANTUS SOLOSTAR) 100 UNIT/ML injection pen    DISCONTINUED: alogliptin (NESINA) 25 MG TABS tablet      3. Primary hypertension  DME Order for (Specify) as OP      4. Screening for hyperlipidemia  Lipid Panel      5. Malignant neoplasm of nipple of left breast in female, estrogen receptor positive (HCC)  gabapentin (NEURONTIN) 300 MG capsule    cyclobenzaprine (FLEXERIL) 10 MG tablet       . I agree with  orders as documented by the resident.   Recommendations: Agree with resident assessment and plan.  Patient presentation concerning for acute exacerbation of heart failure especially with significant fluid retention.  Patient also may benefit from further workup of this especially with the abdominal fluid likely present for any liver disease as opposed to right heart strain versus other.  Patient agrees to proceed to the ER and states understand the risk of not proceeding to the ER including risk of severe heart failure, permanent disability or worse.  Patient states that she will proceed by private vehicle.  Patient also likely benefit from social determinants of health and working on patient insurance issues.  Patient have close follow-up with 
present on exam. No crackles or murmurs heard. Recent BNP > 1000. Gained 24 lbs in last 3 weeks. Endorses orthopnea and PND. Patient doesn't have a follow up appointment with cardiology till next month. Patient instructed to go to ER for further cardiac workup with possible need for admission with evaluation by cardiology. Patient understanding.   - Echo (TTE) complete (PRN contrast/bubble/strain/3D); Future  - furosemide (LASIX) 20 MG tablet; Take 1 tablet by mouth daily  Dispense: 60 tablet; Refill: 3  - potassium chloride (KLOR-CON M) 10 MEQ extended release tablet; Take 1 tablet by mouth daily  Dispense: 10 tablet; Refill: 0    2. Type 2 diabetes mellitus with hyperosmolarity without coma, without long-term current use of insulin ([facility])  - A1c today 12.7, down from 13.9. Patient has not been on her medications for the past 3 months. Has been taking Januvia 100 mg, lantus 24 units bid (per chart review supposed to be on 40 units bid), and novolog 4 units with meals. Patient to continue taking insulin lantus 40 units bid, and novolog 4 units with meals. Januvia not covered by patient insurance, alternative is nesina 25 mg daily. Referred to clinical pharmacy to try and get CGM for close glucose monitoring. Referral placed to [facility] for financial strains and assistance to make sure patient has access to medications.  - Microalbumin, Ur; Future  - POCT glycosylated hemoglobin (Hb A1C)  -  DIABETES FOOT EXAM  - [facility] Primary Care Pharmacist  - [facility] Referral for Social Determinants of Health (Primary Care Practices)  - alogliptin (NESINA) 25 MG TABS tablet; Take 1 tablet by mouth daily  Dispense: 30 tablet; Refill: 2  - insulin glargine (LANTUS SOLOSTAR) 100 UNIT/ML injection pen; INJECT 40 UNITS SUBCUTANEOUSLY TWICE DAILY  Dispense: 15 mL; Refill: 10  - insulin lispro, 1 Unit Dial, (HUMALOG/ADMELOG) 100 UNIT/ML SOPN; Inject 4 Units into the skin 3 times daily (before meals)  Dispense: 3 mL; Refill: 2    3.

## 2024-04-18 NOTE — PATIENT INSTRUCTIONS
Thank you for letting us take care of you today. We hope all your questions were addressed. If a question was overlooked or something else comes to mind after you return home, please contact a member of your Care Team listed below.      Your Care Team at [facility] is Team #2  Mita Brady M.D. (Faculty)  Maru Rodriguez, (Resident)  Angela Herbert, (Resident)  Keesha Matias (Resident)  Nova Tao, (Resident)  Stefani Sanders, [facility]  Wang Braxton, JOSE LUIS Kennedy, [facility]  Marjorie Thompson, [facility]  Melissa Recinos,  [facility]  Velvet Ball, [facility]  Yamel Brewer, [facility]  Briana Danielle, [facility]  Jonas (LJ) Hang JOSE LUIS (Clinical Practice Manager)  Concepcion Perez [facility] (Clinical Pharmacist)     Office phone number: [phone]    If you need to get in right away due to illness, please be advised we have \"Same Day\" appointments available Monday-Friday. Please call us at [phone] option #3 to schedule your \"Same Day\" appointment.

## 2024-04-18 NOTE — ED PROVIDER NOTES
[facility] ED  Emergency Department Encounter  Emergency Medicine Resident     Pt Name:Magui Thayer  MRN: [id number]  Birthdate 1965  Date of evaluation: 24  PCP:  Keesha Matias MD  Note Started: 11:59 AM EDT      CHIEF COMPLAINT       Chief Complaint   Patient presents with    Leg Swelling    Swelling     Bilateral feet       HISTORY OF PRESENT ILLNESS  (Location/Symptom, Timing/Onset, Context/Setting, Quality, Duration, Modifying Factors, Severity.)      Magui Thayer is a 59 y.o. female who presents with leg swelling and lower extremities.  Patient was sent from her family medicine clinic with concern for volume overload.  Patient states that she is somewhat short of breath but she is not having any difficulty taking a really deep breath.  She is not complaining of any chest pain currently.  No nausea or vomiting, no abdominal pain.  Patient has a medical history markable for multiple medical comorbidities including hypertension hyperlipidemia, CHF, history of malignancy in remission.     PAST MEDICAL / SURGICAL / SOCIAL / FAMILY HISTORY      has a past medical history of Breast cancer (HCC), Cancer (HCC), Conjunctivitis, Depression, Disorder of patellofemoral joint, DJD (degenerative joint disease) of knee, Hyperlipidemia, Hypertension, Malignant neoplasm of nipple of left breast in female, estrogen receptor positive (HCC), Pain, knee, Primary osteoarthritis of left knee, Type II or unspecified type diabetes mellitus without mention of complication, not stated as uncontrolled, Under care of team, Under care of team, Under care of team, Wears dentures, Wears glasses, and Weight loss.       has a past surgical history that includes Breast surgery (2006); Tubal ligation;  section; lymphadenectomy; other surgical history (N/A, 2017); incision and drainage (N/A, 2017); Mastectomy; Bariatric Surgery (2019); and Colonoscopy (N/A, 3/24/2022).      Social History  failure type (HCC)          DISPOSITION / PLAN     DISPOSITION Admitted 04/18/2024 03:08:39 PM      PATIENT REFERRED TO:  No follow-up provider specified.    DISCHARGE MEDICATIONS:  New Prescriptions    No medications on file       Trent Wright DO  Emergency Medicine Resident    (Please note that portions of this note were completed with a voice recognition program.  Efforts were made to edit the dictations but occasionally words are mis-transcribed.)

## 2024-04-18 NOTE — H&P
tablet by mouth daily 4/18/24   Keesha Matias MD   gabapentin (NEURONTIN) 300 MG capsule Take 1 capsule by mouth 3 times daily for 30 days. 4/18/24 5/18/24  Keesha Matias MD   potassium chloride (KLOR-CON M) 10 MEQ extended release tablet Take 1 tablet by mouth daily 4/18/24   Keesha Matias MD   insulin glargine (LANTUS SOLOSTAR) 100 UNIT/ML injection pen INJECT 40 UNITS SUBCUTANEOUSLY TWICE DAILY 4/18/24   Keesha Matias MD   insulin lispro, 1 Unit Dial, (HUMALOG/ADMELOG) 100 UNIT/ML SOPN Inject 4 Units into the skin 3 times daily (before meals) 4/18/24   Keesha Matias MD   alogliptin (NESINA) 25 MG TABS tablet Take 1 tablet by mouth daily 4/18/24   Keesha Matias MD   cyclobenzaprine (FLEXERIL) 10 MG tablet Take 1 tablet by mouth every 8 hours as needed for Muscle spasms 4/18/24   Keesha Matias MD   blood glucose test strips (TRUE METRIX BLOOD GLUCOSE TEST) strip USE STRIP TO TEST BLOOD GLUCOSE FOUR TIMES A DAY AS NEEDED 12/12/23   Keesha Matias MD   REXULTI 1 MG TABS tablet Take 1 tablet by mouth daily 8/8/23   Flores Oneill MD   SITagliptin (JANUVIA) 100 MG tablet TAKE 1 TABLET BY MOUTH DAILY 9/14/23 4/18/24  Keesha Matias MD   atorvastatin (LIPITOR) 20 MG tablet TAKE 1 TABLET BY MOUTH DAILY 8/9/23   Keesha Matias MD   Lancets MISC 1 each by Does not apply route daily 7/6/23   Dana Hatch MD   blood glucose monitor strips Test 3 times a day & as needed for symptoms of irregular blood glucose. Dispense sufficient amount for indicated testing frequency plus additional to accommodate PRN testing needs. 7/6/23   Dana Hatch MD   glucose monitoring (FREESTYLE FREEDOM) kit Please check blood glucose 3 times daily 7/6/23   Dana Hatch MD   Easy Touch Lancets 33G/Twist MISC USE LANCET TO TEST BLOOD GLUCOSE FOUR TIMES DAILY AS NEEDED. 6/15/23   Keesha Matias MD   topiramate (TOPAMAX) 200 MG tablet TAKE TWO (2) TABLETS BY MOUTH TWICE  MD Flores   zolpidem (AMBIEN) 10 MG tablet Take 1 tablet by mouth nightly as needed for Sleep.    Provider, MD Flores   risperiDONE (RISPERDAL) 4 MG tablet Take 1 tablet by mouth daily 16   ProviderFlores MD B-D 3CC LUER-AMBAR SYR 23HW9-2/2 22G X 1-/2\" 3 ML MISC  16   ProviderFlores MD   benztropine (COGENTIN) 0.5 MG tablet Take 1 tablet by mouth 2 times daily 8/27/15   ProviderFlores MD       SOCIAL HISTORY     Tobacco: Denies  Alcohol: Denies  Illicits: Denies    FAMILY HISTORY     Family History   Problem Relation Age of Onset    Cancer Father        PHYSICAL EXAM     Vitals: BP (!) 159/108   Pulse 91   Temp 97.5 °F (36.4 °C) (Oral)   Resp 20   Wt 73.5 kg (162 lb)   LMP 2015 (Approximate)   SpO2 100%   BMI 28.70 kg/m²   Tmax: Temp (24hrs), Av.8 °F (36.6 °C), Min:97.5 °F (36.4 °C), Max:98 °F (36.7 °C)    Last Body weight:   Wt Readings from Last 3 Encounters:   24 73.5 kg (162 lb)   24 73.7 kg (162 lb 6.4 oz)   24 62.6 kg (138 lb)     Body Mass Index : Body mass index is 28.7 kg/m².      Physical Exam:  Physical Exam  Constitutional:       General: She is not in acute distress.     Appearance: Normal appearance.   HENT:      Head: Normocephalic and atraumatic.      Mouth/Throat:      Pharynx: Oropharynx is clear. No oropharyngeal exudate.   Eyes:      General: No scleral icterus.  Cardiovascular:      Rate and Rhythm: Normal rate and regular rhythm.      Pulses: Normal pulses.      Heart sounds: Normal heart sounds. No murmur heard.  Pulmonary:      Effort: Pulmonary effort is normal. No respiratory distress.      Breath sounds: Rales present.   Abdominal:      General: Bowel sounds are normal. There is no distension.      Palpations: Abdomen is soft.      Tenderness: There is no abdominal tenderness. There is no guarding or rebound.   Musculoskeletal:      Right lower leg: Edema present.      Left lower leg: Edema present.   Skin:

## 2024-04-18 NOTE — ED PROVIDER NOTES
[facility]     Emergency Department     Faculty Attestation    I performed a history and physical examination of the patient and discussed management with the resident. I reviewed the resident’s note and agree with the documented findings and plan of care. Any areas of disagreement are noted on the chart. I was personally present for the key portions of any procedures. I have documented in the chart those procedures where I was not present during the key portions. I have reviewed the emergency nurses triage note. I agree with the chief complaint, past medical history, past surgical history, allergies, medications, social and family history as documented unless otherwise noted below.        For Physician Assistant/ Nurse Practitioner cases/documentation I have personally evaluated this patient and have completed at least one if not all key elements of the E/M (history, physical exam, and MDM). Additional findings are as noted.  I have personally seen and evaluated the patient.  I find the patient's history and physical exam are consistent with the NP/PA documentation.  I agree with the care provided, treatment rendered, disposition and follow-up plan.    Bilateral leg swelling and discomfort as well as some orthopnea again dyspnea on exertion complaints consistent with congestive heart failure laboratory studies pending      EKG Interpretation    Interpreted by emergency department physician    Rhythm: normal sinus   Rate: normal  Axis: normal  Conduction: normal unifocal PVCs  ST Segments: no acute change  T Waves: no acute change  Q Waves: no acute change    Clinical Impression:  nonspecific EKG. EKG which she just symptoms with symptoms atrial enlargement        Critical Care     Zain Seth M.D.  Attending Emergency  Physician           Zain Seth MD  04/18/24 [id number]

## 2024-04-19 ENCOUNTER — APPOINTMENT (OUTPATIENT)
Age: 59
DRG: 291 | End: 2024-04-19
Payer: COMMERCIAL

## 2024-04-19 DIAGNOSIS — R60.0 LOWER EXTREMITY EDEMA: ICD-10-CM

## 2024-04-19 LAB
ANION GAP SERPL CALCULATED.3IONS-SCNC: 10 MMOL/L (ref 9–16)
ANION GAP SERPL CALCULATED.3IONS-SCNC: 12 MMOL/L (ref 9–16)
BASOPHILS # BLD: 0 K/UL (ref 0–0.2)
BASOPHILS NFR BLD: 0 % (ref 0–2)
BILIRUB UR QL STRIP: NEGATIVE
BUN SERPL-MCNC: 12 MG/DL (ref 6–20)
CALCIUM SERPL-MCNC: 8.7 MG/DL (ref 8.6–10.4)
CHLORIDE SERPL-SCNC: 106 MMOL/L (ref 98–107)
CHLORIDE SERPL-SCNC: 110 MMOL/L (ref 98–107)
CLARITY UR: CLEAR
CO2 SERPL-SCNC: 21 MMOL/L (ref 20–31)
CO2 SERPL-SCNC: 23 MMOL/L (ref 20–31)
COLOR UR: YELLOW
COMMENT: ABNORMAL
CREAT SERPL-MCNC: 0.9 MG/DL (ref 0.5–0.9)
CREAT UR-MCNC: 48.2 MG/DL (ref 28–217)
EKG ATRIAL RATE: 90 BPM
EKG ATRIAL RATE: 92 BPM
EKG P AXIS: 66 DEGREES
EKG P AXIS: 70 DEGREES
EKG P-R INTERVAL: 176 MS
EKG P-R INTERVAL: 176 MS
EKG Q-T INTERVAL: 390 MS
EKG Q-T INTERVAL: 392 MS
EKG QRS DURATION: 92 MS
EKG QRS DURATION: 94 MS
EKG QTC CALCULATION (BAZETT): 477 MS
EKG QTC CALCULATION (BAZETT): 484 MS
EKG R AXIS: -26 DEGREES
EKG R AXIS: -26 DEGREES
EKG T AXIS: 148 DEGREES
EKG T AXIS: 152 DEGREES
EKG VENTRICULAR RATE: 90 BPM
EKG VENTRICULAR RATE: 92 BPM
EOSINOPHIL # BLD: 0 K/UL (ref 0–0.4)
EOSINOPHILS RELATIVE PERCENT: 0 % (ref 1–4)
ERYTHROCYTE [DISTWIDTH] IN BLOOD BY AUTOMATED COUNT: 14.4 % (ref 11.8–14.4)
GFR SERPL CREATININE-BSD FRML MDRD: 74 ML/MIN/1.73M2
GLUCOSE BLD-MCNC: 105 MG/DL (ref 65–105)
GLUCOSE BLD-MCNC: 136 MG/DL (ref 65–105)
GLUCOSE BLD-MCNC: 140 MG/DL (ref 65–105)
GLUCOSE BLD-MCNC: 145 MG/DL (ref 65–105)
GLUCOSE BLD-MCNC: 54 MG/DL (ref 65–105)
GLUCOSE BLD-MCNC: 71 MG/DL (ref 65–105)
GLUCOSE SERPL-MCNC: 58 MG/DL (ref 74–99)
GLUCOSE UR STRIP-MCNC: ABNORMAL MG/DL
HCT VFR BLD AUTO: 39.5 % (ref 36.3–47.1)
HGB BLD-MCNC: 12.3 G/DL (ref 11.9–15.1)
HGB UR QL STRIP.AUTO: NEGATIVE
IMM GRANULOCYTES # BLD AUTO: 0 K/UL (ref 0–0.3)
IMM GRANULOCYTES NFR BLD: 0 %
KETONES UR STRIP-MCNC: NEGATIVE MG/DL
LEUKOCYTE ESTERASE UR QL STRIP: NEGATIVE
LYMPHOCYTES NFR BLD: 5.76 K/UL (ref 1–4.8)
LYMPHOCYTES RELATIVE PERCENT: 43 % (ref 24–44)
MAGNESIUM SERPL-MCNC: 1.7 MG/DL (ref 1.6–2.6)
MCH RBC QN AUTO: 29.3 PG (ref 25.2–33.5)
MCHC RBC AUTO-ENTMCNC: 31.1 G/DL (ref 28.4–34.8)
MCV RBC AUTO: 94 FL (ref 82.6–102.9)
MONOCYTES NFR BLD: 0.4 K/UL (ref 0.1–0.8)
MONOCYTES NFR BLD: 3 % (ref 1–7)
MORPHOLOGY: NORMAL
NEUTROPHILS NFR BLD: 54 % (ref 36–66)
NEUTS SEG NFR BLD: 7.24 K/UL (ref 1.8–7.7)
NITRITE UR QL STRIP: NEGATIVE
NRBC BLD-RTO: 0 PER 100 WBC
PH UR STRIP: 8 [PH] (ref 5–8)
PLATELET # BLD AUTO: 255 K/UL (ref 138–453)
PMV BLD AUTO: 10 FL (ref 8.1–13.5)
POTASSIUM SERPL-SCNC: 3.1 MMOL/L (ref 3.7–5.3)
POTASSIUM SERPL-SCNC: 4.7 MMOL/L (ref 3.7–5.3)
PROT UR STRIP-MCNC: NEGATIVE MG/DL
RBC # BLD AUTO: 4.2 M/UL (ref 3.95–5.11)
SODIUM SERPL-SCNC: 139 MMOL/L (ref 136–145)
SODIUM SERPL-SCNC: 143 MMOL/L (ref 136–145)
SP GR UR STRIP: 1.02 (ref 1–1.03)
TOTAL PROTEIN, URINE: 9 MG/DL
URINE TOTAL PROTEIN CREATININE RATIO: 0.18
UROBILINOGEN UR STRIP-ACNC: NORMAL EU/DL (ref 0–1)
WBC OTHER # BLD: 13.4 K/UL (ref 3.5–11.3)

## 2024-04-19 PROCEDURE — 36415 COLL VENOUS BLD VENIPUNCTURE: CPT

## 2024-04-19 PROCEDURE — 6360000002 HC RX W HCPCS

## 2024-04-19 PROCEDURE — 93010 ELECTROCARDIOGRAM REPORT: CPT | Performed by: INTERNAL MEDICINE

## 2024-04-19 PROCEDURE — 6370000000 HC RX 637 (ALT 250 FOR IP)

## 2024-04-19 PROCEDURE — 82570 ASSAY OF URINE CREATININE: CPT

## 2024-04-19 PROCEDURE — 99232 SBSQ HOSP IP/OBS MODERATE 35: CPT | Performed by: INTERNAL MEDICINE

## 2024-04-19 PROCEDURE — 82947 ASSAY GLUCOSE BLOOD QUANT: CPT

## 2024-04-19 PROCEDURE — 80048 BASIC METABOLIC PNL TOTAL CA: CPT

## 2024-04-19 PROCEDURE — 1200000000 HC SEMI PRIVATE

## 2024-04-19 PROCEDURE — 85025 COMPLETE CBC W/AUTO DIFF WBC: CPT

## 2024-04-19 PROCEDURE — 80051 ELECTROLYTE PANEL: CPT

## 2024-04-19 PROCEDURE — 81003 URINALYSIS AUTO W/O SCOPE: CPT

## 2024-04-19 PROCEDURE — 2580000003 HC RX 258

## 2024-04-19 PROCEDURE — 83735 ASSAY OF MAGNESIUM: CPT

## 2024-04-19 PROCEDURE — 84156 ASSAY OF PROTEIN URINE: CPT

## 2024-04-19 RX ORDER — MAGNESIUM SULFATE 1 G/100ML
1000 INJECTION INTRAVENOUS ONCE
Status: COMPLETED | OUTPATIENT
Start: 2024-04-19 | End: 2024-04-19

## 2024-04-19 RX ORDER — INSULIN GLARGINE 100 [IU]/ML
20 INJECTION, SOLUTION SUBCUTANEOUS 2 TIMES DAILY
Status: DISCONTINUED | OUTPATIENT
Start: 2024-04-19 | End: 2024-04-20 | Stop reason: HOSPADM

## 2024-04-19 RX ORDER — INSULIN LISPRO 100 [IU]/ML
0-8 INJECTION, SOLUTION INTRAVENOUS; SUBCUTANEOUS
Status: DISCONTINUED | OUTPATIENT
Start: 2024-04-19 | End: 2024-04-20 | Stop reason: HOSPADM

## 2024-04-19 RX ORDER — INSULIN LISPRO 100 [IU]/ML
0-4 INJECTION, SOLUTION INTRAVENOUS; SUBCUTANEOUS NIGHTLY
Status: DISCONTINUED | OUTPATIENT
Start: 2024-04-19 | End: 2024-04-20 | Stop reason: HOSPADM

## 2024-04-19 RX ORDER — AMLODIPINE BESYLATE 5 MG/1
5 TABLET ORAL DAILY
Qty: 30 TABLET | Refills: 3 | Status: SHIPPED | OUTPATIENT
Start: 2024-04-20

## 2024-04-19 RX ORDER — POTASSIUM CHLORIDE 20 MEQ/1
40 TABLET, EXTENDED RELEASE ORAL EVERY 4 HOURS
Status: COMPLETED | OUTPATIENT
Start: 2024-04-19 | End: 2024-04-19

## 2024-04-19 RX ORDER — DIPHENHYDRAMINE HCL 25 MG
25 TABLET ORAL EVERY 6 HOURS PRN
Status: DISCONTINUED | OUTPATIENT
Start: 2024-04-19 | End: 2024-04-20 | Stop reason: HOSPADM

## 2024-04-19 RX ORDER — INSULIN GLARGINE 100 [IU]/ML
20 INJECTION, SOLUTION SUBCUTANEOUS 2 TIMES DAILY
Qty: 15 ML | Refills: 10 | Status: SHIPPED | OUTPATIENT
Start: 2024-04-19 | End: 2024-04-19

## 2024-04-19 RX ORDER — INSULIN GLARGINE 100 [IU]/ML
30 INJECTION, SOLUTION SUBCUTANEOUS 2 TIMES DAILY
Status: DISCONTINUED | OUTPATIENT
Start: 2024-04-19 | End: 2024-04-19

## 2024-04-19 RX ORDER — INSULIN GLARGINE 100 [IU]/ML
20 INJECTION, SOLUTION SUBCUTANEOUS 2 TIMES DAILY
Qty: 15 ML | Refills: 10 | Status: SHIPPED | OUTPATIENT
Start: 2024-04-19 | End: 2024-04-20

## 2024-04-19 RX ADMIN — POTASSIUM CHLORIDE 40 MEQ: 1500 TABLET, EXTENDED RELEASE ORAL at 11:23

## 2024-04-19 RX ADMIN — SODIUM CHLORIDE, PRESERVATIVE FREE 10 ML: 5 INJECTION INTRAVENOUS at 08:19

## 2024-04-19 RX ADMIN — INSULIN GLARGINE 20 UNITS: 100 INJECTION, SOLUTION SUBCUTANEOUS at 20:55

## 2024-04-19 RX ADMIN — POTASSIUM BICARBONATE 40 MEQ: 782 TABLET, EFFERVESCENT ORAL at 15:01

## 2024-04-19 RX ADMIN — MAGNESIUM SULFATE HEPTAHYDRATE 1000 MG: 1 INJECTION, SOLUTION INTRAVENOUS at 15:11

## 2024-04-19 RX ADMIN — SODIUM CHLORIDE, PRESERVATIVE FREE 10 ML: 5 INJECTION INTRAVENOUS at 20:55

## 2024-04-19 RX ADMIN — AMLODIPINE BESYLATE 5 MG: 5 TABLET ORAL at 08:07

## 2024-04-19 RX ADMIN — ENOXAPARIN SODIUM 40 MG: 100 INJECTION SUBCUTANEOUS at 08:06

## 2024-04-19 RX ADMIN — EMPAGLIFLOZIN 10 MG: 10 TABLET, FILM COATED ORAL at 12:01

## 2024-04-19 RX ADMIN — FUROSEMIDE 40 MG: 10 INJECTION, SOLUTION INTRAMUSCULAR; INTRAVENOUS at 08:06

## 2024-04-19 RX ADMIN — DIPHENHYDRAMINE HYDROCHLORIDE 25 MG: 25 TABLET ORAL at 20:55

## 2024-04-19 RX ADMIN — ACETAMINOPHEN 650 MG: 325 TABLET ORAL at 17:06

## 2024-04-19 RX ADMIN — POTASSIUM CHLORIDE 40 MEQ: 1500 TABLET, EXTENDED RELEASE ORAL at 07:48

## 2024-04-19 ASSESSMENT — ENCOUNTER SYMPTOMS
SHORTNESS OF BREATH: 1
WHEEZING: 0
ABDOMINAL PAIN: 0
COUGH: 1

## 2024-04-19 ASSESSMENT — PAIN DESCRIPTION - LOCATION: LOCATION: HEAD

## 2024-04-19 ASSESSMENT — PAIN - FUNCTIONAL ASSESSMENT: PAIN_FUNCTIONAL_ASSESSMENT: ACTIVITIES ARE NOT PREVENTED

## 2024-04-19 ASSESSMENT — PAIN SCALES - GENERAL
PAINLEVEL_OUTOF10: 0
PAINLEVEL_OUTOF10: 3
PAINLEVEL_OUTOF10: 0

## 2024-04-19 ASSESSMENT — PAIN DESCRIPTION - DESCRIPTORS: DESCRIPTORS: ACHING

## 2024-04-19 ASSESSMENT — PAIN DESCRIPTION - ORIENTATION: ORIENTATION: MID

## 2024-04-19 ASSESSMENT — PAIN SCALES - WONG BAKER: WONGBAKER_NUMERICALRESPONSE: NO HURT

## 2024-04-19 NOTE — PLAN OF CARE
Problem: Discharge Planning  Goal: Discharge to home or other facility with appropriate resources  4/19/2024 1720 by Stefani Bose RN  Outcome: Progressing     Problem: Pain  Goal: Verbalizes/displays adequate comfort level or baseline comfort level  4/19/2024 1720 by Stefani Bose RN  Outcome: Progressing     Problem: Safety - Adult  Goal: Free from fall injury  4/19/2024 1720 by Stefani Bose RN  Outcome: Progressing     Problem: Chronic Conditions and Co-morbidities  Goal: Patient's chronic conditions and co-morbidity symptoms are monitored and maintained or improved  Outcome: Progressing

## 2024-04-19 NOTE — CARE COORDINATION
Case Management Assessment  Initial Evaluation    Date/Time of Evaluation: 4/19/2024 11:28 AM  Assessment Completed by: ANY HOLLEY    If patient is discharged prior to next notation, then this note serves as note for discharge by case management.    Patient Name: Magui Thayer                   YOB: 1965  Diagnosis: Leg swelling [M79.89]  CHF (congestive heart failure), NYHA class I, acute on chronic, combined (HCC) [I50.43]  CHF (congestive heart failure), NYHA class I, acute on chronic, diastolic (HCC) [I50.33]  Congestive heart failure, unspecified HF chronicity, unspecified heart failure type (HCC) [I50.9]                   Date / Time: 4/18/2024 11:56 AM    Patient Admission Status: Inpatient   Readmission Risk (Low < 19, Mod (19-27), High > 27): Readmission Risk Score: 12.4    Current PCP: Keesha Matias MD  PCP verified by CM? (P) Yes    Chart Reviewed: Yes      History Provided by: (P) Patient  Patient Orientation: (P) Alert and Oriented, Person, Place, Situation, Self    Patient Cognition: (P) Alert    Hospitalization in the last 30 days (Readmission):  No    If yes, Readmission Assessment in CM Navigator will be completed.    Advance Directives:      Code Status: Full Code   Patient's Primary Decision Maker is: (P) Legal Next of Kin      Discharge Planning:    Patient lives with: (P) Family Members (lives with brother and ANTON) Type of Home: (P) House  Primary Care Giver: (P) Self  Patient Support Systems include: (P) Family Members   Current Financial resources: (P) None  Current community resources: (P) None  Current services prior to admission: (P) None            Current DME:              Type of Home Care services:  (P) None    ADLS  Prior functional level: (P) Independent in ADLs/IADLs  Current functional level: (P) Independent in ADLs/IADLs    PT AM-PAC:   /24  OT AM-PAC:   /24    Family can provide assistance at DC: (P) Yes  Would you like Case Management to discuss the  NYHA class I, acute on chronic, diastolic (HCC) [I50.33]  Congestive heart failure, unspecified HF chronicity, unspecified heart failure type (HCC) [I50.9]    IF APPLICABLE: The Patient and/or patient representative Magui and her family were provided with a choice of provider and agrees with the discharge plan. Freedom of choice list with basic dialogue that supports the patient's individualized plan of care/goals and shares the quality data associated with the providers was provided to: (P) Patient   Patient Representative Name:       The Patient and/or Patient Representative Agree with the Discharge Plan? (P) Yes    ANY HOLLEY  Case Management Department  Ph: [phone]

## 2024-04-19 NOTE — PLAN OF CARE
Problem: Discharge Planning  Goal: Discharge to home or other facility with appropriate resources  Outcome: Progressing     Problem: Pain  Goal: Verbalizes/displays adequate comfort level or baseline comfort level  Outcome: Progressing     Problem: Safety - Adult  Goal: Free from fall injury  4/19/2024 0529 by Hina Kemp, RN  Outcome: Progressing  4/18/2024 1752 by Janna Sotomayor, RN  Outcome: Progressing

## 2024-04-19 NOTE — PROGRESS NOTES
[facility]  Internal Medicine Teaching Residency Program  Inpatient Daily Progress Note  ______________________________________________________________________________    Patient: Magui Thayer  YOB: 1965   MRN:[id number]    Acct: [id number]     Room: 0330/0330-01  Admit date: 4/18/2024  Today's date: 04/19/24  Number of days in the hospital: 1    SUBJECTIVE   Admitting Diagnosis: CHF (congestive heart failure), NYHA class I, acute on chronic, diastolic (HCC)  CC: Chief complaint: Shortness of breath and leg swelling   Pt examined at bedside. Chart & results reviewed.     Patient afebrile, hemodynamically stable, on room air.  No acute events overnight.  No new complaints.  Lungs sound better upon auscultation, breathing better and laying flat without difficulty.    Review of Systems   Constitutional:  Positive for fatigue and unexpected weight change. Negative for chills and fever.   HENT:  Negative for congestion.    Respiratory:  Positive for cough and shortness of breath. Negative for wheezing.    Cardiovascular:  Positive for leg swelling. Negative for chest pain and palpitations.   Gastrointestinal:  Negative for abdominal pain.   Genitourinary:  Negative for dysuria.   Neurological:  Negative for headaches.   Psychiatric/Behavioral:  Negative for agitation.        BRIEF HISTORY     The patient is a 59 y.o. female with history of breast cancer s/p chemo 10 years ago, hypertension, diabetes, who presents with shortness of breath and lower extremity swelling for the last 10 days or so.  She has been using more pillows overnight and shortness of breath is worse when lying flat and with exertion.  She was recently in an outlying facility emergency department where she was found to have elevated BNP and was referred to cardiology but did not follow-up.  She has been noncompliant with her home medications which includes Januvia, Lantus, antihypertensives.

## 2024-04-20 ENCOUNTER — APPOINTMENT (OUTPATIENT)
Age: 59
DRG: 291 | End: 2024-04-20
Payer: COMMERCIAL

## 2024-04-20 VITALS
BODY MASS INDEX: 29.78 KG/M2 | DIASTOLIC BLOOD PRESSURE: 82 MMHG | RESPIRATION RATE: 16 BRPM | HEART RATE: 90 BPM | TEMPERATURE: 98.4 F | HEIGHT: 62 IN | WEIGHT: 161.82 LBS | OXYGEN SATURATION: 100 % | SYSTOLIC BLOOD PRESSURE: 113 MMHG

## 2024-04-20 LAB
ANION GAP SERPL CALCULATED.3IONS-SCNC: 9 MMOL/L (ref 9–16)
BASOPHILS # BLD: 0.07 K/UL (ref 0–0.2)
BASOPHILS NFR BLD: 1 % (ref 0–2)
BUN SERPL-MCNC: 14 MG/DL (ref 6–20)
CALCIUM SERPL-MCNC: 8.4 MG/DL (ref 8.6–10.4)
CHLORIDE SERPL-SCNC: 109 MMOL/L (ref 98–107)
CO2 SERPL-SCNC: 23 MMOL/L (ref 20–31)
CREAT SERPL-MCNC: 1 MG/DL (ref 0.5–0.9)
ECHO AO ASC DIAM: 3.2 CM
ECHO AO ASCENDING AORTA INDEX: 1.83 CM/M2
ECHO AO ROOT DIAM: 2.6 CM
ECHO AO ROOT INDEX: 1.49 CM/M2
ECHO AV AREA PEAK VELOCITY: 2.1 CM2
ECHO AV AREA VTI: 2.5 CM2
ECHO AV AREA/BSA PEAK VELOCITY: 1.2 CM2/M2
ECHO AV AREA/BSA VTI: 1.4 CM2/M2
ECHO AV MEAN GRADIENT: 2 MMHG
ECHO AV MEAN VELOCITY: 0.7 M/S
ECHO AV PEAK GRADIENT: 3 MMHG
ECHO AV PEAK VELOCITY: 0.9 M/S
ECHO AV VELOCITY RATIO: 0.67
ECHO AV VTI: 14.9 CM
ECHO BSA: 1.79 M2
ECHO EST RA PRESSURE: 8 MMHG
ECHO LA AREA 2C: 24.7 CM2
ECHO LA AREA 4C: 25.6 CM2
ECHO LA DIAMETER INDEX: 2.63 CM/M2
ECHO LA DIAMETER: 4.6 CM
ECHO LA MAJOR AXIS: 6.2 CM
ECHO LA MINOR AXIS: 6 CM
ECHO LA TO AORTIC ROOT RATIO: 1.77
ECHO LA VOL BP: 86 ML (ref 22–52)
ECHO LA VOL MOD A2C: 84 ML (ref 22–52)
ECHO LA VOL MOD A4C: 87 ML (ref 22–52)
ECHO LA VOL/BSA BIPLANE: 49 ML/M2 (ref 16–34)
ECHO LA VOLUME INDEX MOD A2C: 48 ML/M2 (ref 16–34)
ECHO LA VOLUME INDEX MOD A4C: 50 ML/M2 (ref 16–34)
ECHO LV E' LATERAL VELOCITY: 9 CM/S
ECHO LV E' SEPTAL VELOCITY: 5 CM/S
ECHO LV EJECTION FRACTION BIPLANE: 28 % (ref 55–100)
ECHO LV FRACTIONAL SHORTENING: 13 % (ref 28–44)
ECHO LV INTERNAL DIMENSION DIASTOLE INDEX: 3.09 CM/M2
ECHO LV INTERNAL DIMENSION DIASTOLIC: 5.4 CM (ref 3.9–5.3)
ECHO LV INTERNAL DIMENSION SYSTOLIC INDEX: 2.69 CM/M2
ECHO LV INTERNAL DIMENSION SYSTOLIC: 4.7 CM
ECHO LV IVSD: 1.1 CM (ref 0.6–0.9)
ECHO LV MASS 2D: 249.4 G (ref 67–162)
ECHO LV MASS INDEX 2D: 142.5 G/M2 (ref 43–95)
ECHO LV POSTERIOR WALL DIASTOLIC: 1.2 CM (ref 0.6–0.9)
ECHO LV RELATIVE WALL THICKNESS RATIO: 0.44
ECHO LVOT AREA: 3.1 CM2
ECHO LVOT AV VTI INDEX: 0.79
ECHO LVOT DIAM: 2 CM
ECHO LVOT MEAN GRADIENT: 1 MMHG
ECHO LVOT PEAK GRADIENT: 1 MMHG
ECHO LVOT PEAK VELOCITY: 0.6 M/S
ECHO LVOT STROKE VOLUME INDEX: 21.2 ML/M2
ECHO LVOT SV: 37.1 ML
ECHO LVOT VTI: 11.8 CM
ECHO MV AREA VTI: 1.8 CM2
ECHO MV E DECELERATION TIME (DT): 163 MS
ECHO MV E VELOCITY: 0.9 M/S
ECHO MV E/E' LATERAL: 10
ECHO MV E/E' RATIO (AVERAGED): 14
ECHO MV LVOT VTI INDEX: 1.78
ECHO MV MAX VELOCITY: 1.1 M/S
ECHO MV MEAN GRADIENT: 2 MMHG
ECHO MV MEAN VELOCITY: 0.6 M/S
ECHO MV PEAK GRADIENT: 4 MMHG
ECHO MV REGURGITANT ALIASING (NYQUIST) VELOCITY: 62 CM/S
ECHO MV REGURGITANT PEAK GRADIENT: 92 MMHG
ECHO MV REGURGITANT PEAK VELOCITY: 4.8 M/S
ECHO MV REGURGITANT RADIUS PISA: 0.6 CM
ECHO MV REGURGITANT VTIA: 142 CM
ECHO MV VTI: 21 CM
ECHO PV MAX VELOCITY: 0.6 M/S
ECHO PV PEAK GRADIENT: 2 MMHG
ECHO RIGHT VENTRICULAR SYSTOLIC PRESSURE (RVSP): 49 MMHG
ECHO RV FREE WALL PEAK S': 9 CM/S
ECHO RV INTERNAL DIMENSION: 4.5 CM
ECHO RV TAPSE: 1.9 CM (ref 1.7–?)
ECHO TV REGURGITANT MAX VELOCITY: 3.21 M/S
ECHO TV REGURGITANT PEAK GRADIENT: 41 MMHG
EOSINOPHIL # BLD: 0.14 K/UL (ref 0–0.44)
EOSINOPHILS RELATIVE PERCENT: 2 % (ref 1–4)
ERYTHROCYTE [DISTWIDTH] IN BLOOD BY AUTOMATED COUNT: 14.4 % (ref 11.8–14.4)
GFR SERPL CREATININE-BSD FRML MDRD: 68 ML/MIN/1.73M2
GLUCOSE BLD-MCNC: 123 MG/DL (ref 65–105)
GLUCOSE BLD-MCNC: 69 MG/DL (ref 65–105)
GLUCOSE BLD-MCNC: 82 MG/DL (ref 65–105)
GLUCOSE BLD-MCNC: 91 MG/DL (ref 65–105)
GLUCOSE SERPL-MCNC: 129 MG/DL (ref 74–99)
HCT VFR BLD AUTO: 37.4 % (ref 36.3–47.1)
HGB BLD-MCNC: 12 G/DL (ref 11.9–15.1)
IMM GRANULOCYTES # BLD AUTO: 0.03 K/UL (ref 0–0.3)
IMM GRANULOCYTES NFR BLD: 0 %
LYMPHOCYTES NFR BLD: 3.71 K/UL (ref 1.1–3.7)
LYMPHOCYTES RELATIVE PERCENT: 40 % (ref 24–43)
MCH RBC QN AUTO: 29.3 PG (ref 25.2–33.5)
MCHC RBC AUTO-ENTMCNC: 32.1 G/DL (ref 28.4–34.8)
MCV RBC AUTO: 91.2 FL (ref 82.6–102.9)
MONOCYTES NFR BLD: 0.58 K/UL (ref 0.1–1.2)
MONOCYTES NFR BLD: 6 % (ref 3–12)
NEUTROPHILS NFR BLD: 51 % (ref 36–65)
NEUTS SEG NFR BLD: 4.72 K/UL (ref 1.5–8.1)
NRBC BLD-RTO: 0 PER 100 WBC
PLATELET # BLD AUTO: 246 K/UL (ref 138–453)
PMV BLD AUTO: 10.4 FL (ref 8.1–13.5)
POTASSIUM SERPL-SCNC: 4.3 MMOL/L (ref 3.7–5.3)
RBC # BLD AUTO: 4.1 M/UL (ref 3.95–5.11)
SODIUM SERPL-SCNC: 141 MMOL/L (ref 136–145)
WBC OTHER # BLD: 9.3 K/UL (ref 3.5–11.3)

## 2024-04-20 PROCEDURE — 99232 SBSQ HOSP IP/OBS MODERATE 35: CPT | Performed by: INTERNAL MEDICINE

## 2024-04-20 PROCEDURE — 6370000000 HC RX 637 (ALT 250 FOR IP)

## 2024-04-20 PROCEDURE — 80048 BASIC METABOLIC PNL TOTAL CA: CPT

## 2024-04-20 PROCEDURE — 85025 COMPLETE CBC W/AUTO DIFF WBC: CPT

## 2024-04-20 PROCEDURE — 93306 TTE W/DOPPLER COMPLETE: CPT | Performed by: INTERNAL MEDICINE

## 2024-04-20 PROCEDURE — 93306 TTE W/DOPPLER COMPLETE: CPT

## 2024-04-20 PROCEDURE — 6360000002 HC RX W HCPCS

## 2024-04-20 PROCEDURE — 82947 ASSAY GLUCOSE BLOOD QUANT: CPT

## 2024-04-20 PROCEDURE — 36415 COLL VENOUS BLD VENIPUNCTURE: CPT

## 2024-04-20 RX ORDER — INSULIN GLARGINE 100 [IU]/ML
15 INJECTION, SOLUTION SUBCUTANEOUS NIGHTLY
Qty: 15 ML | Refills: 10 | Status: SHIPPED | OUTPATIENT
Start: 2024-04-20

## 2024-04-20 RX ADMIN — AMLODIPINE BESYLATE 5 MG: 5 TABLET ORAL at 08:20

## 2024-04-20 RX ADMIN — FUROSEMIDE 40 MG: 10 INJECTION, SOLUTION INTRAMUSCULAR; INTRAVENOUS at 08:21

## 2024-04-20 RX ADMIN — EMPAGLIFLOZIN 10 MG: 10 TABLET, FILM COATED ORAL at 08:21

## 2024-04-20 RX ADMIN — ENOXAPARIN SODIUM 40 MG: 100 INJECTION SUBCUTANEOUS at 08:21

## 2024-04-20 RX ADMIN — DIPHENHYDRAMINE HYDROCHLORIDE 25 MG: 25 TABLET ORAL at 10:55

## 2024-04-20 ASSESSMENT — ENCOUNTER SYMPTOMS
SHORTNESS OF BREATH: 1
COUGH: 1
ABDOMINAL PAIN: 0
WHEEZING: 0

## 2024-04-20 NOTE — PLAN OF CARE
Problem: Discharge Planning  Goal: Discharge to home or other facility with appropriate resources  4/20/2024 0519 by Hina Kemp RN  Outcome: Progressing  4/19/2024 1720 by Stefani Bose RN  Outcome: Progressing     Problem: Pain  Goal: Verbalizes/displays adequate comfort level or baseline comfort level  4/20/2024 0519 by Hina Kemp RN  Outcome: Progressing  4/19/2024 1720 by Stefani Bose RN  Outcome: Progressing     Problem: Safety - Adult  Goal: Free from fall injury  4/20/2024 0519 by Hina Kemp RN  Outcome: Progressing  4/19/2024 1720 by Stefani Bose RN  Outcome: Progressing     Problem: Chronic Conditions and Co-morbidities  Goal: Patient's chronic conditions and co-morbidity symptoms are monitored and maintained or improved  4/20/2024 0519 by Hina Kemp RN  Outcome: Progressing  4/19/2024 1720 by Stefani Bose RN  Outcome: Progressing

## 2024-04-20 NOTE — DISCHARGE INSTRUCTIONS
You were admitted for shortness of breath and leg swelling.    Your workup showed likely heart failure.    Your echocardiogram has been done but not read by the cardiologist. Please follow up about these results with you PCP Dr. Polly LEON.    Please ensure you take your lasix, insulin, and jardiance as prescribed  Please ensure follow up with your PCP and cardiology, make appointments with the number provided. Make sure to ask them about the medications you have been taking and prescribed.    If you have any more shortness of breath, chest pain, or any other severe symptoms, make sure to return to the ED or call 911.

## 2024-04-20 NOTE — PLAN OF CARE
Problem: Discharge Planning  Goal: Discharge to home or other facility with appropriate resources  4/20/2024 1210 by Stefani Bose RN  Outcome: Adequate for Discharge     Problem: Pain  Goal: Verbalizes/displays adequate comfort level or baseline comfort level  4/20/2024 1210 by Stefani Bose RN  Outcome: Adequate for Discharge     Problem: Safety - Adult  Goal: Free from fall injury  4/20/2024 1210 by Stefani Bose RN  Outcome: Adequate for Discharge     Problem: Chronic Conditions and Co-morbidities  Goal: Patient's chronic conditions and co-morbidity symptoms are monitored and maintained or improved  4/20/2024 1210 by Stefani Bose RN  Outcome: Adequate for Discharge

## 2024-04-20 NOTE — PROGRESS NOTES
[facility]  Internal Medicine Teaching Residency Program  Inpatient Daily Progress Note  ______________________________________________________________________________    Patient: Magui Thayer  YOB: 1965   MRN:[id number]    Acct: [id number]     Room: 0330/0330-01  Admit date: 4/18/2024  Today's date: 04/20/24  Number of days in the hospital: 2    SUBJECTIVE   Admitting Diagnosis: CHF (congestive heart failure), NYHA class I, acute on chronic, diastolic (HCC)  CC: Chief complaint: Shortness of breath and leg swelling   Pt examined at bedside. Chart & results reviewed.     Patient afebrile, hemodynamically stable, on room air.  No acute events overnight.  No new complaints.    Review of Systems   Constitutional:  Positive for fatigue and unexpected weight change. Negative for chills and fever.   HENT:  Negative for congestion.    Respiratory:  Positive for cough and shortness of breath. Negative for wheezing.    Cardiovascular:  Positive for leg swelling. Negative for chest pain and palpitations.   Gastrointestinal:  Negative for abdominal pain.   Genitourinary:  Negative for dysuria.   Neurological:  Negative for headaches.   Psychiatric/Behavioral:  Negative for agitation.        BRIEF HISTORY     The patient is a 59 y.o. female with history of breast cancer s/p chemo 10 years ago, hypertension, diabetes, who presents with shortness of breath and lower extremity swelling for the last 10 days or so.  She has been using more pillows overnight and shortness of breath is worse when lying flat and with exertion.  [facility] she [facility] found to have elevated BNP and was referred to cardiology but did not follow-up.  She has been noncompliant with her home medications which includes Januvia, Lantus, antihypertensives.  She does not have any known prior history of CHF or CAD.  She has noted a gain of about 24

## 2024-04-21 NOTE — DISCHARGE SUMMARY
R-10Normal      !! blood glucose test strips (TRUE METRIX BLOOD GLUCOSE TEST) strip USE STRIP TO TEST BLOOD GLUCOSE FOUR TIMES A DAY AS NEEDED, Disp-100 each, R-10Normal      atorvastatin (LIPITOR) 20 MG tablet TAKE 1 TABLET BY MOUTH DAILY, Disp-30 tablet, R-10Normal      !! Lancets MISC DAILY Starting Thu 7/6/2023, Disp-100 each, R-5, Normal      !! blood glucose monitor strips Test 3 times a day & as needed for symptoms of irregular blood glucose. Dispense sufficient amount for indicated testing frequency plus additional to accommodate PRN testing needs., Disp-100 strip, R-2, Normal      !! glucose monitoring (FREESTYLE FREEDOM) kit Disp-1 kit, R-0, NormalPlease check blood glucose 3 times daily      !! Easy Touch Lancets 33G/Twist MISC Disp-100 each, R-10, NormalUSE LANCET TO TEST BLOOD GLUCOSE FOUR TIMES DAILY AS NEEDED.      topiramate (TOPAMAX) 200 MG tablet TAKE TWO (2) TABLETS BY MOUTH TWICE DAILY, Disp-60 tablet, R-10Normal      aspirin (ASPIRIN LOW DOSE) 81 MG EC tablet TAKE 1 TABLET BY MOUTH ONCE DAILY, Disp-30 tablet, R-10Normal      ferrous sulfate (FEROSUL) 325 (65 Fe) MG tablet TAKE 1 TABLET BY MOUTH THREE TIMES A DAY, Disp-90 tablet, R-10Normal      !! Blood Glucose Monitoring Suppl (TRUE METRIX METER) w/Device KIT USE GLUCOMETER FOR TESTING BLOOD GLUCOSE 4 TIMES DAILY AS NEEDED., Disp-1 kit, R-5Normal      !! OneTouch Delica Lancets 33G MISC USE TO TEST BLOOD SUGAR DAILY, Disp-100 each, R-5Normal      escitalopram (LEXAPRO) 10 MG tablet Take 1 tablet by mouth dailyHistorical Med      hydrOXYzine (ATARAX) 25 MG tablet TAKE 1 TABLET BY MOUTH TWICE DAILY AS NEEDEDHistorical Med      mirtazapine (REMERON) 15 MG tablet TAKE 1 TABLET BY MOUTH AT BEDTIME AS NEEDEDHistorical Med      !! Lancets (ONETOUCH DELICA PLUS [id number]) MISC USE TO TEST BLOOD SUGAR DAILY, Disp-100 each, R-5Normal      !! Insulin Pen Needle 31G X 8 MM MISC DAILY Starting Fri 12/31/2021, Disp-100 each, R-3, Normal      !! Insulin Pen

## 2024-04-22 ENCOUNTER — TELEPHONE (OUTPATIENT)
Dept: FAMILY MEDICINE CLINIC | Age: 59
End: 2024-04-22

## 2024-04-22 DIAGNOSIS — R60.0 LOWER EXTREMITY EDEMA: ICD-10-CM

## 2024-04-22 RX ORDER — POTASSIUM CHLORIDE 750 MG/1
10 TABLET, EXTENDED RELEASE ORAL DAILY
Qty: 10 TABLET | Refills: 10 | OUTPATIENT
Start: 2024-04-22

## 2024-04-22 RX ORDER — POTASSIUM CHLORIDE 750 MG/1
10 TABLET, EXTENDED RELEASE ORAL DAILY
Qty: 10 TABLET | Refills: 10 | Status: SHIPPED | OUTPATIENT
Start: 2024-04-22

## 2024-04-22 NOTE — TELEPHONE ENCOUNTER
Magui JAQUAN Thayer was contacted by a Community Health Navigator to discuss a referral for SDOH related needs.     Writer spoke with: Patient and explained the services and assistance that can be provided through the Community Health Navigation program.     Patient not agreeable to receiving resources and support from writer.     Intake Notes: Writer spoke with patient regarding medication needs. Patient will have insurance coverage the beginning of May which will be sooner than the patient assistance program. Patient would have to complete paperwork and physician will have to complete which can take 2 to 3 weeks for approval or denial. Patient states she has enough medication to last until insurance starts. Writer will close referral at this time.    Action steps to be completed by writer: Close referral.    Action steps to be completed by patient: Close referral.    Patient has given verbal permission to leave detailed messages regarding SDOH referral on their phone: N/A    Patient has given verbal permission to submit applications on their behalf: N/A    Patient voiced understanding of action plan and responsibilities, was provided with writer's contact information, and agrees to call should they require additional assistance: N/A      Gena Pittman MA

## 2024-04-22 NOTE — TELEPHONE ENCOUNTER
E-scribe request for KLOR-CON. Please review and e-scribe if applicable.     Last Visit Date:  4/18/24  Next Visit Date:  5/7/2024    Hemoglobin A1C (%)   Date Value   04/18/2024 12.7   12/29/2022 13.9   06/02/2022 13.2             ( goal A1C is < 7)   No components found for: \"LABMICR\"  LDL Cholesterol (mg/dL)   Date Value   12/29/2022 71       (goal LDL is <100)   AST (U/L)   Date Value   10/10/2023 13     ALT (U/L)   Date Value   10/10/2023 12     BUN (mg/dL)   Date Value   04/20/2024 14     BP Readings from Last 3 Encounters:   04/20/24 113/82   04/18/24 (!) 135/90   03/26/24 (!) 141/96          (goal 120/80)        Patient Active Problem List:     History of breast cancer     Pain, knee     Depression     DJD (degenerative joint disease) of knee     Headache     Iron deficiency anemia     Uncontrolled stage 2 hypertension     Uncontrolled diabetes mellitus with hyperglycemia, with long-term current use of insulin ([facility])     Left shoulder pain     Diabetes 1.5, managed as type 2 (HCC)     Headache disorder     Koilonychia     Class 2 severe obesity due to excess calories with serious comorbidity in adult ([facility])     Episode of syncope     Hypokalemia     Hyperglycemia due to diabetes mellitus (HCC)     CHF (congestive heart failure), NYHA class I, acute on chronic, combined (HCC)     CHF (congestive heart failure), NYHA class I, acute on chronic, diastolic ([facility])     Leg swelling     Noncompliance

## 2024-04-22 NOTE — TELEPHONE ENCOUNTER
Care Transitions Initial Follow Up Call    Outreach made within 2 business days of discharge: Yes    Patient: Magui Thayer Patient : 1965   MRN: [id number]  Reason for Admission: There are no discharge diagnoses documented for the most recent discharge.  Discharge Date: 24       Spoke with: GALEN for patient to call office to ask the discharge questions -      Discharge department/facility: [facility]

## 2024-04-22 NOTE — TELEPHONE ENCOUNTER
Magui Thayer was contacted by Gena Pittman MA, a Community Health Navigator, regarding a Social Determinants of Health referral.     A message was left with the writer's contact information.  [phone]    Follow-up attempt.

## 2024-04-23 ENCOUNTER — TELEPHONE (OUTPATIENT)
Dept: FAMILY MEDICINE CLINIC | Age: 59
End: 2024-04-23

## 2024-05-01 ENCOUNTER — TELEPHONE (OUTPATIENT)
Dept: FAMILY MEDICINE CLINIC | Age: 59
End: 2024-05-01

## 2024-05-01 NOTE — TELEPHONE ENCOUNTER
Writer spoke with the patient, patient called the office with concerns as to why Ambien was off her med list after reviewing encounters patient was in the hospital on 4/18/2024 patient's Ambien was discontinued on 04/19/2024. Patient has some concerns as to why this medication was discontinued. Writer informed patient of her upcoming appointment with PCP explained to the patient she could discuss this at her appointment. Patient thanked writer and confirmed she would be at her appointment.

## 2024-05-02 ENCOUNTER — TELEPHONE (OUTPATIENT)
Dept: FAMILY MEDICINE CLINIC | Age: 59
End: 2024-05-02

## 2024-05-02 NOTE — TELEPHONE ENCOUNTER
Pt dropped off leave of absence forms for completion.  Pt seen in April, ROI's signed, paperwork given to Briana

## 2024-05-03 ENCOUNTER — HOSPITAL ENCOUNTER (OUTPATIENT)
Age: 59
Discharge: HOME | End: 2024-05-03
Payer: COMMERCIAL

## 2024-05-03 DIAGNOSIS — E11.00 TYPE 2 DIABETES MELLITUS WITH HYPEROSMOLARITY WITHOUT COMA, WITHOUT LONG-TERM CURRENT USE OF INSULIN (HCC): ICD-10-CM

## 2024-05-03 DIAGNOSIS — Z13.220 SCREENING FOR HYPERLIPIDEMIA: ICD-10-CM

## 2024-05-03 LAB
ANION GAP SERPL CALCULATED.3IONS-SCNC: 12 MMOL/L (ref 9–16)
BUN SERPL-MCNC: 13 MG/DL (ref 6–20)
CALCIUM SERPL-MCNC: 8.6 MG/DL (ref 8.6–10.4)
CHLORIDE SERPL-SCNC: 103 MMOL/L (ref 98–107)
CHOLEST SERPL-MCNC: 134 MG/DL (ref 0–199)
CHOLESTEROL/HDL RATIO: 2
CO2 SERPL-SCNC: 22 MMOL/L (ref 20–31)
CREAT SERPL-MCNC: 1 MG/DL (ref 0.5–0.9)
CREAT UR-MCNC: 91.3 MG/DL (ref 28–217)
GFR, ESTIMATED: 65 ML/MIN/1.73M2
GLUCOSE SERPL-MCNC: 155 MG/DL (ref 74–99)
HDLC SERPL-MCNC: 81 MG/DL
LDLC SERPL CALC-MCNC: 42 MG/DL (ref 0–100)
MICROALBUMIN UR-MCNC: <12 MG/L (ref 0–20)
MICROALBUMIN/CREAT UR-RTO: NORMAL MCG/MG CREAT (ref 0–25)
POTASSIUM SERPL-SCNC: 4.2 MMOL/L (ref 3.7–5.3)
SODIUM SERPL-SCNC: 137 MMOL/L (ref 136–145)
TRIGL SERPL-MCNC: 59 MG/DL
VLDLC SERPL CALC-MCNC: 12 MG/DL

## 2024-05-03 PROCEDURE — 80061 LIPID PANEL: CPT

## 2024-05-03 PROCEDURE — 82043 UR ALBUMIN QUANTITATIVE: CPT

## 2024-05-03 PROCEDURE — 80048 BASIC METABOLIC PNL TOTAL CA: CPT

## 2024-05-03 PROCEDURE — 36415 COLL VENOUS BLD VENIPUNCTURE: CPT

## 2024-05-03 PROCEDURE — 82570 ASSAY OF URINE CREATININE: CPT

## 2024-05-03 NOTE — TELEPHONE ENCOUNTER
Last visit: 04/18/2024  Last Med refill: 04/19/2024  Does patient have enough medication for 72 hours: No: PATIENT RECEIVED THIS MEDICATION FROM THE EMERGENCY DEPARTMENT/ PATIENT STATES IT NEEDS TO BE ORDERED FROM PATIENT'S PCP. PLEASE ADVISE AND THANK YOU.    Next Visit Date:  Future Appointments   Date Time Provider Department Center   5/7/2024  9:40 AM [facility]   5/21/2024  9:30 AM [facility]pcion Perez [facility]   5/28/2024  2:00 PM [facility]   10/17/2024 11:00 AM Ryder Betancur MD [facility]       Health Maintenance   Topic Date Due    Hepatitis C screen  Never done    Shingles vaccine (1 of 2) Never done    Diabetic retinal exam  12/31/2020    Pneumococcal 0-64 years Vaccine (2 of 2 - PCV) 06/02/2023    Cervical cancer screen  06/21/2023    COVID-19 Vaccine (4 - 2023-24 season) 09/01/2023    Diabetic Alb to Cr ratio (uACR) test  12/29/2023    Lipids  12/29/2023    A1C test (Diabetic or Prediabetic)  07/18/2024    Diabetic foot exam  04/18/2025    Depression Monitoring  04/18/2025    GFR test (Diabetes, CKD 3-4, OR last GFR 15-59)  04/20/2025    DTaP/Tdap/Td vaccine (4 - Td or Tdap) 04/23/2031    Colorectal Cancer Screen  03/24/2032    Hepatitis B vaccine  Completed    Breast cancer screen  Completed    Flu vaccine  Completed    HIV screen  Completed    Hepatitis A vaccine  Aged Out    Hib vaccine  Aged Out    Polio vaccine  Aged Out    Meningococcal (ACWY) vaccine  Aged Out       Hemoglobin A1C (%)   Date Value   04/18/2024 12.7   12/29/2022 13.9   06/02/2022 13.2             ( goal A1C is < 7)   No components found for: \"LABMICR\"  No components found for: \"LDLCHOLESTEROL\", \"LDLCALC\"    (goal LDL is <100)   AST (U/L)   Date Value   10/10/2023 13     ALT (U/L)   Date Value   10/10/2023 12     BUN (mg/dL)   Date Value   04/20/2024 14     BP Readings from Last 3 Encounters:   04/20/24 113/82   04/18/24 (!) 135/90   03/26/24 (!) 141/96

## 2024-05-07 ENCOUNTER — TELEPHONE (OUTPATIENT)
Dept: FAMILY MEDICINE CLINIC | Age: 59
End: 2024-05-07

## 2024-05-07 DIAGNOSIS — R60.0 LOWER EXTREMITY EDEMA: ICD-10-CM

## 2024-05-07 DIAGNOSIS — E11.00 TYPE 2 DIABETES MELLITUS WITH HYPEROSMOLARITY WITHOUT COMA, WITHOUT LONG-TERM CURRENT USE OF INSULIN (HCC): ICD-10-CM

## 2024-05-07 RX ORDER — POTASSIUM CHLORIDE 750 MG/1
10 TABLET, EXTENDED RELEASE ORAL DAILY
Qty: 10 TABLET | Refills: 10 | OUTPATIENT
Start: 2024-05-07

## 2024-05-07 RX ORDER — FERROUS SULFATE 325(65) MG
TABLET ORAL
Qty: 90 TABLET | Refills: 10 | Status: SHIPPED | OUTPATIENT
Start: 2024-05-07

## 2024-05-07 NOTE — TELEPHONE ENCOUNTER
Last visit: 4/18/24  Last Med refill: 11/17/22  Does patient have enough medication for 72 hours: no    Next Visit Date:  Future Appointments   Date Time Provider Department Center   5/21/2024  9:30 AM Concepcion Perez [facility]   5/28/2024  2:00 PM Keesha Matias MD [facility]   10/17/2024 11:00 AM Ryder Betancur MD [facility]       Health Maintenance   Topic Date Due    Hepatitis C screen  Never done    Shingles vaccine (1 of 2) Never done    Diabetic retinal exam  12/31/2020    Pneumococcal 0-64 years Vaccine (2 of 2 - PCV) 06/02/2023    Cervical cancer screen  06/21/2023    COVID-19 Vaccine (4 - 2023-24 season) 09/01/2023    A1C test (Diabetic or Prediabetic)  07/18/2024    Diabetic foot exam  04/18/2025    Depression Monitoring  04/18/2025    Diabetic Alb to Cr ratio (uACR) test  05/03/2025    Lipids  05/03/2025    GFR test (Diabetes, CKD 3-4, OR last GFR 15-59)  05/03/2025    DTaP/Tdap/Td vaccine (4 - Td or Tdap) 04/23/2031    Colorectal Cancer Screen  03/24/2032    Hepatitis B vaccine  Completed    Breast cancer screen  Completed    Flu vaccine  Completed    HIV screen  Completed    Hepatitis A vaccine  Aged Out    Hib vaccine  Aged Out    Polio vaccine  Aged Out    Meningococcal (ACWY) vaccine  Aged Out       Hemoglobin A1C (%)   Date Value   04/18/2024 12.7   12/29/2022 13.9   06/02/2022 13.2             ( goal A1C is < 7)   No components found for: \"LABMICR\"  No components found for: \"LDLCHOLESTEROL\", \"LDLCALC\"    (goal LDL is <100)   AST (U/L)   Date Value   10/10/2023 13     ALT (U/L)   Date Value   10/10/2023 12     BUN (mg/dL)   Date Value   05/03/2024 13     BP Readings from Last 3 Encounters:   04/20/24 113/82   04/18/24 (!) 135/90   03/26/24 (!) 141/96          (goal 120/80)    All Future Testing planned in [facility]  Lab Frequency Next Occurrence   Basic Metabolic Panel Once 07/13/2023   Comprehensive Metabolic Panel Once 10/30/2024   CBC with Auto Differential Once

## 2024-05-07 NOTE — TELEPHONE ENCOUNTER
Writer received FMLA forms from [employer] on 5/1/2024, Writer scanned blank into chart. [employer] and FMLA Policy in chart. Gave FMLA forms to Dr. Matias on 5/7/2024.

## 2024-05-08 DIAGNOSIS — I50.43 CHF (CONGESTIVE HEART FAILURE), NYHA CLASS I, ACUTE ON CHRONIC, COMBINED (HCC): Primary | ICD-10-CM

## 2024-05-10 ENCOUNTER — TELEPHONE (OUTPATIENT)
Dept: FAMILY MEDICINE CLINIC | Age: 59
End: 2024-05-10

## 2024-05-10 NOTE — TELEPHONE ENCOUNTER
Discussed Echo results during hospital admission with patient over the phone.  According to patient she was to follow-up with cardiology on 4/30/2024.  Upon chart review cannot find any follow-up appointments with cardiology. She informed me that she followed up with her Cardiolgist at [facility] (Dr. Jessee Hodge) who had ordered her heart cath which she completed on 5/7/2024.  I cannot find any of these results in her chart, but she states that there was no blockages found.  Per patient she was started on losartan 25 mg and metoprolol succinate ER 25 mg half a tablet for both, and aspirin.  Patient's Lasix dose was also increased to 40 mg.  Will reach out to my MA to find if we can get a hold of these records and uploaded to her chart.    Electronically signed by Keesha Matias MD on 5/10/2024 at 6:08 PM

## 2024-05-14 DIAGNOSIS — R51.9 HEADACHE DISORDER: ICD-10-CM

## 2024-05-14 NOTE — TELEPHONE ENCOUNTER
Writer received completed FMLA for patient. Faxed and scanned into chart with confirmation. Patient was called and informed.  Writer had to [facility].

## 2024-05-15 NOTE — TELEPHONE ENCOUNTER
E-scribe request for topamax. Please review and e-scribe if applicable.     Last Visit Date:  4/18/24  Next Visit Date:  5/21/2024    Hemoglobin A1C (%)   Date Value   04/18/2024 12.7   12/29/2022 13.9   06/02/2022 13.2             ( goal A1C is < 7)   No components found for: \"LABMICR\"  No components found for: \"LDLCHOLESTEROL\", \"LDLCALC\"    (goal LDL is <100)   AST (U/L)   Date Value   10/10/2023 13     ALT (U/L)   Date Value   10/10/2023 12     BUN (mg/dL)   Date Value   05/03/2024 13     BP Readings from Last 3 Encounters:   04/20/24 113/82   04/18/24 (!) 135/90   03/26/24 (!) 141/96          (goal 120/80)        Patient Active Problem List:     History of breast cancer     Pain, knee     Depression     DJD (degenerative joint disease) of knee     Headache     Iron deficiency anemia     Uncontrolled stage 2 hypertension     Uncontrolled diabetes mellitus with hyperglycemia, with long-term current use of insulin ([facility])     Left shoulder pain     Diabetes 1.5, managed as type 2 (HCC)     Headache disorder     Koilonychia     Class 2 severe obesity due to excess calories with serious comorbidity in adult ([facility])     Episode of syncope     Hypokalemia     Hyperglycemia due to diabetes mellitus (HCC)     CHF (congestive heart failure), NYHA class I, acute on chronic, combined (HCC)     CHF (congestive heart failure), NYHA class I, acute on chronic, diastolic ([facility])     Leg swelling     Noncompliance

## 2024-06-19 DIAGNOSIS — E11.00 TYPE 2 DIABETES MELLITUS WITH HYPEROSMOLARITY WITHOUT COMA, WITHOUT LONG-TERM CURRENT USE OF INSULIN (HCC): ICD-10-CM

## 2024-06-19 RX ORDER — LANCETS 33 GAUGE
EACH MISCELLANEOUS
Qty: 100 EACH | Refills: 10 | Status: SHIPPED | OUTPATIENT
Start: 2024-06-19

## 2024-06-19 NOTE — TELEPHONE ENCOUNTER
E-scribe request for LANCETS. Please review and e-scribe if applicable.     Last Visit Date:  4/18/24  Next Visit Date:  Visit date not found    Hemoglobin A1C (%)   Date Value   04/18/2024 12.7   12/29/2022 13.9   06/02/2022 13.2             ( goal A1C is < 7)   No components found for: \"LABMICR\"  No components found for: \"LDLCHOLESTEROL\", \"LDLCALC\"    (goal LDL is <100)   AST (U/L)   Date Value   10/10/2023 13     ALT (U/L)   Date Value   10/10/2023 12     BUN (mg/dL)   Date Value   05/03/2024 13     BP Readings from Last 3 Encounters:   04/20/24 113/82   04/18/24 (!) 135/90   03/26/24 (!) 141/96          (goal 120/80)        Patient Active Problem List:     History of breast cancer     Pain, knee     Depression     DJD (degenerative joint disease) of knee     Headache     Iron deficiency anemia     Uncontrolled stage 2 hypertension     Uncontrolled diabetes mellitus with hyperglycemia, with long-term current use of insulin ([facility])     Left shoulder pain     Diabetes 1.5, managed as type 2 (HCC)     Headache disorder     Koilonychia     Class 2 severe obesity due to excess calories with serious comorbidity in adult ([facility])     Episode of syncope     Hypokalemia     Hyperglycemia due to diabetes mellitus (HCC)     CHF (congestive heart failure), NYHA class I, acute on chronic, combined (HCC)     CHF (congestive heart failure), NYHA class I, acute on chronic, diastolic ([facility])     Leg swelling     Noncompliance      ----JF

## 2024-07-08 ENCOUNTER — TELEPHONE (OUTPATIENT)
Dept: FAMILY MEDICINE CLINIC | Age: 59
End: 2024-07-08

## 2024-07-08 NOTE — TELEPHONE ENCOUNTER
Patient contacting office stating she believes her Lasix is causing her to have a allergic reaction for about three weeks now. Patient states she can not sleep because of the itching at night. Patient has also stop taking all her medications at this time. She would liek to know what she should do, please advise.

## 2024-07-10 NOTE — TELEPHONE ENCOUNTER
Patient called back today after the 72 hours because she states she has not heard from the provider yet about message below. Please Advise    0 = understands/communicates without difficulty

## 2024-07-11 DIAGNOSIS — E11.00 TYPE 2 DIABETES MELLITUS WITH HYPEROSMOLARITY WITHOUT COMA, WITHOUT LONG-TERM CURRENT USE OF INSULIN (HCC): ICD-10-CM

## 2024-07-11 NOTE — TELEPHONE ENCOUNTER
Writer tried to call patient to schedule a follow up per provider but the phone says the call can not be processed. Writer will send a scheduling ticket.

## 2024-07-12 RX ORDER — INSULIN LISPRO 100 [IU]/ML
INJECTION, SOLUTION INTRAVENOUS; SUBCUTANEOUS
Qty: 15 ML | Refills: 10 | Status: SHIPPED | OUTPATIENT
Start: 2024-07-12

## 2024-07-12 NOTE — TELEPHONE ENCOUNTER
E-scribe request for INSULIN LISPRO. Please review and e-scribe if applicable.     Last Visit Date:  4/18/24  Next Visit Date:  Visit date not found    Hemoglobin A1C (%)   Date Value   04/18/2024 12.7   12/29/2022 13.9   06/02/2022 13.2             ( goal A1C is < 7)   No components found for: \"LABMICR\"  No components found for: \"LDLCHOLESTEROL\", \"LDLCALC\"    (goal LDL is <100)   AST (U/L)   Date Value   10/10/2023 13     ALT (U/L)   Date Value   10/10/2023 12     BUN (mg/dL)   Date Value   05/03/2024 13     BP Readings from Last 3 Encounters:   04/20/24 113/82   04/18/24 (!) 135/90   03/26/24 (!) 141/96          (goal 120/80)        Patient Active Problem List:     History of breast cancer     Pain, knee     Depression     DJD (degenerative joint disease) of knee     Headache     Iron deficiency anemia     Uncontrolled stage 2 hypertension     Uncontrolled diabetes mellitus with hyperglycemia, with long-term current use of insulin ([facility])     Left shoulder pain     Diabetes 1.5, managed as type 2 (HCC)     Headache disorder     Koilonychia     Class 2 severe obesity due to excess calories with serious comorbidity in adult ([facility])     Episode of syncope     Hypokalemia     Hyperglycemia due to diabetes mellitus ([facility])     CHF (congestive heart failure), NYHA class I, acute on chronic, combined (HCC)     CHF (congestive heart failure), NYHA class I, acute on chronic, diastolic ([facility])     Leg swelling     Noncompliance      ----JF

## 2024-07-24 ENCOUNTER — TELEPHONE (OUTPATIENT)
Dept: SURGERY | Age: 59
End: 2024-07-24

## 2024-07-24 ENCOUNTER — OFFICE VISIT (OUTPATIENT)
Dept: FAMILY MEDICINE CLINIC | Age: 59
End: 2024-07-24
Payer: COMMERCIAL

## 2024-07-24 VITALS
HEART RATE: 104 BPM | SYSTOLIC BLOOD PRESSURE: 120 MMHG | HEIGHT: 62 IN | OXYGEN SATURATION: 98 % | BODY MASS INDEX: 26.57 KG/M2 | WEIGHT: 144.4 LBS | DIASTOLIC BLOOD PRESSURE: 72 MMHG

## 2024-07-24 DIAGNOSIS — L29.9 ITCHING: ICD-10-CM

## 2024-07-24 DIAGNOSIS — G62.9 NEUROPATHY: ICD-10-CM

## 2024-07-24 DIAGNOSIS — E11.00 TYPE 2 DIABETES MELLITUS WITH HYPEROSMOLARITY WITHOUT COMA, WITHOUT LONG-TERM CURRENT USE OF INSULIN (HCC): Primary | ICD-10-CM

## 2024-07-24 DIAGNOSIS — I50.43 CHF (CONGESTIVE HEART FAILURE), NYHA CLASS I, ACUTE ON CHRONIC, COMBINED (HCC): ICD-10-CM

## 2024-07-24 LAB — HBA1C MFR BLD: 10.2 %

## 2024-07-24 PROCEDURE — 3078F DIAST BP <80 MM HG: CPT | Performed by: STUDENT IN AN ORGANIZED HEALTH CARE EDUCATION/TRAINING PROGRAM

## 2024-07-24 PROCEDURE — 83036 HEMOGLOBIN GLYCOSYLATED A1C: CPT | Performed by: STUDENT IN AN ORGANIZED HEALTH CARE EDUCATION/TRAINING PROGRAM

## 2024-07-24 PROCEDURE — 3074F SYST BP LT 130 MM HG: CPT | Performed by: STUDENT IN AN ORGANIZED HEALTH CARE EDUCATION/TRAINING PROGRAM

## 2024-07-24 PROCEDURE — 3046F HEMOGLOBIN A1C LEVEL >9.0%: CPT | Performed by: STUDENT IN AN ORGANIZED HEALTH CARE EDUCATION/TRAINING PROGRAM

## 2024-07-24 PROCEDURE — 99214 OFFICE O/P EST MOD 30 MIN: CPT | Performed by: STUDENT IN AN ORGANIZED HEALTH CARE EDUCATION/TRAINING PROGRAM

## 2024-07-24 RX ORDER — DIPHENHYDRAMINE HYDROCHLORIDE, ZINC ACETATE 2; .1 G/100G; G/100G
CREAM TOPICAL
Qty: 15 G | Refills: 1 | Status: SHIPPED | OUTPATIENT
Start: 2024-07-24

## 2024-07-24 RX ORDER — GABAPENTIN 400 MG/1
400 CAPSULE ORAL NIGHTLY
Qty: 30 CAPSULE | Refills: 0 | Status: SHIPPED | OUTPATIENT
Start: 2024-07-24 | End: 2024-08-23

## 2024-07-24 RX ORDER — DIPHENHYDRAMINE HYDROCHLORIDE, ZINC ACETATE 2; .1 G/100G; G/100G
CREAM TOPICAL
Qty: 15 G | Refills: 1 | Status: SHIPPED | OUTPATIENT
Start: 2024-07-24 | End: 2024-07-24

## 2024-07-24 ASSESSMENT — ENCOUNTER SYMPTOMS
DIARRHEA: 0
NAUSEA: 0
ABDOMINAL PAIN: 0
SHORTNESS OF BREATH: 0
COUGH: 0
VOMITING: 0
COLOR CHANGE: 0
CONSTIPATION: 0

## 2024-07-24 NOTE — PATIENT INSTRUCTIONS
Thank you for letting us take care of you today. We hope all your questions were addressed. If a question was overlooked or something else comes to mind after you return home, please contact a member of your Care Team listed below.      Your Care Team at [facility] is Team #2  Mita Brady M.D. (Faculty)  Angela Herbert M.D. (Resident)  Keesha Matias M.D. (Resident)  Alvaro Davenport M.D. (Resident)  Maru Rodriguez M.D. (Resident)  Hoa Angel M.D. (Resident)  Wnag Braxton, [facility]  Teresa Kennedy, JOSE LUIS Thompson, [facility]  Melissa Recinos,  [facility]  Velvet Ball, [facility]  Yamel Brewer, JOSE LUIS Danielle, [facility]  Jonas (LJ) Hang JOSE LUIS (Clinical Practice Manager)  Concepcion Perez [facility] (Clinical Pharmacist)     Office phone number: [phone]    If you need to get in right away due to illness, please be advised we have \"Same Day\" appointments available Monday-Friday. Please call us at [phone] option #3 to schedule your \"Same Day\" appointment.

## 2024-07-24 NOTE — PROGRESS NOTES
DIABETES and HYPERTENSION visit    BP Readings from Last 3 Encounters:   04/20/24 113/82   04/18/24 (!) 135/90   03/26/24 (!) 141/96        Hemoglobin A1C (%)   Date Value   04/18/2024 12.7   12/29/2022 13.9   06/02/2022 13.2     HDL (mg/dL)   Date Value   05/03/2024 81     BUN (mg/dL)   Date Value   05/03/2024 13     Creatinine (mg/dL)   Date Value   05/03/2024 1.0 (H)     Glucose (mg/dL)   Date Value   05/03/2024 155 (H)   03/01/2012 95            Have you changed or started any medications since your last visit including any over-the-counter medicines, vitamins, or herbal medicines? no   Have you stopped taking any of your medications? Is so, why? -  no  Are you having any side effects from any of your medications? - no    Have you seen any other physician or provider since your last visit?  yes - Cardiology   Have you had any other diagnostic tests since your last visit?  yes - EKG, Echo, labs   Have you been seen in the emergency room and/or had an admission in a hospital since we last saw you?  yes - [facility]   Have you had your routine dental cleaning in the past 6 months?  no     Have you had your annual diabetic retinal (eye) exam? No   (ensure copy of exam is in the chart)    Do you have an active MyChart account? If no, what is the barrier?  Yes    Patient Care Team:  Keesha Matias MD as PCP - General (Family Medicine)  Ryder Betancur MD as Consulting Physician (Hematology and Oncology)  Leonel Simon MD (General Surgery)  Thao Wilson DO as Consulting Physician (General Surgery)  Rene Harding MD as Consulting Physician (Pulmonology)    Medical History Review  Past Medical, Family, and Social History reviewed and does not contribute to the patient presenting condition    Health Maintenance   Topic Date Due    Hepatitis C screen  Never done    Shingles vaccine (1 of 2) Never done    Diabetic retinal exam  12/31/2020    Pneumococcal 0-64 years Vaccine (2 of 2 - PCV)

## 2024-07-24 NOTE — TELEPHONE ENCOUNTER
Patient told Dr. Brady that she needs to be on medication from Cardiology , that she has not been on . An appointment is scheduled .  She did not know which medication. Please advise and thank you

## 2024-08-12 DIAGNOSIS — E78.00 HYPERCHOLESTEREMIA: ICD-10-CM

## 2024-08-12 DIAGNOSIS — E11.00 TYPE 2 DIABETES MELLITUS WITH HYPEROSMOLARITY WITHOUT COMA, WITHOUT LONG-TERM CURRENT USE OF INSULIN (HCC): ICD-10-CM

## 2024-08-12 RX ORDER — ATORVASTATIN CALCIUM 20 MG/1
20 TABLET, FILM COATED ORAL DAILY
Qty: 30 TABLET | Refills: 10 | Status: SHIPPED | OUTPATIENT
Start: 2024-08-12

## 2024-08-12 NOTE — TELEPHONE ENCOUNTER
Last visit: 07/24/24  Last Med refill:   Does patient have enough medication for 72 hours: No:     Next Visit Date:  Future Appointments   Date Time Provider Department Center   9/17/2024  3:00 PM Moshe Gonzales DO [facility] DEP   10/17/2024 11:00 AM [facility]       Health Maintenance   Topic Date Due    Hepatitis C screen  Never done    Shingles vaccine (1 of 2) Never done    Diabetic retinal exam  12/31/2020    Pneumococcal 0-64 years Vaccine (2 of 2 - PCV) 06/02/2023    Cervical cancer screen  06/21/2023    COVID-19 Vaccine (4 - 2023-24 season) 09/01/2023    Flu vaccine (1) 08/01/2024    A1C test (Diabetic or Prediabetic)  10/24/2024    Diabetic foot exam  04/18/2025    Depression Monitoring  04/18/2025    Diabetic Alb to Cr ratio (uACR) test  05/03/2025    Lipids  05/03/2025    GFR test (Diabetes, CKD 3-4, OR last GFR 15-59)  05/03/2025    DTaP/Tdap/Td vaccine (4 - Td or Tdap) 04/23/2031    Colorectal Cancer Screen  03/24/2032    Hepatitis B vaccine  Completed    Breast cancer screen  Completed    HIV screen  Completed    Hepatitis A vaccine  Aged Out    Hib vaccine  Aged Out    Polio vaccine  Aged Out    Meningococcal (ACWY) vaccine  Aged Out       Hemoglobin A1C (%)   Date Value   07/24/2024 10.2   04/18/2024 12.7   12/29/2022 13.9             ( goal A1C is < 7)   No components found for: \"LABMICR\"  No components found for: \"LDLCHOLESTEROL\", \"LDLCALC\"    (goal LDL is <100)   AST (U/L)   Date Value   10/10/2023 13     ALT (U/L)   Date Value   10/10/2023 12     BUN (mg/dL)   Date Value   05/03/2024 13     BP Readings from Last 3 Encounters:   07/24/24 120/72   04/20/24 113/82   04/18/24 (!) 135/90          (goal 120/80)    All Future Testing planned in [facility]  Lab Frequency Next Occurrence   Comprehensive Metabolic Panel Once 10/30/2024   CBC with Auto Differential Once 10/30/2024   Ferritin Once 10/30/2024   Echo (TTE) complete (PRN contrast/bubble/strain/3D)

## 2024-09-04 NOTE — PROGRESS NOTES
Attending Physician Statement  I  have discussed the care of Chong Soto including pertinent history and exam findings with the resident. I agree with the assessment, plan and orders as documented by the resident. /84 (Site: Right Upper Arm, Position: Sitting, Cuff Size: Large Adult) Comment: machine  Pulse (!) 116   Temp (!) 96.6 °F (35.9 °C) (Temporal)   Ht 5' 3\" (1.6 m)   Wt 167 lb 9.6 oz (76 kg)   LMP 12/08/2015 (Approximate)   BMI 29.69 kg/m²    BP Readings from Last 3 Encounters:   06/02/22 138/84   03/24/22 130/85   03/24/22 117/71     Wt Readings from Last 3 Encounters:   06/02/22 167 lb 9.6 oz (76 kg)   03/24/22 155 lb (70.3 kg)   03/02/22 170 lb 9.6 oz (77.4 kg)          Diagnosis Orders   1. Diabetes 1.5, managed as type 2 ([address]  Microalbumin, Ur    POCT glycosylated hemoglobin (Hb A1C)    [address] Diabetes Education - [facility]   2. Screening for hyperlipidemia  Lipid Panel   3. Need for prophylactic vaccination against Streptococcus pneumoniae (pneumococcus)  Pneumococcal polysaccharide vaccine 23-valent PPSV23   4.  Need for prophylactic vaccination and inoculation against varicella  zoster recombinant adjuvanted vaccine [facility]) 50 MCG/0.5ML SUSR injection       Shanice Hawkins DO 6/2/2022 10:39 AM Her/She

## 2024-11-13 DIAGNOSIS — C50.012 MALIGNANT NEOPLASM OF NIPPLE OF LEFT BREAST IN FEMALE, ESTROGEN RECEPTOR POSITIVE (HCC): ICD-10-CM

## 2024-11-13 DIAGNOSIS — Z17.0 MALIGNANT NEOPLASM OF NIPPLE OF LEFT BREAST IN FEMALE, ESTROGEN RECEPTOR POSITIVE (HCC): ICD-10-CM

## 2024-11-13 RX ORDER — GABAPENTIN 300 MG/1
300 CAPSULE ORAL 3 TIMES DAILY
Qty: 90 CAPSULE | Refills: 4 | Status: SHIPPED | OUTPATIENT
Start: 2024-11-13 | End: 2024-12-13

## 2024-11-22 DIAGNOSIS — R60.0 LOWER EXTREMITY EDEMA: ICD-10-CM

## 2024-11-25 RX ORDER — FUROSEMIDE 20 MG/1
20 TABLET ORAL DAILY
Qty: 30 TABLET | Refills: 10 | Status: SHIPPED | OUTPATIENT
Start: 2024-11-25

## 2024-11-25 NOTE — TELEPHONE ENCOUNTER
Last visit: 7/24/24  Last Med refill: 4/18/24  Does patient have enough medication for 72 hours: no    Next Visit Date:  No future appointments.    Health Maintenance   Topic Date Due    Hepatitis C screen  Never done    Shingles vaccine (1 of 2) Never done    Diabetic retinal exam  12/31/2020    Pneumococcal 0-64 years Vaccine (2 of 2 - PCV) 06/02/2023    Cervical cancer screen  06/21/2023    Flu vaccine (1) 08/01/2024    COVID-19 Vaccine (4 - 2023-24 season) 09/01/2024    A1C test (Diabetic or Prediabetic)  10/24/2024    Diabetic foot exam  04/18/2025    Depression Monitoring  04/18/2025    Diabetic Alb to Cr ratio (uACR) test  05/03/2025    Lipids  05/03/2025    GFR test (Diabetes, CKD 3-4, OR last GFR 15-59)  05/03/2025    DTaP/Tdap/Td vaccine (4 - Td or Tdap) 04/23/2031    Colorectal Cancer Screen  03/24/2032    Hepatitis B vaccine  Completed    Breast cancer screen  Completed    HIV screen  Completed    Hepatitis A vaccine  Aged Out    Hib vaccine  Aged Out    Polio vaccine  Aged Out    Meningococcal (ACWY) vaccine  Aged Out       Hemoglobin A1C (%)   Date Value   07/24/2024 10.2   04/18/2024 12.7   12/29/2022 13.9             ( goal A1C is < 7)   No components found for: \"LABMICR\"  No components found for: \"LDLCHOLESTEROL\", \"LDLCALC\"    (goal LDL is <100)   AST (U/L)   Date Value   10/10/2023 13     ALT (U/L)   Date Value   10/10/2023 12     BUN (mg/dL)   Date Value   05/03/2024 13     BP Readings from Last 3 Encounters:   07/24/24 120/72   04/20/24 113/82   04/18/24 (!) 135/90          (goal 120/80)    All Future Testing planned in [facility]  Lab Frequency Next Occurrence   Echo (TTE) complete (PRN contrast/bubble/strain/3D) Once 04/18/2024               Patient Active Problem List:     History of breast cancer     Pain, knee     Depression     DJD (degenerative joint disease) of knee     Headache     Iron deficiency anemia     Uncontrolled stage 2 hypertension     Uncontrolled diabetes mellitus with

## 2024-12-24 ENCOUNTER — HOSPITAL ENCOUNTER (INPATIENT)
Age: 59
LOS: 9 days | Discharge: HOME HEALTH | End: 2025-01-02
Attending: EMERGENCY MEDICINE | Admitting: FAMILY MEDICINE
Payer: COMMERCIAL

## 2024-12-24 ENCOUNTER — APPOINTMENT (OUTPATIENT)
Dept: CT IMAGING | Age: 59
End: 2024-12-24
Payer: COMMERCIAL

## 2024-12-24 ENCOUNTER — HOSPITAL ENCOUNTER (EMERGENCY)
Age: 59
Discharge: ANOTHER HOSPITAL | End: 2024-12-24
Attending: EMERGENCY MEDICINE
Payer: COMMERCIAL

## 2024-12-24 VITALS
RESPIRATION RATE: 16 BRPM | TEMPERATURE: 99.1 F | BODY MASS INDEX: 25.76 KG/M2 | DIASTOLIC BLOOD PRESSURE: 74 MMHG | SYSTOLIC BLOOD PRESSURE: 125 MMHG | OXYGEN SATURATION: 97 % | HEIGHT: 62 IN | HEART RATE: 112 BPM | WEIGHT: 140 LBS

## 2024-12-24 DIAGNOSIS — I61.5 INTRAVENTRICULAR HEMORRHAGE (HCC): ICD-10-CM

## 2024-12-24 DIAGNOSIS — R56.9 SEIZURE (HCC): ICD-10-CM

## 2024-12-24 DIAGNOSIS — R41.82 ALTERED MENTAL STATUS, UNSPECIFIED ALTERED MENTAL STATUS TYPE: ICD-10-CM

## 2024-12-24 DIAGNOSIS — R41.0 CONFUSION: Primary | ICD-10-CM

## 2024-12-24 DIAGNOSIS — R73.9 HYPERGLYCEMIA: ICD-10-CM

## 2024-12-24 DIAGNOSIS — I62.9 INTRACRANIAL HEMORRHAGE (HCC): Primary | ICD-10-CM

## 2024-12-24 LAB
ALBUMIN SERPL-MCNC: 4 G/DL (ref 3.5–5.2)
ALP SERPL-CCNC: 128 U/L (ref 35–104)
ALT SERPL-CCNC: 26 U/L (ref 10–35)
AMMONIA PLAS-SCNC: 39 UMOL/L (ref 11–51)
ANION GAP SERPL CALCULATED.3IONS-SCNC: 10 MMOL/L (ref 9–16)
ANION GAP SERPL CALCULATED.3IONS-SCNC: 11 MMOL/L (ref 9–16)
AST SERPL-CCNC: 29 U/L (ref 10–35)
B-OH-BUTYR SERPL-MCNC: 0.08 MMOL/L (ref 0.02–0.27)
BASOPHILS # BLD: 0.1 K/UL (ref 0–0.2)
BASOPHILS NFR BLD: 1 % (ref 0–2)
BILIRUB SERPL-MCNC: 0.4 MG/DL (ref 0–1.2)
BILIRUB UR QL STRIP: NEGATIVE
BODY TEMPERATURE: 37
BUN BLD-MCNC: 9 MG/DL (ref 8–26)
BUN SERPL-MCNC: 9 MG/DL (ref 6–20)
BUN SERPL-MCNC: 9 MG/DL (ref 6–20)
CA-I BLD-SCNC: 1.24 MMOL/L (ref 1.15–1.33)
CALCIUM SERPL-MCNC: 8.9 MG/DL (ref 8.6–10.4)
CALCIUM SERPL-MCNC: 9 MG/DL (ref 8.6–10.4)
CHLORIDE BLD-SCNC: 92 MMOL/L (ref 98–107)
CHLORIDE SERPL-SCNC: 91 MMOL/L (ref 98–107)
CHLORIDE SERPL-SCNC: 93 MMOL/L (ref 98–107)
CLARITY UR: CLEAR
CO2 BLD CALC-SCNC: 32 MMOL/L (ref 22–30)
CO2 SERPL-SCNC: 26 MMOL/L (ref 20–31)
CO2 SERPL-SCNC: 27 MMOL/L (ref 20–31)
COHGB MFR BLD: 1.4 % (ref 0–5)
COLOR UR: YELLOW
COMMENT: ABNORMAL
CREAT SERPL-MCNC: 1.1 MG/DL (ref 0.6–0.9)
CREAT SERPL-MCNC: 1.1 MG/DL (ref 0.7–1.2)
EGFR, POC: 74 ML/MIN/1.73M2
EOSINOPHIL # BLD: 0.1 K/UL (ref 0–0.4)
EOSINOPHILS RELATIVE PERCENT: 1 % (ref 0–4)
ERYTHROCYTE [DISTWIDTH] IN BLOOD BY AUTOMATED COUNT: 13.3 % (ref 11.5–14.9)
FIO2 ON VENT: NORMAL %
GFR, ESTIMATED: 58 ML/MIN/1.73M2
GFR, ESTIMATED: 58 ML/MIN/1.73M2
GLUCOSE BLD-MCNC: 386 MG/DL (ref 65–105)
GLUCOSE BLD-MCNC: 432 MG/DL (ref 65–105)
GLUCOSE BLD-MCNC: 662 MG/DL (ref 74–100)
GLUCOSE SERPL-MCNC: 613 MG/DL (ref 74–99)
GLUCOSE SERPL-MCNC: 698 MG/DL (ref 74–99)
GLUCOSE UR STRIP-MCNC: ABNORMAL MG/DL
HCO3 VENOUS: 28.3 MMOL/L (ref 24–30)
HCO3 VENOUS: 32.6 MMOL/L (ref 22–29)
HCT VFR BLD AUTO: 36.8 % (ref 36–46)
HCT VFR BLD AUTO: 40 % (ref 36–46)
HGB BLD-MCNC: 12.4 G/DL (ref 12–16)
HGB UR QL STRIP.AUTO: NEGATIVE
INR PPP: 1
KETONES UR STRIP-MCNC: NEGATIVE MG/DL
LACTIC ACID, WHOLE BLOOD: 2.4 MMOL/L (ref 0.7–2.1)
LEUKOCYTE ESTERASE UR QL STRIP: NEGATIVE
LYMPHOCYTES NFR BLD: 2.4 K/UL (ref 1–4.8)
LYMPHOCYTES RELATIVE PERCENT: 25 % (ref 24–44)
MAGNESIUM SERPL-MCNC: 1.9 MG/DL (ref 1.6–2.6)
MCH RBC QN AUTO: 31.7 PG (ref 26–34)
MCHC RBC AUTO-ENTMCNC: 33.6 G/DL (ref 31–37)
MCV RBC AUTO: 94.3 FL (ref 80–100)
MONOCYTES NFR BLD: 0.5 K/UL (ref 0.1–1.3)
MONOCYTES NFR BLD: 6 % (ref 1–7)
NEUTROPHILS NFR BLD: 67 % (ref 36–66)
NEUTS SEG NFR BLD: 6.4 K/UL (ref 1.3–9.1)
NITRITE UR QL STRIP: NEGATIVE
O2 SAT, VEN: 50.9 % (ref 60–85)
O2 SAT, VEN: 66.1 % (ref 60–85)
PARTIAL THROMBOPLASTIN TIME: 24.8 SEC (ref 24–36)
PCO2 VENOUS: 53.6 MM HG (ref 39–55)
PCO2 VENOUS: 57.7 MM HG (ref 41–51)
PH UR STRIP: 6.5 [PH] (ref 5–8)
PH VENOUS: 7.34 (ref 7.32–7.42)
PH VENOUS: 7.36 (ref 7.32–7.43)
PLATELET # BLD AUTO: 235 K/UL (ref 150–450)
PMV BLD AUTO: 8.5 FL (ref 6–12)
PO2 VENOUS: 29.1 MM HG (ref 30–50)
PO2 VENOUS: 36.9 MM HG (ref 30–50)
POC ANION GAP: 10 MMOL/L (ref 7–16)
POC CREATININE: 0.9 MG/DL (ref 0.51–1.19)
POC HEMOGLOBIN (CALC): 13.8 G/DL (ref 12–16)
POC LACTIC ACID: 1.6 MMOL/L (ref 0.56–1.39)
POSITIVE BASE EXCESS, VEN: 1.5 MMOL/L (ref 0–2)
POSITIVE BASE EXCESS, VEN: 5.4 MMOL/L (ref 0–3)
POTASSIUM BLD-SCNC: 3.9 MMOL/L (ref 3.5–4.5)
POTASSIUM SERPL-SCNC: 4 MMOL/L (ref 3.7–5.3)
POTASSIUM SERPL-SCNC: 4.2 MMOL/L (ref 3.7–5.3)
PROT SERPL-MCNC: 6.8 G/DL (ref 6.6–8.7)
PROT UR STRIP-MCNC: NEGATIVE MG/DL
PROTHROMBIN TIME: 13.6 SEC (ref 11.8–14.6)
RBC # BLD AUTO: 3.9 M/UL (ref 4–5.2)
SODIUM BLD-SCNC: 133 MMOL/L (ref 138–146)
SODIUM SERPL-SCNC: 128 MMOL/L (ref 136–145)
SODIUM SERPL-SCNC: 130 MMOL/L (ref 136–145)
SP GR UR STRIP: 1.03 (ref 1–1.03)
TROPONIN I SERPL HS-MCNC: 23 NG/L (ref 0–14)
UROBILINOGEN UR STRIP-ACNC: NORMAL EU/DL (ref 0–1)
WBC OTHER # BLD: 9.4 K/UL (ref 3.5–11)

## 2024-12-24 PROCEDURE — 82805 BLOOD GASES W/O2 SATURATION: CPT

## 2024-12-24 PROCEDURE — 2580000003 HC RX 258

## 2024-12-24 PROCEDURE — 82330 ASSAY OF CALCIUM: CPT

## 2024-12-24 PROCEDURE — 82140 ASSAY OF AMMONIA: CPT

## 2024-12-24 PROCEDURE — 93005 ELECTROCARDIOGRAM TRACING: CPT | Performed by: EMERGENCY MEDICINE

## 2024-12-24 PROCEDURE — 83735 ASSAY OF MAGNESIUM: CPT

## 2024-12-24 PROCEDURE — 93005 ELECTROCARDIOGRAM TRACING: CPT | Performed by: FAMILY MEDICINE

## 2024-12-24 PROCEDURE — 81003 URINALYSIS AUTO W/O SCOPE: CPT

## 2024-12-24 PROCEDURE — 82947 ASSAY GLUCOSE BLOOD QUANT: CPT

## 2024-12-24 PROCEDURE — 6360000004 HC RX CONTRAST MEDICATION

## 2024-12-24 PROCEDURE — 80048 BASIC METABOLIC PNL TOTAL CA: CPT

## 2024-12-24 PROCEDURE — 82010 KETONE BODYS QUAN: CPT

## 2024-12-24 PROCEDURE — 84484 ASSAY OF TROPONIN QUANT: CPT

## 2024-12-24 PROCEDURE — 84520 ASSAY OF UREA NITROGEN: CPT

## 2024-12-24 PROCEDURE — 83605 ASSAY OF LACTIC ACID: CPT

## 2024-12-24 PROCEDURE — 99285 EMERGENCY DEPT VISIT HI MDM: CPT

## 2024-12-24 PROCEDURE — 80051 ELECTROLYTE PANEL: CPT

## 2024-12-24 PROCEDURE — 70450 CT HEAD/BRAIN W/O DYE: CPT

## 2024-12-24 PROCEDURE — 2060000000 HC ICU INTERMEDIATE R&B

## 2024-12-24 PROCEDURE — 85014 HEMATOCRIT: CPT

## 2024-12-24 PROCEDURE — 36415 COLL VENOUS BLD VENIPUNCTURE: CPT

## 2024-12-24 PROCEDURE — 6370000000 HC RX 637 (ALT 250 FOR IP)

## 2024-12-24 PROCEDURE — 85730 THROMBOPLASTIN TIME PARTIAL: CPT

## 2024-12-24 PROCEDURE — 82565 ASSAY OF CREATININE: CPT

## 2024-12-24 PROCEDURE — 6360000002 HC RX W HCPCS

## 2024-12-24 PROCEDURE — 85610 PROTHROMBIN TIME: CPT

## 2024-12-24 PROCEDURE — 82803 BLOOD GASES ANY COMBINATION: CPT

## 2024-12-24 PROCEDURE — 70496 CT ANGIOGRAPHY HEAD: CPT

## 2024-12-24 PROCEDURE — 80053 COMPREHEN METABOLIC PANEL: CPT

## 2024-12-24 PROCEDURE — 85025 COMPLETE CBC W/AUTO DIFF WBC: CPT

## 2024-12-24 PROCEDURE — 2500000003 HC RX 250 WO HCPCS

## 2024-12-24 PROCEDURE — 96365 THER/PROPH/DIAG IV INF INIT: CPT

## 2024-12-24 RX ORDER — ACETAMINOPHEN 650 MG/1
650 SUPPOSITORY RECTAL EVERY 6 HOURS PRN
Status: DISCONTINUED | OUTPATIENT
Start: 2024-12-24 | End: 2025-01-02 | Stop reason: HOSPADM

## 2024-12-24 RX ORDER — INSULIN LISPRO 100 [IU]/ML
4 INJECTION, SOLUTION INTRAVENOUS; SUBCUTANEOUS
Status: DISCONTINUED | OUTPATIENT
Start: 2024-12-25 | End: 2024-12-24

## 2024-12-24 RX ORDER — ESCITALOPRAM OXALATE 10 MG/1
10 TABLET ORAL DAILY
Status: DISCONTINUED | OUTPATIENT
Start: 2024-12-25 | End: 2025-01-02 | Stop reason: HOSPADM

## 2024-12-24 RX ORDER — AMLODIPINE BESYLATE 5 MG/1
5 TABLET ORAL DAILY
Status: DISCONTINUED | OUTPATIENT
Start: 2024-12-25 | End: 2025-01-02 | Stop reason: HOSPADM

## 2024-12-24 RX ORDER — FERROUS SULFATE 325(65) MG
325 TABLET, DELAYED RELEASE (ENTERIC COATED) ORAL
Status: DISCONTINUED | OUTPATIENT
Start: 2024-12-25 | End: 2025-01-02 | Stop reason: HOSPADM

## 2024-12-24 RX ORDER — SODIUM CHLORIDE 0.9 % (FLUSH) 0.9 %
5-40 SYRINGE (ML) INJECTION EVERY 12 HOURS SCHEDULED
Status: DISCONTINUED | OUTPATIENT
Start: 2024-12-24 | End: 2025-01-02 | Stop reason: HOSPADM

## 2024-12-24 RX ORDER — INSULIN GLARGINE 100 [IU]/ML
15 INJECTION, SOLUTION SUBCUTANEOUS NIGHTLY
Status: DISCONTINUED | OUTPATIENT
Start: 2024-12-24 | End: 2024-12-29

## 2024-12-24 RX ORDER — FUROSEMIDE 40 MG/1
20 TABLET ORAL DAILY
Status: DISCONTINUED | OUTPATIENT
Start: 2024-12-25 | End: 2025-01-02 | Stop reason: HOSPADM

## 2024-12-24 RX ORDER — CYCLOBENZAPRINE HCL 10 MG
10 TABLET ORAL EVERY 8 HOURS PRN
Status: DISCONTINUED | OUTPATIENT
Start: 2024-12-24 | End: 2025-01-02 | Stop reason: HOSPADM

## 2024-12-24 RX ORDER — ONDANSETRON 2 MG/ML
4 INJECTION INTRAMUSCULAR; INTRAVENOUS EVERY 6 HOURS PRN
Status: DISCONTINUED | OUTPATIENT
Start: 2024-12-24 | End: 2025-01-02 | Stop reason: HOSPADM

## 2024-12-24 RX ORDER — M-VIT,TX,IRON,MINS/CALC/FOLIC 27MG-0.4MG
1 TABLET ORAL DAILY
Status: DISCONTINUED | OUTPATIENT
Start: 2024-12-25 | End: 2025-01-02 | Stop reason: HOSPADM

## 2024-12-24 RX ORDER — POLYETHYLENE GLYCOL 3350 17 G/17G
17 POWDER, FOR SOLUTION ORAL DAILY PRN
Status: DISCONTINUED | OUTPATIENT
Start: 2024-12-24 | End: 2025-01-02 | Stop reason: HOSPADM

## 2024-12-24 RX ORDER — ONDANSETRON 4 MG/1
4 TABLET, ORALLY DISINTEGRATING ORAL EVERY 8 HOURS PRN
Status: DISCONTINUED | OUTPATIENT
Start: 2024-12-24 | End: 2025-01-02 | Stop reason: HOSPADM

## 2024-12-24 RX ORDER — GLUCAGON 1 MG/ML
1 KIT INJECTION PRN
Status: DISCONTINUED | OUTPATIENT
Start: 2024-12-24 | End: 2025-01-02 | Stop reason: HOSPADM

## 2024-12-24 RX ORDER — ASPIRIN 81 MG/1
81 TABLET ORAL DAILY
Status: DISCONTINUED | OUTPATIENT
Start: 2024-12-25 | End: 2025-01-02 | Stop reason: HOSPADM

## 2024-12-24 RX ORDER — INSULIN LISPRO 100 [IU]/ML
0-8 INJECTION, SOLUTION INTRAVENOUS; SUBCUTANEOUS
Status: DISCONTINUED | OUTPATIENT
Start: 2024-12-24 | End: 2024-12-25

## 2024-12-24 RX ORDER — ACETAMINOPHEN 325 MG/1
650 TABLET ORAL EVERY 6 HOURS PRN
Status: DISCONTINUED | OUTPATIENT
Start: 2024-12-24 | End: 2025-01-02 | Stop reason: HOSPADM

## 2024-12-24 RX ORDER — NICARDIPINE HYDROCHLORIDE 0.1 MG/ML
2.5-15 INJECTION INTRAVENOUS CONTINUOUS
Status: DISCONTINUED | OUTPATIENT
Start: 2024-12-24 | End: 2024-12-25

## 2024-12-24 RX ORDER — INSULIN LISPRO 100 [IU]/ML
4 INJECTION, SOLUTION INTRAVENOUS; SUBCUTANEOUS
Status: DISCONTINUED | OUTPATIENT
Start: 2024-12-25 | End: 2024-12-30

## 2024-12-24 RX ORDER — SODIUM CHLORIDE 9 MG/ML
INJECTION, SOLUTION INTRAVENOUS PRN
Status: DISCONTINUED | OUTPATIENT
Start: 2024-12-24 | End: 2025-01-02 | Stop reason: HOSPADM

## 2024-12-24 RX ORDER — IOPAMIDOL 755 MG/ML
75 INJECTION, SOLUTION INTRAVASCULAR
Status: COMPLETED | OUTPATIENT
Start: 2024-12-24 | End: 2024-12-24

## 2024-12-24 RX ORDER — TOPIRAMATE 200 MG/1
400 TABLET, FILM COATED ORAL 2 TIMES DAILY
Status: DISCONTINUED | OUTPATIENT
Start: 2024-12-24 | End: 2024-12-26

## 2024-12-24 RX ORDER — ATORVASTATIN CALCIUM 20 MG/1
20 TABLET, FILM COATED ORAL DAILY
Status: DISCONTINUED | OUTPATIENT
Start: 2024-12-25 | End: 2025-01-02 | Stop reason: HOSPADM

## 2024-12-24 RX ORDER — SODIUM CHLORIDE 0.9 % (FLUSH) 0.9 %
5-40 SYRINGE (ML) INJECTION PRN
Status: DISCONTINUED | OUTPATIENT
Start: 2024-12-24 | End: 2025-01-02 | Stop reason: HOSPADM

## 2024-12-24 RX ORDER — POTASSIUM CHLORIDE 7.45 MG/ML
10 INJECTION INTRAVENOUS PRN
Status: DISCONTINUED | OUTPATIENT
Start: 2024-12-24 | End: 2025-01-02 | Stop reason: HOSPADM

## 2024-12-24 RX ORDER — MAGNESIUM SULFATE IN WATER 40 MG/ML
2000 INJECTION, SOLUTION INTRAVENOUS PRN
Status: DISCONTINUED | OUTPATIENT
Start: 2024-12-24 | End: 2025-01-02 | Stop reason: HOSPADM

## 2024-12-24 RX ORDER — DEXTROSE MONOHYDRATE 100 MG/ML
INJECTION, SOLUTION INTRAVENOUS CONTINUOUS PRN
Status: DISCONTINUED | OUTPATIENT
Start: 2024-12-24 | End: 2025-01-02 | Stop reason: HOSPADM

## 2024-12-24 RX ORDER — BENZTROPINE MESYLATE 0.5 MG/1
0.5 TABLET ORAL 2 TIMES DAILY
Status: DISCONTINUED | OUTPATIENT
Start: 2024-12-24 | End: 2025-01-02 | Stop reason: HOSPADM

## 2024-12-24 RX ORDER — POTASSIUM CHLORIDE 1500 MG/1
40 TABLET, EXTENDED RELEASE ORAL PRN
Status: DISCONTINUED | OUTPATIENT
Start: 2024-12-24 | End: 2025-01-02 | Stop reason: HOSPADM

## 2024-12-24 RX ORDER — 0.9 % SODIUM CHLORIDE 0.9 %
1000 INTRAVENOUS SOLUTION INTRAVENOUS ONCE
Status: COMPLETED | OUTPATIENT
Start: 2024-12-24 | End: 2024-12-24

## 2024-12-24 RX ADMIN — INSULIN LISPRO 8 UNITS: 100 INJECTION, SOLUTION INTRAVENOUS; SUBCUTANEOUS at 22:51

## 2024-12-24 RX ADMIN — IOPAMIDOL 75 ML: 755 INJECTION, SOLUTION INTRAVENOUS at 20:12

## 2024-12-24 RX ADMIN — NICARDIPINE HYDROCHLORIDE 2.5 MG/HR: 0.1 INJECTION INTRAVENOUS at 19:57

## 2024-12-24 RX ADMIN — BENZTROPINE MESYLATE 0.5 MG: 0.5 TABLET ORAL at 22:57

## 2024-12-24 RX ADMIN — SODIUM CHLORIDE, PRESERVATIVE FREE 10 ML: 5 INJECTION INTRAVENOUS at 22:56

## 2024-12-24 RX ADMIN — TOPIRAMATE 400 MG: 200 TABLET, FILM COATED ORAL at 22:57

## 2024-12-24 RX ADMIN — SODIUM CHLORIDE 1000 ML: 9 INJECTION, SOLUTION INTRAVENOUS at 18:49

## 2024-12-24 RX ADMIN — INSULIN GLARGINE 15 UNITS: 100 INJECTION, SOLUTION SUBCUTANEOUS at 21:48

## 2024-12-24 ASSESSMENT — PAIN - FUNCTIONAL ASSESSMENT: PAIN_FUNCTIONAL_ASSESSMENT: NONE - DENIES PAIN

## 2024-12-24 ASSESSMENT — LIFESTYLE VARIABLES
HOW MANY STANDARD DRINKS CONTAINING ALCOHOL DO YOU HAVE ON A TYPICAL DAY: PATIENT DOES NOT DRINK
HOW OFTEN DO YOU HAVE A DRINK CONTAINING ALCOHOL: NEVER

## 2024-12-24 NOTE — ED NOTES
Pt arrived to ED tx from [place] with report of confusion and IVH.   LKW 1 week ago. Friend had called EMS d/t not hearing from patient in 1 week  Per report patient had glucose of 698. No medication reported given PTA.   Pt A&O to person, place, time but is confused to events.  Pt denies taking any blood thinners.   Pt attached to full cardiac monitor, call light within reach

## 2024-12-24 NOTE — ED TRIAGE NOTES
Mode of arrival (squad #, walk in, police, etc) : walk in        Chief complaint(s): AMS        Arrival Note (brief scenario, treatment PTA, etc).: Pt brought in with her best friend who reports that she can no longer identify what year it is or family members that have passed. She was finally able to get a hold of her over a week and realized she's very confused asking about dead family members. Pt is alert to self and season only.        C= \"Have you ever felt that you should Cut down on your drinking?\"  No  A= \"Have people Annoyed you by criticizing your drinking?\"  No  G= \"Have you ever felt bad or Guilty about your drinking?\"  No  E= \"Have you ever had a drink as an Eye-opener first thing in the morning to steady your nerves or to help a hangover?\"  No      Deferred []      Reason for deferring: N/A    *If yes to two or more: probable alcohol abuse.*

## 2024-12-24 NOTE — ED PROVIDER NOTES
EMERGENCY DEPARTMENT ENCOUNTER    Pt Name: Magui Thayer  MRN: [id number]  Birthdate 1965  Date of evaluation: 24  CHIEF COMPLAINT       Chief Complaint   Patient presents with    Altered Mental Status     HISTORY OF PRESENT ILLNESS   Patient is presenting for altered mental status.  She is accompanied by her best friend.  The friend says that the patient has been acting confused over the last week.  This is not typical for her.  She has been doing things like getting up and cooking in the middle of the night.  Patient went out the other day and could not recall anything that she did throughout the day or where she was.  Patient is alert and oriented x 2 currently.  She is able to speak in full sentences and is not have any neurological deficits.  Patient has been asking about family members who are .    The history is provided by the patient.           REVIEW OF SYSTEMS     Review of Systems   Unable to perform ROS: Mental status change   Psychiatric/Behavioral:  Positive for confusion.      PASTMEDICAL HISTORY     Past Medical History:   Diagnosis Date    Breast cancer ([facility]) 2012    Cancer ([facility]) 06    L breast - invasive ductal adenocarcinoma    Conjunctivitis     susceptible to conjunctivitis    Depression     Disorder of patellofemoral joint     DJD (degenerative joint disease) of knee     bilateral    Hyperlipidemia     Hypertension     Malignant neoplasm of nipple of left breast in female, estrogen receptor positive ([facility]) 2015    Pain, knee 10-06    R lateral meniscus tear    Primary osteoarthritis of left knee     Type II or unspecified type diabetes mellitus without mention of complication, not stated as uncontrolled     Under care of team 2022    PCP - DR. MAN - LAST VISIT 2022    Under care of team 2022    ONCOLOGY - DR. OJEDA - LAST VISIT 10.2021    Under care of team 2022    GENERAL SURGERY - DR. MCKEE - LAST VISIT 3/2022    Wears dentures

## 2024-12-24 NOTE — ED PROVIDER NOTES
[facility] EMERGENCY DEPARTMENT     Emergency Department     Faculty Attestation    I performed a history and physical examination of the patient and discussed management with the resident. I reviewed the resident’s note and agree with the documented findings and plan of care. Any areas of disagreement are noted on the chart. I was personally present for the key portions of any procedures. I have documented in the chart those procedures where I was not present during the key portions. I have reviewed the emergency nurses triage note. I agree with the chief complaint, past medical history, past surgical history, allergies, medications, social and family history as documented unless otherwise noted below. For Physician Assistant/ Nurse Practitioner cases/documentation I have personally evaluated this patient and have completed at least one if not all key elements of the E/M (history, physical exam, and MDM). Additional findings are as noted.    Note Started: 6:40 PM EST    Patient transferred from [facility] for altered mental status elevated blood sugar and intracranial hemorrhage.  Patient  Any meaningful history at this time independent history from her friend.  On exam resting comfortably chatting with her friend and the nurse.  Normal pupils.  Lungs clear heart regular abdomen soft nontender.  Will review transfer workup admit    EKG interpretation: Sinus tachycardia 103.  Normal intervals left axis.  There are T wave inversions in the lateral leads likely due to LVH.  Similar morphologies to prior on 4/18/2024    CT head from [facility]:  IMPRESSION:  Small amount of acute intraventricular hemorrhage in the temporal horn and atrium of the right lateral ventricle.    Critical Care     none    Tim Valdes MD, FACEP, FAAEM  Attending Emergency  Physician           Tim Valdes MD  12/24/24 2002

## 2024-12-25 PROBLEM — R41.0 CONFUSION: Status: ACTIVE | Noted: 2024-12-25

## 2024-12-25 PROBLEM — I61.5 IVH (INTRAVENTRICULAR HEMORRHAGE) (HCC): Status: ACTIVE | Noted: 2024-12-25

## 2024-12-25 LAB
ANION GAP SERPL CALCULATED.3IONS-SCNC: 14 MMOL/L (ref 9–16)
BASOPHILS # BLD: 0 K/UL (ref 0–0.2)
BASOPHILS NFR BLD: 0 % (ref 0–2)
BUN SERPL-MCNC: 6 MG/DL (ref 6–20)
CALCIUM SERPL-MCNC: 9.1 MG/DL (ref 8.6–10.4)
CHLORIDE SERPL-SCNC: 100 MMOL/L (ref 98–107)
CO2 SERPL-SCNC: 20 MMOL/L (ref 20–31)
CREAT SERPL-MCNC: 0.8 MG/DL (ref 0.6–0.9)
EKG ATRIAL RATE: 97 BPM
EKG P AXIS: 68 DEGREES
EKG P-R INTERVAL: 156 MS
EKG Q-T INTERVAL: 344 MS
EKG QRS DURATION: 90 MS
EKG QTC CALCULATION (BAZETT): 436 MS
EKG R AXIS: -19 DEGREES
EKG T AXIS: 143 DEGREES
EKG VENTRICULAR RATE: 97 BPM
EOSINOPHIL # BLD: 0.37 K/UL (ref 0–0.4)
EOSINOPHILS RELATIVE PERCENT: 3 % (ref 1–4)
ERYTHROCYTE [DISTWIDTH] IN BLOOD BY AUTOMATED COUNT: 12.2 % (ref 11.8–14.4)
EST. AVERAGE GLUCOSE BLD GHB EST-MCNC: 447 MG/DL
GFR, ESTIMATED: 85 ML/MIN/1.73M2
GLUCOSE BLD-MCNC: 142 MG/DL (ref 65–105)
GLUCOSE BLD-MCNC: 156 MG/DL (ref 65–105)
GLUCOSE BLD-MCNC: 168 MG/DL (ref 65–105)
GLUCOSE BLD-MCNC: 187 MG/DL (ref 65–105)
GLUCOSE BLD-MCNC: 195 MG/DL (ref 65–105)
GLUCOSE BLD-MCNC: 96 MG/DL (ref 65–105)
GLUCOSE SERPL-MCNC: 123 MG/DL (ref 74–99)
HBA1C MFR BLD: 17.2 % (ref 4–6)
HCT VFR BLD AUTO: 41.3 % (ref 36.3–47.1)
HGB BLD-MCNC: 12.5 G/DL (ref 11.9–15.1)
IMM GRANULOCYTES # BLD AUTO: 0 K/UL (ref 0–0.3)
IMM GRANULOCYTES NFR BLD: 0 %
LACTIC ACID, WHOLE BLOOD: 2.1 MMOL/L (ref 0.7–2.1)
LYMPHOCYTES NFR BLD: 4.67 K/UL (ref 1–4.8)
LYMPHOCYTES RELATIVE PERCENT: 38 % (ref 24–44)
MCH RBC QN AUTO: 29.8 PG (ref 25.2–33.5)
MCHC RBC AUTO-ENTMCNC: 30.3 G/DL (ref 28.4–34.8)
MCV RBC AUTO: 98.6 FL (ref 82.6–102.9)
MONOCYTES NFR BLD: 0.25 K/UL (ref 0.1–0.8)
MONOCYTES NFR BLD: 2 % (ref 1–7)
MORPHOLOGY: NORMAL
NEUTROPHILS NFR BLD: 57 % (ref 36–66)
NEUTS SEG NFR BLD: 7.01 K/UL (ref 1.8–7.7)
NRBC BLD-RTO: 0 PER 100 WBC
PLATELET # BLD AUTO: 255 K/UL (ref 138–453)
PMV BLD AUTO: 9.8 FL (ref 8.1–13.5)
POTASSIUM SERPL-SCNC: 4.2 MMOL/L (ref 3.7–5.3)
RBC # BLD AUTO: 4.19 M/UL (ref 3.95–5.11)
SODIUM SERPL-SCNC: 134 MMOL/L (ref 136–145)
TROPONIN I SERPL HS-MCNC: 19 NG/L (ref 0–14)
WBC OTHER # BLD: 12.3 K/UL (ref 3.5–11.3)

## 2024-12-25 PROCEDURE — 6370000000 HC RX 637 (ALT 250 FOR IP)

## 2024-12-25 PROCEDURE — 36415 COLL VENOUS BLD VENIPUNCTURE: CPT

## 2024-12-25 PROCEDURE — 85025 COMPLETE CBC W/AUTO DIFF WBC: CPT

## 2024-12-25 PROCEDURE — 80201 ASSAY OF TOPIRAMATE: CPT

## 2024-12-25 PROCEDURE — 83036 HEMOGLOBIN GLYCOSYLATED A1C: CPT

## 2024-12-25 PROCEDURE — 2500000003 HC RX 250 WO HCPCS

## 2024-12-25 PROCEDURE — 99223 1ST HOSP IP/OBS HIGH 75: CPT | Performed by: NEUROLOGICAL SURGERY

## 2024-12-25 PROCEDURE — 93010 ELECTROCARDIOGRAM REPORT: CPT | Performed by: INTERNAL MEDICINE

## 2024-12-25 PROCEDURE — 82947 ASSAY GLUCOSE BLOOD QUANT: CPT

## 2024-12-25 PROCEDURE — 99255 IP/OBS CONSLTJ NEW/EST HI 80: CPT | Performed by: PSYCHIATRY & NEUROLOGY

## 2024-12-25 PROCEDURE — 2060000000 HC ICU INTERMEDIATE R&B

## 2024-12-25 PROCEDURE — 80048 BASIC METABOLIC PNL TOTAL CA: CPT

## 2024-12-25 PROCEDURE — 6360000002 HC RX W HCPCS

## 2024-12-25 PROCEDURE — 99222 1ST HOSP IP/OBS MODERATE 55: CPT | Performed by: FAMILY MEDICINE

## 2024-12-25 PROCEDURE — 83605 ASSAY OF LACTIC ACID: CPT

## 2024-12-25 RX ORDER — INSULIN LISPRO 100 [IU]/ML
0-4 INJECTION, SOLUTION INTRAVENOUS; SUBCUTANEOUS
Status: DISCONTINUED | OUTPATIENT
Start: 2024-12-25 | End: 2024-12-28

## 2024-12-25 RX ADMIN — TOPIRAMATE 400 MG: 200 TABLET, FILM COATED ORAL at 07:51

## 2024-12-25 RX ADMIN — BENZTROPINE MESYLATE 0.5 MG: 0.5 TABLET ORAL at 21:11

## 2024-12-25 RX ADMIN — FERROUS SULFATE TAB EC 325 MG (65 MG FE EQUIVALENT) 325 MG: 325 (65 FE) TABLET DELAYED RESPONSE at 07:46

## 2024-12-25 RX ADMIN — ESCITALOPRAM OXALATE 10 MG: 10 TABLET ORAL at 07:46

## 2024-12-25 RX ADMIN — FERROUS SULFATE TAB EC 325 MG (65 MG FE EQUIVALENT) 325 MG: 325 (65 FE) TABLET DELAYED RESPONSE at 11:40

## 2024-12-25 RX ADMIN — SODIUM CHLORIDE, PRESERVATIVE FREE 10 ML: 5 INJECTION INTRAVENOUS at 21:11

## 2024-12-25 RX ADMIN — FUROSEMIDE 20 MG: 40 TABLET ORAL at 07:46

## 2024-12-25 RX ADMIN — FERROUS SULFATE TAB EC 325 MG (65 MG FE EQUIVALENT) 325 MG: 325 (65 FE) TABLET DELAYED RESPONSE at 16:32

## 2024-12-25 RX ADMIN — ATORVASTATIN CALCIUM 20 MG: 20 TABLET, FILM COATED ORAL at 07:46

## 2024-12-25 RX ADMIN — TOPIRAMATE 400 MG: 200 TABLET, FILM COATED ORAL at 21:11

## 2024-12-25 RX ADMIN — Medication 1 TABLET: at 07:51

## 2024-12-25 RX ADMIN — SODIUM CHLORIDE, PRESERVATIVE FREE 10 ML: 5 INJECTION INTRAVENOUS at 07:47

## 2024-12-25 RX ADMIN — AMLODIPINE BESYLATE 5 MG: 5 TABLET ORAL at 07:46

## 2024-12-25 RX ADMIN — BENZTROPINE MESYLATE 0.5 MG: 0.5 TABLET ORAL at 07:52

## 2024-12-25 RX ADMIN — INSULIN LISPRO 1 UNITS: 100 INJECTION, SOLUTION INTRAVENOUS; SUBCUTANEOUS at 16:12

## 2024-12-25 RX ADMIN — NICARDIPINE HYDROCHLORIDE 4.5 MG/HR: 0.1 INJECTION INTRAVENOUS at 00:32

## 2024-12-25 RX ADMIN — EMPAGLIFLOZIN 10 MG: 10 TABLET, FILM COATED ORAL at 07:46

## 2024-12-25 RX ADMIN — INSULIN LISPRO 4 UNITS: 100 INJECTION, SOLUTION INTRAVENOUS; SUBCUTANEOUS at 11:40

## 2024-12-25 RX ADMIN — INSULIN LISPRO 4 UNITS: 100 INJECTION, SOLUTION INTRAVENOUS; SUBCUTANEOUS at 16:13

## 2024-12-25 NOTE — PROGRESS NOTES
0235- Neurosurgery resident called to bedside to evaluate per family request. Pt presenting worsening AMS and aphasia. Pt is alert to self only and having trouble finding the words to complete her sentences, repeating the same phrase over and again. Pt is following commands.     0254 Dr. Kim to bedside, Pt evaluated    0328- routine MRI brain ordered.    0530- MRI checklist completed with the help of Pt's friend at bedside, Briana Shanks.

## 2024-12-25 NOTE — CONSULTS
Department of Neurosurgery                                            Resident Consult Note      Reason for Consult:  ICH  Requesting Physician:  Lucio  Neurosurgeon:   [x] Dr. Bear  [] Dr. Gonzalez  [] Dr. Contreras  [] Dr. Whelan      History Obtained From:  patient, non-family caregiver - best friend, electronic medical record    CHIEF COMPLAINT:         Chief Complaint   Patient presents with    Altered Mental Status    Hyperglycemia       HISTORY OF PRESENT ILLNESS:       The patient is a 59 y.o. female who presents with confusion for 8 days. Patient was encouraged to present to [facility] and was found to have intracranial bleed on CT. She was transferred here for neurosurgical evaluation. Per best friend it is unclear if patient has successfully remembered to take her medications appropriately since symptoms began. She is a known diabetic on insulin and has hypertension. Best friend states she has had previous cardiac evaluation at [facility] but is unsure if this was by stress test or catheterization.       Neurosurgery notified of consult at: 1859  Neurosurgery evaluation begun: 1900    PAST MEDICAL HISTORY :       Past Medical History:        Diagnosis Date    Breast cancer ([facility]) 7/16/2012    Cancer ([facility]) 05-16-06    L breast - invasive ductal adenocarcinoma    Conjunctivitis     susceptible to conjunctivitis    Depression     Disorder of patellofemoral joint     DJD (degenerative joint disease) of knee     bilateral    Hyperlipidemia     Hypertension     Malignant neoplasm of nipple of left breast in female, estrogen receptor positive ([facility]) 9/24/2015    Pain, knee 10-06    R lateral meniscus tear    Primary osteoarthritis of left knee     Type II or unspecified type diabetes mellitus without mention of complication, not stated as uncontrolled     Under care of team 03/21/2022    PCP - DR. MAN - LAST VISIT 1/2022    Under care of team 03/21/2022    ONCOLOGY - DR. OJEDA - LAST VISIT  SpO2 100%       CONSTITUTIONAL: no apparent distress, appears stated age   HEAD: normocephalic, atraumatic   ENT: moist mucous membranes, uvula midline   NECK: supple, symmetric, no midline tenderness to palpation   BACK: without midline tenderness, step-offs or deformities   LUNGS: clear to auscultation bilaterally   CARDIOVASCULAR: regular rate and rhythm   ABDOMEN: Soft, non-tender, non-distended with normal active bowel sounds   NEUROLOGIC:  EYE OPENING     Spontaneous - 4 [x]       To voice - 3 []       To pain - 2 []       None - 1 []    VERBAL RESPONSE     Appropriate, oriented - 5 []       Dazed or confused - 4 [x]       Syllables, expletives - 3 []       Grunts - 2 []       None - 1 []    MOTOR RESPONSE     Spontaneous, command - 6 [x]       Localizes pain - 5 []       Withdraws pain - 4 []       Abnormal flexion - 3 []       Abnormal extension - 2 []       None - 1 []            Total GCS: 14    Mental Status:  alert, oriented x2               Cranial Nerves:    cranial nerves II-XII are grossly intact    Motor Exam:    Drift:  absent  Tone:  normal    Motor exam is symmetrical 5 out of 5 all extremities bilaterally    Sensory:    Right Upper Extremity:  normal  Left Upper Extremity:  normal  Right Lower Extremity:  normal  Left Lower Extremity:  normal    Deep Tendon Reflexes:    Right Bicep:  2+  Left Bicep:  2+  Right Knee:  2+  Left Knee:  2+    Plantar Response:    Right:  upgoing  Left:  upgoing    Clonus:  N/A  Sandy's:  N/A    Gait:  deferred   SKIN: no rash       LABS AND IMAGING:     CBC with Differential:    Lab Results   Component Value Date/Time    WBC 9.4 12/24/2024 05:26 PM    RBC 3.90 12/24/2024 05:26 PM    RBC 4.00 06/01/2012 01:38 PM    HGB 12.4 12/24/2024 05:26 PM    HCT 36.8 12/24/2024 05:26 PM     12/24/2024 05:26 PM     06/01/2012 01:38 PM    MCV 94.3 12/24/2024 05:26 PM    MCH 31.7 12/24/2024 05:26 PM    MCHC 33.6 12/24/2024 05:26 PM    RDW 13.3 12/24/2024 05:26 PM

## 2024-12-25 NOTE — TRANSITION OF CARE
Transfer of Care Note    HPI  The patient is a 59 y.o. female with history of type 2 diabetes on insulin, HFrEF with EF 20 to 25%, CAD, STORMY, epilepsy, breast cancer who presented 2024 with AMS x 1 week.  Patient accompanied by her best friend and uncle who states that the patient has been acting confused over the last week such as forgetting tasks, getting up in the middle of the night to cook, forgetting about  family members.  Her uncle states that a couple of days ago she seemed to be very weak and was slurring her speech while out shopping with them.  She is able to speak in full sentences, no neurological deficits, only alert and oriented to self.  Family states that she is likely not been taking her medications this past week due to the confusion.  Patient was encouraged to go to the ER by her brother throughout the week but she had refused until she was convinced by her best friend today.  Went to [facility] ER, where CT head without contrast showed intraventricular hemorrhage in the temporal horn and atrium of the right lateral ventricle, and BG of 698 without anion gap. Transferred to [facility] ER for neurosurgery evaluation of intracranial bleed.     At ER, patient hypertensive.  Started on Cardene drip.  Currently AAO x 1, with no neurological deficits on exam.  Blood glucose 613 on arrival, corrected sodium within normal limits, normal anion gap, bicarb level, normal beta hydroxybutyrate and pH.  Received 1 L bolus in ER.  Neurosurgery evaluated patient and ordered CTA head and neck to determine need for neurosurgical intervention.  All antiplatelets and anticoagulants held.  Troponin elevated above baseline at 23, repeat pending.  Cardiology consulted to further evaluate. She was admitted to the hospital for IVH and hyperglycemia.     Hospital course  CT head reviewed: there is a right cortical hippocampal hyperintensity, which is probably more representative of intraparenchymal

## 2024-12-25 NOTE — PROGRESS NOTES
Report called to Stefani in 4A. Patient transported via aide with all belongings. Family updated.

## 2024-12-25 NOTE — PLAN OF CARE
Problem: Safety - Adult  Goal: Free from fall injury  Outcome: Progressing  Flowsheets (Taken 12/24/2024 2237)  Free From Fall Injury: Instruct family/caregiver on patient safety     Problem: Neurosensory - Adult  Goal: Achieves stable or improved neurological status  Outcome: Progressing  Flowsheets (Taken 12/24/2024 2230)  Achieves stable or improved neurological status:   Assess for and report changes in neurological status   Initiate measures to prevent increased intracranial pressure   Maintain blood pressure and fluid volume within ordered parameters to optimize cerebral perfusion and minimize risk of hemorrhage   Monitor temperature, glucose, and sodium. Initiate appropriate interventions as ordered     Problem: Neurosensory - Adult  Goal: Achieves maximal functionality and self care  Outcome: Progressing  Flowsheets (Taken 12/24/2024 2230)  Achieves maximal functionality and self care:   Encourage and assist patient to increase activity and self care with guidance from physical therapy/occupational therapy   Monitor swallowing and airway patency with patient fatigue and changes in neurological status     Problem: Cardiovascular - Adult  Goal: Maintains optimal cardiac output and hemodynamic stability  Outcome: Progressing  Flowsheets (Taken 12/24/2024 2230)  Maintains optimal cardiac output and hemodynamic stability:   Monitor blood pressure and heart rate   Monitor urine output and notify Licensed Independent Practitioner for values outside of normal range     Problem: Skin/Tissue Integrity - Adult  Goal: Skin integrity remains intact  Outcome: Progressing  Flowsheets  Taken 12/24/2024 2237  Skin Integrity Remains Intact: Monitor for areas of redness and/or skin breakdown  Taken 12/24/2024 2230  Skin Integrity Remains Intact: Monitor for areas of redness and/or skin breakdown     Problem: Metabolic/Fluid and Electrolytes - Adult  Goal: Glucose maintained within prescribed range  Outcome:

## 2024-12-25 NOTE — CONSULTS
[facility]    Consult               Today's Date: 12/25/2024  Patient Name: Magui Thayer  Date of admission: 12/24/2024  6:31 PM  Patient's age: 59 y.o., 1965  Admission Dx: Confusion [R41.0]  Intraventricular hemorrhage (HCC) [I61.5]  AMS (altered mental status) [R41.82]    Requesting Physician: Kash Mancera MD    Cardiac Evaluation Reason:      elevated trop above baselin; HfrEF and CAD       History Obtained From: patient and chart review     History of Present Illness:    This patient 59 y.o. years old with past medical history of type 2 diabetes on insulin, HFrEF with ejection fraction 20 to 25%, CAD, STORMY presented with AMS x 1 week.  Patient was brought in by her best friend and uncle who states that the patient has been acting confused over the last week such as forgetting task, getting up in the middle of the night to cook, forgetting about disease family members.  Her uncle states that a couple of days ago she seemed to be very weak and was slurring her speech while out shopping with them.  She is able to speak in full sentence, no neurological deficits, only alert and oriented to herself.  As per family's he is not taking medication this past week due to confusion.  Patient was initially taken to [facility] where CT head without contrast showed intraventricular hemorrhage in the temporal horn .  Patient was transferred to [place] for neurosurgery evaluation for intracranial bleed.  In ED patient was hypertensive, no neurological deficit all antiplatelets and anticoagulants were held.  Troponin were elevated above baseline at 23.  Inpatient cardiology consulted for elevated troponin.  Past Medical History:   has a past medical history of Breast cancer (HCC), Cancer (HCC), Conjunctivitis, Depression, Disorder of patellofemoral joint, DJD (degenerative joint disease) of knee, Hyperlipidemia, Hypertension, Malignant neoplasm of nipple of left breast in female, estrogen  SUGAR DAILY 5/13/22   Addi Epps MD   Insulin Pen Needle 31G X 8 MM MISC 1 each by Does not apply route daily 12/31/21   Kelsey Hodgson MD   Insulin Pen Needle 31G X 6 MM MISC 1 each by Does not apply route daily 12/31/21   Kelsey Hodgson MD   Lancets (ONETOUCH DELICA PLUS [id number]) MISC Check blood sugars 3 times daily 3/17/20   Itzel Moseley MD   Multiple Vitamins-Minerals (MULTIVITAMIN-MINERALS) TABS tablet TAKE (1) TABLET BY MOUTH DAILY 9/25/19   Itzel Moseley MD   TRUEPLUS PEN NEEDLES 31G X 6 MM MISC USE DAILY AS DIRECTED 6/27/19   Itzel Moseley MD   Melatonin 10 MG CAPS  8/7/18   ProviderFlores MD B-D 3CC LUER-AMBAR SYR [id number]/2 22G X 1-1/2\" 3 ML MISC  4/14/16   Provider, MD Flores   benztropine (COGENTIN) 0.5 MG tablet Take 1 tablet by mouth 2 times daily 8/27/15   ProviderFlores MD       Allergies:  Lisinopril    Social History:   reports that she has never smoked. She has been exposed to tobacco smoke. She has never used smokeless tobacco. She reports that she does not drink alcohol and does not use drugs.     Family History: family history includes Cancer in her father.     REVIEW OF SYSTEMS:    Constitutional: Negative for fatigue, weight loss, loss of appetite   Cardiovascular: as per HPI  Respiratory: as per HPI  Gastrointestinal: Negative for abdominal pain, N/V  Genitourinary: No dysuria, trouble voiding, or hematuria.  Musculoskeletal:  No gait disturbance, No weakness or joint complaints.  Neurological: No headache, diplopia, change in muscle strength, numbness or tingling. No change in gate.   Endocrine: No temperature intolerance. No excessive thirst, fluid intake, or urination. No tremor.  Hematologic/Lymphatic: No abnormal bruising or bleeding    PHYSICAL EXAM:      /77   Pulse 77   Temp 98.2 °F (36.8 °C) (Oral)   Resp 16   Ht 1.575 m (5' 2\")   Wt 56.7 kg (125 lb)   LMP 12/08/2015 (Approximate)   SpO2 100%   BMI 22.86 kg/m²

## 2024-12-25 NOTE — ED PROVIDER NOTES
[facility] EMERGENCY DEPARTMENT  Emergency Department Encounter  Emergency Medicine Resident     Pt Name:Magui Thayer  MRN: [id number]  Birthdate 1965  Date of evaluation: 12/24/24  PCP:  Keesha Matias MD  Note Started: 10:11 PM EST      CHIEF COMPLAINT       Chief Complaint   Patient presents with    Altered Mental Status    Hyperglycemia       HISTORY OF PRESENT ILLNESS  (Location/Symptom, Timing/Onset, Context/Setting, Quality, Duration, Modifying Factors, Severity.)      Magui Thayer is a 59 y.o. female who presents with concern for intraventricular hemorrhage identified at outlying facility.  Per patient and her best friend who accompanies her in the room, patient has been having some intermittent confusion over the past week and a half.  Patient denies any trauma or falls.  Patient also found to be hyperglycemic.  Patient is a type II diabetic.  She states that she has been compliant on her medications.  Patient does not complain of any symptoms on arrival.  She denies any headache.  Denies any numbness or tingling.  Denies any chest pain or shortness of breath.  Denies any abdominal pain, nausea or vomiting.  Meaningful history obtained from patient's friend who is accompanying the patient in the room, she states that the patient has been acting somewhat confused over the past week.  The patient lives with her brother, who helps take care of her.  He has not noticed any falls or traumatic injuries.    PAST MEDICAL / SURGICAL / SOCIAL / FAMILY HISTORY      has a past medical history of Breast cancer (HCC), Cancer (HCC), Conjunctivitis, Depression, Disorder of patellofemoral joint, DJD (degenerative joint disease) of knee, Hyperlipidemia, Hypertension, Malignant neoplasm of nipple of left breast in female, estrogen receptor positive (HCC), Pain, knee, Primary osteoarthritis of left knee, Type II or unspecified type diabetes mellitus without mention of complication, not stated as

## 2024-12-25 NOTE — DISCHARGE INSTR - COC
Continuity of Care Form    Patient Name: Magui Thayer   :  1965  MRN:  [id number]    Admit date:  2024  Discharge date:  2025     Code Status Order: Full Code   Advance Directives:   Advance Care Flowsheet Documentation             Admitting Physician:  Kash Mancera MD  PCP: Keesha Matias MD    Discharging Nurse: GEORGIE Bajwa  Discharging Hospital Unit/Room#: 0402/0402-01  Discharging Unit Phone Number: [phone]    Emergency Contact:   Extended Emergency Contact Information  Primary Emergency Contact: PAPO GAMA  Home Phone: [phone]  Mobile Phone: [phone]  Relation: Brother/Sister  Preferred language: English   needed? No  Secondary Emergency Contact: Judith Thayer   [facility]  Home Phone: [phone]  Relation: Child    Past Surgical History:  Past Surgical History:   Procedure Laterality Date    BARIATRIC SURGERY  2019    BREAST SURGERY  2006    L lumpectomy, [place] lymph node bx, L axillary node dissection     SECTION      X2    COLONOSCOPY N/A 3/24/2022    COLONOSCOPY DIAGNOSTIC performed by Yuriy Burrell IV, DO at [facility] OR    INCISION AND DRAINAGE N/A 2017    INCISION AND DRAINAGE LEFT PERIANAL ABSCESS performed by Thao Wilson DO at [facility] OR    LYMPHADENECTOMY      left breath lymph node removed    MASTECTOMY      OTHER SURGICAL HISTORY N/A 2017    I&D perirectal abcess    TUBAL LIGATION         Immunization History:   Immunization History   Administered Date(s) Administered    COVID-19, PFIZER PURPLE top, DILUTE for use, (age 12 y+), 30mcg/0.3mL 2021, 02/10/2021, 2021    Hep A, HAVRIX, VAQTA, (age 19y+), IM, 1mL 10/23/2020    Hep B, ENGERIX-B, (age 20y+), IM, 1mL 2020, 10/23/2020, 2022    Influenza Vaccine, unspecified formulation 2013, 2016, 10/19/2017    Influenza Virus Vaccine 2012, 2013, 2015, 2016, 10/19/2017    Influenza, AFLURIA, FLUZONE, (age2  y+), IM, Trivalent MDV, 0.5mL 11/04/2016    Influenza, FLUARIX, FLULAVAL, FLUZONE (age 6 mo+) and AFLURIA, (age 3 y+), Quadv PF, 0.5mL 11/21/2019, 10/23/2020, 09/23/2021    Influenza, FLUCELVAX, (age 6 mo+), MDCK, Quadv PF, 0.5mL 10/25/2023    Influenza, Quadv, 6 mo and older, IM (Fluzone, Flulaval) 10/19/2017    MMR, PRIORIX, M-M-R II, (age 12m+), SC, 0.5mL 10/23/2020    Pneumococcal, PPSV23, PNEUMOVAX 23, (age 2y+), SC/IM, 0.5mL 01/12/2013, 02/06/2017, 06/02/2022    TDaP, ADACEL (age 10y-64y), BOOSTRIX (age 10y+), IM, 0.5mL 02/06/2017, 10/23/2020, 04/23/2021       Active Problems:  Patient Active Problem List   Diagnosis Code    History of breast cancer Z85.3    Pain, knee M25.569    Depression F32.A    DJD (degenerative joint disease) of knee M17.9    Headache R51.9    Iron deficiency anemia D50.9    Uncontrolled stage 2 hypertension I10    Uncontrolled diabetes mellitus with hyperglycemia, with long-term current use of insulin ([facility]) E11.65, Z79.4    Left shoulder pain M25.512    Diabetes 1.5, 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    Headache disorder R51.9    [place] L60.3    Class 2 severe obesity due to excess calories with serious comorbidity in adult E66.812, E66.01    Episode of syncope R55    Hypokalemia E87.6    Hyperglycemia due to diabetes mellitus ([facility]) E11.65    CHF (congestive heart failure), NYHA class I, acute on chronic, combined ([facility]) I50.43    CHF (congestive heart failure), NYHA class I, acute on chronic, diastolic ([facility]) I50.33    Leg swelling M79.89    Noncompliance Z91.199    AMS (altered mental status) R41.82       Isolation/Infection:   Isolation            No Isolation          Patient Infection Status       None to display                     Nurse Assessment:  Last Vital Signs: /77   Pulse 77   Temp 98.6 °F (37 °C) (Oral)   Resp 16   Ht 1.575 m (5' 2\")   Wt 56.7 kg (125 lb)   LMP 12/08/2015 (Approximate)   SpO2 100%   BMI 22.86 kg/m²     Last documented pain score (0-10 scale):

## 2024-12-25 NOTE — PROGRESS NOTES
FAMILY MEDICINE  IN-PATIENT SERVICE  [facility]    PROGRESS NOTE             2024    7:28 AM    Name:   Magui Thayer  MRN:     [id number]     Acct:      [id number]   Room:   [facility]040-01   Day:  1  Admit Date:  2024  6:31 PM    PCP:  Keesha Matias MD  Code Status:  Full Code    Subjective:     C/C:   Chief Complaint   Patient presents with    Altered Mental Status    Hyperglycemia     Interval History Status:     -Vitals stable:  Vitals:    24 0400   BP: 131/77   Pulse: 77   Resp: 16   Temp: 98.2 °F (36.8 °C)   SpO2: 100%   -RN reports that overnight, there was a transitory episode of expressive aphasia, the neurosurgery team was consulted, and MRI brain ordered.  -Level of consciousness is stable, GCS 14 (E4M6V4).  -Blood glucose 142 this AM, on low-intensity sliding scale and basal insulin.  -CTA head&neck shows no signs of vascular abnormality. MRI head ordered, pending.  -Continue holding antiplatelets and anticoagulation. Further plan after reviewing the pending imaging by the neurosurgery team.      Brief History:     The patient is a 59 y.o.  female with history of type 2 diabetes on insulin, HFrEF with EF 20 to 25%, CAD, STORMY who presented with AMS x 1 week.  Patient accompanied by her best friend and uncle who states that the patient has been acting confused over the last week such as forgetting tasks, getting up in the middle of the night to cook, forgetting about  family members.  Her uncle states that a couple of days ago she seemed to be very weak and was slurring her speech while out shopping with them.  She is able to speak in full sentences, no neurological deficits, only alert and oriented to self.  Family states that she is likely not been taking her medications this past week due to the confusion.  Patient was encouraged to go to the ER by her brother throughout the week but she had refused until she was convinced by her best friend today.  Went to

## 2024-12-25 NOTE — CARE COORDINATION
Case Management Assessment  Initial Evaluation    Date/Time of Evaluation: 12/25/2024 4:25 PM  Assessment Completed by: Nelsy Saavedra RN    If patient is discharged prior to next notation, then this note serves as note for discharge by case management.    Patient Name: Magui Thayer                   YOB: 1965  Diagnosis: Confusion [R41.0]  Intraventricular hemorrhage (HCC) [I61.5]  AMS (altered mental status) [R41.82]                   Date / Time: 12/24/2024  6:31 PM    Patient Admission Status: Inpatient   Readmission Risk (Low < 19, Mod (19-27), High > 27): Readmission Risk Score: 12.1    Current PCP: Keesha Matias MD  PCP verified by CM? (P) Yes    Chart Reviewed: Yes      History Provided by: (P) Patient, Friend  Patient Orientation: (P) Alert and Oriented, Person, Place    Patient Cognition: (P) Alert    Hospitalization in the last 30 days (Readmission):  No    If yes, Readmission Assessment in  Navigator will be completed.    Advance Directives:      Code Status: Full Code   Patient's Primary Decision Maker is: (P) Legal Next of Kin      Discharge Planning:    Patient lives with: (P) Family Members Type of Home: (P) House  Primary Care Giver: (P) Self  Patient Support Systems include: (P) Children, Family Members, Friends/Neighbors   Current Financial resources: (P) Medicaid  Current community resources: (P) None  Current services prior to admission: (P) Durable Medical Equipment            Current DME: (P) Shower Chair, Cane            Type of Home Care services:  (P) None    ADLS  Prior functional level: (P) Independent in ADLs/IADLs  Current functional level: (P) Assistance with the following:, Dressing, Bathing, Toileting, Cooking, Housework, Shopping, Mobility    PT AM-PAC:   /24  OT AM-PAC:   /24    Family can provide assistance at DC: (P) Yes  Would you like Case Management to discuss the discharge plan with any other family members/significant others, and if so, who? (P) Yes

## 2024-12-25 NOTE — ED NOTES
ED to inpatient nurses report      Chief Complaint:  Chief Complaint   Patient presents with    Altered Mental Status    Hyperglycemia     Present to ED from: [facility]    MOA:     LOC: alert and orientated to name, place, date  Mobility: Independent  Oxygen Baseline: RA    Current needs required: 3L  Pending ED orders: none  Present condition: stable    Why did the patient come to the ED?     Pt arrived to ED tx from [place] with report of confusion and IVH.   LKW 1 week ago. Friend had called EMS d/t not hearing from patient in 1 week  Per report patient had glucose of 698. No medication reported given PTA.   Pt A&O to person, place, time but is confused to events.  Pt denies taking any blood thinners.      What is the plan? admit  Any procedures or intervention occur? Labs, Ct, cardene  Any safety concerns??    Mental Status:  Level of Consciousness: Alert (0)    Psych Assessment:   Psychosocial  Psychosocial (WDL): Within Defined Limits  Vital signs   Vitals:    12/24/24 1908 12/24/24 2018 12/24/24 2024 12/24/24 2026   BP:  (!) 156/84     Pulse: 92  94 98   Resp: 27  15 13   Temp:       TempSrc:       SpO2: 100%  100% 94%        Vitals:  Patient Vitals for the past 24 hrs:   BP Temp Temp src Pulse Resp SpO2   12/24/24 2026 -- -- -- 98 13 94 %   12/24/24 2024 -- -- -- 94 15 100 %   12/24/24 2018 (!) 156/84 -- -- -- -- --   12/24/24 1908 -- -- -- 92 27 100 %   12/24/24 1825 (!) 162/89 98.7 °F (37.1 °C) Oral 90 19 95 %      Visit Vitals  BP (!) 156/84   Pulse 98   Temp 98.7 °F (37.1 °C) (Oral)   Resp 13   SpO2 94%        LDAs:   Peripheral IV 12/24/24 Left;Proximal Forearm (Active)       Peripheral IV 12/24/24 Left;Dorsal Forearm (Active)       Ambulatory Status:  Presents to emergency department  because of falls (Syncope, seizure, or loss of consciousness): No, Age > 70: No, Altered Mental Status, Intoxication with alcohol or substance confusion (Disorientation, impaired judgment, poor safety awaremess, or  iopamidol (ISOVUE-370) 76 % injection 75 mL       SURGICAL HISTORY       Past Surgical History:   Procedure Laterality Date    BARIATRIC SURGERY  2019    BREAST SURGERY  2006    L lumpectomy, [place] lymph node bx, L axillary node dissection     SECTION      X2    COLONOSCOPY N/A 3/24/2022    COLONOSCOPY DIAGNOSTIC performed by Yuriy Burrell IV, DO at [facility] OR    INCISION AND DRAINAGE N/A 2017    INCISION AND DRAINAGE LEFT PERIANAL ABSCESS performed by Thao Wilson DO at [facility] OR    LYMPHADENECTOMY      left breath lymph node removed    MASTECTOMY      OTHER SURGICAL HISTORY N/A 2017    I&D perirectal abcess    TUBAL LIGATION         PAST MEDICAL HISTORY       Past Medical History:   Diagnosis Date    Breast cancer ([facility]) 2012    Cancer ([facility]) 06    L breast - invasive ductal adenocarcinoma    Conjunctivitis     susceptible to conjunctivitis    Depression     Disorder of patellofemoral joint     DJD (degenerative joint disease) of knee     bilateral    Hyperlipidemia     Hypertension     Malignant neoplasm of nipple of left breast in female, estrogen receptor positive ([facility]) 2015    Pain, knee 10-    R lateral meniscus tear    Primary osteoarthritis of left knee     Type II or unspecified type diabetes mellitus without mention of complication, not stated as uncontrolled     Under care of team 2022    PCP - DR. MAN - LAST VISIT 2022    Under care of team 2022    ONCOLOGY - DR. OJEDA - LAST VISIT 10.2021    Under care of team 2022    GENERAL SURGERY - DR. BURRELL - LAST VISIT 3/2022    Wears dentures 2022    FULL    Wears glasses 2022    Weight loss 2022    70 LBS POST BARIATRIC SURGERY.       Labs:  Labs Reviewed   BASIC METABOLIC PANEL - Abnormal; Notable for the following components:       Result Value    Sodium 130 (*)     Chloride 93 (*)     Glucose 613 (*)     Creatinine 1.1 (*)     Est, Glom Filt Rate 58 (*)     All

## 2024-12-25 NOTE — H&P
intervention.  All antiplatelets and anticoagulants held.  Troponin elevated above baseline at 23, repeat pending.  Cardiology consulted to further evaluate. she is admitted to the hospital for IVH and hyperglycemia.     PAST MEDICAL HISTORY:        has a past medical history of Breast cancer (HCC), Cancer (HCC), Conjunctivitis, Depression, Disorder of patellofemoral joint, DJD (degenerative joint disease) of knee, Hyperlipidemia, Hypertension, Malignant neoplasm of nipple of left breast in female, estrogen receptor positive (HCC), Pain, knee, Primary osteoarthritis of left knee, Type II or unspecified type diabetes mellitus without mention of complication, not stated as uncontrolled, Under care of team, Under care of team, Under care of team, Wears dentures, Wears glasses, and Weight loss.    PAST SURGICAL HISTORY:      has a past surgical history that includes Breast surgery (2006); Tubal ligation;  section; lymphadenectomy; other surgical history (N/A, 2017); incision and drainage (N/A, 2017); Mastectomy; Bariatric Surgery (); and Colonoscopy (N/A, 3/24/2022).     FAMILY HISTORY:     family history includes Cancer in her father.    HOME MEDICATIONS:     Prior to Admission medications    Medication Sig Start Date End Date Taking? Authorizing Provider   furosemide (LASIX) 20 MG tablet TAKE 1 TABLET BY MOUTH DAILY 24   Keesha Matias MD   gabapentin (NEURONTIN) 300 MG capsule Take 1 capsule by mouth 3 times daily for 30 days. 24  Ryder Betancur MD   atorvastatin (LIPITOR) 20 MG tablet TAKE 1 TABLET BY MOUTH DAILY 24   Keesha Matias MD   gabapentin (NEURONTIN) 400 MG capsule Take 1 capsule by mouth at bedtime for 30 days. 24  Mita Brady MD   diphenhydrAMINE-zinc acetate (BENADRYL EXTRA STRENGTH) 2-0.1 % cream Apply topically 3 times daily as needed for itching 24   Mita Brady MD   insulin lispro, 1 Unit Dial,  blood glucose on admission 613  Patient likely missed doses of medication this past week due to AMS  Doesn't meet DKA or HHS criteria. Normal AG, bicard, beta-hydroxy, pH 7.34  Resumed home lantus 15 units nightly, humalog 4 units tid with meals, MDSS  Hypoglycemia protocol in place  POCT glucose 4 x daily AC & HS    Elevated troponin in setting of CAD  Denies chest pain, SOB  Troponin on admission elevated to 23 (baseline 14), repeat 18  EKG shows NSR with T wave inversions in lateral leads as seen in previous EKGs  Left heart cath on 5/7/2024 shows mild to moderate nonobstructive CAD  It was recommended at the time that patient start entresto and uptitrate GDMT. Home med list doesn't contain entresto or GDMT  Patient was to optimize and repeat echo to reassess mitral valve disease  Resumed home meds lipitor 20 mg daily  Cardiology consulted, recommendations appreciated    HFrEF with EF 20-25%  Echo on 4/20/2024 shows severely reduced LVSF with EF 20-25%. Grade II diastolic dysfunction. Moderate to severe MVR  Patient was to optimize therapy and repeat echo to reassess mitral valve disease  Resume home meds lasix 20 mg daily, Jardiance 10 mg daily  Cardiology consulted    HTN  BP elevated 162/89 on admission  Started on Carded drip in ER, titrate to maintain SBP < 160 per neurosurgery  Resumed home meds amlodipine 5 mg daily, lasix 20 mg      DVT prophylaxis: reason for no prophylaxis: acute interventricular hemorrhage; SCD  GI prophylaxis:  not indicated  Diet: Regular diet, 4 card, cardiac restriction  Disposition: stabilize blood glucose, clear with neurosurgery and cardiology, discharge home once clinically stable    OT/PT/SW    Code Status: Full    Consultations:   Consults: IP CONSULT TO NEUROSURGERY  IP CONSULT TO FAMILY MEDICINE  IP CONSULT TO SOCIAL WORK  IP CONSULT TO SPIRITUAL SERVICES  IP CONSULT TO CARDIOLOGY          PT/OT    Above plan discussed with the patient and family in room, who agreed to the

## 2024-12-25 NOTE — PLAN OF CARE
Problem: Chronic Conditions and Co-morbidities  Goal: Patient's chronic conditions and co-morbidity symptoms are monitored and maintained or improved  12/25/2024 0922 by Misti Kendall, RN  Outcome: Progressing  Flowsheets (Taken 12/25/2024 0742)  Care Plan - Patient's Chronic Conditions and Co-Morbidity Symptoms are Monitored and Maintained or Improved:   Monitor and assess patient's chronic conditions and comorbid symptoms for stability, deterioration, or improvement   Collaborate with multidisciplinary team to address chronic and comorbid conditions and prevent exacerbation or deterioration   Update acute care plan with appropriate goals if chronic or comorbid symptoms are exacerbated and prevent overall improvement and discharge  12/25/2024 0450 by Briana Mata RN  Outcome: Progressing  Flowsheets (Taken 12/24/2024 2230)  Care Plan - Patient's Chronic Conditions and Co-Morbidity Symptoms are Monitored and Maintained or Improved:   Monitor and assess patient's chronic conditions and comorbid symptoms for stability, deterioration, or improvement   Collaborate with multidisciplinary team to address chronic and comorbid conditions and prevent exacerbation or deterioration   Update acute care plan with appropriate goals if chronic or comorbid symptoms are exacerbated and prevent overall improvement and discharge     Problem: Discharge Planning  Goal: Discharge to home or other facility with appropriate resources  12/25/2024 0922 by Misti Kendall, RN  Outcome: Progressing  Flowsheets (Taken 12/25/2024 0742)  Discharge to home or other facility with appropriate resources:   Identify barriers to discharge with patient and caregiver   Arrange for needed discharge resources and transportation as appropriate   Identify discharge learning needs (meds, wound care, etc)   Refer to discharge planning if patient needs post-hospital services based on physician order or complex needs related to functional status,

## 2024-12-25 NOTE — CONSULTS
imbalance  [] Dizziness  [] Sleep disturbance  [x] Weakness  [x] Numbness  [x] Tremors  [x] Speech Difficulty  [] Headaches  [x] Light Sensitivity  [x] Sound Sensitivity   Endocrinology []Excessive thirst  []Excessive hunger   Psychiatric [] Anxiety/Depression  [] Hallucination   Allergy/immunology []Hives/environmental allergies   Hematologic/lymph [] Abnormal bleeding  [] Abnormal bruising     General Examination:  Head: Normocephalic, atraumatic  Eyes: Pupils equal, round, and reactive to light. Extraocular movements intact.  Lungs: Clear breath sounds bilaterally.  ENT: Normal external ear canals, no sinus tenderness, uvula midline.  Heart: Regular rate and rhythm without murmurs.  Abdomen: Soft, non-tender, non-distended.  Extremities: No edema or cyanosis; 2+ pulses bilaterally.  Skin: Intact, without rashes.    Neurological Examination  Function Findings   Mental Status Alert and oriented to self and place. Blunted affect, repetitive speech noted.   Cranial Nerves II: Visual fields intact to confrontation. III, IV, VI: Full extraocular movements, no nystagmus. V: Facial sensation intact. VII: Symmetric facial movements. VIII: Intact hearing. IX, X: Palate elevation symmetric. XI: Shoulder shrug symmetric. XII: Tongue midline without atrophy or fasciculations.   Motor Function Normal bulk and tone, power 5/5 in all extremities.   Sensory Function Intact to light touch, pinprick, vibration, and proprioception.   Cerebellar Function Normal finger-to-nose and heel-to-shin testing. No tremors or dysmetria.   Reflex Function 2+ and symmetric bilaterally. Babinski upgoing bilaterally.   Gait Deferred due to acute presentation.     Neurological Workup  Date Study Findings   12/24/2024 CT Head Without Contrast Small acute intraventricular hemorrhage in the temporal horn and atrium of the right lateral ventricle. No mass effect or hydrocephalus.   12/24/2024 CTA Head and Neck No evidence of aneurysm, vascular

## 2024-12-26 ENCOUNTER — APPOINTMENT (OUTPATIENT)
Dept: MRI IMAGING | Age: 59
End: 2024-12-26
Payer: COMMERCIAL

## 2024-12-26 PROBLEM — I50.20 HFREF (HEART FAILURE WITH REDUCED EJECTION FRACTION) (HCC): Status: ACTIVE | Noted: 2024-12-26

## 2024-12-26 LAB
ANION GAP SERPL CALCULATED.3IONS-SCNC: 10 MMOL/L (ref 9–16)
BASOPHILS # BLD: 0.04 K/UL (ref 0–0.2)
BASOPHILS NFR BLD: 0 % (ref 0–2)
BUN SERPL-MCNC: 9 MG/DL (ref 6–20)
CALCIUM SERPL-MCNC: 9.1 MG/DL (ref 8.6–10.4)
CHLORIDE SERPL-SCNC: 104 MMOL/L (ref 98–107)
CO2 SERPL-SCNC: 23 MMOL/L (ref 20–31)
CREAT SERPL-MCNC: 1 MG/DL (ref 0.6–0.9)
EOSINOPHIL # BLD: 0.12 K/UL (ref 0–0.44)
EOSINOPHILS RELATIVE PERCENT: 1 % (ref 1–4)
ERYTHROCYTE [DISTWIDTH] IN BLOOD BY AUTOMATED COUNT: 12.5 % (ref 11.8–14.4)
GFR, ESTIMATED: 65 ML/MIN/1.73M2
GLUCOSE BLD-MCNC: 136 MG/DL (ref 65–105)
GLUCOSE BLD-MCNC: 164 MG/DL (ref 65–105)
GLUCOSE BLD-MCNC: 256 MG/DL (ref 65–105)
GLUCOSE BLD-MCNC: 89 MG/DL (ref 65–105)
GLUCOSE SERPL-MCNC: 172 MG/DL (ref 74–99)
HCT VFR BLD AUTO: 38.9 % (ref 36.3–47.1)
HGB BLD-MCNC: 12.4 G/DL (ref 11.9–15.1)
IMM GRANULOCYTES # BLD AUTO: 0.04 K/UL (ref 0–0.3)
IMM GRANULOCYTES NFR BLD: 0 %
LYMPHOCYTES NFR BLD: 3.47 K/UL (ref 1.1–3.7)
LYMPHOCYTES RELATIVE PERCENT: 37 % (ref 24–43)
MCH RBC QN AUTO: 29.6 PG (ref 25.2–33.5)
MCHC RBC AUTO-ENTMCNC: 31.9 G/DL (ref 28.4–34.8)
MCV RBC AUTO: 92.8 FL (ref 82.6–102.9)
MONOCYTES NFR BLD: 0.52 K/UL (ref 0.1–1.2)
MONOCYTES NFR BLD: 6 % (ref 3–12)
NEUTROPHILS NFR BLD: 55 % (ref 36–65)
NEUTS SEG NFR BLD: 5.19 K/UL (ref 1.5–8.1)
NRBC BLD-RTO: 0 PER 100 WBC
PLATELET # BLD AUTO: 260 K/UL (ref 138–453)
PMV BLD AUTO: 10 FL (ref 8.1–13.5)
POTASSIUM SERPL-SCNC: 3.8 MMOL/L (ref 3.7–5.3)
RBC # BLD AUTO: 4.19 M/UL (ref 3.95–5.11)
SODIUM SERPL-SCNC: 137 MMOL/L (ref 136–145)
WBC OTHER # BLD: 9.4 K/UL (ref 3.5–11.3)

## 2024-12-26 PROCEDURE — 95700 EEG CONT REC W/VID EEG TECH: CPT

## 2024-12-26 PROCEDURE — 80048 BASIC METABOLIC PNL TOTAL CA: CPT

## 2024-12-26 PROCEDURE — A9579 GAD-BASE MR CONTRAST NOS,1ML: HCPCS

## 2024-12-26 PROCEDURE — C9254 INJECTION, LACOSAMIDE: HCPCS

## 2024-12-26 PROCEDURE — 95711 VEEG 2-12 HR UNMONITORED: CPT

## 2024-12-26 PROCEDURE — 6360000002 HC RX W HCPCS

## 2024-12-26 PROCEDURE — 94761 N-INVAS EAR/PLS OXIMETRY MLT: CPT

## 2024-12-26 PROCEDURE — 6370000000 HC RX 637 (ALT 250 FOR IP)

## 2024-12-26 PROCEDURE — 6360000004 HC RX CONTRAST MEDICATION

## 2024-12-26 PROCEDURE — 36415 COLL VENOUS BLD VENIPUNCTURE: CPT

## 2024-12-26 PROCEDURE — 2060000000 HC ICU INTERMEDIATE R&B

## 2024-12-26 PROCEDURE — 2500000003 HC RX 250 WO HCPCS

## 2024-12-26 PROCEDURE — 82947 ASSAY GLUCOSE BLOOD QUANT: CPT

## 2024-12-26 PROCEDURE — 70553 MRI BRAIN STEM W/O & W/DYE: CPT

## 2024-12-26 PROCEDURE — 97535 SELF CARE MNGMENT TRAINING: CPT

## 2024-12-26 PROCEDURE — 85025 COMPLETE CBC W/AUTO DIFF WBC: CPT

## 2024-12-26 PROCEDURE — 97166 OT EVAL MOD COMPLEX 45 MIN: CPT

## 2024-12-26 PROCEDURE — 6360000002 HC RX W HCPCS: Performed by: NURSE PRACTITIONER

## 2024-12-26 PROCEDURE — 95718 EEG PHYS/QHP 2-12 HR W/VEEG: CPT | Performed by: PSYCHIATRY & NEUROLOGY

## 2024-12-26 PROCEDURE — 99232 SBSQ HOSP IP/OBS MODERATE 35: CPT | Performed by: PSYCHIATRY & NEUROLOGY

## 2024-12-26 PROCEDURE — 99223 1ST HOSP IP/OBS HIGH 75: CPT | Performed by: INTERNAL MEDICINE

## 2024-12-26 RX ORDER — HYDRALAZINE HYDROCHLORIDE 20 MG/ML
10 INJECTION INTRAMUSCULAR; INTRAVENOUS
Status: DISCONTINUED | OUTPATIENT
Start: 2024-12-26 | End: 2024-12-26

## 2024-12-26 RX ORDER — HYDRALAZINE HYDROCHLORIDE 20 MG/ML
10 INJECTION INTRAMUSCULAR; INTRAVENOUS EVERY 8 HOURS PRN
Status: DISCONTINUED | OUTPATIENT
Start: 2024-12-26 | End: 2024-12-29

## 2024-12-26 RX ORDER — LACOSAMIDE 10 MG/ML
200 INJECTION, SOLUTION INTRAVENOUS ONCE
Status: COMPLETED | OUTPATIENT
Start: 2024-12-26 | End: 2024-12-26

## 2024-12-26 RX ORDER — LABETALOL HYDROCHLORIDE 5 MG/ML
10 INJECTION, SOLUTION INTRAVENOUS
Status: DISCONTINUED | OUTPATIENT
Start: 2024-12-26 | End: 2024-12-26

## 2024-12-26 RX ORDER — LACOSAMIDE 100 MG/1
200 TABLET ORAL 2 TIMES DAILY
Status: DISCONTINUED | OUTPATIENT
Start: 2024-12-27 | End: 2024-12-27

## 2024-12-26 RX ORDER — TOPIRAMATE 200 MG/1
100 TABLET, FILM COATED ORAL 2 TIMES DAILY
Status: DISCONTINUED | OUTPATIENT
Start: 2024-12-26 | End: 2025-01-02 | Stop reason: HOSPADM

## 2024-12-26 RX ADMIN — GADOTERIDOL 10 ML: 279.3 INJECTION, SOLUTION INTRAVENOUS at 20:16

## 2024-12-26 RX ADMIN — Medication 1 TABLET: at 09:15

## 2024-12-26 RX ADMIN — TOPIRAMATE 100 MG: 200 TABLET, FILM COATED ORAL at 21:29

## 2024-12-26 RX ADMIN — AMLODIPINE BESYLATE 5 MG: 5 TABLET ORAL at 09:13

## 2024-12-26 RX ADMIN — INSULIN LISPRO 4 UNITS: 100 INJECTION, SOLUTION INTRAVENOUS; SUBCUTANEOUS at 18:01

## 2024-12-26 RX ADMIN — INSULIN LISPRO 4 UNITS: 100 INJECTION, SOLUTION INTRAVENOUS; SUBCUTANEOUS at 15:13

## 2024-12-26 RX ADMIN — ATORVASTATIN CALCIUM 20 MG: 20 TABLET, FILM COATED ORAL at 09:13

## 2024-12-26 RX ADMIN — FERROUS SULFATE TAB EC 325 MG (65 MG FE EQUIVALENT) 325 MG: 325 (65 FE) TABLET DELAYED RESPONSE at 09:12

## 2024-12-26 RX ADMIN — BENZTROPINE MESYLATE 0.5 MG: 0.5 TABLET ORAL at 09:15

## 2024-12-26 RX ADMIN — ESCITALOPRAM OXALATE 10 MG: 10 TABLET ORAL at 09:12

## 2024-12-26 RX ADMIN — INSULIN LISPRO 2 UNITS: 100 INJECTION, SOLUTION INTRAVENOUS; SUBCUTANEOUS at 18:02

## 2024-12-26 RX ADMIN — SODIUM CHLORIDE, PRESERVATIVE FREE 10 ML: 5 INJECTION INTRAVENOUS at 09:13

## 2024-12-26 RX ADMIN — FERROUS SULFATE TAB EC 325 MG (65 MG FE EQUIVALENT) 325 MG: 325 (65 FE) TABLET DELAYED RESPONSE at 15:13

## 2024-12-26 RX ADMIN — SODIUM CHLORIDE, PRESERVATIVE FREE 10 ML: 5 INJECTION INTRAVENOUS at 22:37

## 2024-12-26 RX ADMIN — LACOSAMIDE 200 MG: 10 INJECTION INTRAVENOUS at 22:37

## 2024-12-26 RX ADMIN — FUROSEMIDE 20 MG: 40 TABLET ORAL at 09:13

## 2024-12-26 RX ADMIN — TOPIRAMATE 400 MG: 200 TABLET, FILM COATED ORAL at 09:15

## 2024-12-26 RX ADMIN — EMPAGLIFLOZIN 10 MG: 10 TABLET, FILM COATED ORAL at 09:12

## 2024-12-26 RX ADMIN — HYDRALAZINE HYDROCHLORIDE 10 MG: 20 INJECTION INTRAMUSCULAR; INTRAVENOUS at 15:42

## 2024-12-26 RX ADMIN — BENZTROPINE MESYLATE 0.5 MG: 0.5 TABLET ORAL at 21:29

## 2024-12-26 RX ADMIN — INSULIN LISPRO 4 UNITS: 100 INJECTION, SOLUTION INTRAVENOUS; SUBCUTANEOUS at 09:12

## 2024-12-26 RX ADMIN — INSULIN GLARGINE 15 UNITS: 100 INJECTION, SOLUTION SUBCUTANEOUS at 21:28

## 2024-12-26 NOTE — PROGRESS NOTES
intact to confrontation  III,IV,VI -  PERR, EOMs full  V     -     Normal facial sensation   VII    -     Normal facial symmetry  VIII   -     Intact hearing    MOTOR FUNCTION: 5/5 in all 4 extremities  No drift   SENSORY FUNCTION: Denies diminished sensation   CEREBELLAR FUNCTION:  Intact fine motor control over upper limbs and lower limbs   REFLEX FUNCTION:  Symmetric in upper and lower extremities, no tremors   STATION and GAIT Deferred to PT       Investigations:      Laboratory Testing:  Recent Results (from the past 24 hour(s))   POC Glucose Fingerstick    Collection Time: 12/25/24 11:17 AM   Result Value Ref Range    POC Glucose 168 (H) 65 - 105 mg/dL   POC Glucose Fingerstick    Collection Time: 12/25/24  4:10 PM   Result Value Ref Range    POC Glucose 195 (H) 65 - 105 mg/dL   POC Glucose Fingerstick    Collection Time: 12/25/24  8:19 PM   Result Value Ref Range    POC Glucose 96 65 - 105 mg/dL   CBC with Auto Differential    Collection Time: 12/26/24  6:22 AM   Result Value Ref Range    WBC 9.4 3.5 - 11.3 k/uL    RBC 4.19 3.95 - 5.11 m/uL    Hemoglobin 12.4 11.9 - 15.1 g/dL    Hematocrit 38.9 36.3 - 47.1 %    MCV 92.8 82.6 - 102.9 fL    MCH 29.6 25.2 - 33.5 pg    MCHC 31.9 28.4 - 34.8 g/dL    RDW 12.5 11.8 - 14.4 %    Platelets 260 138 - 453 k/uL    MPV 10.0 8.1 - 13.5 fL    NRBC Automated 0.0 0.0 per 100 WBC    Neutrophils % 55 36 - 65 %    Lymphocytes % 37 24 - 43 %    Monocytes % 6 3 - 12 %    Eosinophils % 1 1 - 4 %    Basophils % 0 0 - 2 %    Immature Granulocytes % 0 0 %    Neutrophils Absolute 5.19 1.50 - 8.10 k/uL    Lymphocytes Absolute 3.47 1.10 - 3.70 k/uL    Monocytes Absolute 0.52 0.10 - 1.20 k/uL    Eosinophils Absolute 0.12 0.00 - 0.44 k/uL    Basophils Absolute 0.04 0.00 - 0.20 k/uL    Immature Granulocytes Absolute 0.04 0.00 - 0.30 k/uL   Basic Metabolic Panel w/ Reflex to MG    Collection Time: 12/26/24  6:22 AM   Result Value Ref Range    Sodium 137 136 - 145 mmol/L    Potassium 3.8 3.7 -  5.3 mmol/L    Chloride 104 98 - 107 mmol/L    CO2 23 20 - 31 mmol/L    Anion Gap 10 9 - 16 mmol/L    Glucose 172 (H) 74 - 99 mg/dL    BUN 9 6 - 20 mg/dL    Creatinine 1.0 (H) 0.6 - 0.9 mg/dL    Est, Glom Filt Rate 65 >60 mL/min/1.73m2    Calcium 9.1 8.6 - 10.4 mg/dL   POC Glucose Fingerstick    Collection Time: 12/26/24  7:27 AM   Result Value Ref Range    POC Glucose 164 (H) 65 - 105 mg/dL     Recent Labs     12/26/24  0622   WBC 9.4   RBC 4.19   HGB 12.4   HCT 38.9   MCV 92.8   MCH 29.6   MCHC 31.9   RDW 12.5      MPV 10.0     Recent Labs     12/24/24  1708 12/24/24  1833 12/26/24  0622   *   < > 137   K 4.2   < > 3.8   CL 91*   < > 104   CO2 26   < > 23   BUN 9   < > 9   CREATININE 1.1   < > 1.0*   GLUCOSE 698*   < > 172*   CALCIUM 8.9   < > 9.1   BILITOT 0.4  --   --    ALKPHOS 128*  --   --    AST 29  --   --    ALT 26  --   --     < > = values in this interval not displayed.     Hemoglobin A1C   Date Value Ref Range Status   12/25/2024 17.2 (H) 4.0 - 6.0 % Final       Assessment :      Primary Problem  AMS (altered mental status)    Active Hospital Problems    Diagnosis Date Noted    Confusion [R41.0] 12/25/2024    IVH (intraventricular hemorrhage) ([facility]) [I61.5] 12/25/2024    AMS (altered mental status) [R41.82] 12/24/2024    CHF (congestive heart failure), NYHA class I, acute on chronic, combined ([facility]) [I50.43] 04/18/2024    Uncontrolled diabetes mellitus with hyperglycemia, with long-term current use of insulin ([facility]) [E11.65, Z79.4] 04/25/2014    Iron deficiency anemia [D50.9] 03/12/2013     59 female with worsening confusion over the past week, acute on chronic 3-year history of progressive cognitive decline.  CT head showing acute intraventricular hemorrhage temporal horn atrium right lateral ventricle      Plan:         IVH R temporal horn and atrium lateral ventricle  - Continue Topamax 100 mg twice daily  - LTME  - MRI brain pending  - Plan for possible LP pending MRI results  - Holding

## 2024-12-26 NOTE — PLAN OF CARE
Problem: Chronic Conditions and Co-morbidities  Goal: Patient's chronic conditions and co-morbidity symptoms are monitored and maintained or improved  Outcome: Progressing     Problem: Discharge Planning  Goal: Discharge to home or other facility with appropriate resources  Outcome: Progressing     Problem: Skin/Tissue Integrity  Goal: Absence of new skin breakdown  Description: 1.  Monitor for areas of redness and/or skin breakdown  2.  Assess vascular access sites hourly  3.  Every 4-6 hours minimum:  Change oxygen saturation probe site  4.  Every 4-6 hours:  If on nasal continuous positive airway pressure, respiratory therapy assess nares and determine need for appliance change or resting period.  Outcome: Progressing     Problem: Safety - Adult  Goal: Free from fall injury  Outcome: Progressing     Problem: ABCDS Injury Assessment  Goal: Absence of physical injury  Outcome: Progressing     Problem: Spiritual Struggle  Goal: Verbalizes spiritual struggle  Outcome: Progressing  Goal: Gains new understanding/perception  Outcome: Progressing  Goal: Growing sense of peace/hope  Outcome: Progressing  Goal: Explore resources for additional follow up, post discharge  Outcome: Progressing     Problem: Emotional Distress  Goal: Reduce evidence of anxiety/worry/anger  Outcome: Progressing  Goal: Explore resources for additional follow up, post discharge  Outcome: Progressing     Problem: Life Adjustment  Goal: Identifies/restores coping resources/skills  Outcome: Progressing     Problem: Support with Decision-Making  Goal: Verbalization of thoughts/feelings regarding decision  Outcome: Progressing  Goal: Identifies/restores coping resources/skills  Outcome: Progressing     Problem: Sacramental/Yazidi Needs  Goal: Provide for sacramental/Restoration needs as appropriate,per patient/family request  Outcome: Progressing     Problem: Neurosensory - Adult  Goal: Achieves stable or improved neurological status  Outcome:

## 2024-12-26 NOTE — PROGRESS NOTES
Occupational Therapy  Occupational Therapy Initial Evaluation  Facility/Department: [address] NEURO   Patient Name: Magui Thayer        MRN: [id number]    : 1965    Date of Service: 2024    Chief Complaint   Patient presents with    Altered Mental Status    Hyperglycemia     Past Medical History:  has a past medical history of Breast cancer (HCC), Cancer (HCC), Conjunctivitis, Depression, Disorder of patellofemoral joint, DJD (degenerative joint disease) of knee, Hyperlipidemia, Hypertension, Malignant neoplasm of nipple of left breast in female, estrogen receptor positive (HCC), Pain, knee, Primary osteoarthritis of left knee, Type II or unspecified type diabetes mellitus without mention of complication, not stated as uncontrolled, Under care of team, Under care of team, Under care of team, Wears dentures, Wears glasses, and Weight loss.  Past Surgical History:  has a past surgical history that includes Breast surgery (2006); Tubal ligation;  section; lymphadenectomy; other surgical history (N/A, 2017); incision and drainage (N/A, 2017); Mastectomy; Bariatric Surgery (); and Colonoscopy (N/A, 3/24/2022).    Discharge Recommendations  Discharge Recommendations: Home with assist PRN, 24 hour supervision or assist  OT Equipment Recommendations  Other: CTA    Assessment  Performance deficits / Impairments: Decreased functional mobility ;Decreased cognition;Decreased high-level IADLs;Decreased ADL status;Decreased strength;Decreased balance;Decreased safe awareness    Assessment: Pt presents to OT this date with above noted deficits s/p AMS. Pt is not currently functioning at [facility]. Pt requires Bed mobility SUP, transfers CGA, Func mob CGA standing sinkside SBA. D/t this Pt is not currently safe to return to prior living arrangement without 24/ supervision/assistance and prior to meeting Acute OT goals. It is recommended that Pt recieve OT services during hospitalization and

## 2024-12-26 NOTE — PROGRESS NOTES
Spiritual Health History and Assessment/Progress Note  [facility]    Spiritual/Emotional Needs,  ,  ,      Name: Magui Thayer MRN: [id number]    Age: 59 y.o.     Sex: female   Language: English   Druze: Voodoo   AMS (altered mental status)     Date: 12/26/2024            Total Time Calculated: 10 min              Spiritual Assessment began in [facility]        Referral/Consult From: Nurse   Encounter Overview/Reason: Spiritual/Emotional Needs  Service Provided For: Patient and family together    Writer visited per consult for advance directives and per the loneliness list. Pt is confused at this time and unable to complete AD documents. 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 Patient expressed happiness at seeing a familiar face. Pt's uncle and sister were present. They said pt's daughter and granddaughter were here yesterday but pt did not remember that. Writer provided support through active listening and words of affirmation, and prayed with pt and family.     Franca, Belief, Meaning:   Patient is connected with a franca tradition or spiritual practice  Family/Friends Other:        Importance and Influence:  Patient has no beliefs influential to healthcare decision-making identified during this visit  Family/Friends Other:      Community:  Patient feels well-supported. Support system includes: Children, [place] [facility], Friends, and Extended family  Family/Friends Other:      Assessment and Plan of Care:     Patient Interventions include:   Family/Friends Interventions include: Facilitated expression of thoughts and feelings and Other: Prayer    Patient Plan of Care: Spiritual Care available upon further referral  Family/Friends Plan of Care: Spiritual Care available upon further referral    Electronically signed by BRE Sauer on 12/26/2024 at 10:20 AM

## 2024-12-26 NOTE — PROGRESS NOTES
[facility]   Progress Note                   Date:   12/26/2024  Patient name: Magui Thayer  Date of admission:  12/24/2024  6:31 PM  MRN:   [id number]  YOB: 1965  PCP: Keesha Matias MD    Reason for Admission: Confusion [R41.0]  Intraventricular hemorrhage (HCC) [I61.5]  AMS (altered mental status) [R41.82]    Subjective:       Clinical Changes / Abnormalities:Pt seen and examined in the room.  Patient resting in bed, family at bedside. Pt denies any CP or sob. Still somewhat confused. Labs, vitals and tele reviewed- SR  Medications:   Scheduled Meds:   topiramate  100 mg Oral BID    insulin lispro  0-4 Units SubCUTAneous 4x Daily AC & HS    amLODIPine  5 mg Oral Daily    [Held by provider] aspirin  81 mg Oral Daily    atorvastatin  20 mg Oral Daily    benztropine  0.5 mg Oral BID    escitalopram  10 mg Oral Daily    ferrous sulfate  325 mg Oral TID WC    furosemide  20 mg Oral Daily    insulin glargine  15 Units SubCUTAneous Nightly    therapeutic multivitamin-minerals  1 tablet Oral Daily    sodium chloride flush  5-40 mL IntraVENous 2 times per day    insulin lispro  4 Units SubCUTAneous TID WC    empagliflozin  10 mg Oral Daily     Continuous Infusions:   dextrose      sodium chloride       CBC:   Recent Labs     12/24/24  1726 12/25/24  0804 12/26/24  0622   WBC 9.4 12.3* 9.4   HGB 12.4 12.5 12.4    255 260     BMP:    Recent Labs     12/24/24  1845 12/25/24  0804 12/26/24  0622   * 134* 137   K 4.0 4.2 3.8   CL 93* 100 104   CO2 27 20 23   BUN 9 6 9   CREATININE 1.1* 0.8 1.0*   GLUCOSE 613* 123* 172*     Hepatic:   Recent Labs     12/24/24  1708   AST 29   ALT 26   BILITOT 0.4   ALKPHOS 128*     Troponin:   Recent Labs     12/24/24  1708 12/24/24  2346   TROPHS 23* 19*     BNP: No results for input(s): \"BNP\" in the last 72 hours.  Lipids: No results for input(s): \"CHOL\", \"HDL\" in the last 72 hours.    Invalid input(s): \"LDLCALCU\"  INR:   Recent Labs      12/24/24  1740   INR 1.0     EKG:   Normal sinus rhythm, no acute ST-T wave changes      ECHO:   reviewed.  4/20/2024   Left Ventricle: Severely reduced left ventricular systolic function with a visually estimated EF of 20 - 25%. EF by 2D Simpsons Biplane is 28%. Left ventricle is mildly dilated. Mildly increased wall thickness. Severe global hypokinesis present. Grade II diastolic dysfunction with increased LAP.    Right Ventricle: Right ventricle is dilated.    Aortic Valve: Trileaflet valve. Mild regurgitation.    Mitral Valve: Moderate to severe regurgitation.    Tricuspid Valve: Moderate to severe regurgitation. Mildly elevated RVSP, consistent with mild pulmonary hypertension. The estimated RVSP is 49 mmHg.    Left Atrium: Left atrium is severely dilated. Left atrial volume index is severely increased (>48 mL/m2). LA Vol Index is  49 ml/m2.    Right Atrium: Right atrium is dilated.    Pericardium: No pericardial effusion.    Image quality is good.  Stress Test:   not obtained.     Cardiac Angiography:   reviewed.  5/7/2024  Findings:   1. RA 15, RV 50/15, PA 52 OVER 17 WITH A MEAN OF 34, WEDGE 19, LVEDP 18 MMHG  2. Mild-to-moderate nonobstructive CAD   3. PA sat 64%, AO sat 96%  4. Cardiac output/cardiac index by assumed Abran is 4.64/2.83 and by thermodilution is 4.28/2.61   Objective:   Vitals: BP (!) 145/93   Pulse 80   Temp 98 °F (36.7 °C) (Oral)   Resp 15   Ht 1.575 m (5' 2\")   Wt 56.7 kg (125 lb)   LMP 12/08/2015 (Approximate)   SpO2 100%   BMI 22.86 kg/m²   General appearance: alert and cooperative with exam  HEENT: Head: Normocephalic, no lesions, without obvious abnormality.  Neck: no JVD, trachea midline, no adenopathy  Lungs: Clear to auscultation  Heart: Regular rate and rhythm, s1/s2 auscultated, no murmurs  Abdomen: soft, non-tender, bowel sounds active  Extremities: no edema  Neurologic: not done        Assessment / Acute Cardiac Problems:     Patient Active Problem List:     History of

## 2024-12-26 NOTE — PLAN OF CARE
Problem: Chronic Conditions and Co-morbidities  Goal: Patient's chronic conditions and co-morbidity symptoms are monitored and maintained or improved  12/26/2024 1106 by Titi Altamirano RN  Outcome: Progressing  12/26/2024 0211 by Shanthi Velazquez RN  Outcome: Progressing     Problem: Discharge Planning  Goal: Discharge to home or other facility with appropriate resources  12/26/2024 1106 by Titi Altamirano RN  Outcome: Progressing  12/26/2024 0211 by Shanthi Velazquez RN  Outcome: Progressing     Problem: Skin/Tissue Integrity  Goal: Absence of new skin breakdown  Description: 1.  Monitor for areas of redness and/or skin breakdown  2.  Assess vascular access sites hourly  3.  Every 4-6 hours minimum:  Change oxygen saturation probe site  4.  Every 4-6 hours:  If on nasal continuous positive airway pressure, respiratory therapy assess nares and determine need for appliance change or resting period.  12/26/2024 1106 by Titi Altamirano RN  Outcome: Progressing  12/26/2024 0211 by Shanthi Velazquez RN  Outcome: Progressing     Problem: Safety - Adult  Goal: Free from fall injury  12/26/2024 1106 by Titi Altamirano RN  Outcome: Progressing  12/26/2024 0211 by Shanthi Velazquez RN  Outcome: Progressing     Problem: ABCDS Injury Assessment  Goal: Absence of physical injury  12/26/2024 1106 by Titi Altamirano RN  Outcome: Progressing  12/26/2024 0211 by Shanthi Velazquez RN  Outcome: Progressing     Problem: Spiritual Struggle  Goal: Verbalizes spiritual struggle  12/26/2024 1106 by Titi Altamirano RN  Outcome: Progressing  12/26/2024 0211 by Shanthi Velazquez RN  Outcome: Progressing  Goal: Gains new understanding/perception  12/26/2024 1106 by Titi Altamirano RN  Outcome: Progressing  12/26/2024 0211 by Shanthi Velazquez RN  Outcome: Progressing  Goal: Growing sense of peace/hope  12/26/2024 1106 by Titi Altamirano RN  Outcome: Progressing  12/26/2024 0211 by Shanthi Velazquez RN  Outcome:  Progressing  Goal: Explore resources for additional follow up, post discharge  12/26/2024 1106 by Titi Altamirano RN  Outcome: Progressing  12/26/2024 0211 by Shanthi Velazquez RN  Outcome: Progressing     Problem: Emotional Distress  Goal: Reduce evidence of anxiety/worry/anger  12/26/2024 1106 by Titi Altamirano RN  Outcome: Progressing  12/26/2024 0211 by Shanthi Velazquez RN  Outcome: Progressing  Goal: Explore resources for additional follow up, post discharge  12/26/2024 0211 by Shanthi Velazquez RN  Outcome: Progressing     Problem: Life Adjustment  Goal: Identifies/restores coping resources/skills  12/26/2024 0211 by Shanthi Velazquez RN  Outcome: Progressing     Problem: Support with Decision-Making  Goal: Verbalization of thoughts/feelings regarding decision  12/26/2024 0211 by Shanthi Velazquez RN  Outcome: Progressing  Goal: Identifies/restores coping resources/skills  12/26/2024 0211 by Shanthi Velazquez RN  Outcome: Progressing     Problem: Sacramental/Mormon Needs  Goal: Provide for sacramental/Voodoo needs as appropriate,per patient/family request  12/26/2024 0211 by Shanthi Velazquez RN  Outcome: Progressing     Problem: Neurosensory - Adult  Goal: Achieves stable or improved neurological status  12/26/2024 0211 by Shanthi Velazquez RN  Outcome: Progressing  Goal: Achieves maximal functionality and self care  12/26/2024 0211 by Shanthi Velazquez RN  Outcome: Progressing     Problem: Respiratory - Adult  Goal: Achieves optimal ventilation and oxygenation  12/26/2024 0211 by Shanthi Velazquez RN  Outcome: Progressing     Problem: Cardiovascular - Adult  Goal: Maintains optimal cardiac output and hemodynamic stability  12/26/2024 0211 by Shanthi Velazquez RN  Outcome: Progressing     Problem: Skin/Tissue Integrity - Adult  Goal: Skin integrity remains intact  12/26/2024 0211 by Shanthi Velazquez RN  Outcome: Progressing     Problem: Musculoskeletal - Adult  Goal: Return mobility to safest level of function  12/26/2024 0211 by

## 2024-12-27 ENCOUNTER — APPOINTMENT (OUTPATIENT)
Dept: CT IMAGING | Age: 59
End: 2024-12-27
Payer: COMMERCIAL

## 2024-12-27 LAB
ALBUMIN CSF-MCNC: 718 MG/L (ref 70–350)
ALBUMIN INDEX: 194.1
ALBUMIN SERPL-MCNC: 3.7 G/DL (ref 3.5–5.2)
ANION GAP SERPL CALCULATED.3IONS-SCNC: 9 MMOL/L (ref 9–16)
APPEARANCE CSF: CLEAR
BASOPHILS # BLD: 0.07 K/UL (ref 0–0.2)
BASOPHILS NFR BLD: 1 % (ref 0–2)
BUN SERPL-MCNC: 10 MG/DL (ref 6–20)
C GATTII+NEOFOR DNA CSF QL NAA+NON-PROBE: NOT DETECTED
CALCIUM SERPL-MCNC: 8.8 MG/DL (ref 8.6–10.4)
CHLORIDE SERPL-SCNC: 106 MMOL/L (ref 98–107)
CLOT CHECK: ABNORMAL
CMV DNA CSF QL NAA+NON-PROBE: NOT DETECTED
CO2 SERPL-SCNC: 23 MMOL/L (ref 20–31)
COLOR CSF: COLORLESS
CREAT SERPL-MCNC: 1 MG/DL (ref 0.6–0.9)
E COLI K1 DNA CSF QL NAA+NON-PROBE: NOT DETECTED
EOSINOPHIL # BLD: 0.16 K/UL (ref 0–0.44)
EOSINOPHILS RELATIVE PERCENT: 1 % (ref 1–4)
ERYTHROCYTE [DISTWIDTH] IN BLOOD BY AUTOMATED COUNT: 12.5 % (ref 11.8–14.4)
EV RNA CSF QL NAA+NON-PROBE: NOT DETECTED
GFR, ESTIMATED: 65 ML/MIN/1.73M2
GLUCOSE BLD-MCNC: 110 MG/DL (ref 65–105)
GLUCOSE BLD-MCNC: 217 MG/DL (ref 65–105)
GLUCOSE BLD-MCNC: 220 MG/DL (ref 65–105)
GLUCOSE BLD-MCNC: 242 MG/DL (ref 65–105)
GLUCOSE CSF-MCNC: 128 MG/DL (ref 40–70)
GLUCOSE SERPL-MCNC: 178 MG/DL (ref 74–99)
GP B STREP DNA CSF QL NAA+NON-PROBE: NOT DETECTED
HAEM INFLU DNA CSF QL NAA+NON-PROBE: NOT DETECTED
HCT VFR BLD AUTO: 37 % (ref 36.3–47.1)
HGB BLD-MCNC: 11.8 G/DL (ref 11.9–15.1)
HHV6 DNA CSF QL NAA+NON-PROBE: NOT DETECTED
HSV1 DNA CSF QL NAA+NON-PROBE: NOT DETECTED
HSV2 DNA CSF QL NAA+NON-PROBE: NOT DETECTED
IGG CSF-MCNC: 12 MG/DL (ref 1–3)
IGG INDEX CSF: 0.61
IGG SERPL-MCNC: 1019 MG/DL (ref 700–1600)
IGG SYNTHESIS RATE CSF: 13.2 MG/24 H
IMM GRANULOCYTES # BLD AUTO: 0.05 K/UL (ref 0–0.3)
IMM GRANULOCYTES NFR BLD: 0 %
L MONOCYTOG DNA CSF QL NAA+NON-PROBE: NOT DETECTED
LYMPHOCYTES NFR BLD: 4.69 K/UL (ref 1.1–3.7)
LYMPHOCYTES RELATIVE PERCENT: 41 % (ref 24–43)
MCH RBC QN AUTO: 29.9 PG (ref 25.2–33.5)
MCHC RBC AUTO-ENTMCNC: 31.9 G/DL (ref 28.4–34.8)
MCV RBC AUTO: 93.9 FL (ref 82.6–102.9)
MONOCYTES NFR BLD: 0.57 K/UL (ref 0.1–1.2)
MONOCYTES NFR BLD: 5 % (ref 3–12)
N MEN DNA CSF QL NAA+NON-PROBE: NOT DETECTED
NEUTROPHILS NFR BLD: 52 % (ref 36–65)
NEUTS SEG NFR BLD: 5.83 K/UL (ref 1.5–8.1)
NRBC BLD-RTO: 0 PER 100 WBC
OLIGOCLONAL BANDS: ABNORMAL
PARECHOVIRUS A RNA CSF QL NAA+NON-PROBE: NOT DETECTED
PLATELET # BLD AUTO: 285 K/UL (ref 138–453)
PMV BLD AUTO: 10 FL (ref 8.1–13.5)
POTASSIUM SERPL-SCNC: 3.7 MMOL/L (ref 3.7–5.3)
PROT CSF-MCNC: 114 MG/DL (ref 15–45)
RBC # BLD AUTO: 3.94 M/UL (ref 3.95–5.11)
RBC # FLD MANUAL: 2 CELLS/UL
S PNEUM DNA CSF QL NAA+NON-PROBE: NOT DETECTED
SODIUM SERPL-SCNC: 138 MMOL/L (ref 136–145)
SPECIMEN DESCRIPTION: NORMAL
SPECIMEN VOL CSF: 6 ML
TOPIRAMATE SERPL-MCNC: 16.2 UG/ML (ref 5–20)
TOTAL CELLS COUNTED BRONCH: 2 CELLS/UL (ref 0–5)
TUBE # CSF: 3
VZV DNA CSF QL NAA+NON-PROBE: NOT DETECTED
WBC OTHER # BLD: 11.4 K/UL (ref 3.5–11.3)
XANTHOCHROMIA CSF QL: ABNORMAL

## 2024-12-27 PROCEDURE — 6370000000 HC RX 637 (ALT 250 FOR IP)

## 2024-12-27 PROCEDURE — 82945 GLUCOSE OTHER FLUID: CPT

## 2024-12-27 PROCEDURE — 82947 ASSAY GLUCOSE BLOOD QUANT: CPT

## 2024-12-27 PROCEDURE — 99232 SBSQ HOSP IP/OBS MODERATE 35: CPT | Performed by: PSYCHIATRY & NEUROLOGY

## 2024-12-27 PROCEDURE — 97110 THERAPEUTIC EXERCISES: CPT

## 2024-12-27 PROCEDURE — 85025 COMPLETE CBC W/AUTO DIFF WBC: CPT

## 2024-12-27 PROCEDURE — 82042 OTHER SOURCE ALBUMIN QUAN EA: CPT

## 2024-12-27 PROCEDURE — 97530 THERAPEUTIC ACTIVITIES: CPT

## 2024-12-27 PROCEDURE — 2060000000 HC ICU INTERMEDIATE R&B

## 2024-12-27 PROCEDURE — 62270 DX LMBR SPI PNXR: CPT | Performed by: PSYCHIATRY & NEUROLOGY

## 2024-12-27 PROCEDURE — 83873 ASSAY OF CSF PROTEIN: CPT

## 2024-12-27 PROCEDURE — 6360000004 HC RX CONTRAST MEDICATION: Performed by: PSYCHIATRY & NEUROLOGY

## 2024-12-27 PROCEDURE — 86618 LYME DISEASE ANTIBODY: CPT

## 2024-12-27 PROCEDURE — 87205 SMEAR GRAM STAIN: CPT

## 2024-12-27 PROCEDURE — 87327 CRYPTOCOCCUS NEOFORM AG IA: CPT

## 2024-12-27 PROCEDURE — 009U3ZX DRAINAGE OF SPINAL CANAL, PERCUTANEOUS APPROACH, DIAGNOSTIC: ICD-10-PCS | Performed by: PSYCHIATRY & NEUROLOGY

## 2024-12-27 PROCEDURE — 97162 PT EVAL MOD COMPLEX 30 MIN: CPT

## 2024-12-27 PROCEDURE — 83916 OLIGOCLONAL BANDS: CPT

## 2024-12-27 PROCEDURE — 80048 BASIC METABOLIC PNL TOTAL CA: CPT

## 2024-12-27 PROCEDURE — 89050 BODY FLUID CELL COUNT: CPT

## 2024-12-27 PROCEDURE — 86592 SYPHILIS TEST NON-TREP QUAL: CPT

## 2024-12-27 PROCEDURE — 84157 ASSAY OF PROTEIN OTHER: CPT

## 2024-12-27 PROCEDURE — 87483 CNS DNA AMP PROBE TYPE 12-25: CPT

## 2024-12-27 PROCEDURE — 2500000003 HC RX 250 WO HCPCS

## 2024-12-27 PROCEDURE — 82784 ASSAY IGA/IGD/IGG/IGM EACH: CPT

## 2024-12-27 PROCEDURE — 71260 CT THORAX DX C+: CPT

## 2024-12-27 PROCEDURE — 95720 EEG PHY/QHP EA INCR W/VEEG: CPT | Performed by: PSYCHIATRY & NEUROLOGY

## 2024-12-27 PROCEDURE — 82040 ASSAY OF SERUM ALBUMIN: CPT

## 2024-12-27 PROCEDURE — 4A10X4Z MONITORING OF CENTRAL NERVOUS ELECTRICAL ACTIVITY, EXTERNAL APPROACH: ICD-10-PCS | Performed by: PSYCHIATRY & NEUROLOGY

## 2024-12-27 PROCEDURE — 36415 COLL VENOUS BLD VENIPUNCTURE: CPT

## 2024-12-27 PROCEDURE — 87070 CULTURE OTHR SPECIMN AEROBIC: CPT

## 2024-12-27 PROCEDURE — 95714 VEEG EA 12-26 HR UNMNTR: CPT

## 2024-12-27 RX ORDER — IOPAMIDOL 755 MG/ML
100 INJECTION, SOLUTION INTRAVASCULAR
Status: COMPLETED | OUTPATIENT
Start: 2024-12-27 | End: 2024-12-27

## 2024-12-27 RX ORDER — LIDOCAINE HYDROCHLORIDE 10 MG/ML
5 INJECTION, SOLUTION EPIDURAL; INFILTRATION; INTRACAUDAL; PERINEURAL ONCE
Status: DISCONTINUED | OUTPATIENT
Start: 2024-12-27 | End: 2024-12-27

## 2024-12-27 RX ORDER — LACOSAMIDE 100 MG/1
100 TABLET ORAL 2 TIMES DAILY
Status: DISCONTINUED | OUTPATIENT
Start: 2024-12-27 | End: 2025-01-02 | Stop reason: HOSPADM

## 2024-12-27 RX ORDER — LIDOCAINE HYDROCHLORIDE 20 MG/ML
5 INJECTION, SOLUTION INFILTRATION; PERINEURAL ONCE
Status: DISCONTINUED | OUTPATIENT
Start: 2024-12-27 | End: 2025-01-02 | Stop reason: HOSPADM

## 2024-12-27 RX ADMIN — AMLODIPINE BESYLATE 5 MG: 5 TABLET ORAL at 09:39

## 2024-12-27 RX ADMIN — FUROSEMIDE 20 MG: 40 TABLET ORAL at 09:39

## 2024-12-27 RX ADMIN — SODIUM CHLORIDE, PRESERVATIVE FREE 10 ML: 5 INJECTION INTRAVENOUS at 09:39

## 2024-12-27 RX ADMIN — ATORVASTATIN CALCIUM 20 MG: 20 TABLET, FILM COATED ORAL at 09:39

## 2024-12-27 RX ADMIN — BENZTROPINE MESYLATE 0.5 MG: 0.5 TABLET ORAL at 22:45

## 2024-12-27 RX ADMIN — FERROUS SULFATE TAB EC 325 MG (65 MG FE EQUIVALENT) 325 MG: 325 (65 FE) TABLET DELAYED RESPONSE at 12:22

## 2024-12-27 RX ADMIN — BENZTROPINE MESYLATE 0.5 MG: 0.5 TABLET ORAL at 10:12

## 2024-12-27 RX ADMIN — IOPAMIDOL 100 ML: 755 INJECTION, SOLUTION INTRAVENOUS at 22:53

## 2024-12-27 RX ADMIN — INSULIN LISPRO 4 UNITS: 100 INJECTION, SOLUTION INTRAVENOUS; SUBCUTANEOUS at 12:22

## 2024-12-27 RX ADMIN — INSULIN LISPRO 2 UNITS: 100 INJECTION, SOLUTION INTRAVENOUS; SUBCUTANEOUS at 20:43

## 2024-12-27 RX ADMIN — TOPIRAMATE 100 MG: 200 TABLET, FILM COATED ORAL at 20:44

## 2024-12-27 RX ADMIN — INSULIN LISPRO 4 UNITS: 100 INJECTION, SOLUTION INTRAVENOUS; SUBCUTANEOUS at 18:25

## 2024-12-27 RX ADMIN — LACOSAMIDE 100 MG: 100 TABLET, FILM COATED ORAL at 09:39

## 2024-12-27 RX ADMIN — LACOSAMIDE 100 MG: 100 TABLET, FILM COATED ORAL at 20:42

## 2024-12-27 RX ADMIN — FERROUS SULFATE TAB EC 325 MG (65 MG FE EQUIVALENT) 325 MG: 325 (65 FE) TABLET DELAYED RESPONSE at 09:38

## 2024-12-27 RX ADMIN — ESCITALOPRAM OXALATE 10 MG: 10 TABLET ORAL at 09:38

## 2024-12-27 RX ADMIN — INSULIN LISPRO 4 UNITS: 100 INJECTION, SOLUTION INTRAVENOUS; SUBCUTANEOUS at 10:13

## 2024-12-27 RX ADMIN — SODIUM CHLORIDE, PRESERVATIVE FREE 10 ML: 5 INJECTION INTRAVENOUS at 20:43

## 2024-12-27 RX ADMIN — EMPAGLIFLOZIN 10 MG: 10 TABLET, FILM COATED ORAL at 09:39

## 2024-12-27 RX ADMIN — INSULIN GLARGINE 15 UNITS: 100 INJECTION, SOLUTION SUBCUTANEOUS at 20:42

## 2024-12-27 RX ADMIN — TOPIRAMATE 100 MG: 200 TABLET, FILM COATED ORAL at 09:44

## 2024-12-27 RX ADMIN — INSULIN LISPRO 1 UNITS: 100 INJECTION, SOLUTION INTRAVENOUS; SUBCUTANEOUS at 12:21

## 2024-12-27 RX ADMIN — FERROUS SULFATE TAB EC 325 MG (65 MG FE EQUIVALENT) 325 MG: 325 (65 FE) TABLET DELAYED RESPONSE at 18:25

## 2024-12-27 RX ADMIN — INSULIN LISPRO 1 UNITS: 100 INJECTION, SOLUTION INTRAVENOUS; SUBCUTANEOUS at 09:42

## 2024-12-27 RX ADMIN — Medication 1 TABLET: at 10:11

## 2024-12-27 NOTE — PROCEDURES
LONG-TERM EEG-VIDEO MONITORING   CLINICAL NEUROPHYSIOLOGY LABORATORY  DEPARTMENT OF NEUROLOGY  [facility]    Patient: Magui Thayer  Age: 59 y.o.  MRN: [id number]    Referring Physician: No ref. provider found  History: The patient is a 59 y.o. female who presented with altered mentation, found to have possible ICH. This long-term video-EEG monitoring study was performed to evaluate for seizures. The patient is on neuroactive medications.   Magui Thayer   Current Facility-Administered Medications   Medication Dose Route Frequency Provider Last Rate Last Admin    lacosamide (VIMPAT) tablet 100 mg  100 mg Oral BID Ry Mauricio MD        topiramate (TOPAMAX) tablet 100 mg  100 mg Oral BID Evelyn Cobian MD   100 mg at 12/26/24 2129    hydrALAZINE (APRESOLINE) injection 10 mg  10 mg IntraVENous Q8H PRN Lamar Groves APRN - NP   10 mg at 12/26/24 1542    insulin lispro (HUMALOG,ADMELOG) injection vial 0-4 Units  0-4 Units SubCUTAneous 4x Daily AC & HS Keesha Matias MD   2 Units at 12/26/24 1802    amLODIPine (NORVASC) tablet 5 mg  5 mg Oral Daily Keesha Matias MD   5 mg at 12/26/24 0913    [Held by provider] aspirin EC tablet 81 mg  81 mg Oral Daily Keesha Matias MD        atorvastatin (LIPITOR) tablet 20 mg  20 mg Oral Daily Keesha Matias MD   20 mg at 12/26/24 0913    benztropine (COGENTIN) tablet 0.5 mg  0.5 mg Oral BID Keesha Matias MD   0.5 mg at 12/26/24 2129    [Held by provider] cyclobenzaprine (FLEXERIL) tablet 10 mg  10 mg Oral Q8H PRN Keesha Matias MD        escitalopram (LEXAPRO) tablet 10 mg  10 mg Oral Daily Keesha Matias MD   10 mg at 12/26/24 0912    ferrous sulfate (FE TABS 325) EC tablet 325 mg  325 mg Oral TID WC Keesha Matias MD   325 mg at 12/26/24 1513    furosemide (LASIX) tablet 20 mg  20 mg Oral Daily Keesha Matias MD   20 mg at 12/26/24 0913    insulin glargine (LANTUS) injection vial 15 Units  15 Units SubCUTAneous Nightly Polly  MD Keesha   15 Units at 12/26/24 2128    therapeutic multivitamin-minerals 1 tablet  1 tablet Oral Daily Keesha Matias MD   1 tablet at 12/26/24 0915    glucose chewable tablet 16 g  4 tablet Oral PRN Keesha Matias MD        dextrose bolus 10% 125 mL  125 mL IntraVENous PRN Keesha Matias MD        Or    dextrose bolus 10% 250 mL  250 mL IntraVENous PRN Keesha Matias MD        glucagon injection 1 mg  1 mg SubCUTAneous PRN Keesha Matias MD        dextrose 10 % infusion   IntraVENous Continuous PRN Keesha Matias MD        sodium chloride flush 0.9 % injection 5-40 mL  5-40 mL IntraVENous 2 times per day Keesha Matias MD   10 mL at 12/26/24 2237    sodium chloride flush 0.9 % injection 5-40 mL  5-40 mL IntraVENous PRN Keesha Matias MD        0.9 % sodium chloride infusion   IntraVENous PRN Keesha Matias MD        potassium chloride (KLOR-CON M) extended release tablet 40 mEq  40 mEq Oral PRN Keesha Matias MD        Or    potassium bicarb-citric acid (EFFER-K) effervescent tablet 40 mEq  40 mEq Oral PRN Keesha Matias MD        Or    potassium chloride 10 mEq/100 mL IVPB (Peripheral Line)  10 mEq IntraVENous PRN Keesha Matias MD        magnesium sulfate 2000 mg in 50 mL IVPB premix  2,000 mg IntraVENous PRN Keesha Matias MD        ondansetron (ZOFRAN-ODT) disintegrating tablet 4 mg  4 mg Oral Q8H PRN Keesha Matias MD        Or    ondansetron (ZOFRAN) injection 4 mg  4 mg IntraVENous Q6H PRN Keesha Matias MD        polyethylene glycol (GLYCOLAX) packet 17 g  17 g Oral Daily PRN Keesha Matias MD        acetaminophen (TYLENOL) tablet 650 mg  650 mg Oral Q6H PRN Keesha Matias MD        Or    acetaminophen (TYLENOL) suppository 650 mg  650 mg Rectal Q6H PRN Keesha Matias MD        insulin lispro (HUMALOG,ADMELOG) injection vial 4 Units  4 Units SubCUTAneous TID WC Keesha Matias MD   4 Units at 12/26/24 1801    empagliflozin (JARDIANCE) tablet 10 mg  10 mg Oral Daily

## 2024-12-27 NOTE — PROGRESS NOTES
tablet by mouth daily 4/20/24   Florencia Fernandez MD   cyclobenzaprine (FLEXERIL) 10 MG tablet Take 1 tablet by mouth every 8 hours as needed for Muscle spasms 4/18/24   Keesha Matias MD   blood glucose test strips (TRUE METRIX BLOOD GLUCOSE TEST) strip USE STRIP TO TEST BLOOD GLUCOSE FOUR TIMES A DAY AS NEEDED 12/12/23   Keesha Matias MD   SITagliptin (JANUVIA) 100 MG tablet TAKE 1 TABLET BY MOUTH DAILY 9/14/23 4/18/24  Keesha Matias MD   Lancets MISC 1 each by Does not apply route daily 7/6/23   Dana Hatch MD   blood glucose monitor strips Test 3 times a day & as needed for symptoms of irregular blood glucose. Dispense sufficient amount for indicated testing frequency plus additional to accommodate PRN testing needs. 7/6/23   Dana Hatch MD   glucose monitoring (FREESTYLE FREEDOM) kit Please check blood glucose 3 times daily 7/6/23   Dana Hatch MD   aspirin (ASPIRIN LOW DOSE) 81 MG EC tablet TAKE 1 TABLET BY MOUTH ONCE DAILY 3/10/23   Karen Chavez MD   OneTouch Delica Lancets 33G MISC USE TO TEST BLOOD SUGAR DAILY 8/12/22   Abundio Tao MD   escitalopram (LEXAPRO) 10 MG tablet Take 1 tablet by mouth daily 5/13/22   Flores Oneill MD   hydrOXYzine (ATARAX) 25 MG tablet TAKE 1 TABLET BY MOUTH TWICE DAILY AS NEEDED 5/18/22   Flores Oneill MD   mirtazapine (REMERON) 15 MG tablet TAKE 1 TABLET BY MOUTH AT BEDTIME AS NEEDED 5/18/22   Flores Oneill MD   Lancets (ONETOUCH DELICA PLUS [id number]) MISC USE TO TEST BLOOD SUGAR DAILY 5/13/22   Addi Epps MD   Insulin Pen Needle 31G X 8 MM MISC 1 each by Does not apply route daily 12/31/21   Kelsey Hodgson MD   Insulin Pen Needle 31G X 6 MM MISC 1 each by Does not apply route daily 12/31/21   Kelsey Hodgson MD   Lancets (ONETOUCH DELICA PLUS [id number]) MISC Check blood sugars 3 times daily 3/17/20   Itzel Moseley MD   Multiple Vitamins-Minerals (MULTIVITAMIN-MINERALS)  AP/AC  - Keep SBP less than 140  - As needed hydralazine and labetalol  - Neurosurgery following    DMT2  - Hypoglycemia treatment protocol  - POCT glucose checks  - MDSS  - Medicine following    Hypertension  HFrEF  Type II MI  - Continue home medications amlodipine 5 mg daily  - Continue Lasix 20 mg  - Last echo 4/2024 EF 20-25%, diastolic  - Cardiology following          Consultations:   IP CONSULT TO NEUROSURGERY  IP CONSULT TO FAMILY MEDICINE  IP CONSULT TO SOCIAL WORK  IP CONSULT TO SPIRITUAL SERVICES  IP CONSULT TO CARDIOLOGY  IP CONSULT TO NEUROLOGY  IP CONSULT TO CARDIOLOGY  IP CONSULT TO SPIRITUAL SERVICES      Evelyn Cobian MD  PGY2 Neurology Resident  12/27/2024  2:19 PM    Copy sent to Keesha Matias MD

## 2024-12-27 NOTE — PROCEDURES
PROCEDURE NOTE  Date: 12/27/2024   Name: Magui Thayer  YOB: 1965    Lumbar Puncture    Indications: Diagnostic LP for possible encephalitis vs autoimmune process    Procedure Details     Consent: Indications, the nature of the procedure, as well as risks of the procedure were discussed including: infection, bleeding, and pain. Informed consent was obtained from the patient. As well as the patient's daughter, Judith Thayer, over the phone, by myself with the RN as a witness.     The patient was positioned on in the left decubitus position in her room. Under sterile conditions, chlorhexidine solution was used to prep the skin; sterile drape was applied. Using the Tuffier's line, the L4-L5 interspace was identified, and the skin over it was injected with 1% lidocaine solution. A spinal needle was inserted at that level. The initial attempt failed as the needle encountered a bony structure. Another attempt was made one level below after anesthetizing the skin in a similar fashion. This attempt was a failure as well for the same reason. At this point, the patient was moving around and not maintaining a proper posture.  The patient was repositioned.     A third attempt was made at the L4-L5 interspace, and a clear fluid was obtained. Opening pressure was 15mm of H2O. 7.5 ml of clear CSF was collected. Spinal needle was removed. A sterile dressing applied. Patient laid flat.    Complications:  None. Patient tolerated the procedure well.    Recommendations:  Lay the patient flat for 2 hours.     I have discussed the case of Magui Thayer, including pertinent history and exam findings with the resident. I have seen and examined the patient and the key elements of the encounter have been performed by me. I agree with the assessment, plan and orders as documented by the resident with changes made to the note as needed.       Dana Torres MD  Neurology    This note is created with the assistance of a

## 2024-12-27 NOTE — PLAN OF CARE
Neurosurgery update  MRI completed and reviewed with DR gaines      1. T2 hypointense signal within the right hippocampal cortex without  restricted diffusion or enhancement. This may correspond to the hyperdense  appearance in this region on comparison CT and can be seen in the setting of  hyperosmolar nonketotic hyperglycemic seizure.  2. T2/FLAIR hyperintense signal within the left hippocampus and left medial  temporal lobe, which could be secondary to postictal change, encephalitis,  metabolic/toxic process, or subacute ischemia.    No acute neurosurgical interventions planned  We will sign off    Electronically signed by MONCHO Alas NP on 12/27/2024 at 9:24 AM

## 2024-12-27 NOTE — PLAN OF CARE
Adult  Goal: Achieves stable or improved neurological status  12/26/2024 2228 by Jess Silver RN  Outcome: Progressing  12/26/2024 2228 by Jess Silver RN  Outcome: Progressing  Goal: Achieves maximal functionality and self care  12/26/2024 2228 by Jess Silver, RN  Outcome: Progressing  12/26/2024 2228 by Jess Silver, RN  Outcome: Progressing     Problem: Respiratory - Adult  Goal: Achieves optimal ventilation and oxygenation  12/26/2024 2228 by Jess Silver, RN  Outcome: Progressing  12/26/2024 2228 by Jess Silver, RN  Outcome: Progressing     Problem: Cardiovascular - Adult  Goal: Maintains optimal cardiac output and hemodynamic stability  12/26/2024 2228 by Jess Silver, RN  Outcome: Progressing  12/26/2024 2228 by Jess Silver, RN  Outcome: Progressing     Problem: Skin/Tissue Integrity - Adult  Goal: Skin integrity remains intact  12/26/2024 2228 by Jess Silver, RN  Outcome: Progressing  12/26/2024 2228 by Jess Silver, RN  Outcome: Progressing     Problem: Musculoskeletal - Adult  Goal: Return mobility to safest level of function  12/26/2024 2228 by Jess Silver, RN  Outcome: Progressing  12/26/2024 2228 by Jess Silver, RN  Outcome: Progressing  Goal: Return ADL status to a safe level of function  12/26/2024 2228 by Jess Silver, RN  Outcome: Progressing  12/26/2024 2228 by Jess Silver, RN  Outcome: Progressing     Problem: Gastrointestinal - Adult  Goal: Maintains adequate nutritional intake  12/26/2024 2228 by Jess Silver RN  Outcome: Progressing  12/26/2024 2228 by Jess Silver RN  Outcome: Progressing     Problem: Metabolic/Fluid and Electrolytes - Adult  Goal: Electrolytes maintained within normal limits  12/26/2024 2228 by Jess Silver, RN  Outcome: Progressing  12/26/2024 2228 by Nadeem, Jess M, RN  Outcome: Progressing  Goal: Hemodynamic stability and optimal renal function maintained  12/26/2024 2228 by Jess Silver, RN  Outcome:

## 2024-12-27 NOTE — PLAN OF CARE
Got message from Dr. Whitaker that she had 3 subclinical seizures during the day.  Discussed with Dr. Torres, we will load her with Vimpat of 200 Mg twice daily and continue with Vimpat 200mg twice daily from tomorrow.    Plan: Continue LTME    Electronically signed by Ry Mauricio MD on 12/26/2024 at 10:33 PM

## 2024-12-27 NOTE — PROCEDURES
LONG-TERM EEG-VIDEO MONITORING   CLINICAL NEUROPHYSIOLOGY LABORATORY  DEPARTMENT OF NEUROLOGY  [facility]    Patient: Magui Thayer  Age: 59 y.o.  MRN: [id number]    Referring Physician: No ref. provider found  History: The patient is a 59 y.o. female who presented with altered mentation, found to have possible ICH. This long-term video-EEG monitoring study was performed to evaluate for seizures. The patient is on neuroactive medications.   Magui Thayer   Current Facility-Administered Medications   Medication Dose Route Frequency Provider Last Rate Last Admin    topiramate (TOPAMAX) tablet 100 mg  100 mg Oral BID Evelyn Cobian MD   100 mg at 12/26/24 2129    hydrALAZINE (APRESOLINE) injection 10 mg  10 mg IntraVENous Q8H PRN Lamar Groves APRN - NP   10 mg at 12/26/24 1542    insulin lispro (HUMALOG,ADMELOG) injection vial 0-4 Units  0-4 Units SubCUTAneous 4x Daily AC & HS Keesha Matias MD   2 Units at 12/26/24 1802    amLODIPine (NORVASC) tablet 5 mg  5 mg Oral Daily Keesha Matias MD   5 mg at 12/26/24 0913    [Held by provider] aspirin EC tablet 81 mg  81 mg Oral Daily Keesha Matias MD        atorvastatin (LIPITOR) tablet 20 mg  20 mg Oral Daily Keesha Matias MD   20 mg at 12/26/24 0913    benztropine (COGENTIN) tablet 0.5 mg  0.5 mg Oral BID Keesha Matias MD   0.5 mg at 12/26/24 2129    [Held by provider] cyclobenzaprine (FLEXERIL) tablet 10 mg  10 mg Oral Q8H PRN Keesha Matias MD        escitalopram (LEXAPRO) tablet 10 mg  10 mg Oral Daily Keesha Matias MD   10 mg at 12/26/24 0912    ferrous sulfate (FE TABS 325) EC tablet 325 mg  325 mg Oral TID WC Keesha Matias MD   325 mg at 12/26/24 1513    furosemide (LASIX) tablet 20 mg  20 mg Oral Daily Keesha Matias MD   20 mg at 12/26/24 0913    insulin glargine (LANTUS) injection vial 15 Units  15 Units SubCUTAneous Nightly Keesha Matias MD   15 Units at 12/26/24 2128    therapeutic multivitamin-minerals 1  RDA  -Frequent independent left posterior quadrant slowing  -Occasional right temporal slowing  -Four left temporal onset electrographic seizures    The background abnormalities indicated a mild-moderate encephalopathy, nonspecific to etiology.  The bitemporal slowing, L>R, and left posterior quadrant slowing indicated an underlying focal neuronal dysfunction.  The left temporal RDA indicated an increased risk of focal onset seizures. Four left temporal onset electrographic seizures were recorded.    Inpatient neurology team updated.    Monitoring was continued to evaluate for seizures. The EKG channel revealed significant artifact, limiting interpretation.    Please note this is a preliminary report and updated daily. The final report will have a summary of behavior and electrographic findings with clinical correlation.    Ez Whitaker, DO  Neurology/Epilepsy

## 2024-12-27 NOTE — PROGRESS NOTES
Physical Therapy  Facility/Department: [address] NEURO  Physical Therapy Initial Assessment    Name: Magui Thayer  : 1965  MRN: [id number]  Date of Service: 2024    The patient is a 59 y.o.  female with history of type 2 diabetes on insulin, HFrEF with EF 20 to 25%, CAD, STORMY who presented with AMS x 1 week.  Patient accompanied by her best friend and uncle who states that the patient has been acting confused over the last week such as forgetting tasks, getting up in the middle of the night to cook, forgetting about  family members.  Her uncle states that a couple of days ago she seemed to be very weak and was slurring her speech while out shopping with them.  She is able to speak in full sentences, no neurological deficits, only alert and oriented to self.  Family states that she is likely not been taking her medications this past week due to the confusion.  Patient was encouraged to go to the ER by her brother throughout the week but she had refused until she was convinced by her best friend today.  Went to [facility], where CT head without contrast showed intraventricular hemorrhage in the temporal horn and atrium of the right lateral ventricle, and BG of 698 without anion gap.  Transferred to [facility] for neurosurgery evaluation of intracranial bleed     Discharge Recommendations:  No further therapy required at discharge.         PT Equipment Recommendations  Equipment Needed: No      Patient Diagnosis(es): The primary encounter diagnosis was Confusion. A diagnosis of Intraventricular hemorrhage (HCC) was also pertinent to this visit.  Past Medical History:  has a past medical history of Breast cancer (HCC), Cancer (HCC), Conjunctivitis, Depression, Disorder of patellofemoral joint, DJD (degenerative joint disease) of knee, Hyperlipidemia, Hypertension, Malignant neoplasm of nipple of left breast in female, estrogen receptor positive (HCC), Pain, knee, Primary osteoarthritis

## 2024-12-28 LAB
ANION GAP SERPL CALCULATED.3IONS-SCNC: 9 MMOL/L (ref 9–16)
BASOPHILS # BLD: 0.05 K/UL (ref 0–0.2)
BASOPHILS NFR BLD: 0 % (ref 0–2)
BUN SERPL-MCNC: 13 MG/DL (ref 6–20)
CALCIUM SERPL-MCNC: 8.8 MG/DL (ref 8.6–10.4)
CHLORIDE SERPL-SCNC: 109 MMOL/L (ref 98–107)
CO2 SERPL-SCNC: 19 MMOL/L (ref 20–31)
CREAT SERPL-MCNC: 0.9 MG/DL (ref 0.6–0.9)
CRYPTOC AG CSF QL: NEGATIVE
EOSINOPHIL # BLD: 0.18 K/UL (ref 0–0.44)
EOSINOPHILS RELATIVE PERCENT: 2 % (ref 1–4)
ERYTHROCYTE [DISTWIDTH] IN BLOOD BY AUTOMATED COUNT: 12.8 % (ref 11.8–14.4)
GFR, ESTIMATED: 74 ML/MIN/1.73M2
GLUCOSE BLD-MCNC: 133 MG/DL (ref 65–105)
GLUCOSE BLD-MCNC: 308 MG/DL (ref 65–105)
GLUCOSE BLD-MCNC: 377 MG/DL (ref 65–105)
GLUCOSE BLD-MCNC: 416 MG/DL (ref 65–105)
GLUCOSE SERPL-MCNC: 130 MG/DL (ref 74–99)
HCT VFR BLD AUTO: 38.2 % (ref 36.3–47.1)
HGB BLD-MCNC: 11.5 G/DL (ref 11.9–15.1)
IMM GRANULOCYTES # BLD AUTO: 0.06 K/UL (ref 0–0.3)
IMM GRANULOCYTES NFR BLD: 1 %
LYMPHOCYTES NFR BLD: 4.39 K/UL (ref 1.1–3.7)
LYMPHOCYTES RELATIVE PERCENT: 37 % (ref 24–43)
MCH RBC QN AUTO: 29.5 PG (ref 25.2–33.5)
MCHC RBC AUTO-ENTMCNC: 30.1 G/DL (ref 28.4–34.8)
MCV RBC AUTO: 97.9 FL (ref 82.6–102.9)
MONOCYTES NFR BLD: 0.66 K/UL (ref 0.1–1.2)
MONOCYTES NFR BLD: 6 % (ref 3–12)
NEUTROPHILS NFR BLD: 54 % (ref 36–65)
NEUTS SEG NFR BLD: 6.5 K/UL (ref 1.5–8.1)
NRBC BLD-RTO: 0 PER 100 WBC
PLATELET # BLD AUTO: 295 K/UL (ref 138–453)
PMV BLD AUTO: 9.9 FL (ref 8.1–13.5)
POTASSIUM SERPL-SCNC: 3.6 MMOL/L (ref 3.7–5.3)
RBC # BLD AUTO: 3.9 M/UL (ref 3.95–5.11)
SEND OUT REPORT: NORMAL
SODIUM SERPL-SCNC: 137 MMOL/L (ref 136–145)
TEST NAME: NORMAL
WBC OTHER # BLD: 11.8 K/UL (ref 3.5–11.3)

## 2024-12-28 PROCEDURE — 2060000000 HC ICU INTERMEDIATE R&B

## 2024-12-28 PROCEDURE — 2500000003 HC RX 250 WO HCPCS

## 2024-12-28 PROCEDURE — 95714 VEEG EA 12-26 HR UNMNTR: CPT

## 2024-12-28 PROCEDURE — 80048 BASIC METABOLIC PNL TOTAL CA: CPT

## 2024-12-28 PROCEDURE — 85025 COMPLETE CBC W/AUTO DIFF WBC: CPT

## 2024-12-28 PROCEDURE — 99232 SBSQ HOSP IP/OBS MODERATE 35: CPT | Performed by: PSYCHIATRY & NEUROLOGY

## 2024-12-28 PROCEDURE — 82947 ASSAY GLUCOSE BLOOD QUANT: CPT

## 2024-12-28 PROCEDURE — 6370000000 HC RX 637 (ALT 250 FOR IP)

## 2024-12-28 PROCEDURE — 95720 EEG PHY/QHP EA INCR W/VEEG: CPT | Performed by: PSYCHIATRY & NEUROLOGY

## 2024-12-28 PROCEDURE — 6360000002 HC RX W HCPCS

## 2024-12-28 PROCEDURE — 2580000003 HC RX 258

## 2024-12-28 PROCEDURE — 36415 COLL VENOUS BLD VENIPUNCTURE: CPT

## 2024-12-28 RX ORDER — INSULIN LISPRO 100 [IU]/ML
0-16 INJECTION, SOLUTION INTRAVENOUS; SUBCUTANEOUS
Status: DISCONTINUED | OUTPATIENT
Start: 2024-12-28 | End: 2025-01-02 | Stop reason: HOSPADM

## 2024-12-28 RX ORDER — INSULIN LISPRO 100 [IU]/ML
0-8 INJECTION, SOLUTION INTRAVENOUS; SUBCUTANEOUS
Status: DISCONTINUED | OUTPATIENT
Start: 2024-12-28 | End: 2024-12-28

## 2024-12-28 RX ADMIN — BENZTROPINE MESYLATE 0.5 MG: 0.5 TABLET ORAL at 09:06

## 2024-12-28 RX ADMIN — TOPIRAMATE 100 MG: 200 TABLET, FILM COATED ORAL at 21:32

## 2024-12-28 RX ADMIN — BENZTROPINE MESYLATE 0.5 MG: 0.5 TABLET ORAL at 21:31

## 2024-12-28 RX ADMIN — INSULIN LISPRO 4 UNITS: 100 INJECTION, SOLUTION INTRAVENOUS; SUBCUTANEOUS at 09:06

## 2024-12-28 RX ADMIN — Medication 1 TABLET: at 09:06

## 2024-12-28 RX ADMIN — INSULIN GLARGINE 15 UNITS: 100 INJECTION, SOLUTION SUBCUTANEOUS at 22:57

## 2024-12-28 RX ADMIN — FERROUS SULFATE TAB EC 325 MG (65 MG FE EQUIVALENT) 325 MG: 325 (65 FE) TABLET DELAYED RESPONSE at 17:07

## 2024-12-28 RX ADMIN — TOPIRAMATE 100 MG: 200 TABLET, FILM COATED ORAL at 09:04

## 2024-12-28 RX ADMIN — SODIUM CHLORIDE, PRESERVATIVE FREE 10 ML: 5 INJECTION INTRAVENOUS at 22:56

## 2024-12-28 RX ADMIN — ESCITALOPRAM OXALATE 10 MG: 10 TABLET ORAL at 09:06

## 2024-12-28 RX ADMIN — INSULIN LISPRO 4 UNITS: 100 INJECTION, SOLUTION INTRAVENOUS; SUBCUTANEOUS at 17:06

## 2024-12-28 RX ADMIN — AMLODIPINE BESYLATE 5 MG: 5 TABLET ORAL at 09:06

## 2024-12-28 RX ADMIN — INSULIN LISPRO 4 UNITS: 100 INJECTION, SOLUTION INTRAVENOUS; SUBCUTANEOUS at 12:29

## 2024-12-28 RX ADMIN — ATORVASTATIN CALCIUM 20 MG: 20 TABLET, FILM COATED ORAL at 09:06

## 2024-12-28 RX ADMIN — LACOSAMIDE 100 MG: 100 TABLET, FILM COATED ORAL at 09:06

## 2024-12-28 RX ADMIN — LACOSAMIDE 100 MG: 100 TABLET, FILM COATED ORAL at 21:31

## 2024-12-28 RX ADMIN — FERROUS SULFATE TAB EC 325 MG (65 MG FE EQUIVALENT) 325 MG: 325 (65 FE) TABLET DELAYED RESPONSE at 09:06

## 2024-12-28 RX ADMIN — INSULIN LISPRO 16 UNITS: 100 INJECTION, SOLUTION INTRAVENOUS; SUBCUTANEOUS at 17:06

## 2024-12-28 RX ADMIN — FUROSEMIDE 20 MG: 40 TABLET ORAL at 09:05

## 2024-12-28 RX ADMIN — SODIUM CHLORIDE, PRESERVATIVE FREE 10 ML: 5 INJECTION INTRAVENOUS at 09:08

## 2024-12-28 RX ADMIN — METHYLPREDNISOLONE SODIUM SUCCINATE 500 MG: 1 INJECTION, POWDER, LYOPHILIZED, FOR SOLUTION INTRAMUSCULAR; INTRAVENOUS at 22:54

## 2024-12-28 RX ADMIN — FERROUS SULFATE TAB EC 325 MG (65 MG FE EQUIVALENT) 325 MG: 325 (65 FE) TABLET DELAYED RESPONSE at 12:32

## 2024-12-28 RX ADMIN — EMPAGLIFLOZIN 10 MG: 10 TABLET, FILM COATED ORAL at 09:06

## 2024-12-28 RX ADMIN — METHYLPREDNISOLONE SODIUM SUCCINATE 500 MG: 1 INJECTION, POWDER, LYOPHILIZED, FOR SOLUTION INTRAMUSCULAR; INTRAVENOUS at 09:21

## 2024-12-28 NOTE — PROGRESS NOTES
[facility] Neu[facility]   IN-PATIENT SERVICE   [facility]    Progress Note             Date:   12/28/2024  Patient name:  Magui Thayer  Date of admission:  12/24/2024  6:31 PM  MRN:   [id number]  Account:  [id number]  YOB: 1965  PCP:    Keesha Matias MD  Room:   [address] [place] [address]  Code Status:    Full Code    Chief Complaint:     Chief Complaint   Patient presents with    Altered Mental Status    Hyperglycemia       Interval hx:     Patient seen and examined at bedside this morning.  Oriented to self and place. More alert as compared to yesterday.  Neurological exam otherwise unchanged from yesterday.      S/p LP, f/u paraneoplastic/autoimmune panel   DC LTME        Brief History of Present Illness:     As per H&P/initial consult note:      Reason for Consult: Intracranial Hemorrhage and Altered Mental Status     I had the pleasure of seeing your patient in neurology consultation for her symptoms of confusion and altered mental status. As you would recall, this is a 59-year-old female with a complex past medical history, including type II diabetes mellitus, bipolar disorder, PTSD, a history of invasive ductal carcinoma of the left breast, and a previous diagnosis of degenerative joint disease, among other conditions.     Her family reports a three-year history of progressive cognitive decline, with worsening short-term memory, episodic confusion, personality changes, and visual hallucinations. Over the past week, these symptoms have significantly worsened, with repetitive speech and increased confusion being noted. She presented to an outside hospital, where CT imaging revealed an acute intraventricular hemorrhage in the temporal horn and atrium of the right lateral ventricle. She was subsequently transferred for further evaluation.     The patient has no reported history of recent trauma, alcohol or substance use, or significant medication changes. Notably, the family recalls a prior  [E11.65, Z79.4] 04/25/2014    Iron deficiency anemia [D50.9] 03/12/2013     59 female with worsening confusion over the past week, acute on chronic 3-year history of progressive cognitive decline.  CT head showing acute intraventricular hemorrhage temporal horn atrium right lateral ventricle      Plan:         IVH R temporal horn and atrium lateral ventricle  - Continue Topamax 100 mg twice daily and vimpat 100mg bid  - Continue Solu-Medrol 500 q12h  - LTME discontinued   - MRI brain completed  - S/p LP 12/27; f/u csf studies, paraneoplastic/autoimmune panel   - Holding AP/AC  - Keep SBP less than 140  - As needed hydralazine and labetalol  - Neurosurgery following    DMT2  - Hypoglycemia treatment protocol  - POCT glucose checks  - HDSS  - Medicine following    Hypertension  HFrEF  Type II MI  - Continue home medications amlodipine 5 mg daily  - Continue Lasix 20 mg  - Last echo 4/2024 EF 20-25%, diastolic  - Cardiology following          Consultations:   IP CONSULT TO NEUROSURGERY  IP CONSULT TO FAMILY MEDICINE  IP CONSULT TO SOCIAL WORK  IP CONSULT TO SPIRITUAL SERVICES  IP CONSULT TO CARDIOLOGY  IP CONSULT TO NEUROLOGY  IP CONSULT TO CARDIOLOGY  IP CONSULT TO SPIRITUAL SERVICES      Evelyn Cobian MD  PGY2 Neurology Resident  12/28/2024  9:50 AM    Copy sent to Keesha Matias MD

## 2024-12-28 NOTE — PROCEDURES
LONG-TERM EEG-VIDEO MONITORING   CLINICAL NEUROPHYSIOLOGY LABORATORY  DEPARTMENT OF NEUROLOGY  [facility]    Patient: Magui Thayer  Age: 59 y.o.  MRN: [id number]    Referring Physician: No ref. provider found  History: The patient is a 59 y.o. female who presented with altered mentation, found to have possible ICH. This long-term video-EEG monitoring study was performed to evaluate for seizures. The patient is on neuroactive medications.   Magui Thayer   Current Facility-Administered Medications   Medication Dose Route Frequency Provider Last Rate Last Admin    lacosamide (VIMPAT) tablet 100 mg  100 mg Oral BID Ry Mauricio MD   100 mg at 12/27/24 2042    lidocaine 2 % injection 5 mL  5 mL IntraDERmal Once Morales Miranda MD        methylPREDNISolone sodium (SOLU-MEDROL) 500 mg in sodium chloride 0.9 % 250 mL IVPB  500 mg IntraVENous Q12H Ry Mauricio MD        topiramate (TOPAMAX) tablet 100 mg  100 mg Oral BID Evelyn Cobian MD   100 mg at 12/27/24 2044    hydrALAZINE (APRESOLINE) injection 10 mg  10 mg IntraVENous Q8H PRN Lamar Groves APRN - NP   10 mg at 12/26/24 1542    insulin lispro (HUMALOG,ADMELOG) injection vial 0-4 Units  0-4 Units SubCUTAneous 4x Daily AC & HS Keesha Matias MD   2 Units at 12/27/24 2043    amLODIPine (NORVASC) tablet 5 mg  5 mg Oral Daily Keesha Matias MD   5 mg at 12/27/24 0939    [Held by provider] aspirin EC tablet 81 mg  81 mg Oral Daily Keesha Matias MD        atorvastatin (LIPITOR) tablet 20 mg  20 mg Oral Daily Keesha Matias MD   20 mg at 12/27/24 0939    benztropine (COGENTIN) tablet 0.5 mg  0.5 mg Oral BID Keesha Matias MD   0.5 mg at 12/27/24 2245    [Held by provider] cyclobenzaprine (FLEXERIL) tablet 10 mg  10 mg Oral Q8H PRN Keesha Matias MD        escitalopram (LEXAPRO) tablet 10 mg  10 mg Oral Daily Keesha Matias MD   10 mg at 12/27/24 0938    ferrous sulfate (FE TABS 325) EC tablet 325 mg  325 mg Oral TID WC  polymorphic delta slowing, often occurring in runs of rhythmic delta activity (RDA) without further evolution or associated clinical symptoms.    There was frequent independent left posterior quadrant polymorphic delta slowing, at times occurring in quasi rhythmic runs without further evolution or associated clinical symptoms.    There was occasional right temporal polymorphic delta slowing.    There was significant electrode artifact.    The EKG channel revealed significant artifact, limiting interpretation.    Ictal EEG Recording / Patient Events: During this period the patient had no seizures.    There were push button events at 13:46 and 17:12 that appeared accidental    Summary: The EEG was abnormal due to:  -Diffuse background slowing and disorganization, improving as the recording progressed  -Frequent left temporal slowing and RDA  -Frequent independent left posterior quadrant slowing  -Occasional right temporal slowing    The initial background abnormalities indicated a mild-moderate encephalopathy, nonspecific to etiology, that improved to a mild degree as the recording progressed.  The bitemporal slowing, L>R, and left posterior quadrant slowing indicated an underlying focal neuronal dysfunction.  The left temporal RDA indicated an increased risk of focal onset seizures. No seizures were recorded.    Monitoring was discontinued at 19:02. The EKG channel revealed significant artifact, limiting interpretation.    Ez Whitaker, DO  Neurology/Epilepsy       Summary of EEG and Behavior: The EEG was abnormal due to:  -Diffuse background slowing and disorganization, improved towards the end of the recording  -Frequent left temporal slowing and RDA  -Frequent independent left posterior quadrant slowing  -Occasional right temporal slowing  -Four left temporal onset electrographic seizures recorded on day 1    Clinical Correlation: This 3 day EEG recording was abnormal. The initial background abnormalities

## 2024-12-29 LAB
ANION GAP SERPL CALCULATED.3IONS-SCNC: 14 MMOL/L (ref 9–16)
BASOPHILS # BLD: 0.03 K/UL (ref 0–0.2)
BASOPHILS NFR BLD: 0 % (ref 0–2)
BUN SERPL-MCNC: 11 MG/DL (ref 6–20)
CALCIUM SERPL-MCNC: 8.6 MG/DL (ref 8.6–10.4)
CHLORIDE SERPL-SCNC: 102 MMOL/L (ref 98–107)
CO2 SERPL-SCNC: 16 MMOL/L (ref 20–31)
CREAT SERPL-MCNC: 0.9 MG/DL (ref 0.6–0.9)
EOSINOPHIL # BLD: <0.03 K/UL (ref 0–0.44)
EOSINOPHILS RELATIVE PERCENT: 0 % (ref 1–4)
ERYTHROCYTE [DISTWIDTH] IN BLOOD BY AUTOMATED COUNT: 12.9 % (ref 11.8–14.4)
GFR, ESTIMATED: 74 ML/MIN/1.73M2
GLUCOSE BLD-MCNC: 109 MG/DL (ref 65–105)
GLUCOSE BLD-MCNC: 173 MG/DL (ref 65–105)
GLUCOSE BLD-MCNC: 333 MG/DL (ref 65–105)
GLUCOSE BLD-MCNC: 343 MG/DL (ref 65–105)
GLUCOSE BLD-MCNC: 400 MG/DL (ref 65–105)
GLUCOSE SERPL-MCNC: 394 MG/DL (ref 74–99)
HCT VFR BLD AUTO: 39.3 % (ref 36.3–47.1)
HGB BLD-MCNC: 11.9 G/DL (ref 11.9–15.1)
IMM GRANULOCYTES # BLD AUTO: 0.17 K/UL (ref 0–0.3)
IMM GRANULOCYTES NFR BLD: 1 %
LYMPHOCYTES NFR BLD: 1.06 K/UL (ref 1.1–3.7)
LYMPHOCYTES RELATIVE PERCENT: 7 % (ref 24–43)
MCH RBC QN AUTO: 30 PG (ref 25.2–33.5)
MCHC RBC AUTO-ENTMCNC: 30.3 G/DL (ref 28.4–34.8)
MCV RBC AUTO: 99 FL (ref 82.6–102.9)
MONOCYTES NFR BLD: 0.11 K/UL (ref 0.1–1.2)
MONOCYTES NFR BLD: 1 % (ref 3–12)
NEUTROPHILS NFR BLD: 91 % (ref 36–65)
NEUTS SEG NFR BLD: 13.25 K/UL (ref 1.5–8.1)
NRBC BLD-RTO: 0 PER 100 WBC
PLATELET # BLD AUTO: 303 K/UL (ref 138–453)
PMV BLD AUTO: 9.8 FL (ref 8.1–13.5)
POTASSIUM SERPL-SCNC: 3.8 MMOL/L (ref 3.7–5.3)
RBC # BLD AUTO: 3.97 M/UL (ref 3.95–5.11)
SODIUM SERPL-SCNC: 132 MMOL/L (ref 136–145)
WBC OTHER # BLD: 14.6 K/UL (ref 3.5–11.3)

## 2024-12-29 PROCEDURE — 80048 BASIC METABOLIC PNL TOTAL CA: CPT

## 2024-12-29 PROCEDURE — 99232 SBSQ HOSP IP/OBS MODERATE 35: CPT | Performed by: PSYCHIATRY & NEUROLOGY

## 2024-12-29 PROCEDURE — 6370000000 HC RX 637 (ALT 250 FOR IP)

## 2024-12-29 PROCEDURE — 6360000002 HC RX W HCPCS

## 2024-12-29 PROCEDURE — 2500000003 HC RX 250 WO HCPCS

## 2024-12-29 PROCEDURE — 2580000003 HC RX 258

## 2024-12-29 PROCEDURE — 2060000000 HC ICU INTERMEDIATE R&B

## 2024-12-29 PROCEDURE — 36415 COLL VENOUS BLD VENIPUNCTURE: CPT

## 2024-12-29 PROCEDURE — 85025 COMPLETE CBC W/AUTO DIFF WBC: CPT

## 2024-12-29 PROCEDURE — 82947 ASSAY GLUCOSE BLOOD QUANT: CPT

## 2024-12-29 RX ORDER — INSULIN GLARGINE 100 [IU]/ML
18 INJECTION, SOLUTION SUBCUTANEOUS NIGHTLY
Status: DISCONTINUED | OUTPATIENT
Start: 2024-12-29 | End: 2025-01-01

## 2024-12-29 RX ORDER — HYDRALAZINE HYDROCHLORIDE 20 MG/ML
10 INJECTION INTRAMUSCULAR; INTRAVENOUS EVERY 8 HOURS PRN
Status: DISCONTINUED | OUTPATIENT
Start: 2024-12-29 | End: 2025-01-02 | Stop reason: HOSPADM

## 2024-12-29 RX ADMIN — BENZTROPINE MESYLATE 0.5 MG: 0.5 TABLET ORAL at 08:39

## 2024-12-29 RX ADMIN — TOPIRAMATE 100 MG: 200 TABLET, FILM COATED ORAL at 21:39

## 2024-12-29 RX ADMIN — INSULIN LISPRO 4 UNITS: 100 INJECTION, SOLUTION INTRAVENOUS; SUBCUTANEOUS at 08:42

## 2024-12-29 RX ADMIN — HYDRALAZINE HYDROCHLORIDE 10 MG: 20 INJECTION INTRAMUSCULAR; INTRAVENOUS at 00:57

## 2024-12-29 RX ADMIN — FERROUS SULFATE TAB EC 325 MG (65 MG FE EQUIVALENT) 325 MG: 325 (65 FE) TABLET DELAYED RESPONSE at 16:54

## 2024-12-29 RX ADMIN — INSULIN GLARGINE 18 UNITS: 100 INJECTION, SOLUTION SUBCUTANEOUS at 20:50

## 2024-12-29 RX ADMIN — SODIUM CHLORIDE, PRESERVATIVE FREE 10 ML: 5 INJECTION INTRAVENOUS at 12:26

## 2024-12-29 RX ADMIN — BENZTROPINE MESYLATE 0.5 MG: 0.5 TABLET ORAL at 21:39

## 2024-12-29 RX ADMIN — TOPIRAMATE 100 MG: 200 TABLET, FILM COATED ORAL at 08:38

## 2024-12-29 RX ADMIN — INSULIN LISPRO 4 UNITS: 100 INJECTION, SOLUTION INTRAVENOUS; SUBCUTANEOUS at 12:22

## 2024-12-29 RX ADMIN — ACETAMINOPHEN 650 MG: 325 TABLET ORAL at 21:47

## 2024-12-29 RX ADMIN — EMPAGLIFLOZIN 10 MG: 10 TABLET, FILM COATED ORAL at 08:39

## 2024-12-29 RX ADMIN — INSULIN LISPRO 4 UNITS: 100 INJECTION, SOLUTION INTRAVENOUS; SUBCUTANEOUS at 16:54

## 2024-12-29 RX ADMIN — ESCITALOPRAM OXALATE 10 MG: 10 TABLET ORAL at 08:39

## 2024-12-29 RX ADMIN — ATORVASTATIN CALCIUM 20 MG: 20 TABLET, FILM COATED ORAL at 08:39

## 2024-12-29 RX ADMIN — FUROSEMIDE 20 MG: 40 TABLET ORAL at 08:39

## 2024-12-29 RX ADMIN — AMLODIPINE BESYLATE 5 MG: 5 TABLET ORAL at 08:39

## 2024-12-29 RX ADMIN — FERROUS SULFATE TAB EC 325 MG (65 MG FE EQUIVALENT) 325 MG: 325 (65 FE) TABLET DELAYED RESPONSE at 08:39

## 2024-12-29 RX ADMIN — LACOSAMIDE 100 MG: 100 TABLET, FILM COATED ORAL at 21:39

## 2024-12-29 RX ADMIN — METHYLPREDNISOLONE SODIUM SUCCINATE 500 MG: 1 INJECTION, POWDER, LYOPHILIZED, FOR SOLUTION INTRAMUSCULAR; INTRAVENOUS at 21:44

## 2024-12-29 RX ADMIN — LACOSAMIDE 100 MG: 100 TABLET, FILM COATED ORAL at 08:39

## 2024-12-29 RX ADMIN — INSULIN LISPRO 12 UNITS: 100 INJECTION, SOLUTION INTRAVENOUS; SUBCUTANEOUS at 20:50

## 2024-12-29 RX ADMIN — INSULIN LISPRO 16 UNITS: 100 INJECTION, SOLUTION INTRAVENOUS; SUBCUTANEOUS at 08:39

## 2024-12-29 RX ADMIN — Medication 1 TABLET: at 12:22

## 2024-12-29 RX ADMIN — FERROUS SULFATE TAB EC 325 MG (65 MG FE EQUIVALENT) 325 MG: 325 (65 FE) TABLET DELAYED RESPONSE at 12:22

## 2024-12-29 RX ADMIN — METHYLPREDNISOLONE SODIUM SUCCINATE 500 MG: 1 INJECTION, POWDER, LYOPHILIZED, FOR SOLUTION INTRAMUSCULAR; INTRAVENOUS at 12:26

## 2024-12-29 RX ADMIN — SODIUM CHLORIDE, PRESERVATIVE FREE 10 ML: 5 INJECTION INTRAVENOUS at 21:45

## 2024-12-29 ASSESSMENT — PAIN SCALES - WONG BAKER: WONGBAKER_NUMERICALRESPONSE: NO HURT

## 2024-12-29 ASSESSMENT — PAIN DESCRIPTION - LOCATION: LOCATION: HEAD

## 2024-12-29 ASSESSMENT — PAIN SCALES - GENERAL
PAINLEVEL_OUTOF10: 0
PAINLEVEL_OUTOF10: 5
PAINLEVEL_OUTOF10: 0

## 2024-12-29 ASSESSMENT — PAIN DESCRIPTION - DESCRIPTORS: DESCRIPTORS: ACHING

## 2024-12-29 NOTE — PLAN OF CARE
Problem: Chronic Conditions and Co-morbidities  Goal: Patient's chronic conditions and co-morbidity symptoms are monitored and maintained or improved  Outcome: Progressing     Problem: Discharge Planning  Goal: Discharge to home or other facility with appropriate resources  Outcome: Progressing     Problem: Skin/Tissue Integrity  Goal: Absence of new skin breakdown  Description: 1.  Monitor for areas of redness and/or skin breakdown  2.  Assess vascular access sites hourly  3.  Every 4-6 hours minimum:  Change oxygen saturation probe site  4.  Every 4-6 hours:  If on nasal continuous positive airway pressure, respiratory therapy assess nares and determine need for appliance change or resting period.  Outcome: Progressing     Problem: Safety - Adult  Goal: Free from fall injury  Outcome: Progressing     Problem: ABCDS Injury Assessment  Goal: Absence of physical injury  Outcome: Progressing     Problem: Support with Decision-Making  Goal: Verbalization of thoughts/feelings regarding decision  Outcome: Progressing     Problem: Neurosensory - Adult  Goal: Achieves stable or improved neurological status  Outcome: Progressing     Problem: Cardiovascular - Adult  Goal: Maintains optimal cardiac output and hemodynamic stability  Outcome: Progressing     Problem: Skin/Tissue Integrity - Adult  Goal: Skin integrity remains intact  Outcome: Progressing     Problem: Musculoskeletal - Adult  Goal: Return mobility to safest level of function  Outcome: Progressing     Problem: Gastrointestinal - Adult  Goal: Maintains adequate nutritional intake  Outcome: Progressing     Problem: Hematologic - Adult  Goal: Maintains hematologic stability  Outcome: Progressing     Problem: Confusion  Goal: Confusion, delirium, dementia, or psychosis is improved or at baseline  Description: INTERVENTIONS:  1. Assess for possible contributors to thought disturbance, including medications, impaired vision or hearing, underlying metabolic

## 2024-12-29 NOTE — PROGRESS NOTES
[facility] Neurology   IN-PATIENT SERVICE   [facility]    Progress Note             Date:   12/29/2024  Patient name:  Magui Thayer  Date of admission:  12/24/2024  6:31 PM  MRN:   [id number]  Account:  [id number]  YOB: 1965  PCP:    Keesha Matias MD  Room:   [address] [place] [address]  Code Status:    Full Code    Chief Complaint:     Chief Complaint   Patient presents with    Altered Mental Status    Hyperglycemia       Interval hx:     Patient seen and examined at bedside this morning.  Oriented to self and place. More alert as compared to yesterday.  Neurological exam otherwise unchanged from yesterday.      BG better controlled today, added high dose sliding scale and increased lantus   S/p LP, f/u paraneoplastic/autoimmune panel           Brief History of Present Illness:     As per H&P/initial consult note:      Reason for Consult: Intracranial Hemorrhage and Altered Mental Status     I had the pleasure of seeing your patient in neurology consultation for her symptoms of confusion and altered mental status. As you would recall, this is a 59-year-old female with a complex past medical history, including type II diabetes mellitus, bipolar disorder, PTSD, a history of invasive ductal carcinoma of the left breast, and a previous diagnosis of degenerative joint disease, among other conditions.     Her family reports a three-year history of progressive cognitive decline, with worsening short-term memory, episodic confusion, personality changes, and visual hallucinations. Over the past week, these symptoms have significantly worsened, with repetitive speech and increased confusion being noted. She presented to an outside hospital, where CT imaging revealed an acute intraventricular hemorrhage in the temporal horn and atrium of the right lateral ventricle. She was subsequently transferred for further evaluation.     The patient has no reported history of recent trauma, alcohol or substance

## 2024-12-29 NOTE — PROGRESS NOTES
SW unable to complete the PHQ-9 depression screen due to altered menatation.  MEMANUEL will continue to follow.  Angel Luis Pelaez

## 2024-12-30 PROBLEM — G04.81 AUTOIMMUNE ENCEPHALITIS: Status: ACTIVE | Noted: 2024-12-30

## 2024-12-30 PROBLEM — R41.0 EPISODIC CONFUSION: Status: ACTIVE | Noted: 2024-12-30

## 2024-12-30 LAB
ANION GAP SERPL CALCULATED.3IONS-SCNC: 11 MMOL/L (ref 9–16)
B BURGDOR AB CSF IA-ACNC: 0.12 IV
BASOPHILS # BLD: <0.03 K/UL (ref 0–0.2)
BASOPHILS NFR BLD: 0 % (ref 0–2)
BUN SERPL-MCNC: 14 MG/DL (ref 6–20)
CALCIUM SERPL-MCNC: 8.7 MG/DL (ref 8.6–10.4)
CHLORIDE SERPL-SCNC: 104 MMOL/L (ref 98–107)
CO2 SERPL-SCNC: 20 MMOL/L (ref 20–31)
CREAT SERPL-MCNC: 0.9 MG/DL (ref 0.6–0.9)
CSF ISOELECTRIC FOCUSING INTERPRETATION: NORMAL
EOSINOPHIL # BLD: <0.03 K/UL (ref 0–0.44)
EOSINOPHILS RELATIVE PERCENT: 0 % (ref 1–4)
ERYTHROCYTE [DISTWIDTH] IN BLOOD BY AUTOMATED COUNT: 13.2 % (ref 11.8–14.4)
GFR, ESTIMATED: 74 ML/MIN/1.73M2
GLUCOSE BLD-MCNC: 139 MG/DL (ref 65–105)
GLUCOSE BLD-MCNC: 191 MG/DL (ref 65–105)
GLUCOSE BLD-MCNC: 277 MG/DL (ref 65–105)
GLUCOSE BLD-MCNC: 331 MG/DL (ref 65–105)
GLUCOSE SERPL-MCNC: 416 MG/DL (ref 74–99)
HCT VFR BLD AUTO: 34.8 % (ref 36.3–47.1)
HGB BLD-MCNC: 11 G/DL (ref 11.9–15.1)
IMM GRANULOCYTES # BLD AUTO: 0.25 K/UL (ref 0–0.3)
IMM GRANULOCYTES NFR BLD: 1 %
LYMPHOCYTES NFR BLD: 1.04 K/UL (ref 1.1–3.7)
LYMPHOCYTES RELATIVE PERCENT: 6 % (ref 24–43)
MCH RBC QN AUTO: 29.8 PG (ref 25.2–33.5)
MCHC RBC AUTO-ENTMCNC: 31.6 G/DL (ref 28.4–34.8)
MCV RBC AUTO: 94.3 FL (ref 82.6–102.9)
MICROORGANISM SPEC CULT: NORMAL
MICROORGANISM/AGENT SPEC: NORMAL
MONOCYTES NFR BLD: 0.27 K/UL (ref 0.1–1.2)
MONOCYTES NFR BLD: 2 % (ref 3–12)
NEUTROPHILS NFR BLD: 91 % (ref 36–65)
NEUTS SEG NFR BLD: 16.43 K/UL (ref 1.5–8.1)
NRBC BLD-RTO: 0 PER 100 WBC
OLIGOCLONAL BANDS CSF IEF: NORMAL BANDS (ref 0–1)
OLIGOCLONAL BANDS: NEGATIVE
PLATELET # BLD AUTO: 352 K/UL (ref 138–453)
PMV BLD AUTO: 9.8 FL (ref 8.1–13.5)
POTASSIUM SERPL-SCNC: 4.6 MMOL/L (ref 3.7–5.3)
RBC # BLD AUTO: 3.69 M/UL (ref 3.95–5.11)
SODIUM SERPL-SCNC: 135 MMOL/L (ref 136–145)
SPECIMEN DESCRIPTION: NORMAL
WBC OTHER # BLD: 18 K/UL (ref 3.5–11.3)

## 2024-12-30 PROCEDURE — 6360000002 HC RX W HCPCS

## 2024-12-30 PROCEDURE — 80048 BASIC METABOLIC PNL TOTAL CA: CPT

## 2024-12-30 PROCEDURE — 6370000000 HC RX 637 (ALT 250 FOR IP)

## 2024-12-30 PROCEDURE — 99221 1ST HOSP IP/OBS SF/LOW 40: CPT | Performed by: FAMILY MEDICINE

## 2024-12-30 PROCEDURE — 2060000000 HC ICU INTERMEDIATE R&B

## 2024-12-30 PROCEDURE — 2580000003 HC RX 258

## 2024-12-30 PROCEDURE — 36415 COLL VENOUS BLD VENIPUNCTURE: CPT

## 2024-12-30 PROCEDURE — 2500000003 HC RX 250 WO HCPCS

## 2024-12-30 PROCEDURE — 82947 ASSAY GLUCOSE BLOOD QUANT: CPT

## 2024-12-30 PROCEDURE — 85025 COMPLETE CBC W/AUTO DIFF WBC: CPT

## 2024-12-30 PROCEDURE — 99233 SBSQ HOSP IP/OBS HIGH 50: CPT | Performed by: PSYCHIATRY & NEUROLOGY

## 2024-12-30 RX ORDER — INSULIN LISPRO 100 [IU]/ML
6 INJECTION, SOLUTION INTRAVENOUS; SUBCUTANEOUS
Status: DISCONTINUED | OUTPATIENT
Start: 2024-12-30 | End: 2025-01-02 | Stop reason: HOSPADM

## 2024-12-30 RX ADMIN — FERROUS SULFATE TAB EC 325 MG (65 MG FE EQUIVALENT) 325 MG: 325 (65 FE) TABLET DELAYED RESPONSE at 08:17

## 2024-12-30 RX ADMIN — SODIUM CHLORIDE, PRESERVATIVE FREE 10 ML: 5 INJECTION INTRAVENOUS at 20:38

## 2024-12-30 RX ADMIN — Medication 1 TABLET: at 08:47

## 2024-12-30 RX ADMIN — TOPIRAMATE 100 MG: 200 TABLET, FILM COATED ORAL at 08:47

## 2024-12-30 RX ADMIN — INSULIN GLARGINE 18 UNITS: 100 INJECTION, SOLUTION SUBCUTANEOUS at 20:29

## 2024-12-30 RX ADMIN — INSULIN LISPRO 12 UNITS: 100 INJECTION, SOLUTION INTRAVENOUS; SUBCUTANEOUS at 12:43

## 2024-12-30 RX ADMIN — EMPAGLIFLOZIN 10 MG: 10 TABLET, FILM COATED ORAL at 08:16

## 2024-12-30 RX ADMIN — LACOSAMIDE 100 MG: 100 TABLET, FILM COATED ORAL at 08:16

## 2024-12-30 RX ADMIN — INSULIN LISPRO 4 UNITS: 100 INJECTION, SOLUTION INTRAVENOUS; SUBCUTANEOUS at 20:40

## 2024-12-30 RX ADMIN — INSULIN LISPRO 6 UNITS: 100 INJECTION, SOLUTION INTRAVENOUS; SUBCUTANEOUS at 16:27

## 2024-12-30 RX ADMIN — BENZTROPINE MESYLATE 0.5 MG: 0.5 TABLET ORAL at 08:47

## 2024-12-30 RX ADMIN — FERROUS SULFATE TAB EC 325 MG (65 MG FE EQUIVALENT) 325 MG: 325 (65 FE) TABLET DELAYED RESPONSE at 13:00

## 2024-12-30 RX ADMIN — BENZTROPINE MESYLATE 0.5 MG: 0.5 TABLET ORAL at 20:28

## 2024-12-30 RX ADMIN — LACOSAMIDE 100 MG: 100 TABLET, FILM COATED ORAL at 20:28

## 2024-12-30 RX ADMIN — INSULIN LISPRO 16 UNITS: 100 INJECTION, SOLUTION INTRAVENOUS; SUBCUTANEOUS at 08:19

## 2024-12-30 RX ADMIN — SODIUM CHLORIDE, PRESERVATIVE FREE 10 ML: 5 INJECTION INTRAVENOUS at 08:17

## 2024-12-30 RX ADMIN — AMLODIPINE BESYLATE 5 MG: 5 TABLET ORAL at 08:16

## 2024-12-30 RX ADMIN — TOPIRAMATE 100 MG: 200 TABLET, FILM COATED ORAL at 20:28

## 2024-12-30 RX ADMIN — INSULIN LISPRO 4 UNITS: 100 INJECTION, SOLUTION INTRAVENOUS; SUBCUTANEOUS at 12:44

## 2024-12-30 RX ADMIN — METHYLPREDNISOLONE SODIUM SUCCINATE 500 MG: 1 INJECTION, POWDER, LYOPHILIZED, FOR SOLUTION INTRAMUSCULAR; INTRAVENOUS at 08:25

## 2024-12-30 RX ADMIN — INSULIN LISPRO 4 UNITS: 100 INJECTION, SOLUTION INTRAVENOUS; SUBCUTANEOUS at 08:19

## 2024-12-30 RX ADMIN — METHYLPREDNISOLONE SODIUM SUCCINATE 500 MG: 1 INJECTION, POWDER, LYOPHILIZED, FOR SOLUTION INTRAMUSCULAR; INTRAVENOUS at 20:33

## 2024-12-30 RX ADMIN — ATORVASTATIN CALCIUM 20 MG: 20 TABLET, FILM COATED ORAL at 08:17

## 2024-12-30 RX ADMIN — HYDRALAZINE HYDROCHLORIDE 10 MG: 20 INJECTION INTRAMUSCULAR; INTRAVENOUS at 20:35

## 2024-12-30 RX ADMIN — ESCITALOPRAM OXALATE 10 MG: 10 TABLET ORAL at 08:16

## 2024-12-30 RX ADMIN — ACETAMINOPHEN 650 MG: 325 TABLET ORAL at 20:29

## 2024-12-30 RX ADMIN — FUROSEMIDE 20 MG: 40 TABLET ORAL at 08:16

## 2024-12-30 RX ADMIN — FERROUS SULFATE TAB EC 325 MG (65 MG FE EQUIVALENT) 325 MG: 325 (65 FE) TABLET DELAYED RESPONSE at 17:54

## 2024-12-30 ASSESSMENT — ENCOUNTER SYMPTOMS
SHORTNESS OF BREATH: 0
ABDOMINAL PAIN: 0
COUGH: 0
WHEEZING: 0
ABDOMINAL DISTENTION: 0
STRIDOR: 0

## 2024-12-30 ASSESSMENT — PAIN SCALES - GENERAL
PAINLEVEL_OUTOF10: 0
PAINLEVEL_OUTOF10: 7
PAINLEVEL_OUTOF10: 0

## 2024-12-30 ASSESSMENT — PAIN SCALES - WONG BAKER
WONGBAKER_NUMERICALRESPONSE: NO HURT
WONGBAKER_NUMERICALRESPONSE: NO HURT

## 2024-12-30 ASSESSMENT — PAIN DESCRIPTION - LOCATION: LOCATION: HEAD

## 2024-12-30 ASSESSMENT — PAIN DESCRIPTION - DESCRIPTORS: DESCRIPTORS: ACHING

## 2024-12-30 NOTE — CONSULTS
Consult note    12/30/24  Magui Thayer     Complaints  None    Physical exam  Vitals:    12/30/24 1526   BP: 136/80   Pulse: 94   Resp: 23   Temp: 98.7 °F (37.1 °C)   SpO2: 99%     GCS 14 ([id number]).   Tongue wet. Skin turgor good.  Respiratory rate: 18 per minute. On room air, saturating well.  No prominent peripheral edema noted.    Labs and imaging reviewed.  Recent blood glucose levels: [phone].    Recommendations:  Continue insulin lispro 6 units SQ TID. Continue lantus 18 units, and high-intensity sliding scale.  Continue holding empagliflozin.  Continue POCT BG checks.  Avoid hyperglycemia episodes.  Do not hesitate to call with any questions.    Will discuss with the attending physician, Dr. Romero.    Morales Miranda  PGY-1

## 2024-12-30 NOTE — PLAN OF CARE
Problem: Chronic Conditions and Co-morbidities  Goal: Patient's chronic conditions and co-morbidity symptoms are monitored and maintained or improved  12/30/2024 0409 by Jessica Moore RN  Outcome: Progressing  12/29/2024 1709 by Lucinda Franz RN  Outcome: Progressing     Problem: Discharge Planning  Goal: Discharge to home or other facility with appropriate resources  12/30/2024 0409 by Jessica Moore RN  Outcome: Progressing  12/29/2024 1709 by Lucinda Franz RN  Outcome: Progressing     Problem: Skin/Tissue Integrity  Goal: Absence of new skin breakdown  Description: 1.  Monitor for areas of redness and/or skin breakdown  2.  Assess vascular access sites hourly  3.  Every 4-6 hours minimum:  Change oxygen saturation probe site  4.  Every 4-6 hours:  If on nasal continuous positive airway pressure, respiratory therapy assess nares and determine need for appliance change or resting period.  12/30/2024 0409 by Jessica Moore RN  Outcome: Progressing  12/29/2024 1709 by Lucinda Franz RN  Outcome: Progressing     Problem: Safety - Adult  Goal: Free from fall injury  12/30/2024 0409 by Jessica Moore RN  Outcome: Progressing  12/29/2024 1709 by Lucinda Franz RN  Outcome: Progressing     Problem: ABCDS Injury Assessment  Goal: Absence of physical injury  12/30/2024 0409 by Jessica Moore RN  Outcome: Progressing  12/29/2024 1709 by Lucinda Franz RN  Outcome: Progressing     Problem: Spiritual Struggle  Goal: Verbalizes spiritual struggle  Outcome: Progressing  Goal: Gains new understanding/perception  Outcome: Progressing  Goal: Growing sense of peace/hope  Outcome: Progressing  Goal: Explore resources for additional follow up, post discharge  Outcome: Progressing     Problem: Emotional Distress  Goal: Reduce evidence of anxiety/worry/anger  Outcome: Progressing  Goal: Explore resources for additional follow up, post discharge  Outcome: Progressing     Problem: Life Adjustment  Goal: Identifies/restores

## 2024-12-30 NOTE — PLAN OF CARE
-Patient originally admitted to the family medicine service for the management of AMS and aphasia likely 2/2 IVH, however was transferred to neurology service to assume primary.  -Family medicine service was consulted to manage hyperglycemia 2/2 type 2 diabetes mellitus.  BG of 416 in the AM, currently on Lantus 18 units nightly, Humalog increased to 6 units 3 times daily with meals, high dose sliding scale in place.  BG's trending down, 416 > 277 > 139.  Continue to monitor.  -Patient seen and examined at bedside, hemodynamically stable, saturating in the high 90's on room air.   -No complaints at this time.    Electronically signed by Julienne Perez MD on 12/30/2024 at 9:30 PM

## 2024-12-30 NOTE — PROGRESS NOTES
[facility]   IN-PATIENT SERVICE   [facility]    Progress Note             Date:   12/30/2024  Patient name:  Magui Thayer  Date of admission:  12/24/2024  6:31 PM  MRN:   [id number]  Account:  [id number]  YOB: 1965  PCP:    Keesha Matias MD  Room:   [address] [place] [address]  Code Status:    Full Code    Chief Complaint:     Chief Complaint   Patient presents with    Altered Mental Status    Hyperglycemia       Interval hx:     The patient was seen and examined at bedside. Is vitally stable, alert and oriented x 2 (person, hospital). No acute events overnight.    Brief History of Present Illness:     I had the pleasure of seeing your patient in neurology consultation for her symptoms of confusion and altered mental status. As you would recall, this is a 59-year-old female with a complex past medical history, including type II diabetes mellitus, bipolar disorder, PTSD, a history of invasive ductal carcinoma of the left breast, and a previous diagnosis of degenerative joint disease, among other conditions.     Her family reports a three-year history of progressive cognitive decline, with worsening short-term memory, episodic confusion, personality changes, and visual hallucinations. Over the past week, these symptoms have significantly worsened, with repetitive speech and increased confusion being noted. She presented to an outside hospital, where CT imaging revealed an acute intraventricular hemorrhage in the temporal horn and atrium of the right lateral ventricle. She was subsequently transferred for further evaluation.     The patient has no reported history of recent trauma, alcohol or substance use, or significant medication changes. Notably, the family recalls a prior history of tardive dyskinesia in her father and mentions the patient's long-term use of medications including lamotrigine and topiramate without documented use of antipsychotics. She has been inconsistently  MD Florencia   cyclobenzaprine (FLEXERIL) 10 MG tablet Take 1 tablet by mouth every 8 hours as needed for Muscle spasms 4/18/24   Keesha Matias MD   blood glucose test strips (TRUE METRIX BLOOD GLUCOSE TEST) strip USE STRIP TO TEST BLOOD GLUCOSE FOUR TIMES A DAY AS NEEDED 12/12/23   Keesha Matias MD   SITagliptin (JANUVIA) 100 MG tablet TAKE 1 TABLET BY MOUTH DAILY 9/14/23 4/18/24  Keesha Matias MD   Lancets MISC 1 each by Does not apply route daily 7/6/23   Dana Hatch MD   blood glucose monitor strips Test 3 times a day & as needed for symptoms of irregular blood glucose. Dispense sufficient amount for indicated testing frequency plus additional to accommodate PRN testing needs. 7/6/23   Dana Hatch MD   glucose monitoring (FREESTYLE FREEDOM) kit Please check blood glucose 3 times daily 7/6/23   Dana Hatch MD   aspirin (ASPIRIN LOW DOSE) 81 MG EC tablet TAKE 1 TABLET BY MOUTH ONCE DAILY 3/10/23   Karen Chavez MD   OneTouch Delica Lancets 33G MISC USE TO TEST BLOOD SUGAR DAILY 8/12/22   Abundio Tao MD   escitalopram (LEXAPRO) 10 MG tablet Take 1 tablet by mouth daily 5/13/22   Flores Oneill MD   hydrOXYzine (ATARAX) 25 MG tablet TAKE 1 TABLET BY MOUTH TWICE DAILY AS NEEDED 5/18/22   Flores Oneill MD   mirtazapine (REMERON) 15 MG tablet TAKE 1 TABLET BY MOUTH AT BEDTIME AS NEEDED 5/18/22   Flores Oneill MD   Lancets (ONETOUCH DELICA PLUS [id number]) MISC USE TO TEST BLOOD SUGAR DAILY 5/13/22   Addi Epps MD   Insulin Pen Needle 31G X 8 MM MISC 1 each by Does not apply route daily 12/31/21   Kelsey Hodgson MD   Insulin Pen Needle 31G X 6 MM MISC 1 each by Does not apply route daily 12/31/21   Kelsey Hodgson MD   Lancets (ONETOUCH DELICA PLUS [id number]) MISC Check blood sugars 3 times daily 3/17/20   Itzel Moseley MD   Multiple Vitamins-Minerals (MULTIVITAMIN-MINERALS) TABS tablet TAKE (1) TABLET BY MOUTH

## 2024-12-30 NOTE — PLAN OF CARE
Problem: Chronic Conditions and Co-morbidities  Goal: Patient's chronic conditions and co-morbidity symptoms are monitored and maintained or improved  12/30/2024 1413 by Spencer Cornelius RN  Outcome: Progressing  12/30/2024 0409 by Jessica Moore RN  Outcome: Progressing     Problem: Discharge Planning  Goal: Discharge to home or other facility with appropriate resources  12/30/2024 1413 by Spencer Cornelius RN  Outcome: Progressing  12/30/2024 0409 by Jessica Moore RN  Outcome: Progressing     Problem: Skin/Tissue Integrity  Goal: Absence of new skin breakdown  Description: 1.  Monitor for areas of redness and/or skin breakdown  2.  Assess vascular access sites hourly  3.  Every 4-6 hours minimum:  Change oxygen saturation probe site  4.  Every 4-6 hours:  If on nasal continuous positive airway pressure, respiratory therapy assess nares and determine need for appliance change or resting period.  12/30/2024 1413 by Spencer Cornelius RN  Outcome: Progressing  12/30/2024 0409 by Jessica Moore RN  Outcome: Progressing     Problem: Safety - Adult  Goal: Free from fall injury  12/30/2024 1413 by Spencer Cornelius RN  Outcome: Progressing  12/30/2024 0409 by Jessica Moore RN  Outcome: Progressing     Problem: ABCDS Injury Assessment  Goal: Absence of physical injury  12/30/2024 1413 by Spencer Cornelius RN  Outcome: Progressing  12/30/2024 0409 by Jessica Moore RN  Outcome: Progressing     Problem: Spiritual Struggle  Goal: Verbalizes spiritual struggle  12/30/2024 1413 by Spencer Cornelius RN  Outcome: Progressing  12/30/2024 0409 by Jessica Moore RN  Outcome: Progressing  Goal: Gains new understanding/perception  12/30/2024 1413 by Spencer Cornelius RN  Outcome: Progressing  12/30/2024 0409 by Jessica Moore RN  Outcome: Progressing  Goal: Growing sense of peace/hope  12/30/2024 1413 by Spencer Cornelius RN  Outcome: Progressing  12/30/2024 0409 by Oscar  GEORGIE Lopez  Outcome: Progressing  Goal: Explore resources for additional follow up, post discharge  12/30/2024 1413 by Spencer Cornelius RN  Outcome: Progressing  12/30/2024 0409 by Jessica Moore RN  Outcome: Progressing     Problem: Emotional Distress  Goal: Reduce evidence of anxiety/worry/anger  12/30/2024 1413 by Spencer Cornelius RN  Outcome: Progressing  12/30/2024 0409 by Jesscia Moore RN  Outcome: Progressing  Goal: Explore resources for additional follow up, post discharge  12/30/2024 1413 by Spencer Cornelius RN  Outcome: Progressing  12/30/2024 0409 by Jessica Moore RN  Outcome: Progressing     Problem: Life Adjustment  Goal: Identifies/restores coping resources/skills  12/30/2024 1413 by Spencer Cornelius RN  Outcome: Progressing  12/30/2024 0409 by Jessica Moore RN  Outcome: Progressing     Problem: Support with Decision-Making  Goal: Verbalization of thoughts/feelings regarding decision  12/30/2024 1413 by Spencer Cornelius RN  Outcome: Progressing  12/30/2024 0409 by Jessica Moore RN  Outcome: Progressing  Goal: Identifies/restores coping resources/skills  12/30/2024 1413 by Spencer Cornelius RN  Outcome: Progressing  12/30/2024 0409 by Jessica Moore RN  Outcome: Progressing     Problem: Sacramental/Temple Needs  Goal: Provide for sacramental/Congregational needs as appropriate,per patient/family request  12/30/2024 1413 by Spencer Cornelius RN  Outcome: Progressing  12/30/2024 0409 by Jessica Moore RN  Outcome: Progressing     Problem: Neurosensory - Adult  Goal: Achieves stable or improved neurological status  12/30/2024 1413 by Spencer Cornelius RN  Outcome: Progressing  12/30/2024 0409 by Jessica Moore RN  Outcome: Progressing  Goal: Achieves maximal functionality and self care  12/30/2024 1413 by Spencer Cornelius, RN  Outcome: Progressing  12/30/2024 0409 by Jessica Moore RN  Outcome: Progressing     Problem: Respiratory -

## 2024-12-31 PROBLEM — D72.829 LEUKOCYTOSIS: Status: ACTIVE | Noted: 2024-12-31

## 2024-12-31 PROBLEM — E11.65 UNCONTROLLED TYPE 2 DIABETES MELLITUS WITH HYPERGLYCEMIA (HCC): Status: ACTIVE | Noted: 2024-12-31

## 2024-12-31 PROBLEM — K76.9 LIVER LESION: Status: ACTIVE | Noted: 2024-12-31

## 2024-12-31 LAB
ANION GAP SERPL CALCULATED.3IONS-SCNC: 11 MMOL/L (ref 9–16)
BASOPHILS # BLD: <0.03 K/UL (ref 0–0.2)
BASOPHILS NFR BLD: 0 % (ref 0–2)
BUN SERPL-MCNC: 18 MG/DL (ref 6–20)
CALCIUM SERPL-MCNC: 8.8 MG/DL (ref 8.6–10.4)
CHLORIDE SERPL-SCNC: 106 MMOL/L (ref 98–107)
CO2 SERPL-SCNC: 22 MMOL/L (ref 20–31)
CREAT SERPL-MCNC: 1 MG/DL (ref 0.6–0.9)
EKG ATRIAL RATE: 103 BPM
EKG P AXIS: 76 DEGREES
EKG P-R INTERVAL: 152 MS
EKG Q-T INTERVAL: 360 MS
EKG QRS DURATION: 102 MS
EKG QTC CALCULATION (BAZETT): 471 MS
EKG R AXIS: -11 DEGREES
EKG T AXIS: 112 DEGREES
EKG VENTRICULAR RATE: 103 BPM
EOSINOPHIL # BLD: <0.03 K/UL (ref 0–0.44)
EOSINOPHILS RELATIVE PERCENT: 0 % (ref 1–4)
ERYTHROCYTE [DISTWIDTH] IN BLOOD BY AUTOMATED COUNT: 13.2 % (ref 11.8–14.4)
GFR, ESTIMATED: 65 ML/MIN/1.73M2
GLUCOSE BLD-MCNC: 167 MG/DL (ref 65–105)
GLUCOSE BLD-MCNC: 196 MG/DL (ref 65–105)
GLUCOSE BLD-MCNC: 232 MG/DL (ref 65–105)
GLUCOSE BLD-MCNC: 307 MG/DL (ref 65–105)
GLUCOSE SERPL-MCNC: 127 MG/DL (ref 74–99)
HCT VFR BLD AUTO: 35.9 % (ref 36.3–47.1)
HGB BLD-MCNC: 11.2 G/DL (ref 11.9–15.1)
IMM GRANULOCYTES # BLD AUTO: 0.26 K/UL (ref 0–0.3)
IMM GRANULOCYTES NFR BLD: 1 %
LYMPHOCYTES NFR BLD: 1.35 K/UL (ref 1.1–3.7)
LYMPHOCYTES RELATIVE PERCENT: 7 % (ref 24–43)
MBP CSF-MCNC: 10.88 NG/ML (ref 0–5.5)
MCH RBC QN AUTO: 29.6 PG (ref 25.2–33.5)
MCHC RBC AUTO-ENTMCNC: 31.2 G/DL (ref 28.4–34.8)
MCV RBC AUTO: 94.7 FL (ref 82.6–102.9)
MONOCYTES NFR BLD: 0.28 K/UL (ref 0.1–1.2)
MONOCYTES NFR BLD: 2 % (ref 3–12)
NEUTROPHILS NFR BLD: 90 % (ref 36–65)
NEUTS SEG NFR BLD: 16.24 K/UL (ref 1.5–8.1)
NRBC BLD-RTO: 0 PER 100 WBC
PLATELET # BLD AUTO: 358 K/UL (ref 138–453)
PMV BLD AUTO: 9.6 FL (ref 8.1–13.5)
POTASSIUM SERPL-SCNC: 4.1 MMOL/L (ref 3.7–5.3)
RBC # BLD AUTO: 3.79 M/UL (ref 3.95–5.11)
SODIUM SERPL-SCNC: 139 MMOL/L (ref 136–145)
VDRL CSF QL: NONREACTIVE
WBC OTHER # BLD: 18.2 K/UL (ref 3.5–11.3)

## 2024-12-31 PROCEDURE — 6370000000 HC RX 637 (ALT 250 FOR IP)

## 2024-12-31 PROCEDURE — 82947 ASSAY GLUCOSE BLOOD QUANT: CPT

## 2024-12-31 PROCEDURE — 36415 COLL VENOUS BLD VENIPUNCTURE: CPT

## 2024-12-31 PROCEDURE — 85025 COMPLETE CBC W/AUTO DIFF WBC: CPT

## 2024-12-31 PROCEDURE — 99255 IP/OBS CONSLTJ NEW/EST HI 80: CPT | Performed by: INTERNAL MEDICINE

## 2024-12-31 PROCEDURE — 2060000000 HC ICU INTERMEDIATE R&B

## 2024-12-31 PROCEDURE — 2580000003 HC RX 258

## 2024-12-31 PROCEDURE — 99233 SBSQ HOSP IP/OBS HIGH 50: CPT | Performed by: PSYCHIATRY & NEUROLOGY

## 2024-12-31 PROCEDURE — 80048 BASIC METABOLIC PNL TOTAL CA: CPT

## 2024-12-31 PROCEDURE — 6360000002 HC RX W HCPCS

## 2024-12-31 PROCEDURE — 2500000003 HC RX 250 WO HCPCS

## 2024-12-31 RX ADMIN — SODIUM CHLORIDE, PRESERVATIVE FREE 10 ML: 5 INJECTION INTRAVENOUS at 08:30

## 2024-12-31 RX ADMIN — FERROUS SULFATE TAB EC 325 MG (65 MG FE EQUIVALENT) 325 MG: 325 (65 FE) TABLET DELAYED RESPONSE at 16:22

## 2024-12-31 RX ADMIN — BENZTROPINE MESYLATE 0.5 MG: 0.5 TABLET ORAL at 08:23

## 2024-12-31 RX ADMIN — INSULIN LISPRO 4 UNITS: 100 INJECTION, SOLUTION INTRAVENOUS; SUBCUTANEOUS at 20:17

## 2024-12-31 RX ADMIN — LACOSAMIDE 100 MG: 100 TABLET, FILM COATED ORAL at 20:17

## 2024-12-31 RX ADMIN — FERROUS SULFATE TAB EC 325 MG (65 MG FE EQUIVALENT) 325 MG: 325 (65 FE) TABLET DELAYED RESPONSE at 12:15

## 2024-12-31 RX ADMIN — TOPIRAMATE 100 MG: 200 TABLET, FILM COATED ORAL at 08:24

## 2024-12-31 RX ADMIN — FERROUS SULFATE TAB EC 325 MG (65 MG FE EQUIVALENT) 325 MG: 325 (65 FE) TABLET DELAYED RESPONSE at 08:22

## 2024-12-31 RX ADMIN — METHYLPREDNISOLONE SODIUM SUCCINATE 500 MG: 1 INJECTION, POWDER, LYOPHILIZED, FOR SOLUTION INTRAMUSCULAR; INTRAVENOUS at 20:27

## 2024-12-31 RX ADMIN — INSULIN LISPRO 6 UNITS: 100 INJECTION, SOLUTION INTRAVENOUS; SUBCUTANEOUS at 08:21

## 2024-12-31 RX ADMIN — INSULIN LISPRO 6 UNITS: 100 INJECTION, SOLUTION INTRAVENOUS; SUBCUTANEOUS at 12:14

## 2024-12-31 RX ADMIN — LACOSAMIDE 100 MG: 100 TABLET, FILM COATED ORAL at 08:21

## 2024-12-31 RX ADMIN — INSULIN LISPRO 12 UNITS: 100 INJECTION, SOLUTION INTRAVENOUS; SUBCUTANEOUS at 16:21

## 2024-12-31 RX ADMIN — FUROSEMIDE 20 MG: 40 TABLET ORAL at 08:21

## 2024-12-31 RX ADMIN — SODIUM CHLORIDE, PRESERVATIVE FREE 10 ML: 5 INJECTION INTRAVENOUS at 20:17

## 2024-12-31 RX ADMIN — AMLODIPINE BESYLATE 5 MG: 5 TABLET ORAL at 08:22

## 2024-12-31 RX ADMIN — TOPIRAMATE 100 MG: 200 TABLET, FILM COATED ORAL at 20:18

## 2024-12-31 RX ADMIN — METHYLPREDNISOLONE SODIUM SUCCINATE 500 MG: 1 INJECTION, POWDER, LYOPHILIZED, FOR SOLUTION INTRAMUSCULAR; INTRAVENOUS at 08:29

## 2024-12-31 RX ADMIN — ESCITALOPRAM OXALATE 10 MG: 10 TABLET ORAL at 08:22

## 2024-12-31 RX ADMIN — INSULIN LISPRO 6 UNITS: 100 INJECTION, SOLUTION INTRAVENOUS; SUBCUTANEOUS at 16:22

## 2024-12-31 RX ADMIN — INSULIN LISPRO 4 UNITS: 100 INJECTION, SOLUTION INTRAVENOUS; SUBCUTANEOUS at 12:14

## 2024-12-31 RX ADMIN — ATORVASTATIN CALCIUM 20 MG: 20 TABLET, FILM COATED ORAL at 08:22

## 2024-12-31 RX ADMIN — Medication 1 TABLET: at 08:23

## 2024-12-31 RX ADMIN — INSULIN GLARGINE 18 UNITS: 100 INJECTION, SOLUTION SUBCUTANEOUS at 20:17

## 2024-12-31 RX ADMIN — BENZTROPINE MESYLATE 0.5 MG: 0.5 TABLET ORAL at 20:18

## 2024-12-31 RX ADMIN — ACETAMINOPHEN 650 MG: 325 TABLET ORAL at 08:22

## 2024-12-31 ASSESSMENT — PAIN SCALES - GENERAL
PAINLEVEL_OUTOF10: 4
PAINLEVEL_OUTOF10: 0

## 2024-12-31 ASSESSMENT — PAIN DESCRIPTION - LOCATION: LOCATION: HEAD

## 2024-12-31 NOTE — CONSULTS
Today's Date: 12/31/2024  Patient Name: Magui Thayer  Date of admission: 12/24/2024  6:31 PM  Patient's age: 59 y.o., 1965  Admission Dx: Confusion [R41.0]  Intraventricular hemorrhage (HCC) [I61.5]  AMS (altered mental status) [R41.82]    Reason for Consult: Liver lesion  Requesting Physician: Kash Mancera MD    CHIEF COMPLAINT: Altered mental status/confusion    History Obtained From:  patient, electronic medical record    HISTORY OF PRESENT ILLNESS:      The patient is a 59 y.o.  female who is admitted to the hospital for altered mental status and progressive cognitive decline which has been going on for few months getting worse in the last few days, scan of the brain and MRI done on December 27, 2024 with no brain metastasis but chronic changes also she had LP and results not conclusive, patient had history of relapsed left breast cancer as below      DIAGNOSIS:   Relapse of left breast cancer with original diagnosis of a stage T2N1M0, left breast invasive ductal carcinoma with staging after neoadjuvant chemotherapy, ER/IA positive, HER2-jacinta negative, originally diagnosed in the spring of 2006 with recent recurrence of left breast cancer and a small tumor [id number], left breast invasive ductal carcinoma, ER/IA positive, and HER2-jacinta negative.    Iron Deficiency Anemia.     CURRENT THERAPY:    Status post bilateral mastectomy done on 10/10/2012 with no evidence of residual malignancy.    Status post 5 years of tamoxifen  between 2006 and 2011.    Status post neoadjuvant chemotherapy with Adriamycin and Cytoxan for 4 cycles followed by weekly Taxol for 8 weeks.  Chemotherapy was given between June 2006 and October 2006.  Status post lumpectomy and lymph node dissection November 2006.  Radiation therapy following the lumpectomy.  Iron replacement.      CT scan was done this admission and that showed the following    IMPRESSION:  1. A 1.5 cm right hepatic lesion may have been present in 2012

## 2024-12-31 NOTE — PLAN OF CARE
Problem: Chronic Conditions and Co-morbidities  Goal: Patient's chronic conditions and co-morbidity symptoms are monitored and maintained or improved  12/31/2024 1129 by Lexis Thompson RN  Outcome: Progressing  Flowsheets (Taken 12/31/2024 0754)  Care Plan - Patient's Chronic Conditions and Co-Morbidity Symptoms are Monitored and Maintained or Improved: Monitor and assess patient's chronic conditions and comorbid symptoms for stability, deterioration, or improvement  12/31/2024 0411 by Jessica Moore RN  Outcome: Progressing     Problem: Discharge Planning  Goal: Discharge to home or other facility with appropriate resources  12/31/2024 1129 by Lexis Thompson RN  Outcome: Progressing  Flowsheets (Taken 12/31/2024 0754)  Discharge to home or other facility with appropriate resources: Identify barriers to discharge with patient and caregiver  12/31/2024 0411 by Jessica Moore RN  Outcome: Progressing     Problem: Skin/Tissue Integrity  Goal: Absence of new skin breakdown  Description: 1.  Monitor for areas of redness and/or skin breakdown  2.  Assess vascular access sites hourly  3.  Every 4-6 hours minimum:  Change oxygen saturation probe site  4.  Every 4-6 hours:  If on nasal continuous positive airway pressure, respiratory therapy assess nares and determine need for appliance change or resting period.  12/31/2024 1129 by Lexis Thompson RN  Outcome: Progressing  12/31/2024 0411 by Jessica Moore RN  Outcome: Progressing     Problem: Safety - Adult  Goal: Free from fall injury  12/31/2024 1129 by Lexis Thompson RN  Outcome: Progressing  Flowsheets (Taken 12/31/2024 1127)  Free From Fall Injury: Instruct family/caregiver on patient safety  12/31/2024 0411 by Jessica Moore RN  Outcome: Progressing     Problem: ABCDS Injury Assessment  Goal: Absence of physical injury  12/31/2024 1129 by Lexis Thompson RN  Outcome: Progressing  Flowsheets (Taken 12/31/2024 1127)  Absence of Physical Injury: Implement safety  measures based on patient assessment  12/31/2024 0411 by Jessica Moore RN  Outcome: Progressing  Flowsheets (Taken 12/30/2024 2030)  Absence of Physical Injury: Implement safety measures based on patient assessment     Problem: Spiritual Struggle  Goal: Verbalizes spiritual struggle  12/31/2024 0411 by Jessica Moore RN  Outcome: Progressing  Goal: Gains new understanding/perception  12/31/2024 1129 by Lexis Thompson RN  Outcome: Progressing  12/31/2024 0411 by Jessica Moore RN  Outcome: Progressing  Goal: Growing sense of peace/hope  12/31/2024 1129 by Lexis Thompson RN  Outcome: Progressing  12/31/2024 0411 by Jessica Moore RN  Outcome: Progressing  Goal: Explore resources for additional follow up, post discharge  12/31/2024 1129 by Lexis Thompson RN  Outcome: Progressing  12/31/2024 0411 by Jessica Moore RN  Outcome: Progressing     Problem: Emotional Distress  Goal: Reduce evidence of anxiety/worry/anger  12/31/2024 0411 by Jessica Moore RN  Outcome: Progressing  Goal: Explore resources for additional follow up, post discharge  12/31/2024 0411 by Jessica Moore RN  Outcome: Progressing     Problem: Life Adjustment  Goal: Identifies/restores coping resources/skills  12/31/2024 0411 by Jessica Moore RN  Outcome: Progressing     Problem: Support with Decision-Making  Goal: Verbalization of thoughts/feelings regarding decision  12/31/2024 0411 by Jessica Moore RN  Outcome: Progressing  Goal: Identifies/restores coping resources/skills  12/31/2024 0411 by Jessica Moore RN  Outcome: Progressing     Problem: Sacramental/[employer] Needs  Goal: Provide for sacramental/Orthodox needs as appropriate,per patient/family request  12/31/2024 0411 by Jessica Moore RN  Outcome: Progressing

## 2024-12-31 NOTE — PROGRESS NOTES
Attending Physician Statement  I have discussed the case of Magui Thayer including pertinent history and exam findings with the resident physician. I reviewed medications, clinical labs, x-rays and other diagnostic tests with the resident physician.    I have seen and examined the patient and the key elements of the encounter have been performed by me. I agree with the assessment, plan and orders as documented by the resident physician.        Briefly, this is a  59 y.o. female with three yr hx of progressive cognitive decline with worsening short-term memory loss along with personality changes and visual hallucinations.  Over 1 week prior to this admit, symptoms significantly worsened for which she was evaluated at outside hospital where CT head revealed acute intraventricular hemorrhage in temporal horn and atrium of right lateral ventricle.  She was subsequently transferred to [facility] for further eval.     CT head 12/24/2024: Small amount of acute intraventricular hemorrhage in the temporal horn and  atrium of the right lateral ventricle.  MRI brain 12/27/2024: T2 hypointense signal within the right hippocampal cortex without restricted diffusion or enhancement. This may correspond to the hyperdense appearance in this region on comparison CT and can be seen in the setting of hyperosmolar nonketotic hyperglycemic seizure.  T2/FLAIR hyperintense signal within the left hippocampus and left medial temporal lobe, which could be secondary to postictal change, encephalitis, metabolic/toxic process, or subacute ischemia.  CSF 12/27/2024: RBC 2, WBC 2, protein 114, IgG index 0.6, OC bands pending    On exam; Blood pressure 127/75, pulse 97, temperature 99.1 °F (37.3 °C), temperature source Oral, resp. rate 20, height 1.575 m (5' 2.01\"), weight 59.1 kg (130 lb 4.7 oz), last menstrual period 12/08/2015, SpO2 98%, not currently breastfeeding.  Alert awake and oriented to self and place and month but not to year and date and

## 2024-12-31 NOTE — CARE COORDINATION
PHQ-9  Met with pt this a.m. was awake and alert.  Pt states she lives with her brother and sister-in-law.  Pt has 1 dtr,1 sin and 2 grandchildren.  Pt denies having any recent feelings of depression or SI.      Patient Health Questionnaire-9 (PHQ-9)    Over the last 2 weeks, how often have you been bothered by any of the following problems?    1. Little Interest or pleasure in doing things?   [x] Not at all  [] Several Days  [] More than half the day  []  Nearly every day    2. Feeling down, depressed or hopeless?    [x] Not at all  [] Several Days  [] More than half the day  []  Nearly every day    3. Trouble falling or staying asleep, or sleeping too much?   [x] Not at all  [] Several Days  [] More than half the day  []  Nearly every day    4. Feeling tired or having little energy?   [] Not at all  [x] Several Days  [] More than half the day  []  Nearly every day    5. Poor apettite or overeating?   [] Not at all  [x] Several Days  [] More than half the day  []  Nearly every day    6. Feeling bad about yourself-or that you are a failure or have let yourself or your family down?   [x] Not at all  [] Several Days  [] More than half the day  []  Nearly every day    7. Trouble concentrating on things, such as reading the newspaper or watching television?   [x] Not at all  [] Several Days  [] More than half the day  []  Nearly every day    8. Moving or speaking so slowly that other people could have noticed? Or the opposite-being so fidgety or restless that you have been moving around a lot more than usual?   [x] Not at all  [] Several Days  [] More than half the day  []  Nearly every day    9. Thoughts that you would be better off dead or of hurting yourself in some way?   [x] Not at all  [] Several Days  [] More than half the day  []  Nearly every day    Total Score: 2    If you checked off any problems, how difficult have these problems made it for you to do your work, take care of things at home, or get along with

## 2024-12-31 NOTE — PROGRESS NOTES
[facility] Neurology   IN-PATIENT SERVICE   [facility]    Progress Note             Date:   12/31/2024  Patient name:  Magui Thayer  Date of admission:  12/24/2024  6:31 PM  MRN:   [id number]  Account:  [id number]  YOB: 1965  PCP:    Keesha Matias MD  Room:   [address] [place] [address]  Code Status:    Full Code    Chief Complaint:     Chief Complaint   Patient presents with    Altered Mental Status    Hyperglycemia       Interval hx:     Patient seen and examined at bedside this morning.  Oriented to self and intermittently to place.  Remains alert.  Neurological exam otherwise unchanged from yesterday.  Of note, patient with [employer] food packaging at bedside brought by her friend, explained to patient the importance of diabetic diet adherence, patient and friend verbalized understanding.    BG better controlled today, added high dose sliding scale and increased lantus - appreciate medicine recs  WBCs 18 today likely reactive leukocytosis 2/2 steroids          Brief History of Present Illness:     As per H&P/initial consult note:      Reason for Consult: Intracranial Hemorrhage and Altered Mental Status     I had the pleasure of seeing your patient in neurology consultation for her symptoms of confusion and altered mental status. As you would recall, this is a 59-year-old female with a complex past medical history, including type II diabetes mellitus, bipolar disorder, PTSD, a history of invasive ductal carcinoma of the left breast, and a previous diagnosis of degenerative joint disease, among other conditions.     Her family reports a three-year history of progressive cognitive decline, with worsening short-term memory, episodic confusion, personality changes, and visual hallucinations. Over the past week, these symptoms have significantly worsened, with repetitive speech and increased confusion being noted. She presented to an outside hospital, where CT imaging revealed an acute

## 2024-12-31 NOTE — PLAN OF CARE
-Patient originally admitted to the family medicine service for the management of AMS and aphasia likely 2/2 IVH, however was transferred to neurology service to assume primary.  -Family medicine service was consulted to manage hyperglycemia 2/2 type 2 diabetes mellitus.  Currently on Lantus 18 units nightly, Humalog 6 units 3 times daily with meals, high dose sliding scale.  BG of 196 this PM.  Continue to monitor.  -Neurology is primary, hematology/oncology consulted due to history of ductal carcinoma s/p mastectomy, chemotherapy and radiation, with a 1.5 cm hepatic lesion which has progressed since last scan.  -Neurosurgery signed off due to no acute intervention at this time.  -Patient hemodynamically stable, saturating in the high 90's on room air.   -No complaints at this time.    Electronically signed by Julienne Perez MD on 12/31/2024 at 8:18 PM

## 2024-12-31 NOTE — PLAN OF CARE
Problem: Chronic Conditions and Co-morbidities  Goal: Patient's chronic conditions and co-morbidity symptoms are monitored and maintained or improved  12/31/2024 0411 by Jessica Moore RN  Outcome: Progressing  12/30/2024 1413 by Spencer Cornelius RN  Outcome: Progressing     Problem: Discharge Planning  Goal: Discharge to home or other facility with appropriate resources  12/31/2024 0411 by Jessica Moore RN  Outcome: Progressing  12/30/2024 1413 by Spencer Cornelius RN  Outcome: Progressing     Problem: Skin/Tissue Integrity  Goal: Absence of new skin breakdown  Description: 1.  Monitor for areas of redness and/or skin breakdown  2.  Assess vascular access sites hourly  3.  Every 4-6 hours minimum:  Change oxygen saturation probe site  4.  Every 4-6 hours:  If on nasal continuous positive airway pressure, respiratory therapy assess nares and determine need for appliance change or resting period.  12/31/2024 0411 by Jessica Moore RN  Outcome: Progressing  12/30/2024 1413 by Spencer Cornelius RN  Outcome: Progressing     Problem: Safety - Adult  Goal: Free from fall injury  12/31/2024 0411 by Jessica Moore RN  Outcome: Progressing  12/30/2024 1413 by Spencer Cornelius RN  Outcome: Progressing     Problem: ABCDS Injury Assessment  Goal: Absence of physical injury  12/31/2024 0411 by Jessica Moore RN  Outcome: Progressing  Flowsheets (Taken 12/30/2024 2030)  Absence of Physical Injury: Implement safety measures based on patient assessment  12/30/2024 1413 by Spencer Cornelius, RN  Outcome: Progressing     Problem: Spiritual Struggle  Goal: Verbalizes spiritual struggle  12/31/2024 0411 by Jessica Moore RN  Outcome: Progressing  12/30/2024 1413 by Spencer Cornelius, RN  Outcome: Progressing  Goal: Gains new understanding/perception  12/31/2024 0411 by Jessica Moore RN  Outcome: Progressing  12/30/2024 1413 by Spencer Cornelius, RN  Outcome: Progressing  Goal: Growing

## 2024-12-31 NOTE — PROGRESS NOTES
Comprehensive Nutrition Assessment    Type and Reason for Visit:  LOS    Nutrition Recommendations/Plan:   Continue current diet order.   Monitor po intake, weight, labs.      Malnutrition Assessment:  Malnutrition Status:  At risk for malnutrition (12/31/24 1445)    Context:  Acute Illness     Findings of the 6 clinical characteristics of malnutrition:  Energy Intake:  Unable to assess  Weight Loss:  Unable to assess     Body Fat Loss:  Unable to assess     Muscle Mass Loss:  Unable to assess    Fluid Accumulation:  Unable to assess     Strength:  Not Performed    Nutrition Assessment:    LOS. Patient presented with AMS. PMH: type 2 DM, HTN, HLD. Hx of uncontrolled blood sugars (100-400s). Per food accptence records patient is consuming ~% of her meal trays. RD visited with residnet, residnet confused during visit. RD coordinated care with floor nurse - patient usually consumes ~100% of her meal trays. Patients family often brings in outside foods, snacks, and beverages. Patients family not in the room when RD visited. Patients family may benefit from education r/t a carbohydrate controlled diet.    Nutrition Related Findings:    labs reviewed. Medications reviewed: ferrous sulfate, lasix, MVI w/ minerals. Wound Type: None       Current Nutrition Intake & Therapies:    Average Meal Intake: 51-75%  Average Supplements Intake: 51-75%  ADULT DIET; Regular; 3 carb choices (45 gm/meal); Low Fat/Low Chol/High Fiber/GREG    Anthropometric Measures:  Height: 157.5 cm (5' 2.01\")  Ideal Body Weight (IBW): 110 lbs (50 kg)    Admission Body Weight: 56.7 kg (125 lb)  Current Body Weight: 59.1 kg (130 lb 4.7 oz), 118.4 % IBW. Weight Source: Bed scale  Current BMI (kg/m2): 23.8  Weight Adjustment For: No Adjustment  BMI Categories: Normal Weight (BMI 18.5-24.9)    Estimated Daily Nutrient Needs:  Energy Requirements Based On: Kcal/kg  Weight Used for Energy Requirements: Current  Energy (kcal/day): 1400-1650kcals  (25-30kcal/kg)  Weight Used for Protein Requirements: Current  Protein (g/day): 60-70g protein (1.0-1.2g/kg)  Method Used for Fluid Requirements: 1 ml/kcal  Fluid (ml/day): 2402-9844 or per MD    Nutrition Diagnosis:   Altered nutrition-related lab values related to endocrine dysfunction as evidenced by lab values    Nutrition Interventions:   Food and/or Nutrient Delivery: Continue Current Diet  Nutrition Education/Counseling: Education/Counseling initiated  Coordination of Nutrition Care: Continue to monitor while inpatient    Goals:  Goals: Meet at least 75% of estimated needs  Type of Goal: New goal  Previous Goal Met: New Goal    Nutrition Monitoring and Evaluation:   Behavioral-Environmental Outcomes: None Identified  Food/Nutrient Intake Outcomes: Progression of Nutrition  Physical Signs/Symptoms Outcomes: Biochemical Data, GI Status, Weight, Skin, Nutrition Focused Physical Findings    Discharge Planning:    Continue current diet     Tim Montes De Oca RD  Contact: [phone]

## 2025-01-01 LAB
FERRITIN SERPL-MCNC: 1453 NG/ML (ref 15–150)
FOLATE SERPL-MCNC: 17.3 NG/ML (ref 4.8–24.2)
GLUCOSE BLD-MCNC: 142 MG/DL (ref 65–105)
GLUCOSE BLD-MCNC: 163 MG/DL (ref 65–105)
GLUCOSE BLD-MCNC: 206 MG/DL (ref 65–105)
GLUCOSE BLD-MCNC: 411 MG/DL (ref 65–105)
IMM RETICS NFR: 14.1 % (ref 2.7–18.3)
IRON SATN MFR SERPL: 43 % (ref 20–55)
IRON SERPL-MCNC: 79 UG/DL (ref 37–145)
RETIC HEMOGLOBIN: 35.1 PG (ref 28.2–35.7)
RETICS # AUTO: 0.16 M/UL (ref 0.03–0.08)
RETICS/RBC NFR AUTO: 4.1 % (ref 0.5–1.9)
TIBC SERPL-MCNC: 185 UG/DL (ref 250–450)
UNSATURATED IRON BINDING CAPACITY: 106 UG/DL (ref 112–347)
VIT B12 SERPL-MCNC: 572 PG/ML (ref 232–1245)

## 2025-01-01 PROCEDURE — 6360000002 HC RX W HCPCS

## 2025-01-01 PROCEDURE — 82947 ASSAY GLUCOSE BLOOD QUANT: CPT

## 2025-01-01 PROCEDURE — 6370000000 HC RX 637 (ALT 250 FOR IP)

## 2025-01-01 PROCEDURE — 82607 VITAMIN B-12: CPT

## 2025-01-01 PROCEDURE — 83540 ASSAY OF IRON: CPT

## 2025-01-01 PROCEDURE — 2500000003 HC RX 250 WO HCPCS

## 2025-01-01 PROCEDURE — 2060000000 HC ICU INTERMEDIATE R&B

## 2025-01-01 PROCEDURE — 36415 COLL VENOUS BLD VENIPUNCTURE: CPT

## 2025-01-01 PROCEDURE — 99232 SBSQ HOSP IP/OBS MODERATE 35: CPT | Performed by: PSYCHIATRY & NEUROLOGY

## 2025-01-01 PROCEDURE — 2580000003 HC RX 258

## 2025-01-01 PROCEDURE — 82728 ASSAY OF FERRITIN: CPT

## 2025-01-01 PROCEDURE — 99232 SBSQ HOSP IP/OBS MODERATE 35: CPT | Performed by: INTERNAL MEDICINE

## 2025-01-01 PROCEDURE — 99232 SBSQ HOSP IP/OBS MODERATE 35: CPT | Performed by: STUDENT IN AN ORGANIZED HEALTH CARE EDUCATION/TRAINING PROGRAM

## 2025-01-01 PROCEDURE — 82746 ASSAY OF FOLIC ACID SERUM: CPT

## 2025-01-01 PROCEDURE — 85045 AUTOMATED RETICULOCYTE COUNT: CPT

## 2025-01-01 PROCEDURE — 83550 IRON BINDING TEST: CPT

## 2025-01-01 RX ORDER — INSULIN GLARGINE 100 [IU]/ML
23 INJECTION, SOLUTION SUBCUTANEOUS NIGHTLY
Status: DISCONTINUED | OUTPATIENT
Start: 2025-01-01 | End: 2025-01-02 | Stop reason: HOSPADM

## 2025-01-01 RX ADMIN — SODIUM CHLORIDE, PRESERVATIVE FREE 10 ML: 5 INJECTION INTRAVENOUS at 09:07

## 2025-01-01 RX ADMIN — METHYLPREDNISOLONE SODIUM SUCCINATE 500 MG: 1 INJECTION, POWDER, LYOPHILIZED, FOR SOLUTION INTRAMUSCULAR; INTRAVENOUS at 09:13

## 2025-01-01 RX ADMIN — TOPIRAMATE 100 MG: 200 TABLET, FILM COATED ORAL at 09:06

## 2025-01-01 RX ADMIN — INSULIN LISPRO 16 UNITS: 100 INJECTION, SOLUTION INTRAVENOUS; SUBCUTANEOUS at 07:48

## 2025-01-01 RX ADMIN — INSULIN GLARGINE 23 UNITS: 100 INJECTION, SOLUTION SUBCUTANEOUS at 20:39

## 2025-01-01 RX ADMIN — BENZTROPINE MESYLATE 0.5 MG: 0.5 TABLET ORAL at 09:06

## 2025-01-01 RX ADMIN — HYDRALAZINE HYDROCHLORIDE 10 MG: 20 INJECTION INTRAMUSCULAR; INTRAVENOUS at 16:22

## 2025-01-01 RX ADMIN — FERROUS SULFATE TAB EC 325 MG (65 MG FE EQUIVALENT) 325 MG: 325 (65 FE) TABLET DELAYED RESPONSE at 12:05

## 2025-01-01 RX ADMIN — BENZTROPINE MESYLATE 0.5 MG: 0.5 TABLET ORAL at 20:37

## 2025-01-01 RX ADMIN — SODIUM CHLORIDE, PRESERVATIVE FREE 10 ML: 5 INJECTION INTRAVENOUS at 20:39

## 2025-01-01 RX ADMIN — ATORVASTATIN CALCIUM 20 MG: 20 TABLET, FILM COATED ORAL at 08:59

## 2025-01-01 RX ADMIN — ESCITALOPRAM OXALATE 10 MG: 10 TABLET ORAL at 08:59

## 2025-01-01 RX ADMIN — INSULIN LISPRO 6 UNITS: 100 INJECTION, SOLUTION INTRAVENOUS; SUBCUTANEOUS at 09:00

## 2025-01-01 RX ADMIN — LACOSAMIDE 100 MG: 100 TABLET, FILM COATED ORAL at 08:59

## 2025-01-01 RX ADMIN — Medication 1 TABLET: at 09:07

## 2025-01-01 RX ADMIN — FERROUS SULFATE TAB EC 325 MG (65 MG FE EQUIVALENT) 325 MG: 325 (65 FE) TABLET DELAYED RESPONSE at 08:57

## 2025-01-01 RX ADMIN — INSULIN LISPRO 6 UNITS: 100 INJECTION, SOLUTION INTRAVENOUS; SUBCUTANEOUS at 12:04

## 2025-01-01 RX ADMIN — INSULIN LISPRO 4 UNITS: 100 INJECTION, SOLUTION INTRAVENOUS; SUBCUTANEOUS at 20:39

## 2025-01-01 RX ADMIN — INSULIN LISPRO 6 UNITS: 100 INJECTION, SOLUTION INTRAVENOUS; SUBCUTANEOUS at 16:29

## 2025-01-01 RX ADMIN — FERROUS SULFATE TAB EC 325 MG (65 MG FE EQUIVALENT) 325 MG: 325 (65 FE) TABLET DELAYED RESPONSE at 16:30

## 2025-01-01 RX ADMIN — FUROSEMIDE 20 MG: 40 TABLET ORAL at 08:58

## 2025-01-01 RX ADMIN — AMLODIPINE BESYLATE 5 MG: 5 TABLET ORAL at 08:58

## 2025-01-01 RX ADMIN — HYDRALAZINE HYDROCHLORIDE 10 MG: 20 INJECTION INTRAMUSCULAR; INTRAVENOUS at 03:56

## 2025-01-01 RX ADMIN — METHYLPREDNISOLONE SODIUM SUCCINATE 500 MG: 1 INJECTION, POWDER, LYOPHILIZED, FOR SOLUTION INTRAMUSCULAR; INTRAVENOUS at 20:42

## 2025-01-01 RX ADMIN — TOPIRAMATE 100 MG: 200 TABLET, FILM COATED ORAL at 20:37

## 2025-01-01 RX ADMIN — LACOSAMIDE 100 MG: 100 TABLET, FILM COATED ORAL at 20:39

## 2025-01-01 ASSESSMENT — ENCOUNTER SYMPTOMS
APNEA: 0
NAUSEA: 0
ABDOMINAL DISTENTION: 0
CHEST TIGHTNESS: 0
VOMITING: 0
ABDOMINAL PAIN: 0
SORE THROAT: 0
SINUS PAIN: 0
DIARRHEA: 0
SINUS PRESSURE: 0
SHORTNESS OF BREATH: 0

## 2025-01-01 ASSESSMENT — PAIN SCALES - GENERAL
PAINLEVEL_OUTOF10: 0

## 2025-01-01 ASSESSMENT — PAIN SCALES - WONG BAKER: WONGBAKER_NUMERICALRESPONSE: NO HURT

## 2025-01-01 NOTE — PROGRESS NOTES
[facility] Medicine Inpatient Service  [facility]  Progress Note    Date:   1/1/2025  Patient name:  Magui Thayer  Date of admission:  12/24/2024  6:31 PM  MRN:   [id number]  YOB: 1965    SUBJECTIVE/Last 24 hours update:     Patient seen and examined at the bed side, no new acute events overnight except blood sugar spike this morning to 411, no new complains.      HPI:     Patient originally admitted to the family medicine service for the management of AMS and aphasia likely 2/2 IVH, however was transferred to neurology service to assume primary. Family medicine service was consulted to manage hyperglycemia 2/2 type 2 diabetes mellitus.  Currently on Lantus 18 units nightly, Humalog 6 units 3 times daily with meals, high dose sliding scale. Neurology is primary, hematology/oncology consulted due to history of ductal carcinoma s/p mastectomy, chemotherapy and radiation, with a 1.5 cm hepatic lesion which has progressed since last scan. Neurosurgery signed off due to no acute intervention at this time. Patient hemodynamically stable, saturating in the high 90's on room air.     REVIEW OF SYSTEMS:   Review of Systems   Constitutional:  Negative for activity change, chills, fatigue and fever.   Respiratory:  Negative for apnea, chest tightness and shortness of breath.    Cardiovascular:  Negative for chest pain and leg swelling.   Gastrointestinal:  Negative for abdominal distention, abdominal pain, diarrhea, nausea and vomiting.   Genitourinary:  Positive for frequency (increased).   Neurological:  Negative for dizziness, light-headedness and headaches.       PAST MEDICAL HISTORY:      has a past medical history of Breast cancer (HCC), Cancer (HCC), Conjunctivitis, Depression, Disorder of patellofemoral joint, DJD (degenerative joint disease) of knee, Hyperlipidemia, Hypertension, Malignant neoplasm of nipple of left breast in female, estrogen receptor positive (HCC), Pain, knee,  9:21 AM

## 2025-01-01 NOTE — PROGRESS NOTES
[facility] Neurology   IN-PATIENT SERVICE   [facility]    Progress Note             Date:   1/1/2025  Patient name:  Magui Thayer  Date of admission:  12/24/2024  6:31 PM  MRN:   [id number]  Account:  [id number]  YOB: 1965  PCP:    Keesha Matias MD  Room:   [address] [place] [address]  Code Status:    Full Code    Chief Complaint:     Chief Complaint   Patient presents with    Altered Mental Status    Hyperglycemia       Interval hx:     Patient seen and examined at bedside this morning.  Patient oriented to self, place, and year.  Seems to be improvement from prior exams.      Brief History of Present Illness:     As per H&P/initial consult note:      Reason for Consult: Intracranial Hemorrhage and Altered Mental Status     I had the pleasure of seeing your patient in neurology consultation for her symptoms of confusion and altered mental status. As you would recall, this is a 59-year-old female with a complex past medical history, including type II diabetes mellitus, bipolar disorder, PTSD, a history of invasive ductal carcinoma of the left breast, and a previous diagnosis of degenerative joint disease, among other conditions.     Her family reports a three-year history of progressive cognitive decline, with worsening short-term memory, episodic confusion, personality changes, and visual hallucinations. Over the past week, these symptoms have significantly worsened, with repetitive speech and increased confusion being noted. She presented to an outside hospital, where CT imaging revealed an acute intraventricular hemorrhage in the temporal horn and atrium of the right lateral ventricle. She was subsequently transferred for further evaluation.     The patient has no reported history of recent trauma, alcohol or substance use, or significant medication changes. Notably, the family recalls a prior history of tardive dyskinesia in her father and mentions the patient's long-term use of      No intake or output data in the 24 hours ending 01/01/25 0836          Neurologic Exam     GENERAL Not in acute distress   HEENT  NC/ AT   HEART  S1 and S2 heard; palpation of pulses: radial pulse    NECK  Supple and no bruits heard   MENTAL STATUS: Alert and oriented to person, place, and year   CRANIAL NERVES: II     -      Visual fields intact to confrontation  III,IV,VI -  PERR, EOMs full  V     -     Normal facial sensation   VII    -     Normal facial symmetry  VIII   -     Intact hearing    MOTOR FUNCTION: 5/5 in all 4 extremities  No drift   SENSORY FUNCTION: Denies diminished sensation   CEREBELLAR FUNCTION:  Intact fine motor control over upper limbs and lower limbs   REFLEX FUNCTION:  Symmetric in upper and lower extremities, no tremors   STATION and GAIT Deferred to PT       Investigations:      Laboratory Testing:  Recent Results (from the past 24 hour(s))   POC Glucose Fingerstick    Collection Time: 12/31/24 11:10 AM   Result Value Ref Range    POC Glucose 232 (H) 65 - 105 mg/dL   POC Glucose Fingerstick    Collection Time: 12/31/24  3:42 PM   Result Value Ref Range    POC Glucose 307 (H) 65 - 105 mg/dL   POC Glucose Fingerstick    Collection Time: 12/31/24  7:34 PM   Result Value Ref Range    POC Glucose 196 (H) 65 - 105 mg/dL   POC Glucose Fingerstick    Collection Time: 01/01/25  7:39 AM   Result Value Ref Range    POC Glucose 411 (HH) 65 - 105 mg/dL     Recent Labs     12/31/24 0317   WBC 18.2*   RBC 3.79*   HGB 11.2*   HCT 35.9*   MCV 94.7   MCH 29.6   MCHC 31.2   RDW 13.2      MPV 9.6     Recent Labs     12/31/24 0317      K 4.1      CO2 22   BUN 18   CREATININE 1.0*   GLUCOSE 127*   CALCIUM 8.8     Hemoglobin A1C   Date Value Ref Range Status   12/25/2024 17.2 (H) 4.0 - 6.0 % Final       Assessment :      Primary Problem  AMS (altered mental status)    Active Hospital Problems    Diagnosis Date Noted    HFrEF (heart failure with reduced ejection fraction) ([facility]) [I50.20]

## 2025-01-01 NOTE — PROGRESS NOTES
Today's Date: 1/1/2025  Patient Name: Magui Thayer  Date of admission: 12/24/2024  6:31 PM  Patient's age: 59 y.o., 1965  Admission Dx: Confusion [R41.0]  Intraventricular hemorrhage (HCC) [I61.5]  AMS (altered mental status) [R41.82]    Reason for Consult: Liver lesion  Requesting Physician: Kash Mancera MD    CHIEF COMPLAINT: Altered mental status/confusion    History Obtained From:  patient, electronic medical record    Interval history  More awake  Hemoglobin stable    HISTORY OF PRESENT ILLNESS:      The patient is a 59 y.o.  female who is admitted to the hospital for altered mental status and progressive cognitive decline which has been going on for few months getting worse in the last few days, scan of the brain and MRI done on December 27, 2024 with no brain metastasis but chronic changes also she had LP and results not conclusive, patient had history of relapsed left breast cancer as below      DIAGNOSIS:   Relapse of left breast cancer with original diagnosis of a stage T2N1M0, left breast invasive ductal carcinoma with staging after neoadjuvant chemotherapy, ER/NM positive, HER2-jacinta negative, originally diagnosed in the spring of 2006 with recent recurrence of left breast cancer and a small tumor [id number], left breast invasive ductal carcinoma, ER/NM positive, and HER2-jacinta negative.    Iron Deficiency Anemia.     CURRENT THERAPY:    Status post bilateral mastectomy done on 10/10/2012 with no evidence of residual malignancy.    Status post 5 years of tamoxifen  between 2006 and 2011.    Status post neoadjuvant chemotherapy with Adriamycin and Cytoxan for 4 cycles followed by weekly Taxol for 8 weeks.  Chemotherapy was given between June 2006 and October 2006.  Status post lumpectomy and lymph node dissection November 2006.  Radiation therapy following the lumpectomy.  Iron replacement.      CT scan was done this admission and that showed the following    IMPRESSION:  1. A 1.5 cm

## 2025-01-01 NOTE — PLAN OF CARE
Problem: Chronic Conditions and Co-morbidities  Goal: Patient's chronic conditions and co-morbidity symptoms are monitored and maintained or improved  Outcome: Progressing  Flowsheets (Taken 1/1/2025 0820)  Care Plan - Patient's Chronic Conditions and Co-Morbidity Symptoms are Monitored and Maintained or Improved: Monitor and assess patient's chronic conditions and comorbid symptoms for stability, deterioration, or improvement     Problem: Discharge Planning  Goal: Discharge to home or other facility with appropriate resources  Outcome: Progressing  Flowsheets (Taken 1/1/2025 0820)  Discharge to home or other facility with appropriate resources: Identify barriers to discharge with patient and caregiver     Problem: Skin/Tissue Integrity  Goal: Absence of new skin breakdown  Description: 1.  Monitor for areas of redness and/or skin breakdown  2.  Assess vascular access sites hourly  3.  Every 4-6 hours minimum:  Change oxygen saturation probe site  4.  Every 4-6 hours:  If on nasal continuous positive airway pressure, respiratory therapy assess nares and determine need for appliance change or resting period.  Outcome: Progressing     Problem: Safety - Adult  Goal: Free from fall injury  Outcome: Progressing     Problem: Discharge Planning  Goal: Discharge to home or other facility with appropriate resources  Outcome: Progressing  Flowsheets (Taken 1/1/2025 0820)  Discharge to home or other facility with appropriate resources: Identify barriers to discharge with patient and caregiver     Problem: Skin/Tissue Integrity  Goal: Absence of new skin breakdown  Description: 1.  Monitor for areas of redness and/or skin breakdown  2.  Assess vascular access sites hourly  3.  Every 4-6 hours minimum:  Change oxygen saturation probe site  4.  Every 4-6 hours:  If on nasal continuous positive airway pressure, respiratory therapy assess nares and determine need for appliance change or resting period.  Outcome: Progressing      Problem: Safety - Adult  Goal: Free from fall injury  Outcome: Progressing

## 2025-01-01 NOTE — CONSULTS
This is a late documentation.      Consult note    01/01/25  Magui Thayer     Complaints  None    Physical exam  Vitals:    01/01/25 0858   BP: 127/61   Pulse:    Resp:    Temp:      GCS 14 ([id number]).   Tongue wet. Skin turgor good.  Respiratory rate: 18 per minute. On room air, saturating well.  No prominent peripheral edema noted.    Labs and imaging reviewed.  Recent blood glucose stabilized.    Recommendations:  Continue insulin lispro 6 units SQ TID. Continue lantus 18 units, and high-intensity sliding scale.  Continue holding empagliflozin.  Continue POCT BG checks.  Avoid hyperglycemia episodes.  Do not hesitate to call with any questions.    Will discuss with the attending physician, Dr. Romero.    Morales Miranda  PGY-1

## 2025-01-01 NOTE — PROGRESS NOTES
Writer let primary know that patient has become tachy in the low teens. No new orders at this. Will continue to monitor

## 2025-01-02 VITALS
WEIGHT: 130.29 LBS | RESPIRATION RATE: 15 BRPM | TEMPERATURE: 98.1 F | DIASTOLIC BLOOD PRESSURE: 78 MMHG | HEART RATE: 94 BPM | SYSTOLIC BLOOD PRESSURE: 142 MMHG | HEIGHT: 62 IN | BODY MASS INDEX: 23.98 KG/M2 | OXYGEN SATURATION: 100 %

## 2025-01-02 PROBLEM — E11.9 DIABETES MELLITUS (HCC): Status: ACTIVE | Noted: 2025-01-02

## 2025-01-02 LAB
ANION GAP SERPL CALCULATED.3IONS-SCNC: 11 MMOL/L (ref 9–16)
BASOPHILS # BLD: 0.06 K/UL (ref 0–0.2)
BASOPHILS NFR BLD: 0 % (ref 0–2)
BUN SERPL-MCNC: 21 MG/DL (ref 6–20)
CALCIUM SERPL-MCNC: 8.3 MG/DL (ref 8.6–10.4)
CHLORIDE SERPL-SCNC: 105 MMOL/L (ref 98–107)
CO2 SERPL-SCNC: 21 MMOL/L (ref 20–31)
CREAT SERPL-MCNC: 0.8 MG/DL (ref 0.6–0.9)
EOSINOPHIL # BLD: <0.03 K/UL (ref 0–0.44)
EOSINOPHILS RELATIVE PERCENT: 0 % (ref 1–4)
ERYTHROCYTE [DISTWIDTH] IN BLOOD BY AUTOMATED COUNT: 13.4 % (ref 11.8–14.4)
GFR, ESTIMATED: 85 ML/MIN/1.73M2
GLUCOSE BLD-MCNC: 134 MG/DL (ref 65–105)
GLUCOSE BLD-MCNC: 276 MG/DL (ref 65–105)
GLUCOSE SERPL-MCNC: 213 MG/DL (ref 74–99)
HCT VFR BLD AUTO: 39.1 % (ref 36.3–47.1)
HGB BLD-MCNC: 12.3 G/DL (ref 11.9–15.1)
IMM GRANULOCYTES # BLD AUTO: 0.29 K/UL (ref 0–0.3)
IMM GRANULOCYTES NFR BLD: 2 %
LYMPHOCYTES NFR BLD: 1.73 K/UL (ref 1.1–3.7)
LYMPHOCYTES RELATIVE PERCENT: 10 % (ref 24–43)
MCH RBC QN AUTO: 30.1 PG (ref 25.2–33.5)
MCHC RBC AUTO-ENTMCNC: 31.5 G/DL (ref 28.4–34.8)
MCV RBC AUTO: 95.6 FL (ref 82.6–102.9)
MONOCYTES NFR BLD: 0.68 K/UL (ref 0.1–1.2)
MONOCYTES NFR BLD: 4 % (ref 3–12)
NEUTROPHILS NFR BLD: 84 % (ref 36–65)
NEUTS SEG NFR BLD: 13.83 K/UL (ref 1.5–8.1)
NRBC BLD-RTO: 0 PER 100 WBC
PLATELET # BLD AUTO: 382 K/UL (ref 138–453)
PMV BLD AUTO: 9.6 FL (ref 8.1–13.5)
POTASSIUM SERPL-SCNC: 3.8 MMOL/L (ref 3.7–5.3)
RBC # BLD AUTO: 4.09 M/UL (ref 3.95–5.11)
SODIUM SERPL-SCNC: 137 MMOL/L (ref 136–145)
WBC OTHER # BLD: 16.6 K/UL (ref 3.5–11.3)

## 2025-01-02 PROCEDURE — 36415 COLL VENOUS BLD VENIPUNCTURE: CPT

## 2025-01-02 PROCEDURE — 6370000000 HC RX 637 (ALT 250 FOR IP)

## 2025-01-02 PROCEDURE — 99232 SBSQ HOSP IP/OBS MODERATE 35: CPT | Performed by: STUDENT IN AN ORGANIZED HEALTH CARE EDUCATION/TRAINING PROGRAM

## 2025-01-02 PROCEDURE — 99232 SBSQ HOSP IP/OBS MODERATE 35: CPT | Performed by: PSYCHIATRY & NEUROLOGY

## 2025-01-02 PROCEDURE — 2500000003 HC RX 250 WO HCPCS

## 2025-01-02 PROCEDURE — 85025 COMPLETE CBC W/AUTO DIFF WBC: CPT

## 2025-01-02 PROCEDURE — 82947 ASSAY GLUCOSE BLOOD QUANT: CPT

## 2025-01-02 PROCEDURE — 80048 BASIC METABOLIC PNL TOTAL CA: CPT

## 2025-01-02 RX ORDER — LACOSAMIDE 100 MG/1
100 TABLET ORAL 2 TIMES DAILY
Qty: 60 TABLET | Refills: 5 | Status: SHIPPED | OUTPATIENT
Start: 2025-01-02 | End: 2025-02-01

## 2025-01-02 RX ORDER — TOPIRAMATE 200 MG/1
100 TABLET, FILM COATED ORAL 2 TIMES DAILY
Qty: 60 TABLET | Refills: 3 | Status: SHIPPED | OUTPATIENT
Start: 2025-01-02

## 2025-01-02 RX ADMIN — TOPIRAMATE 100 MG: 200 TABLET, FILM COATED ORAL at 09:40

## 2025-01-02 RX ADMIN — FUROSEMIDE 20 MG: 40 TABLET ORAL at 08:24

## 2025-01-02 RX ADMIN — SODIUM CHLORIDE, PRESERVATIVE FREE 10 ML: 5 INJECTION INTRAVENOUS at 08:24

## 2025-01-02 RX ADMIN — ESCITALOPRAM OXALATE 10 MG: 10 TABLET ORAL at 08:23

## 2025-01-02 RX ADMIN — AMLODIPINE BESYLATE 5 MG: 5 TABLET ORAL at 08:23

## 2025-01-02 RX ADMIN — FERROUS SULFATE TAB EC 325 MG (65 MG FE EQUIVALENT) 325 MG: 325 (65 FE) TABLET DELAYED RESPONSE at 12:06

## 2025-01-02 RX ADMIN — INSULIN LISPRO 8 UNITS: 100 INJECTION, SOLUTION INTRAVENOUS; SUBCUTANEOUS at 12:06

## 2025-01-02 RX ADMIN — Medication 1 TABLET: at 09:40

## 2025-01-02 RX ADMIN — INSULIN LISPRO 6 UNITS: 100 INJECTION, SOLUTION INTRAVENOUS; SUBCUTANEOUS at 08:22

## 2025-01-02 RX ADMIN — FERROUS SULFATE TAB EC 325 MG (65 MG FE EQUIVALENT) 325 MG: 325 (65 FE) TABLET DELAYED RESPONSE at 08:23

## 2025-01-02 RX ADMIN — LACOSAMIDE 100 MG: 100 TABLET, FILM COATED ORAL at 08:23

## 2025-01-02 RX ADMIN — BENZTROPINE MESYLATE 0.5 MG: 0.5 TABLET ORAL at 09:40

## 2025-01-02 RX ADMIN — INSULIN LISPRO 6 UNITS: 100 INJECTION, SOLUTION INTRAVENOUS; SUBCUTANEOUS at 12:06

## 2025-01-02 ASSESSMENT — ENCOUNTER SYMPTOMS
SINUS PAIN: 0
WHEEZING: 0
TROUBLE SWALLOWING: 0
COLOR CHANGE: 0
APNEA: 0
PHOTOPHOBIA: 0
VOMITING: 0
SHORTNESS OF BREATH: 0
SINUS PRESSURE: 0
VOICE CHANGE: 0
CHEST TIGHTNESS: 0
ABDOMINAL PAIN: 0
NAUSEA: 0
DIARRHEA: 0
ABDOMINAL DISTENTION: 0
SORE THROAT: 0

## 2025-01-02 ASSESSMENT — PAIN SCALES - GENERAL: PAINLEVEL_OUTOF10: 0

## 2025-01-02 NOTE — H&P
heard; palpation of pulses: radial pulse    NECK  Supple and no bruits heard   MENTAL STATUS:  Alert, oriented, intact memory, no confusion, normal speech, normal language, no hallucination or delusion   CRANIAL NERVES: II     -      Visual fields intact to confrontation  III,IV,VI -  PERR, EOMs full, no ptosis  V     -     Normal facial sensation   VII    -     Normal facial symmetry  VIII   -     Intact hearing   IX,X -     Symmetrical palate  XI    -     Symmetrical shoulder shrug  XII   -     Midline tongue, no atrophy    MOTOR FUNCTION: RUE: Significant for good strength of grade 5/5 in proximal and distal muscle groups   LUE: Significant for good strength of grade 5/5 in proximal and distal muscle groups   RLE: Significant for good strength of grade 5/5 in proximal and distal muscle groups   LLE: Significant for good strength of grade 5/5 in proximal and distal muscle groups      Normal bulk, normal tone and no involuntary movements, no tremor   SENSORY FUNCTION:  Normal touch, normal pinprick, normal vibration, normal proprioception   CEREBELLAR FUNCTION:  Intact fine motor control over upper limbs and lower limbs   REFLEX FUNCTION:  Symmetric in upper and lower extremities, no Babinski sign   STATION and GAIT Deferred           Investigations:      Laboratory Testing:  Recent Results (from the past 24 hour(s))   POC Glucose Fingerstick    Collection Time: 01/01/25  7:49 PM   Result Value Ref Range    POC Glucose 206 (H) 65 - 105 mg/dL   POC Glucose Fingerstick    Collection Time: 01/02/25  7:13 AM   Result Value Ref Range    POC Glucose 134 (H) 65 - 105 mg/dL   CBC with Auto Differential    Collection Time: 01/02/25  8:32 AM   Result Value Ref Range    WBC 16.6 (H) 3.5 - 11.3 k/uL    RBC 4.09 3.95 - 5.11 m/uL    Hemoglobin 12.3 11.9 - 15.1 g/dL    Hematocrit 39.1 36.3 - 47.1 %    MCV 95.6 82.6 - 102.9 fL    MCH 30.1 25.2 - 33.5 pg    MCHC 31.5 28.4 - 34.8 g/dL    RDW 13.4 11.8 - 14.4 %    Platelets 382 138 -  with hyperglycemia, with long-term current use of insulin (HCC) [E11.65, Z79.4] 04/25/2014    Iron deficiency anemia [D50.9] 03/12/2013         Plan:     Patient status Admit as inpatient in the     Obtain MRI brain without IV contrast.  Will consider lumbar puncture.  Will consider obtaining CT of abdomen and pelvis.  Decrease topiramate from 400 mg twice daily to 100 mg.  He.  Video EEG to assess for epileptic activity.  Medicine consultation.  Neurosurgery consultation.  PT/OT  DVT prophylaxis with Lovenox.    Consultations:   IP CONSULT TO NEUROSURGERY  IP CONSULT TO SOCIAL WORK  IP CONSULT TO SPIRITUAL SERVICES  IP CONSULT TO CARDIOLOGY  IP CONSULT TO NEUROLOGY  IP CONSULT TO CARDIOLOGY  IP CONSULT TO SPIRITUAL SERVICES  IP CONSULT TO HEM/ONC  IP CONSULT TO FAMILY MEDICINE  IP CONSULT TO HOME CARE NEEDS      Follow-up further recommendations after discussing the case with attending  The plan was discussed with the patient, patient's family and the medical staff.     Patient is admitted as inpatient status because of co-morbidities listed above, severity of signs and symptoms as outlined, requirement for current medical therapies and most importantly because of direct risk to patient if care not provided in a hospital setting.    Kip Crain DO  Neurology Resident PGY-4  1/2/2025  4:50 PM    Copy sent to Keesha Agnel MD

## 2025-01-02 NOTE — PROGRESS NOTES
Today's Date: 1/2/2025  Patient Name: Magui Thayer  Date of admission: 12/24/2024  6:31 PM  Patient's age: 59 y.o., 1965  Admission Dx: Confusion [R41.0]  Intraventricular hemorrhage (HCC) [I61.5]  AMS (altered mental status) [R41.82]    Reason for Consult: Liver lesion  Requesting Physician: Kash Mancera MD    CHIEF COMPLAINT: Altered mental status/confusion    History Obtained From:  patient, electronic medical record    Interval history  More awake  Hemoglobin stable    HISTORY OF PRESENT ILLNESS:      The patient is a 59 y.o.  female who is admitted to the hospital for altered mental status and progressive cognitive decline which has been going on for few months getting worse in the last few days, scan of the brain and MRI done on December 27, 2024 with no brain metastasis but chronic changes also she had LP and results not conclusive, patient had history of relapsed left breast cancer as below      DIAGNOSIS:   Relapse of left breast cancer with original diagnosis of a stage T2N1M0, left breast invasive ductal carcinoma with staging after neoadjuvant chemotherapy, ER/WI positive, HER2-jacinta negative, originally diagnosed in the spring of 2006 with recent recurrence of left breast cancer and a small tumor [id number], left breast invasive ductal carcinoma, ER/WI positive, and HER2-jacinta negative.    Iron Deficiency Anemia.     CURRENT THERAPY:    Status post bilateral mastectomy done on 10/10/2012 with no evidence of residual malignancy.    Status post 5 years of tamoxifen  between 2006 and 2011.    Status post neoadjuvant chemotherapy with Adriamycin and Cytoxan for 4 cycles followed by weekly Taxol for 8 weeks.  Chemotherapy was given between June 2006 and October 2006.  Status post lumpectomy and lymph node dissection November 2006.  Radiation therapy following the lumpectomy.  Iron replacement.      CT scan was done this admission and that showed the following    IMPRESSION:  1. A 1.5 cm  in 2012 presented with worsening altered mental status and brain imaging did not show evidence of metastasis working diagnosis encephalitis/seizure and found to have right hepatic lesion which was exist before with mild increase in size likely benign etiology> follow-up with medical oncology and MRI of the liver protocol  At this time no evidence of cancer recurrence  Normocytic anemia with history of gastric bypass could be element of malabsorption/iron deficient> anemia workup no evidence of hemolysis> iron panel pending  Leukocytosis reactive to steroid  Will follow the patient      Discussed with patient and Nurse.      Thank you for asking us to see this patient.                                    Mathew Sanchez MD                          [facility]                            This note is created with the assistance of a speech recognition program.  While intending to generate a document that actually reflects the content of the visit, the document can still have some errors including those of syntax and sound a like substitutions which may escape proof reading.  It such instances, actual meaning can be extrapolated by contextual diversion.     Hematologist/Medical Oncologist

## 2025-01-02 NOTE — PLAN OF CARE
Problem: Chronic Conditions and Co-morbidities  Goal: Patient's chronic conditions and co-morbidity symptoms are monitored and maintained or improved  1/1/2025 2045 by Brianne Dunbar RN  Outcome: Progressing  1/1/2025 1853 by Ella Post RN  Outcome: Progressing  Flowsheets (Taken 1/1/2025 0820)  Care Plan - Patient's Chronic Conditions and Co-Morbidity Symptoms are Monitored and Maintained or Improved: Monitor and assess patient's chronic conditions and comorbid symptoms for stability, deterioration, or improvement     Problem: Discharge Planning  Goal: Discharge to home or other facility with appropriate resources  1/1/2025 2045 by Brianne Dunbar RN  Outcome: Progressing  1/1/2025 1853 by Ella Post RN  Outcome: Progressing  Flowsheets (Taken 1/1/2025 0820)  Discharge to home or other facility with appropriate resources: Identify barriers to discharge with patient and caregiver

## 2025-01-02 NOTE — ED PROVIDER NOTES
I did not see or evaluate this patient.  They were assigned to me in error    Lizeth Emery MD  Emergency Medicine Attending        Lizeth Emery MD  01/02/25 0049

## 2025-01-02 NOTE — PLAN OF CARE
Problem: Chronic Conditions and Co-morbidities  Goal: Patient's chronic conditions and co-morbidity symptoms are monitored and maintained or improved  Outcome: Adequate for Discharge  Flowsheets (Taken 1/2/2025 0800)  Care Plan - Patient's Chronic Conditions and Co-Morbidity Symptoms are Monitored and Maintained or Improved: Monitor and assess patient's chronic conditions and comorbid symptoms for stability, deterioration, or improvement     Problem: Discharge Planning  Goal: Discharge to home or other facility with appropriate resources  Outcome: Adequate for Discharge  Flowsheets (Taken 1/2/2025 0800)  Discharge to home or other facility with appropriate resources: Identify barriers to discharge with patient and caregiver     Problem: Skin/Tissue Integrity  Goal: Absence of new skin breakdown  Description: 1.  Monitor for areas of redness and/or skin breakdown  2.  Assess vascular access sites hourly  3.  Every 4-6 hours minimum:  Change oxygen saturation probe site  4.  Every 4-6 hours:  If on nasal continuous positive airway pressure, respiratory therapy assess nares and determine need for appliance change or resting period.  Outcome: Adequate for Discharge     Problem: Safety - Adult  Goal: Free from fall injury  Outcome: Adequate for Discharge     Problem: ABCDS Injury Assessment  Goal: Absence of physical injury  Outcome: Adequate for Discharge     Problem: Spiritual Struggle  Goal: Verbalizes spiritual struggle  Outcome: Adequate for Discharge  Goal: Gains new understanding/perception  Outcome: Adequate for Discharge  Goal: Growing sense of peace/hope  Outcome: Adequate for Discharge  Goal: Explore resources for additional follow up, post discharge  Outcome: Adequate for Discharge     Problem: Emotional Distress  Goal: Reduce evidence of anxiety/worry/anger  Outcome: Adequate for Discharge  Goal: Explore resources for additional follow up, post discharge  Outcome: Adequate for Discharge     Problem: Life  Provide frequent short contacts to provide reality reorientation, refocusing and direction  4. Decrease environmental stimuli, including noise as appropriate  5. Monitor and intervene to maintain adequate nutrition, hydration, elimination, sleep and activity  6. If unable to ensure safety without constant attention obtain sitter and review sitter guidelines with assigned personnel  7. Initiate Psychosocial CNS and Spiritual Care consult, as indicated  Outcome: Adequate for Discharge  Flowsheets (Taken 1/2/2025 0800)  Effect of thought disturbance (confusion, delirium, dementia, or psychosis) are managed with adequate functional status: Assess for contributors to thought disturbance, including medications, impaired vision or hearing, underlying metabolic abnormalities, dehydration, psychiatric diagnoses, notify LIP     Problem: Pain  Goal: Verbalizes/displays adequate comfort level or baseline comfort level  Outcome: Adequate for Discharge     Problem: Nutrition Deficit:  Goal: Optimize nutritional status  Outcome: Adequate for Discharge

## 2025-01-02 NOTE — CARE COORDINATION
TRANSITIONAL CARE/DISCHARGE PLANNING ONGOING EVALUATION      Reason for Admission: Confusion [R41.0]  Intraventricular hemorrhage (HCC) [I61.5]  AMS (altered mental status) [R41.82]     Hospital day:9    Patient goals/Transitional Plan:    Spoke with patient about transition and discharge planning, Plan remains home with home care, discussed freedom of choice list previously given to pt, pt choices are according to insurance, [facility], [facility] and [facility].  Referral sent        [facility] RISK OF UNPLANNED READMISSION 2.0             13.4 Total Score        1125 VM left for [facility] to verify referral  1130 Spoke with [facility], unable to accept.  1135 Spoke with [facility], unable to accept due to staffing       1135 Spoke with Lacy from [facility], able to accept patient, will need DEREJE faxed when DCed [phone]

## 2025-01-02 NOTE — PROGRESS NOTES
16.6*   RBC 4.09   HGB 12.3   HCT 39.1   MCV 95.6   MCH 30.1   MCHC 31.5   RDW 13.4      MPV 9.6     Recent Labs     01/02/25  0832      K 3.8      CO2 21   BUN 21*   CREATININE 0.8   GLUCOSE 213*   CALCIUM 8.3*     Hemoglobin A1C   Date Value Ref Range Status   12/25/2024 17.2 (H) 4.0 - 6.0 % Final       Assessment :      Primary Problem  AMS (altered mental status)    Active Hospital Problems    Diagnosis Date Noted    HFrEF (heart failure with reduced ejection fraction) (HCC) [I50.20] 12/26/2024     Priority: High    Liver lesion [K76.9] 12/31/2024    Leukocytosis [D72.829] 12/31/2024    Uncontrolled type 2 diabetes mellitus with hyperglycemia (HCC) [E11.65] 12/31/2024    Autoimmune encephalitis [G04.81] 12/30/2024    Episodic confusion [R41.0] 12/30/2024    Confusion [R41.0] 12/25/2024    Intraventricular hemorrhage (HCC) [I61.5] 12/25/2024    AMS (altered mental status) [R41.82] 12/24/2024    CHF (congestive heart failure), NYHA class I, acute on chronic, combined (HCC) [I50.43] 04/18/2024    Inadequately controlled diabetes mellitus (HCC) [E11.65] 07/06/2023    Uncontrolled diabetes mellitus with hyperglycemia, with long-term current use of insulin (HCC) [E11.65, Z79.4] 04/25/2014    Iron deficiency anemia [D50.9] 03/12/2013     59 female with worsening confusion over the past week, acute on chronic 3-year history of progressive cognitive decline.  CT head showing acute intraventricular hemorrhage temporal horn atrium right lateral ventricle. Patient with multiple electrographic seizures on LTME. Suspected autoimmune encephalitis. Paraneoplastic syndrome is thought to be less likely. Autoimmune encephalitis panel pending. Myelin basic protein and CSF protein elevated which is supportive of leading dx.  Differential diagnose include right hippocampal bleed, was noted on CT scan 12/24/2024        Plan:     Acute worsening of chronic cognitive decline likely 2/2 autoimmune encephalitis versus

## 2025-01-02 NOTE — DISCHARGE SUMMARY
right hippocampal cortex without restricted diffusion or enhancement, likely corresponding to the hyperdense appearance on CT, possibly indicating hyperosmolar nonketotic hyperglycemic seizures. There was also T2/FLAIR hyperintensity in the left hippocampus and medial temporal lobe, which could suggest postictal change, encephalitis, metabolic/toxic processes, or subacute ischemia. CSF analysis (12/27/2024) showed RBC 2, WBC 2, protein 114, IgG index 0.6, and pending OC bands.  She was alert and oriented to herself, place, and the current month, but not to the year, date, or day of the week. She demonstrated poor immediate and delayed recall, though she could name objects and repeat sentences. She followed three-step commands. Neurological examination showed reactive pupils, no nystagmus, bilateral blink response to threat, and symmetrical facial movement. She moved both upper and lower extremities equally well.  Episodic confusion, superimposed on a 3-year history of progressive cognitive decline. EEG (12/26) showed four left temporal onset electrographic seizures. She is on Vimpat 100 mg BID and Topamax 100 mg BID. LTME on 12/28 showed no seizure activity.  Suspected autoimmune encephalitis, with elevated APE2 score, abnormal MRI, and CSF findings. Neural-specific antibody evaluation is pending. She has received 3.5 grams of solumedrol so far and is on IV solumedrol for 5 days.  Encephalopathy, with minimal improvement and significant amnestic deficits.  Intraventricular hemorrhage in the temporal horn and atrium of the right lateral ventricle: Neurosurgery has evaluated the patient, and the T2 hypointense signal in the right hippocampal cortex may correlate with the hyperdense signal on CT, potentially related to a nonketotic hyperglycemic seizure. Neurosurgery has determined no acute intervention is necessary.  Evaluation for a paraneoplastic etiology is ongoing, with CT chest/abdomen/pelvis revealing a 1.5 cm  THREE TIMES A DAY BEFORE MEALS, Disp-15 mL, R-10Normal      !! Easy Touch Lancets 33G/Twist MISC Disp-100 each, R-10, NormalUSE TO TEST BLOOD SUGAR FOUR TIMES A DAY AS NEEDED      empagliflozin (JARDIANCE) 10 MG tablet Take 1 tablet by mouth daily, Disp-30 tablet, R-3Normal      !! Blood Glucose Monitoring Suppl (TRUE METRIX METER) w/Device KIT USE GLUCOMETER FOR TESTING BLOOD GLUCOSE 4 TIMES DAILY AS NEEDED., Disp-1 kit, R-5Normal      ferrous sulfate (FEROSUL) 325 (65 Fe) MG tablet TAKE 1 TABLET BY MOUTH THREE TIMES A DAY, Disp-90 tablet, R-10Normal      potassium chloride (KLOR-CON M) 10 MEQ extended release tablet TAKE 1 TABLET BY MOUTH DAILY, Disp-10 tablet, R-10Normal      insulin glargine (LANTUS SOLOSTAR) 100 UNIT/ML injection pen Inject 15 Units into the skin nightly, Disp-15 mL, R-10Normal      amLODIPine (NORVASC) 5 MG tablet Take 1 tablet by mouth daily, Disp-30 tablet, R-3Normal      cyclobenzaprine (FLEXERIL) 10 MG tablet Take 1 tablet by mouth every 8 hours as needed for Muscle spasms, Disp-60 tablet, R-10Normal      !! blood glucose test strips (TRUE METRIX BLOOD GLUCOSE TEST) strip USE STRIP TO TEST BLOOD GLUCOSE FOUR TIMES A DAY AS NEEDED, Disp-100 each, R-10Normal      !! Lancets MISC DAILY Starting Thu 7/6/2023, Disp-100 each, R-5, Normal      !! blood glucose monitor strips Test 3 times a day & as needed for symptoms of irregular blood glucose. Dispense sufficient amount for indicated testing frequency plus additional to accommodate PRN testing needs., Disp-100 strip, R-2, Normal      !! glucose monitoring (FREESTYLE FREEDOM) kit Disp-1 kit, R-0, NormalPlease check blood glucose 3 times daily      aspirin (ASPIRIN LOW DOSE) 81 MG EC tablet TAKE 1 TABLET BY MOUTH ONCE DAILY, Disp-30 tablet, R-10Normal      !! OneTouch Delica Lancets 33G MISC USE TO TEST BLOOD SUGAR DAILY, Disp-100 each, R-5Normal      escitalopram (LEXAPRO) 10 MG tablet Take 1 tablet by mouth dailyHistorical Med      hydrOXYzine

## 2025-01-02 NOTE — PROGRESS NOTES
[facility] Medicine Inpatient Service  [facility]  Progress Note    Date:   1/2/2025  Patient name:  Magui Thayer  Date of admission:  12/24/2024  6:31 PM  MRN:   [id number]  YOB: 1965    SUBJECTIVE/Last 24 hours update:     Patient seen and examined at the bed side, no new acute events overnight except blood sugar spike this morning to 411, no new complains.      HPI:     Patient originally admitted to the family medicine service for the management of AMS and aphasia likely 2/2 IVH, however was transferred to neurology service to assume primary. Family medicine service was consulted to manage hyperglycemia 2/2 type 2 diabetes mellitus.  Currently on Lantus 18 units nightly, Humalog 6 units 3 times daily with meals, high dose sliding scale. Neurology is primary, hematology/oncology consulted due to history of ductal carcinoma s/p mastectomy, chemotherapy and radiation, with a 1.5 cm hepatic lesion which has progressed since last scan. Neurosurgery signed off due to no acute intervention at this time. Patient hemodynamically stable, saturating in the high 90's on room air.     REVIEW OF SYSTEMS:   Review of Systems   Constitutional:  Negative for activity change, chills, fatigue and fever.   Respiratory:  Negative for apnea, chest tightness and shortness of breath.    Cardiovascular:  Negative for chest pain and leg swelling.   Gastrointestinal:  Negative for abdominal distention, abdominal pain, diarrhea, nausea and vomiting.   Genitourinary:  Positive for frequency (increased).   Neurological:  Negative for dizziness, light-headedness and headaches.       PAST MEDICAL HISTORY:      has a past medical history of Breast cancer (HCC), Cancer (HCC), Conjunctivitis, Depression, Disorder of patellofemoral joint, DJD (degenerative joint disease) of knee, Hyperlipidemia, Hypertension, Malignant neoplasm of nipple of left breast in female, estrogen receptor positive (HCC), Pain, knee,  (VIMPAT) tablet 100 mg  100 mg Oral BID Ry Mauricio MD   100 mg at 01/02/25 0823    lidocaine 2 % injection 5 mL  5 mL IntraDERmal Once Morales Miranda MD        topiramate (TOPAMAX) tablet 100 mg  100 mg Oral BID Evelyn Cobian MD   100 mg at 01/02/25 0940    amLODIPine (NORVASC) tablet 5 mg  5 mg Oral Daily Keesha Matias MD   5 mg at 01/02/25 0823    aspirin EC tablet 81 mg  81 mg Oral Daily Megha Lang MD        atorvastatin (LIPITOR) tablet 20 mg  20 mg Oral Daily Keesha Matias MD   20 mg at 01/01/25 0859    benztropine (COGENTIN) tablet 0.5 mg  0.5 mg Oral BID Keesha Matias MD   0.5 mg at 01/02/25 0940    [Held by provider] cyclobenzaprine (FLEXERIL) tablet 10 mg  10 mg Oral Q8H PRN Keesha Matias MD        escitalopram (LEXAPRO) tablet 10 mg  10 mg Oral Daily Keesha Matias MD   10 mg at 01/02/25 0823    ferrous sulfate (FE TABS 325) EC tablet 325 mg  325 mg Oral TID WC Keesha Matias MD   325 mg at 01/02/25 0823    furosemide (LASIX) tablet 20 mg  20 mg Oral Daily Keesha Matias MD   20 mg at 01/02/25 0824    therapeutic multivitamin-minerals 1 tablet  1 tablet Oral Daily Keesha Matias MD   1 tablet at 01/02/25 0940    glucose chewable tablet 16 g  4 tablet Oral PRN Keesha Matias MD        dextrose bolus 10% 125 mL  125 mL IntraVENous PRN Keesha Matias MD        Or    dextrose bolus 10% 250 mL  250 mL IntraVENous PRN Keesha Matias MD        glucagon injection 1 mg  1 mg SubCUTAneous JUVENTINON Keesha Matias MD        dextrose 10 % infusion   IntraVENous Continuous PRN Keesha Matias MD        sodium chloride flush 0.9 % injection 5-40 mL  5-40 mL IntraVENous 2 times per day Keesha Matias MD   10 mL at 01/02/25 0824    sodium chloride flush 0.9 % injection 5-40 mL  5-40 mL IntraVENous PRN Keesha Matias MD        0.9 % sodium chloride infusion   IntraVENous PRN Keesha Matias MD        potassium chloride (KLOR-CON M) extended release tablet 40 mEq  40 mEq

## 2025-01-06 ENCOUNTER — TELEPHONE (OUTPATIENT)
Dept: FAMILY MEDICINE CLINIC | Age: 60
End: 2025-01-06

## 2025-01-06 NOTE — TELEPHONE ENCOUNTER
Care Transitions Initial Follow Up Call    Outreach made within 2 business days of discharge: Yes    Patient: Magui Thayer Patient : 1965   MRN: [id number]  Reason for Admission: AMS (altered mental status)   Discharge Date: 25       Spoke with: GALEN for patient to call office to schedule a hospital follow up    Discharge department/facility: [place]      [facility]

## 2025-01-07 LAB
SEND OUT REPORT: NORMAL
TEST NAME: NORMAL

## 2025-01-07 NOTE — FLOWSHEET NOTE
MOUTH THREE TIMES A DAY     furosemide 20 MG tablet  Commonly known as: LASIX  TAKE 1 TABLET BY MOUTH DAILY     * gabapentin 400 MG capsule  Commonly known as: Neurontin  Take 1 capsule by mouth at bedtime for 30 days.     * gabapentin 300 MG capsule  Commonly known as: NEURONTIN  Take 1 capsule by mouth 3 times daily for 30 days.     * glucose monitoring kit  Please check blood glucose 3 times daily     * True Metrix Meter w/Device Kit  USE GLUCOMETER FOR TESTING BLOOD GLUCOSE 4 TIMES DAILY AS NEEDED.     hydrOXYzine HCl 25 MG tablet  Commonly known as: ATARAX     insulin lispro (1 Unit Dial) 100 UNIT/ML Sopn  Commonly known as: HUMALOG/ADMELOG  INJECT 4 UNITS SUBCUTANEOUSLY THREE TIMES A DAY BEFORE MEALS     Lantus SoloStar 100 UNIT/ML injection pen  Generic drug: insulin glargine  Inject 15 Units into the skin nightly     melatonin 10 MG Caps capsule     mirtazapine 15 MG tablet  Commonly known as: REMERON     multivitamin-minerals Tabs tablet  TAKE (1) TABLET BY MOUTH DAILY     * OneTouch Delica Plus [id number] Misc  Check blood sugars 3 times daily     * OneTouch Delica Plus [id number] Misc  USE TO TEST BLOOD SUGAR DAILY     * OneTouch Delica Lancets 33G Misc  USE TO TEST BLOOD SUGAR DAILY     * Lancets Misc  1 each by Does not apply route daily     * Easy Touch Lancets 33G/Twist Misc  USE TO TEST BLOOD SUGAR FOUR TIMES A DAY AS NEEDED     potassium chloride 10 MEQ extended release tablet  Commonly known as: KLOR-CON M  TAKE 1 TABLET BY MOUTH DAILY     * TRUEplus Pen Needles 31G X 6 MM Misc  Generic drug: Insulin Pen Needle  USE DAILY AS DIRECTED     * Insulin Pen Needle 31G X 8 MM Misc  1 each by Does not apply route daily     * Insulin Pen Needle 31G X 6 MM Misc  1 each by Does not apply route daily           * This list has 14 medication(s) that are the same as other medications prescribed for you. Read the directions carefully, and ask your doctor or other care provider to review them with you.

## 2025-01-10 ENCOUNTER — OFFICE VISIT (OUTPATIENT)
Dept: FAMILY MEDICINE CLINIC | Age: 60
End: 2025-01-10
Payer: COMMERCIAL

## 2025-01-10 VITALS
WEIGHT: 134.4 LBS | OXYGEN SATURATION: 100 % | SYSTOLIC BLOOD PRESSURE: 126 MMHG | HEART RATE: 94 BPM | BODY MASS INDEX: 24.73 KG/M2 | HEIGHT: 62 IN | DIASTOLIC BLOOD PRESSURE: 78 MMHG

## 2025-01-10 DIAGNOSIS — I10 PRIMARY HYPERTENSION: ICD-10-CM

## 2025-01-10 DIAGNOSIS — E78.00 HYPERCHOLESTEREMIA: ICD-10-CM

## 2025-01-10 DIAGNOSIS — Z17.0 MALIGNANT NEOPLASM OF NIPPLE OF LEFT BREAST IN FEMALE, ESTROGEN RECEPTOR POSITIVE (HCC): ICD-10-CM

## 2025-01-10 DIAGNOSIS — I50.43 CHF (CONGESTIVE HEART FAILURE), NYHA CLASS I, ACUTE ON CHRONIC, COMBINED (HCC): ICD-10-CM

## 2025-01-10 DIAGNOSIS — Z79.4 TYPE 2 DIABETES MELLITUS WITH OTHER SPECIFIED COMPLICATION, WITH LONG-TERM CURRENT USE OF INSULIN (HCC): Primary | ICD-10-CM

## 2025-01-10 DIAGNOSIS — C50.012 MALIGNANT NEOPLASM OF NIPPLE OF LEFT BREAST IN FEMALE, ESTROGEN RECEPTOR POSITIVE (HCC): ICD-10-CM

## 2025-01-10 DIAGNOSIS — Z09 HOSPITAL DISCHARGE FOLLOW-UP: ICD-10-CM

## 2025-01-10 DIAGNOSIS — E11.00 TYPE 2 DIABETES MELLITUS WITH HYPEROSMOLARITY WITHOUT COMA, WITHOUT LONG-TERM CURRENT USE OF INSULIN (HCC): ICD-10-CM

## 2025-01-10 DIAGNOSIS — E11.69 TYPE 2 DIABETES MELLITUS WITH OTHER SPECIFIED COMPLICATION, WITH LONG-TERM CURRENT USE OF INSULIN (HCC): Primary | ICD-10-CM

## 2025-01-10 PROCEDURE — G8420 CALC BMI NORM PARAMETERS: HCPCS

## 2025-01-10 PROCEDURE — 99213 OFFICE O/P EST LOW 20 MIN: CPT

## 2025-01-10 PROCEDURE — G8427 DOCREV CUR MEDS BY ELIG CLIN: HCPCS

## 2025-01-10 PROCEDURE — 3078F DIAST BP <80 MM HG: CPT

## 2025-01-10 PROCEDURE — 3017F COLORECTAL CA SCREEN DOC REV: CPT

## 2025-01-10 PROCEDURE — 1036F TOBACCO NON-USER: CPT

## 2025-01-10 PROCEDURE — 3046F HEMOGLOBIN A1C LEVEL >9.0%: CPT

## 2025-01-10 PROCEDURE — 1111F DSCHRG MED/CURRENT MED MERGE: CPT

## 2025-01-10 PROCEDURE — 2022F DILAT RTA XM EVC RTNOPTHY: CPT

## 2025-01-10 PROCEDURE — 3074F SYST BP LT 130 MM HG: CPT

## 2025-01-10 RX ORDER — ASPIRIN 81 MG/1
TABLET ORAL
Qty: 30 TABLET | Refills: 10 | Status: SHIPPED | OUTPATIENT
Start: 2025-01-10

## 2025-01-10 RX ORDER — AMLODIPINE BESYLATE 5 MG/1
5 TABLET ORAL DAILY
Qty: 30 TABLET | Refills: 3 | Status: SHIPPED | OUTPATIENT
Start: 2025-01-10

## 2025-01-10 RX ORDER — ACYCLOVIR 800 MG/1
TABLET ORAL
Qty: 1 EACH | Refills: 4 | Status: SHIPPED | OUTPATIENT
Start: 2025-01-10

## 2025-01-10 RX ORDER — KETOROLAC TROMETHAMINE 30 MG/ML
INJECTION, SOLUTION INTRAMUSCULAR; INTRAVENOUS
Qty: 1 EACH | Refills: 0 | Status: SHIPPED | OUTPATIENT
Start: 2025-01-10

## 2025-01-10 RX ORDER — ATORVASTATIN CALCIUM 20 MG/1
20 TABLET, FILM COATED ORAL DAILY
Qty: 30 TABLET | Refills: 10 | Status: SHIPPED | OUTPATIENT
Start: 2025-01-10

## 2025-01-10 RX ORDER — CYCLOBENZAPRINE HCL 10 MG
10 TABLET ORAL EVERY 8 HOURS PRN
Qty: 60 TABLET | Refills: 10 | Status: SHIPPED | OUTPATIENT
Start: 2025-01-10

## 2025-01-10 RX ORDER — INSULIN LISPRO 100 [IU]/ML
5 INJECTION, SOLUTION INTRAVENOUS; SUBCUTANEOUS
Qty: 15 ML | Refills: 2 | Status: SHIPPED | OUTPATIENT
Start: 2025-01-10

## 2025-01-10 RX ORDER — INSULIN GLARGINE 100 [IU]/ML
16 INJECTION, SOLUTION SUBCUTANEOUS NIGHTLY
Qty: 15 ML | Refills: 10 | Status: SHIPPED | OUTPATIENT
Start: 2025-01-10

## 2025-01-10 ASSESSMENT — PATIENT HEALTH QUESTIONNAIRE - PHQ9
SUM OF ALL RESPONSES TO PHQ QUESTIONS 1-9: 0
SUM OF ALL RESPONSES TO PHQ9 QUESTIONS 1 & 2: 0
3. TROUBLE FALLING OR STAYING ASLEEP: NOT AT ALL
7. TROUBLE CONCENTRATING ON THINGS, SUCH AS READING THE NEWSPAPER OR WATCHING TELEVISION: NOT AT ALL
10. IF YOU CHECKED OFF ANY PROBLEMS, HOW DIFFICULT HAVE THESE PROBLEMS MADE IT FOR YOU TO DO YOUR WORK, TAKE CARE OF THINGS AT HOME, OR GET ALONG WITH OTHER PEOPLE: NOT DIFFICULT AT ALL
SUM OF ALL RESPONSES TO PHQ QUESTIONS 1-9: 0
2. FEELING DOWN, DEPRESSED OR HOPELESS: NOT AT ALL
8. MOVING OR SPEAKING SO SLOWLY THAT OTHER PEOPLE COULD HAVE NOTICED. OR THE OPPOSITE, BEING SO FIGETY OR RESTLESS THAT YOU HAVE BEEN MOVING AROUND A LOT MORE THAN USUAL: NOT AT ALL
1. LITTLE INTEREST OR PLEASURE IN DOING THINGS: NOT AT ALL
9. THOUGHTS THAT YOU WOULD BE BETTER OFF DEAD, OR OF HURTING YOURSELF: NOT AT ALL
SUM OF ALL RESPONSES TO PHQ QUESTIONS 1-9: 0
SUM OF ALL RESPONSES TO PHQ QUESTIONS 1-9: 0
4. FEELING TIRED OR HAVING LITTLE ENERGY: NOT AT ALL
6. FEELING BAD ABOUT YOURSELF - OR THAT YOU ARE A FAILURE OR HAVE LET YOURSELF OR YOUR FAMILY DOWN: NOT AT ALL
5. POOR APPETITE OR OVEREATING: NOT AT ALL

## 2025-01-10 NOTE — PATIENT INSTRUCTIONS
Thank you for letting us take care of you today. We hope all your questions were addressed. If a question was overlooked or something else comes to mind after you return home, please contact a member of your Care Team listed below.      Your Care Team at [facility] is Team #2  Mita Brady M.D. (Faculty)  Angela Herbert M.D. (Resident)  Keesha Matias M.D. (Resident)  Alvaro Davenport M.D. (Resident)  Maru Rodriguez M.D. (Resident)  Hoa Angel M.D. (Resident)  Perfecto Kennedy, [facility]  Marjorie Thompson, [facility]  Melissa Recinos,  [facility]  Velvet Ball, [facility]  Yamel Brewer, [facility]  Briana Danielle, [facility]  Alex Mcdaniel (LJ) Hang JOSE LUIS (Clinical Practice Manager)  Concepcion Perez [facility] (Clinical Pharmacist)     Office phone number: [phone]    If you need to get in right away due to illness, please be advised we have \"Same Day\" appointments available Monday-Friday. Please call us at [phone] option #3 to schedule your \"Same Day\" appointment.

## 2025-01-10 NOTE — PROGRESS NOTES
Post-Discharge Transitional Care  Follow Up      Magui Thayer   YOB: 1965    Date of Office Visit:  1/10/2025  Date of Hospital Admission: 12/24/24  Date of Hospital Discharge: 1/2/25  Risk of hospital readmission (high >=14%. Medium >=10%) :Readmission Risk Score: 12      Care management risk score Rising risk (score 2-5) and Complex Care (Scores >=6): No Risk Score On File     Non face to face  following discharge, date last encounter closed (first attempt may have been earlier): 01/06/2025    Call initiated 2 business days of discharge: Yes    ASSESSMENT/PLAN:   Type 2 diabetes mellitus with other specified complication, with long-term current use of insulin (HCC)  -     Continuous Glucose Sensor (FREESTYLE SANDRA 3 SENSOR) MISC; Replace every 2 weeks to check blood sugar levels, Disp-1 each, R-4Normal  -     Continuous Glucose  (FREESTYLE SANDRA 3 READER) LUCIANO; Use to check blood sugar levels throughout the day, Disp-1 each, R-0Normal  -     insulin glargine (LANTUS SOLOSTAR) 100 UNIT/ML injection pen; Inject 16 Units into the skin nightly, Disp-15 mL, R-10Normal  -     insulin lispro, 1 Unit Dial, (HUMALOG/ADMELOG) 100 UNIT/ML SOPN; Inject 5 Units into the skin 3 times daily (before meals), Disp-15 mL, R-2Normal  -     atorvastatin (LIPITOR) 20 MG tablet; Take 1 tablet by mouth daily, Disp-30 tablet, R-10Normal  -     aspirin (ASPIRIN LOW DOSE) 81 MG EC tablet; TAKE 1 TABLET BY MOUTH ONCE DAILY, Disp-30 tablet, R-10Normal  Hospital discharge follow-up  -     [place] DISCHARGE MEDS RECONCILED W/ CURRENT OUTPATIENT MED LIST  Type 2 diabetes mellitus with hyperosmolarity without coma, without long-term current use of insulin (HCC)  CHF (congestive heart failure), NYHA class I, acute on chronic, combined (HCC)  -     empagliflozin (JARDIANCE) 10 MG tablet; Take 1 tablet by mouth daily, Disp-30 tablet, R-3Normal  Hypercholesteremia  -     atorvastatin (LIPITOR) 20 MG tablet; Take 1 tablet by 
Attending Physician Statement  I have discussed the care of Magui Thayer, 59 y.o. female,including pertinent history and exam findings,  with the resident Keesha Angel MD.  History:  Chief Complaint   Patient presents with    Follow-Up from \[facility]\""       I have reviewed the key elements of the encounter with the resident. Examination was done by resident as documented in residents note.    BP Readings from Last 3 Encounters:   01/10/25 126/78   01/02/25 (!) 142/78   12/24/24 125/74     /78 (Site: Right Upper Arm, Position: Sitting, Cuff Size: Medium Adult)   Pulse 94   Ht 1.575 m (5' 2\")   Wt 61 kg (134 lb 6.4 oz)   LMP 12/08/2015 (Approximate)   SpO2 100%   BMI 24.58 kg/m²   Lab Results   Component Value Date    WBC 16.6 (H) 01/02/2025    HGB 12.3 01/02/2025    HCT 39.1 01/02/2025     01/02/2025    CHOL 134 05/03/2024    TRIG 59 05/03/2024    HDL 81 05/03/2024    ALT 26 12/24/2024    AST 29 12/24/2024     01/02/2025    K 3.8 01/02/2025     01/02/2025    CREATININE 0.8 01/02/2025    BUN 21 (H) 01/02/2025    CO2 21 01/02/2025    TSH 2.51 04/18/2024    INR 1.0 12/24/2024    LABA1C 17.2 (H) 12/25/2024     Lab Results   Component Value Date    CALCIUM 8.3 (L) 01/02/2025    PHOS 3.9 07/06/2023     No results found for: \"LDLDIRECT\"  I agree with the assessment, plan and diagnosis of    Diagnosis Orders   1. Type 2 diabetes mellitus with other specified complication, with long-term current use of insulin (HCC)  Continuous Glucose Sensor (FREESTYLE SANDRA 3 SENSOR) MISC    Continuous Glucose  (FREESTYLE SANDRA 3 READER) LUCIANO    insulin glargine (LANTUS SOLOSTAR) 100 UNIT/ML injection pen    insulin lispro, 1 Unit Dial, (HUMALOG/ADMELOG) 100 UNIT/ML SOPN    atorvastatin (LIPITOR) 20 MG tablet    aspirin (ASPIRIN LOW DOSE) 81 MG EC tablet      2. Hospital discharge follow-up  LA DISCHARGE MEDS RECONCILED W/ CURRENT OUTPATIENT MED LIST      3. Type 2 diabetes mellitus 
screen  Completed    HIV screen  Completed    Hepatitis A vaccine  Aged Out    Hib vaccine  Aged Out    Polio vaccine  Aged Out    Meningococcal (ACWY) vaccine  Aged Out

## 2025-01-21 ENCOUNTER — TELEPHONE (OUTPATIENT)
Dept: FAMILY MEDICINE CLINIC | Age: 60
End: 2025-01-21

## 2025-01-31 NOTE — TELEPHONE ENCOUNTER
Per the prior auth that was done for the Lantus insurance states Basaglar needs to be sent and for Lispro Novolog needs to be sent. Please Advise

## 2025-02-04 DIAGNOSIS — E11.9 TYPE 2 DIABETES MELLITUS WITHOUT COMPLICATION, WITH LONG-TERM CURRENT USE OF INSULIN (HCC): Primary | ICD-10-CM

## 2025-02-04 DIAGNOSIS — Z79.4 TYPE 2 DIABETES MELLITUS WITHOUT COMPLICATION, WITH LONG-TERM CURRENT USE OF INSULIN (HCC): Primary | ICD-10-CM

## 2025-02-04 RX ORDER — INSULIN GLARGINE 100 [IU]/ML
10 INJECTION, SOLUTION SUBCUTANEOUS NIGHTLY
Qty: 5 ADJUSTABLE DOSE PRE-FILLED PEN SYRINGE | Refills: 0 | OUTPATIENT
Start: 2025-02-04

## 2025-02-04 RX ORDER — INSULIN GLARGINE 100 [IU]/ML
INJECTION, SOLUTION SUBCUTANEOUS NIGHTLY
Qty: 5 ADJUSTABLE DOSE PRE-FILLED PEN SYRINGE | Refills: 3 | Status: CANCELLED | OUTPATIENT
Start: 2025-02-04

## 2025-02-04 RX ORDER — INSULIN GLARGINE 100 [IU]/ML
16 INJECTION, SOLUTION SUBCUTANEOUS NIGHTLY
Qty: 5 ADJUSTABLE DOSE PRE-FILLED PEN SYRINGE | Refills: 2 | Status: SHIPPED | OUTPATIENT
Start: 2025-02-04

## 2025-02-04 NOTE — TELEPHONE ENCOUNTER
Patient's daughter calling for medications to be sent to the pharmacy. States Basglar needs to be sent to pharmacy. Medication pending, please advise. and

## 2025-02-25 ENCOUNTER — PHARMACY VISIT (OUTPATIENT)
Dept: FAMILY MEDICINE CLINIC | Age: 60
End: 2025-02-25

## 2025-02-25 ENCOUNTER — OFFICE VISIT (OUTPATIENT)
Dept: FAMILY MEDICINE CLINIC | Age: 60
End: 2025-02-25
Payer: COMMERCIAL

## 2025-02-25 VITALS
WEIGHT: 136.41 LBS | DIASTOLIC BLOOD PRESSURE: 78 MMHG | HEIGHT: 62 IN | SYSTOLIC BLOOD PRESSURE: 143 MMHG | HEART RATE: 122 BPM | BODY MASS INDEX: 25.1 KG/M2

## 2025-02-25 DIAGNOSIS — Z79.4 TYPE 2 DIABETES MELLITUS WITH OTHER SPECIFIED COMPLICATION, WITH LONG-TERM CURRENT USE OF INSULIN (HCC): Primary | ICD-10-CM

## 2025-02-25 DIAGNOSIS — E78.00 HYPERCHOLESTEREMIA: ICD-10-CM

## 2025-02-25 DIAGNOSIS — C50.012 MALIGNANT NEOPLASM OF NIPPLE OF LEFT BREAST IN FEMALE, ESTROGEN RECEPTOR POSITIVE (HCC): ICD-10-CM

## 2025-02-25 DIAGNOSIS — R56.9 SEIZURE (HCC): ICD-10-CM

## 2025-02-25 DIAGNOSIS — I50.43 CHF (CONGESTIVE HEART FAILURE), NYHA CLASS I, ACUTE ON CHRONIC, COMBINED (HCC): ICD-10-CM

## 2025-02-25 DIAGNOSIS — G62.9 NEUROPATHY: ICD-10-CM

## 2025-02-25 DIAGNOSIS — E11.69 TYPE 2 DIABETES MELLITUS WITH OTHER SPECIFIED COMPLICATION, WITH LONG-TERM CURRENT USE OF INSULIN (HCC): Primary | ICD-10-CM

## 2025-02-25 DIAGNOSIS — R41.3 MEMORY LOSS: ICD-10-CM

## 2025-02-25 DIAGNOSIS — R60.0 LOWER EXTREMITY EDEMA: ICD-10-CM

## 2025-02-25 DIAGNOSIS — Z17.0 MALIGNANT NEOPLASM OF NIPPLE OF LEFT BREAST IN FEMALE, ESTROGEN RECEPTOR POSITIVE (HCC): ICD-10-CM

## 2025-02-25 LAB — HBA1C MFR BLD: 8.4 %

## 2025-02-25 PROCEDURE — APPNB60 APP NON BILLABLE TIME 46-60 MINS

## 2025-02-25 PROCEDURE — 99999 PR OFFICE/OUTPT VISIT,PROCEDURE ONLY: CPT | Performed by: PHARMACIST

## 2025-02-25 PROCEDURE — 83036 HEMOGLOBIN GLYCOSYLATED A1C: CPT

## 2025-02-25 RX ORDER — HYDROCHLOROTHIAZIDE 12.5 MG/1
CAPSULE ORAL
Qty: 6 EACH | Refills: 1 | Status: SHIPPED | OUTPATIENT
Start: 2025-02-25

## 2025-02-25 RX ORDER — AVOBENZONE, HOMOSALATE, OCTISALATE, OCTOCRYLENE 30; 40; 45; 26 MG/ML; MG/ML; MG/ML; MG/ML
CREAM TOPICAL
Qty: 100 EACH | Refills: 1 | Status: SHIPPED | OUTPATIENT
Start: 2025-02-25

## 2025-02-25 RX ORDER — BLOOD-GLUCOSE METER
KIT MISCELLANEOUS
Qty: 1 KIT | Refills: 0 | Status: SHIPPED | OUTPATIENT
Start: 2025-02-25

## 2025-02-25 RX ORDER — METOPROLOL SUCCINATE 25 MG/1
12.5 TABLET, EXTENDED RELEASE ORAL DAILY
Qty: 30 TABLET | Refills: 3 | Status: SHIPPED | OUTPATIENT
Start: 2025-02-25

## 2025-02-25 RX ORDER — ATORVASTATIN CALCIUM 20 MG/1
20 TABLET, FILM COATED ORAL DAILY
Qty: 30 TABLET | Refills: 10 | Status: SHIPPED | OUTPATIENT
Start: 2025-02-25

## 2025-02-25 RX ORDER — FUROSEMIDE 20 MG/1
20 TABLET ORAL DAILY
Qty: 30 TABLET | Refills: 10 | Status: SHIPPED | OUTPATIENT
Start: 2025-02-25

## 2025-02-25 RX ORDER — LOSARTAN POTASSIUM 25 MG/1
12.5 TABLET ORAL DAILY
Qty: 30 TABLET | Refills: 2 | Status: SHIPPED | OUTPATIENT
Start: 2025-02-25

## 2025-02-25 RX ORDER — LACOSAMIDE 100 MG/1
100 TABLET ORAL 2 TIMES DAILY
Qty: 60 TABLET | Refills: 5 | Status: SHIPPED | OUTPATIENT
Start: 2025-02-25 | End: 2025-08-24

## 2025-02-25 RX ORDER — METOPROLOL SUCCINATE 25 MG/1
25 TABLET, EXTENDED RELEASE ORAL DAILY
Qty: 30 TABLET | Refills: 3 | Status: SHIPPED | OUTPATIENT
Start: 2025-02-25 | End: 2025-02-25

## 2025-02-25 RX ORDER — GLUCOSAMINE HCL/CHONDROITIN SU 500-400 MG
CAPSULE ORAL
Qty: 50 STRIP | Refills: 2 | Status: SHIPPED | OUTPATIENT
Start: 2025-02-25

## 2025-02-25 RX ORDER — INSULIN LISPRO 100 [IU]/ML
6 INJECTION, SOLUTION INTRAVENOUS; SUBCUTANEOUS
Qty: 15 ML | Refills: 1 | Status: SHIPPED | OUTPATIENT
Start: 2025-02-25

## 2025-02-25 RX ORDER — ACYCLOVIR 800 MG/1
TABLET ORAL
Qty: 1 EACH | Refills: 4 | Status: CANCELLED | OUTPATIENT
Start: 2025-02-25

## 2025-02-25 RX ORDER — SPIRONOLACTONE 25 MG/1
12.5 TABLET ORAL DAILY
Qty: 45 TABLET | Refills: 1 | Status: SHIPPED | OUTPATIENT
Start: 2025-02-25

## 2025-02-25 RX ORDER — GABAPENTIN 300 MG/1
300 CAPSULE ORAL 3 TIMES DAILY
Qty: 90 CAPSULE | Refills: 0 | Status: SHIPPED | OUTPATIENT
Start: 2025-02-25 | End: 2025-03-27

## 2025-02-25 ASSESSMENT — ENCOUNTER SYMPTOMS
DIARRHEA: 0
ABDOMINAL PAIN: 0
CONSTIPATION: 0
VOMITING: 0
NAUSEA: 0
SHORTNESS OF BREATH: 0

## 2025-02-25 NOTE — PATIENT INSTRUCTIONS
Call Rosita at [phone] for a free replacement sensor  If you have a low sugar (< 70 mg/dL), first ask yourself if you are having any symptoms.  If no symptoms, check with a finger poke meter  If you do have symptoms; drink half a glass of regular pop or eat 4 glucose tablets (you can get them from a pharmacy)  Wait 15 minutes, then check with a finger poke meter; if still low after 15 minutes, treat again and wait another 15 minutes  Once your sugar goes up above 70 mg/dL, either eat a meal if it is near meal time, or eat a protein bar/fiber bar/string cheese to make sure you do not go low again  Any issues or questions with diabetes stuff, call us at [phone]  I would recommend scheduling an eye exam when you can.

## 2025-02-25 NOTE — PATIENT INSTRUCTIONS
Thank you for letting us take care of you today. We hope all your questions were addressed. If a question was overlooked or something else comes to mind after you return home, please contact a member of your Care Team listed below.      Your Care Team at [facility] is Team #2  Mita Brady M.D. (Faculty)  Angela Herbert M.D. (Resident)  Keesha Matias M.D. (Resident)  Alvaro Davenport M.D. (Resident)  Maru Rodriguez M.D. (Resident)  Hoa Angel M.D. (Resident)  Perfecto Kennedy, [facility]  Marjorie Thompson, [facility]  Melissa Recinos,  [facility]  Velvet Ball, [facility]  Yamel Brewer, [facility]  Briana Daneille, [facility]  Alex Mcdaniel (LJ) Hang JOSE LUIS (Clinical Practice Manager)  Concepcion Perez [facility] (Clinical Pharmacist)     Office phone number: [phone]    If you need to get in right away due to illness, please be advised we have \"Same Day\" appointments available Monday-Friday. Please call us at [phone] option #3 to schedule your \"Same Day\" appointment.

## 2025-02-25 NOTE — PROGRESS NOTES
Medication Management Service  [facility]  [phone]    Pharmacist Visit - In-Person Consult  Referring Provider: Dr. Matias  Supervising Provider: Dr. Romero    Subjective/Objective   Magui Thayer is a 59 y.o. female seen by Medication Management Service for Diabetes Management under Collaborative Practice Agreement with A1c goal < 6.5%. Initial visit.     Patient PMH includes:  Past Medical History:   Diagnosis Date    Breast cancer (Formerly [facility]) 7/16/2012    Cancer (Formerly [facility]) 05-16-06    L breast - invasive ductal adenocarcinoma    Conjunctivitis     susceptible to conjunctivitis    Depression     Disorder of patellofemoral joint     DJD (degenerative joint disease) of knee     bilateral    Hyperlipidemia     Hypertension     Malignant neoplasm of nipple of left breast in female, estrogen receptor positive (Formerly [facility]) 9/24/2015    Pain, knee 10-06    R lateral meniscus tear    Primary osteoarthritis of left knee     Type II or unspecified type diabetes mellitus without mention of complication, not stated as uncontrolled     Under care of team 03/21/2022    PCP - DR. MAN - LAST VISIT 1/2022    Under care of team 03/21/2022    ONCOLOGY - DR. OJEDA - LAST VISIT 10.2021    Under care of team 03/21/2022    GENERAL SURGERY - DR. MCKEE - LAST VISIT 3/2022    Wears dentures 03/21/2022    FULL    Wears glasses 03/21/2022    Weight loss 03/21/2022    70 LBS POST BARIATRIC SURGERY.     Patient Active Problem List   Diagnosis    History of breast cancer    Pain, knee    Depression    DJD (degenerative joint disease) of knee    Headache    Iron deficiency anemia    Uncontrolled stage 2 hypertension    Uncontrolled diabetes mellitus with hyperglycemia, with long-term current use of insulin (Formerly [facility])    Left shoulder pain    Diabetes 1.5, managed as type 2 (HCC)    Headache disorder    Koilonychia    Class 2 severe obesity due to excess calories with serious comorbidity in adult    Episode of syncope    Hypokalemia

## 2025-02-25 NOTE — PROGRESS NOTES
[facility] - Outpatient Note      Subjective:    Magui Thayer is a 59 y.o. female with  has a past medical history of Breast cancer (HCC), Cancer (HCC), Conjunctivitis, Depression, Disorder of patellofemoral joint, DJD (degenerative joint disease) of knee, Hyperlipidemia, Hypertension, Malignant neoplasm of nipple of left breast in female, estrogen receptor positive (HCC), Pain, knee, Primary osteoarthritis of left knee, Type II or unspecified type diabetes mellitus without mention of complication, not stated as uncontrolled, Under care of team, Under care of team, Under care of team, Wears dentures, Wears glasses, and Weight loss.    Presented to the office today for:  Chief Complaint   Patient presents with    Diabetes     4 week follow up on DM, needs to change insulin that her insurance will cover, after meal time it go up a little range 261-339. Needs something for her pain     Leg Swelling     Want to be put back on her laxis     Referral - General     Referral to neurology and cardio        HPI    T2DM  -Last A1c 17.2  -Meds: Patient's insulin lispro was increased to 6 units 3 times daily and Lantus to 16 units at night.    -Won freestyle reader and sensor was ordered at the last visit.   - Consistently running low 100s in the AM, after meals 260-300s, had one low BG at 65 when she didn't eat  - Spoke with pharmacy team, one moth follow up.     Combined systolic and diastolic congestive heart failure   - Last echo on 4/20/2024 shows severely reduced left ventricular systolic function with EF of 20 to 25% and grade 2 diastolic dysfunction. Moderate to severe MR, moderate to severe TR   -Cardiac cath completed on 5/7/2024 showed mild to moderate nonobstructive CAD  -Patient not currently on GDMT.  Not on beta-blocker, MRA, ACE/ARB, SGLT-2  - Allergic to ACEi  -Cardiology note from 5/7/2024-patient was put on metoprolol succinate 12.5 mg daily, Cozaar 
Diabetic visit information    BP Readings from Last 3 Encounters:   01/10/25 126/78   01/02/25 (!) 142/78   12/24/24 125/74       Hemoglobin A1C (%)   Date Value   12/25/2024 17.2 (H)   07/24/2024 10.2   04/18/2024 12.7               Have you changed or started any medications since your last visit including any over-the-counter medicines, vitamins, or herbal medicines? no   Have you stopped taking any of your medications? Is so, why? -  no  Are you having any side effects from any of your medications? - no    Have you seen any other physician or provider since your last visit?  no   Have you had any other diagnostic tests since your last visit?  no   Have you been seen in the emergency room and/or had an admission in a hospital since we last saw you?  no     Have you had your annual diabetic retinal (eye) exam? No   (ensure copy of exam is in the chart)    Have you had your routine dental cleaning in the past 6 months? no    Do you have an active MyChart account?  If not, what are your barriers?  Yes    Patient Care Team:  Keesha Matias MD as PCP - General (Family Medicine)  Ryder Betancur MD as Consulting Physician (Hematology and Oncology)  Leonel Simon MD (General Surgery)  Thao Wilson DO as Consulting Physician (General Surgery)  Rene Harding MD as Consulting Physician (Pulmonology)    Medical history Review  Past Medical, Family, and Social History reviewed and does contribute to the patient presenting condition.    Health Maintenance   Topic Date Due    Hepatitis C screen  Never done    Shingles vaccine (1 of 2) Never done    Diabetic retinal exam  12/31/2020    Pneumococcal 50+ years Vaccine (2 of 2 - PCV) 06/02/2023    Cervical cancer screen  06/21/2023    Flu vaccine (1) 08/01/2024    COVID-19 Vaccine (4 - 2024-25 season) 09/01/2024    A1C test (Diabetic or Prediabetic)  03/25/2025    Diabetic foot exam  04/18/2025    Diabetic Alb to Cr ratio (uACR) test  05/03/2025    Lipids 
DO Compa 2/25/2025 3:10 PM

## 2025-03-13 ENCOUNTER — OFFICE VISIT (OUTPATIENT)
Dept: NEUROLOGY | Age: 60
End: 2025-03-13

## 2025-03-13 VITALS
DIASTOLIC BLOOD PRESSURE: 95 MMHG | SYSTOLIC BLOOD PRESSURE: 164 MMHG | HEIGHT: 63 IN | HEART RATE: 121 BPM | WEIGHT: 146.2 LBS | BODY MASS INDEX: 25.91 KG/M2

## 2025-03-13 DIAGNOSIS — G40.909 SEIZURE DISORDER (HCC): ICD-10-CM

## 2025-03-13 DIAGNOSIS — G04.81 AUTOIMMUNE ENCEPHALITIS: ICD-10-CM

## 2025-03-13 DIAGNOSIS — R41.89 MODERATE COGNITIVE IMPAIRMENT: Primary | ICD-10-CM

## 2025-03-13 DIAGNOSIS — R56.9 SEIZURE (HCC): ICD-10-CM

## 2025-03-13 RX ORDER — LACOSAMIDE 100 MG/1
100 TABLET ORAL 2 TIMES DAILY
Qty: 60 TABLET | Refills: 6 | Status: SHIPPED | OUTPATIENT
Start: 2025-03-13 | End: 2025-09-09

## 2025-03-13 RX ORDER — TOPIRAMATE 200 MG/1
100 TABLET, FILM COATED ORAL 2 TIMES DAILY
Qty: 60 TABLET | Refills: 6 | Status: SHIPPED | OUTPATIENT
Start: 2025-03-13

## 2025-03-13 RX ORDER — HYDROXYZINE HYDROCHLORIDE 25 MG/1
TABLET, FILM COATED ORAL
COMMUNITY

## 2025-03-13 RX ORDER — DONEPEZIL HYDROCHLORIDE 10 MG/1
TABLET, FILM COATED ORAL
Qty: 90 TABLET | Refills: 1 | Status: SHIPPED | OUTPATIENT
Start: 2025-03-13

## 2025-03-13 NOTE — PROGRESS NOTES
NEUROLOGY CONSULT  Patient Name:       Magui Thayer  :        1965  Clinic Visit Date:    3/13/2025        Dear Keesha Angel MD     I had the opportunity to see your patient, Ms. Magui Thayer in neurology consultation today. As you know she  is a 59 y.o.  female with recent hosp for progressive cognitive decline.     She was previously evaluated at [facility] in late 2024 following a hemorrhagic stroke, during which her A1c levels were significantly elevated. At that time, she exhibited memory impairments, which have since progressed to a state resembling dementia. Despite improved glycemic control, with her A1c decreasing from 17.9 to 8.4, her cognitive decline persists. She experiences transient episodes of recall but is unable to live independently. She has a history of depression and suicidal ideation in her youth but reports no current issues. Her sleep is frequently interrupted, although she does not report any snoring or apneic episodes. She is capable of performing activities of daily living independently, including ambulation without the aid of a cane or walker. She manages her own shopping, housekeeping, meal preparation, and medication administration, although she requires reminders for certain tasks. She does not drive.    During her hospitalization, an EEG study was conducted, revealing multiple silent seizures characterized by tongue twisting and brief episodes of sleep lasting less than a minute, after which she would awaken with no recollection of the individuals present in the room. These events were also captured on the EEG. She was subsequently started on Topamax and Vimpat, which have effectively controlled her seizures. She is currently on Vimpat 100 mg twice daily and Topamax 100 mg twice daily, and is due for a refill.    Supplemental Information  Additionally, it is noteworthy that she exhibits tachycardia and hypertension. She reported experiencing severe

## 2025-03-14 ENCOUNTER — TELEPHONE (OUTPATIENT)
Dept: NEUROLOGY | Age: 60
End: 2025-03-14

## 2025-03-20 DIAGNOSIS — Z17.0 MALIGNANT NEOPLASM OF NIPPLE OF LEFT BREAST IN FEMALE, ESTROGEN RECEPTOR POSITIVE: ICD-10-CM

## 2025-03-20 DIAGNOSIS — L29.9 ITCHING: ICD-10-CM

## 2025-03-20 DIAGNOSIS — R56.9 SEIZURE (HCC): ICD-10-CM

## 2025-03-20 DIAGNOSIS — Z79.4 TYPE 2 DIABETES MELLITUS WITH OTHER SPECIFIED COMPLICATION, WITH LONG-TERM CURRENT USE OF INSULIN: ICD-10-CM

## 2025-03-20 DIAGNOSIS — I50.43 CHF (CONGESTIVE HEART FAILURE), NYHA CLASS I, ACUTE ON CHRONIC, COMBINED (HCC): ICD-10-CM

## 2025-03-20 DIAGNOSIS — R41.89 MODERATE COGNITIVE IMPAIRMENT: ICD-10-CM

## 2025-03-20 DIAGNOSIS — E78.00 HYPERCHOLESTEREMIA: ICD-10-CM

## 2025-03-20 DIAGNOSIS — E11.69 TYPE 2 DIABETES MELLITUS WITH OTHER SPECIFIED COMPLICATION, WITH LONG-TERM CURRENT USE OF INSULIN: ICD-10-CM

## 2025-03-20 DIAGNOSIS — C50.012 MALIGNANT NEOPLASM OF NIPPLE OF LEFT BREAST IN FEMALE, ESTROGEN RECEPTOR POSITIVE: ICD-10-CM

## 2025-03-20 DIAGNOSIS — G40.909 SEIZURE DISORDER (HCC): ICD-10-CM

## 2025-03-20 DIAGNOSIS — R60.0 LOWER EXTREMITY EDEMA: ICD-10-CM

## 2025-03-20 DIAGNOSIS — G04.81 AUTOIMMUNE ENCEPHALITIS: ICD-10-CM

## 2025-03-20 DIAGNOSIS — I10 PRIMARY HYPERTENSION: ICD-10-CM

## 2025-03-21 RX ORDER — GABAPENTIN 300 MG/1
300 CAPSULE ORAL 3 TIMES DAILY
Qty: 90 CAPSULE | Refills: 0 | Status: ON HOLD | OUTPATIENT
Start: 2025-03-21 | End: 2025-04-20

## 2025-03-21 RX ORDER — METOPROLOL SUCCINATE 25 MG/1
12.5 TABLET, EXTENDED RELEASE ORAL DAILY
Qty: 30 TABLET | Refills: 3 | Status: ON HOLD | OUTPATIENT
Start: 2025-03-21

## 2025-03-21 RX ORDER — TOPIRAMATE 200 MG/1
100 TABLET, FILM COATED ORAL 2 TIMES DAILY
Qty: 60 TABLET | Refills: 6 | OUTPATIENT
Start: 2025-03-21

## 2025-03-21 RX ORDER — ATORVASTATIN CALCIUM 20 MG/1
20 TABLET, FILM COATED ORAL DAILY
Qty: 30 TABLET | Refills: 10 | Status: ON HOLD | OUTPATIENT
Start: 2025-03-21

## 2025-03-21 RX ORDER — LACOSAMIDE 100 MG/1
100 TABLET ORAL 2 TIMES DAILY
Qty: 60 TABLET | Refills: 6 | OUTPATIENT
Start: 2025-03-21 | End: 2025-09-17

## 2025-03-21 RX ORDER — FERROUS SULFATE 325(65) MG
TABLET ORAL
Qty: 90 TABLET | Refills: 10 | Status: ON HOLD | OUTPATIENT
Start: 2025-03-21 | End: 2025-03-23

## 2025-03-21 RX ORDER — FUROSEMIDE 20 MG/1
20 TABLET ORAL DAILY
Qty: 30 TABLET | Refills: 10 | Status: ON HOLD | OUTPATIENT
Start: 2025-03-21

## 2025-03-21 RX ORDER — CYCLOBENZAPRINE HCL 10 MG
10 TABLET ORAL EVERY 8 HOURS PRN
Qty: 60 TABLET | Refills: 10 | Status: ON HOLD | OUTPATIENT
Start: 2025-03-21

## 2025-03-21 RX ORDER — AMLODIPINE BESYLATE 5 MG/1
5 TABLET ORAL DAILY
Qty: 30 TABLET | Refills: 3 | Status: ON HOLD | OUTPATIENT
Start: 2025-03-21

## 2025-03-21 RX ORDER — DIPHENHYDRAMINE HYDROCHLORIDE, ZINC ACETATE 2; .1 G/100G; G/100G
CREAM TOPICAL
Qty: 15 G | Refills: 1 | Status: ON HOLD | OUTPATIENT
Start: 2025-03-21

## 2025-03-21 RX ORDER — POTASSIUM CHLORIDE 750 MG/1
10 TABLET, EXTENDED RELEASE ORAL DAILY
Qty: 10 TABLET | Refills: 10 | Status: ON HOLD | OUTPATIENT
Start: 2025-03-21 | End: 2025-03-23

## 2025-03-21 RX ORDER — LOSARTAN POTASSIUM 25 MG/1
12.5 TABLET ORAL DAILY
Qty: 30 TABLET | Refills: 2 | Status: ON HOLD | OUTPATIENT
Start: 2025-03-21

## 2025-03-21 RX ORDER — DONEPEZIL HYDROCHLORIDE 10 MG/1
TABLET, FILM COATED ORAL
Qty: 90 TABLET | Refills: 1 | OUTPATIENT
Start: 2025-03-21

## 2025-03-21 NOTE — TELEPHONE ENCOUNTER
Last visit:2-25-25   Last Med refill:   Does patient have enough medication for 72 hours: No:     Next Visit Date:  Future Appointments   Date Time Provider Department Center   4/1/2025  2:30 PM Concepcion Perez [facility] E[facility] DEP   4/16/2025  9:00 AM Bobby Lynn MD [facility]   6/24/2025 10:00 AM Dorothy Diana MD Neuro Spec Neurology -       Health Maintenance   Topic Date Due    Hepatitis C screen  Never done    Shingles vaccine (1 of 2) Never done    Diabetic retinal exam  12/31/2020    Pneumococcal 50+ years Vaccine (2 of 2 - PCV) 06/02/2023    Cervical cancer screen  06/21/2023    Flu vaccine (1) 08/01/2024    COVID-19 Vaccine (4 - 2024-25 season) 09/01/2024    Diabetic foot exam  04/18/2025    Diabetic Alb to Cr ratio (uACR) test  05/03/2025    Lipids  05/03/2025    GFR test (Diabetes, CKD 3-4, OR last GFR 15-59)  01/02/2026    Depression Monitoring  01/10/2026    A1C test (Diabetic or Prediabetic)  02/25/2026    DTaP/Tdap/Td vaccine (4 - Td or Tdap) 04/23/2031    Colorectal Cancer Screen  03/24/2032    Hepatitis B vaccine  Completed    Breast cancer screen  Completed    HIV screen  Completed    Hepatitis A vaccine  Aged Out    Hib vaccine  Aged Out    Polio vaccine  Aged Out    Meningococcal (ACWY) vaccine  Aged Out    Meningococcal B vaccine  Aged Out    Pneumococcal 0-49 years Vaccine  Discontinued       Hemoglobin A1C (%)   Date Value   02/25/2025 8.4   12/25/2024 17.2 (H)   07/24/2024 10.2             ( goal A1C is < 7)   No components found for: \"LABMICR\"  No components found for: \"LDLCHOLESTEROL\", \"LDLCALC\"    (goal LDL is <100)   AST (U/L)   Date Value   12/24/2024 29     ALT (U/L)   Date Value   12/24/2024 26     BUN (mg/dL)   Date Value   01/02/2025 21 (H)     BP Readings from Last 3 Encounters:   03/13/25 (!) 164/95   02/25/25 (!) 143/78   01/10/25 126/78          (goal 120/80)    All Future Testing planned in [facility]  Lab Frequency Next Occurrence   Echo (TTE)

## 2025-03-21 NOTE — TELEPHONE ENCOUNTER
All medications were sent on 3/13/2025 with refills. Called patient to let her know these were sent already, she voiced understanding.

## 2025-03-21 NOTE — TELEPHONE ENCOUNTER
Last visit: 2-25-25  Last Med refill:   Does patient have enough medication for 72 hours: No:     Next Visit Date:  Future Appointments   Date Time Provider Department Center   4/1/2025  2:30 PM Concepcion Perez [facility] ECC DEP   4/16/2025  9:00 AM Bobby Lynn MD [facility]   6/24/2025 10:00 AM Dorothy Diana MD Neuro Spec Neurology -       Health Maintenance   Topic Date Due    Hepatitis C screen  Never done    Shingles vaccine (1 of 2) Never done    Diabetic retinal exam  12/31/2020    Pneumococcal 50+ years Vaccine (2 of 2 - PCV) 06/02/2023    Cervical cancer screen  06/21/2023    Flu vaccine (1) 08/01/2024    COVID-19 Vaccine (4 - 2024-25 season) 09/01/2024    Diabetic foot exam  04/18/2025    Diabetic Alb to Cr ratio (uACR) test  05/03/2025    Lipids  05/03/2025    GFR test (Diabetes, CKD 3-4, OR last GFR 15-59)  01/02/2026    Depression Monitoring  01/10/2026    A1C test (Diabetic or Prediabetic)  02/25/2026    DTaP/Tdap/Td vaccine (4 - Td or Tdap) 04/23/2031    Colorectal Cancer Screen  03/24/2032    Hepatitis B vaccine  Completed    Breast cancer screen  Completed    HIV screen  Completed    Hepatitis A vaccine  Aged Out    Hib vaccine  Aged Out    Polio vaccine  Aged Out    Meningococcal (ACWY) vaccine  Aged Out    Meningococcal B vaccine  Aged Out    Pneumococcal 0-49 years Vaccine  Discontinued       Hemoglobin A1C (%)   Date Value   02/25/2025 8.4   12/25/2024 17.2 (H)   07/24/2024 10.2             ( goal A1C is < 7)   No components found for: \"LABMICR\"  No components found for: \"LDLCHOLESTEROL\", \"LDLCALC\"    (goal LDL is <100)   AST (U/L)   Date Value   12/24/2024 29     ALT (U/L)   Date Value   12/24/2024 26     BUN (mg/dL)   Date Value   01/02/2025 21 (H)     BP Readings from Last 3 Encounters:   03/13/25 (!) 164/95   02/25/25 (!) 143/78   01/10/25 126/78          (goal 120/80)    All Future Testing planned in [facility]  Lab Frequency Next Occurrence   Echo (TTE)

## 2025-03-23 ENCOUNTER — HOSPITAL ENCOUNTER (INPATIENT)
Age: 60
LOS: 2 days | Discharge: HOME | DRG: 291 | End: 2025-03-26
Attending: EMERGENCY MEDICINE | Admitting: EMERGENCY MEDICINE
Payer: COMMERCIAL

## 2025-03-23 ENCOUNTER — APPOINTMENT (OUTPATIENT)
Dept: GENERAL RADIOLOGY | Age: 60
DRG: 291 | End: 2025-03-23
Payer: COMMERCIAL

## 2025-03-23 DIAGNOSIS — I50.9 CONGESTIVE HEART FAILURE WITH CARDIOMYOPATHY (HCC): ICD-10-CM

## 2025-03-23 DIAGNOSIS — I42.9 CONGESTIVE HEART FAILURE WITH CARDIOMYOPATHY (HCC): ICD-10-CM

## 2025-03-23 DIAGNOSIS — I42.9 CARDIOMYOPATHY, UNSPECIFIED TYPE (HCC): ICD-10-CM

## 2025-03-23 DIAGNOSIS — I50.20 HFREF (HEART FAILURE WITH REDUCED EJECTION FRACTION) (HCC): ICD-10-CM

## 2025-03-23 DIAGNOSIS — R07.9 CHEST PAIN, UNSPECIFIED TYPE: Primary | ICD-10-CM

## 2025-03-23 LAB
ALBUMIN SERPL-MCNC: 3.9 G/DL (ref 3.5–5.2)
ALBUMIN/GLOB SERPL: 1.5 {RATIO} (ref 1–2.5)
ALP SERPL-CCNC: 104 U/L (ref 35–104)
ALT SERPL-CCNC: 14 U/L (ref 10–35)
ANION GAP SERPL CALCULATED.3IONS-SCNC: 11 MMOL/L (ref 9–16)
AST SERPL-CCNC: 19 U/L (ref 10–35)
BASOPHILS # BLD: 0 K/UL (ref 0–0.2)
BASOPHILS NFR BLD: 0 % (ref 0–2)
BILIRUB SERPL-MCNC: 0.2 MG/DL (ref 0–1.2)
BUN SERPL-MCNC: 12 MG/DL (ref 6–20)
CALCIUM SERPL-MCNC: 8.9 MG/DL (ref 8.6–10.4)
CHLORIDE SERPL-SCNC: 108 MMOL/L (ref 98–107)
CO2 SERPL-SCNC: 21 MMOL/L (ref 20–31)
CREAT SERPL-MCNC: 0.9 MG/DL (ref 0.6–0.9)
D DIMER PPP FEU-MCNC: 0.55 UG/ML FEU (ref 0–0.57)
EOSINOPHIL # BLD: 0.55 K/UL (ref 0–0.4)
EOSINOPHILS RELATIVE PERCENT: 6 % (ref 1–4)
ERYTHROCYTE [DISTWIDTH] IN BLOOD BY AUTOMATED COUNT: 11.9 % (ref 11.8–14.4)
GFR, ESTIMATED: 74 ML/MIN/1.73M2
GLUCOSE BLD-MCNC: 137 MG/DL (ref 65–105)
GLUCOSE BLD-MCNC: 166 MG/DL (ref 65–105)
GLUCOSE SERPL-MCNC: 173 MG/DL (ref 74–99)
HCT VFR BLD AUTO: 35.2 % (ref 36.3–47.1)
HGB BLD-MCNC: 11.3 G/DL (ref 11.9–15.1)
IMM GRANULOCYTES # BLD AUTO: 0 K/UL (ref 0–0.3)
IMM GRANULOCYTES NFR BLD: 0 %
LYMPHOCYTES NFR BLD: 4.6 K/UL (ref 1–4.8)
LYMPHOCYTES RELATIVE PERCENT: 50 % (ref 24–44)
MCH RBC QN AUTO: 29.4 PG (ref 25.2–33.5)
MCHC RBC AUTO-ENTMCNC: 32.1 G/DL (ref 28.4–34.8)
MCV RBC AUTO: 91.4 FL (ref 82.6–102.9)
MONOCYTES NFR BLD: 0.28 K/UL (ref 0.1–0.8)
MONOCYTES NFR BLD: 3 % (ref 1–7)
MORPHOLOGY: NORMAL
NEUTROPHILS NFR BLD: 41 % (ref 36–66)
NEUTS SEG NFR BLD: 3.77 K/UL (ref 1.8–7.7)
NRBC BLD-RTO: 0 PER 100 WBC
PLATELET # BLD AUTO: 228 K/UL (ref 138–453)
PMV BLD AUTO: 9.9 FL (ref 8.1–13.5)
POTASSIUM SERPL-SCNC: 3.5 MMOL/L (ref 3.7–5.3)
PROT SERPL-MCNC: 6.5 G/DL (ref 6.6–8.7)
RBC # BLD AUTO: 3.85 M/UL (ref 3.95–5.11)
SODIUM SERPL-SCNC: 140 MMOL/L (ref 136–145)
TROPONIN I SERPL HS-MCNC: 14 NG/L (ref 0–14)
TROPONIN I SERPL HS-MCNC: 16 NG/L (ref 0–14)
WBC OTHER # BLD: 9.2 K/UL (ref 3.5–11.3)

## 2025-03-23 PROCEDURE — G0378 HOSPITAL OBSERVATION PER HR: HCPCS

## 2025-03-23 PROCEDURE — 2500000003 HC RX 250 WO HCPCS: Performed by: STUDENT IN AN ORGANIZED HEALTH CARE EDUCATION/TRAINING PROGRAM

## 2025-03-23 PROCEDURE — 85379 FIBRIN DEGRADATION QUANT: CPT

## 2025-03-23 PROCEDURE — 84484 ASSAY OF TROPONIN QUANT: CPT

## 2025-03-23 PROCEDURE — 82947 ASSAY GLUCOSE BLOOD QUANT: CPT

## 2025-03-23 PROCEDURE — 85025 COMPLETE CBC W/AUTO DIFF WBC: CPT

## 2025-03-23 PROCEDURE — 6370000000 HC RX 637 (ALT 250 FOR IP): Performed by: STUDENT IN AN ORGANIZED HEALTH CARE EDUCATION/TRAINING PROGRAM

## 2025-03-23 PROCEDURE — 93005 ELECTROCARDIOGRAM TRACING: CPT | Performed by: STUDENT IN AN ORGANIZED HEALTH CARE EDUCATION/TRAINING PROGRAM

## 2025-03-23 PROCEDURE — 99285 EMERGENCY DEPT VISIT HI MDM: CPT

## 2025-03-23 PROCEDURE — 71045 X-RAY EXAM CHEST 1 VIEW: CPT

## 2025-03-23 PROCEDURE — 6360000002 HC RX W HCPCS: Performed by: STUDENT IN AN ORGANIZED HEALTH CARE EDUCATION/TRAINING PROGRAM

## 2025-03-23 PROCEDURE — 6370000000 HC RX 637 (ALT 250 FOR IP)

## 2025-03-23 PROCEDURE — 96372 THER/PROPH/DIAG INJ SC/IM: CPT

## 2025-03-23 PROCEDURE — 80053 COMPREHEN METABOLIC PANEL: CPT

## 2025-03-23 RX ORDER — SODIUM CHLORIDE 9 MG/ML
INJECTION, SOLUTION INTRAVENOUS PRN
Status: DISCONTINUED | OUTPATIENT
Start: 2025-03-23 | End: 2025-03-26 | Stop reason: HOSPADM

## 2025-03-23 RX ORDER — SODIUM CHLORIDE 0.9 % (FLUSH) 0.9 %
5-40 SYRINGE (ML) INJECTION PRN
Status: DISCONTINUED | OUTPATIENT
Start: 2025-03-23 | End: 2025-03-26 | Stop reason: HOSPADM

## 2025-03-23 RX ORDER — ONDANSETRON 2 MG/ML
4 INJECTION INTRAMUSCULAR; INTRAVENOUS EVERY 6 HOURS PRN
Status: DISCONTINUED | OUTPATIENT
Start: 2025-03-23 | End: 2025-03-26 | Stop reason: HOSPADM

## 2025-03-23 RX ORDER — ONDANSETRON 4 MG/1
4 TABLET, ORALLY DISINTEGRATING ORAL EVERY 8 HOURS PRN
Status: DISCONTINUED | OUTPATIENT
Start: 2025-03-23 | End: 2025-03-26 | Stop reason: HOSPADM

## 2025-03-23 RX ORDER — POLYETHYLENE GLYCOL 3350 17 G/17G
17 POWDER, FOR SOLUTION ORAL DAILY PRN
Status: DISCONTINUED | OUTPATIENT
Start: 2025-03-23 | End: 2025-03-26 | Stop reason: HOSPADM

## 2025-03-23 RX ORDER — SODIUM CHLORIDE 0.9 % (FLUSH) 0.9 %
5-40 SYRINGE (ML) INJECTION EVERY 12 HOURS SCHEDULED
Status: DISCONTINUED | OUTPATIENT
Start: 2025-03-23 | End: 2025-03-26 | Stop reason: HOSPADM

## 2025-03-23 RX ORDER — ACETAMINOPHEN 650 MG/1
650 SUPPOSITORY RECTAL EVERY 6 HOURS PRN
Status: DISCONTINUED | OUTPATIENT
Start: 2025-03-23 | End: 2025-03-26 | Stop reason: HOSPADM

## 2025-03-23 RX ORDER — LOSARTAN POTASSIUM 25 MG/1
12.5 TABLET ORAL DAILY
Status: DISCONTINUED | OUTPATIENT
Start: 2025-03-24 | End: 2025-03-23

## 2025-03-23 RX ORDER — LACOSAMIDE 50 MG/1
100 TABLET ORAL 2 TIMES DAILY
Status: DISCONTINUED | OUTPATIENT
Start: 2025-03-23 | End: 2025-03-26 | Stop reason: HOSPADM

## 2025-03-23 RX ORDER — MAGNESIUM SULFATE IN WATER 40 MG/ML
2000 INJECTION, SOLUTION INTRAVENOUS PRN
Status: DISCONTINUED | OUTPATIENT
Start: 2025-03-23 | End: 2025-03-26 | Stop reason: HOSPADM

## 2025-03-23 RX ORDER — ATORVASTATIN CALCIUM 20 MG/1
20 TABLET, FILM COATED ORAL DAILY
Status: DISCONTINUED | OUTPATIENT
Start: 2025-03-23 | End: 2025-03-26 | Stop reason: HOSPADM

## 2025-03-23 RX ORDER — CYCLOBENZAPRINE HCL 10 MG
10 TABLET ORAL 3 TIMES DAILY PRN
Status: DISCONTINUED | OUTPATIENT
Start: 2025-03-23 | End: 2025-03-26 | Stop reason: HOSPADM

## 2025-03-23 RX ORDER — FUROSEMIDE 20 MG/1
20 TABLET ORAL DAILY
Status: DISCONTINUED | OUTPATIENT
Start: 2025-03-23 | End: 2025-03-24

## 2025-03-23 RX ORDER — AMLODIPINE BESYLATE 10 MG/1
5 TABLET ORAL DAILY
Status: DISCONTINUED | OUTPATIENT
Start: 2025-03-24 | End: 2025-03-23

## 2025-03-23 RX ORDER — METOPROLOL SUCCINATE 25 MG/1
12.5 TABLET, EXTENDED RELEASE ORAL DAILY
Status: DISCONTINUED | OUTPATIENT
Start: 2025-03-24 | End: 2025-03-23

## 2025-03-23 RX ORDER — ENOXAPARIN SODIUM 100 MG/ML
40 INJECTION SUBCUTANEOUS DAILY
Status: DISCONTINUED | OUTPATIENT
Start: 2025-03-23 | End: 2025-03-26 | Stop reason: HOSPADM

## 2025-03-23 RX ORDER — AMLODIPINE BESYLATE 10 MG/1
5 TABLET ORAL DAILY
Status: DISCONTINUED | OUTPATIENT
Start: 2025-03-23 | End: 2025-03-26 | Stop reason: HOSPADM

## 2025-03-23 RX ORDER — ATORVASTATIN CALCIUM 20 MG/1
20 TABLET, FILM COATED ORAL DAILY
Status: DISCONTINUED | OUTPATIENT
Start: 2025-03-24 | End: 2025-03-23

## 2025-03-23 RX ORDER — ACETAMINOPHEN 325 MG/1
650 TABLET ORAL EVERY 6 HOURS PRN
Status: DISCONTINUED | OUTPATIENT
Start: 2025-03-23 | End: 2025-03-26 | Stop reason: HOSPADM

## 2025-03-23 RX ORDER — LOSARTAN POTASSIUM 25 MG/1
12.5 TABLET ORAL DAILY
Status: DISCONTINUED | OUTPATIENT
Start: 2025-03-23 | End: 2025-03-26 | Stop reason: HOSPADM

## 2025-03-23 RX ORDER — POTASSIUM CHLORIDE 7.45 MG/ML
10 INJECTION INTRAVENOUS PRN
Status: DISCONTINUED | OUTPATIENT
Start: 2025-03-23 | End: 2025-03-26 | Stop reason: HOSPADM

## 2025-03-23 RX ORDER — TOPIRAMATE 200 MG/1
100 TABLET, FILM COATED ORAL 2 TIMES DAILY
Status: DISCONTINUED | OUTPATIENT
Start: 2025-03-23 | End: 2025-03-26 | Stop reason: HOSPADM

## 2025-03-23 RX ORDER — POTASSIUM CHLORIDE 1500 MG/1
40 TABLET, EXTENDED RELEASE ORAL PRN
Status: DISCONTINUED | OUTPATIENT
Start: 2025-03-23 | End: 2025-03-26 | Stop reason: HOSPADM

## 2025-03-23 RX ORDER — GABAPENTIN 300 MG/1
300 CAPSULE ORAL 3 TIMES DAILY
Status: DISCONTINUED | OUTPATIENT
Start: 2025-03-23 | End: 2025-03-26 | Stop reason: HOSPADM

## 2025-03-23 RX ORDER — METOPROLOL SUCCINATE 25 MG/1
12.5 TABLET, EXTENDED RELEASE ORAL DAILY
Status: DISCONTINUED | OUTPATIENT
Start: 2025-03-23 | End: 2025-03-25

## 2025-03-23 RX ADMIN — ENOXAPARIN SODIUM 40 MG: 100 INJECTION SUBCUTANEOUS at 16:59

## 2025-03-23 RX ADMIN — LOSARTAN POTASSIUM 12.5 MG: 25 TABLET, FILM COATED ORAL at 17:03

## 2025-03-23 RX ADMIN — FUROSEMIDE 20 MG: 20 TABLET ORAL at 17:00

## 2025-03-23 RX ADMIN — SODIUM CHLORIDE, PRESERVATIVE FREE 10 ML: 5 INJECTION INTRAVENOUS at 20:32

## 2025-03-23 RX ADMIN — METOPROLOL SUCCINATE 12.5 MG: 25 TABLET, EXTENDED RELEASE ORAL at 17:00

## 2025-03-23 RX ADMIN — GABAPENTIN 300 MG: 300 CAPSULE ORAL at 20:19

## 2025-03-23 RX ADMIN — ATORVASTATIN CALCIUM 20 MG: 20 TABLET, FILM COATED ORAL at 17:00

## 2025-03-23 RX ADMIN — TOPIRAMATE 100 MG: 200 TABLET, FILM COATED ORAL at 20:19

## 2025-03-23 RX ADMIN — CYCLOBENZAPRINE 10 MG: 10 TABLET, FILM COATED ORAL at 17:00

## 2025-03-23 RX ADMIN — AMLODIPINE BESYLATE 5 MG: 10 TABLET ORAL at 16:59

## 2025-03-23 RX ADMIN — LACOSAMIDE 100 MG: 50 TABLET, FILM COATED ORAL at 20:19

## 2025-03-23 ASSESSMENT — LIFESTYLE VARIABLES: HOW OFTEN DO YOU HAVE A DRINK CONTAINING ALCOHOL: NEVER

## 2025-03-23 ASSESSMENT — PAIN - FUNCTIONAL ASSESSMENT: PAIN_FUNCTIONAL_ASSESSMENT: NONE - DENIES PAIN

## 2025-03-23 ASSESSMENT — HEART SCORE: ECG: NON-SPECIFC REPOLARIZATION DISTURBANCE/LBTB/PM

## 2025-03-23 NOTE — ED NOTES
Patient presents to ED via EMS from home for evaluation of chest pain. Patient took a nap 6am-11am this morning and when she put her feet on the ground after she woke up, she started having bilateral leg fullness feeling then fullness in her chest with associated diaphoresis and nausea. Patient denies shortness of breath. Patient was given 1 nitro spray and 324 aspirin en route with improvement of her pain.  Patient is alert and oriented x4, answering questions appropriately. Respirations even and unlabored. Patient changed into gown, placed on full cardiac monitor, BP cuff, and pulse ox. EKG completed. IV established. Call light within reach. Will continue to monitor.

## 2025-03-23 NOTE — ED PROVIDER NOTES
[facility]     Emergency Department     Faculty Attestation    I performed a history and physical examination of the patient and discussed management with the resident. I have reviewed and agree with the resident’s findings including all diagnostic interpretations, and treatment plans as written at the time of my review. Any areas of disagreement are noted on the chart. I was personally present for the key portions of any procedures. I have documented in the chart those procedures where I was not present during the key portions. For Physician Assistant/ Nurse Practitioner cases/documentation I have personally evaluated this patient and have completed at least one if not all key elements of the E/M (history, physical exam, and MDM). Additional findings are as noted.    PtName: Magui Thayer  MRN: [id number]  Birthdate 1965  Date of evaluation: 3/23/25  Note Started: 12:36 PM EDT    Primary Care Physician: Keesha Matias MD        History: This is a 59 y.o. female who presents to the Emergency Department with complaint of chest pain.  Patient presents emergency complaint of chest pain that began yesterday.  Causes achiness.  She has some associated nausea but denies any vomiting.  Denies any radiation of the pain.    Physical:   height is 1.6 m (5' 3\") and weight is 66.2 kg (146 lb). Her oral temperature is 98 °F (36.7 °C). Her blood pressure is 167/103 (abnormal) and her pulse is 107 (abnormal). Her respiration is 17 and oxygen saturation is 100%.  Lungs are clear to auscultation bilateral, heart mildly tachycardic with a regular rhythm, abdomen is soft nontender    Impression: Chest pain    Plan: Chest x-ray, EKG, CBC, BMP, troponin      Heart Score:    Heart Score for chest pain patients  History: Moderately Suspicious  ECG: Non-Specifc repolarization disturbance/LBTB/PM  Patient Age: > 45 and < 65 years  *Risk factors for Atherosclerotic disease:

## 2025-03-23 NOTE — H&P
[facility]  CDU / OBSERVATION ENCOUNTER  Physician NOTE     Pt Name: Magui Thayer  MRN: [id number]  Birthdate 1965  Date of evaluation: 3/23/25  Patient's PCP is :  Keesha Matias MD    CHIEF COMPLAINT       Chief Complaint   Patient presents with    Chest Pain         HISTORY OF PRESENT ILLNESS    Magui Thayer is a 59 y.o. 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  Patient reports that she woke up this morning and she noted she had left-sided chest pressure.  States it was approximately 4 out of 10 in severity however has been constant and progressively worsening now 6 out of 10 in severity.  States it radiates to her left arm and to her upper back.  The pain she feels in her upper back is a \"burning\" sensation.  She endorses some associated shortness of breath as well as nausea.  Patient reports 1 week ago she had similar symptoms however resolved after approximately 2 hours.  While examining the patient patient noted that she had leg swelling today which is the real reason that she presented to the emergency department however did not bring this up upon initial interview.  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    Location/Symptom: Left-sided chest pain  Timing/Onset: 1 day  Provocation: None  Quality: Pressure  Radiation: Left arm, back  Severity: 6 out of 10  Timing/Duration: Constant since this morning  Modifying Factors: Associated shortness of breath and nausea    History was obtained in part through review of the ED chart. When possible, a direct discussion was had with ED nurses, residents, and attendings  REVIEW OF SYSTEMS       General ROS - No fevers, No malaise   Ophthalmic ROS - No discharge, No changes in

## 2025-03-23 NOTE — ED NOTES
ED to inpatient nurses report      Chief Complaint:  Chief Complaint   Patient presents with    Chest Pain     Present to ED from: home    MOA:     LOC: alert and orientated to name, place, date  Mobility: Independent  Oxygen Baseline: room air    Current needs required: room air   Pending ED orders: none  Present condition: stable    Why did the patient come to the ED? Chest pain  What is the plan? Cardiology consult  Any procedures or intervention occur? IV, labs, CXR. Patient received 324mg aspirin and 1 spray nitro en route  Any safety concerns?? none    Mental Status:  Level of Consciousness: Alert (0)    Psych Assessment:      Vital signs   Vitals:    03/23/25 1218   BP: (!) 167/103   Pulse: (!) 107   Resp: 17   Temp: 98 °F (36.7 °C)   TempSrc: Oral   SpO2: 100%   Weight: 66.2 kg (146 lb)   Height: 1.6 m (5' 3\")        Vitals:  Patient Vitals for the past 24 hrs:   BP Temp Temp src Pulse Resp SpO2 Height Weight   03/23/25 1218 (!) 167/103 98 °F (36.7 °C) Oral (!) 107 17 100 % 1.6 m (5' 3\") 66.2 kg (146 lb)      Visit Vitals  BP (!) 167/103   Pulse (!) 107   Temp 98 °F (36.7 °C) (Oral)   Resp 17   Ht 1.6 m (5' 3\")   Wt 66.2 kg (146 lb)   SpO2 100%   BMI 25.86 kg/m²        LDAs:   Peripheral IV 03/23/25 Right Antecubital (Active)   Site Assessment Clean, dry & intact 03/23/25 1228   Line Status Blood return noted;Brisk blood return;Specimen collected;Flushed;Normal saline locked 03/23/25 1228   Phlebitis Assessment No symptoms 03/23/25 1228   Infiltration Assessment 0 03/23/25 1228   Dressing Status New dressing applied;Clean, dry & intact 03/23/25 1228   Dressing Type Transparent 03/23/25 1228       Ambulatory Status:  Presents to emergency department  because of falls (Syncope, seizure, or loss of consciousness): No, Age > 70: No, Altered Mental Status, Intoxication with alcohol or substance confusion (Disorientation, impaired judgment, poor safety awaremess, or inability to follow instructions): No, Impaired

## 2025-03-23 NOTE — ED PROVIDER NOTES
[facility]  Emergency Department Encounter  Emergency Medicine Resident     Pt Name:Magui Thayer  MRN: [id number]  Birthdate 1965  Date of evaluation: 3/23/25  PCP:  Keesha Matias MD  Note Started: 12:19 PM EDT      CHIEF COMPLAINT       Chief Complaint   Patient presents with    Chest Pain       HISTORY OF PRESENT ILLNESS  (Location/Symptom, Timing/Onset, Context/Setting, Quality, Duration, Modifying Factors, Severity.)      Magui Thayer is a 59 y.o. 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  Patient received nitro x 2 along with 324 mg of aspirin by EMS services.  Patient denies active chest pains, associated shortness of breath, vomiting, syncopal episodes.  Patient mentions that the chest discomfort did radiate through to her back however she denies numbness and tingling any of her 4 extremities. Denies bilateral LE pain, swelling, calf pains    PAST MEDICAL / SURGICAL / SOCIAL / FAMILY HISTORY      has a past medical history of Breast cancer (HCC), Cancer (HCC), Conjunctivitis, Depression, Disorder of patellofemoral joint, DJD (degenerative joint disease) of knee, Hyperlipidemia, Hypertension, Malignant neoplasm of nipple of left breast in female, estrogen receptor positive (HCC), Pain, knee, Primary osteoarthritis of left knee, Type II or unspecified type diabetes mellitus without mention of complication, not stated as uncontrolled, Under care of team, Under care of team, Under care of team, Wears dentures, Wears glasses, and Weight loss.       has a past surgical history that includes Breast surgery (2006); Tubal ligation;  section; lymphadenectomy; other surgical history (N/A, 2017); incision and drainage (N/A, 2017); Mastectomy; Bariatric Surgery (); and Colonoscopy (N/A, 3/24/2022).      Social

## 2025-03-23 NOTE — ED NOTES
The following labs were labeled with appropriate pt sticker and tubed to lab:     [x] Blue     [x] Lavender   [] on ice  [x] Green/yellow  [x] Green/black [] on ice  [] Marquez  [] on ice  [x] Yellow  [] Red  [] Pink  [] Type/ Screen  [] ABG  [] VBG    [] COVID-19 swab    [] Rapid  [] PCR  [] Flu swab  [] Peds Viral Panel     [] Urine Sample  [] Fecal Sample  [] Pelvic Cultures  [] Blood Cultures  [] X 2  [] STREP Cultures  [] Wound Cultures

## 2025-03-24 ENCOUNTER — TELEPHONE (OUTPATIENT)
Age: 60
End: 2025-03-24

## 2025-03-24 ENCOUNTER — APPOINTMENT (OUTPATIENT)
Age: 60
DRG: 291 | End: 2025-03-24
Payer: COMMERCIAL

## 2025-03-24 PROBLEM — I50.9 CONGESTIVE HEART FAILURE WITH CARDIOMYOPATHY (HCC): Status: ACTIVE | Noted: 2025-03-24

## 2025-03-24 PROBLEM — I42.9 CONGESTIVE HEART FAILURE WITH CARDIOMYOPATHY (HCC): Status: ACTIVE | Noted: 2025-03-24

## 2025-03-24 LAB
ANION GAP SERPL CALCULATED.3IONS-SCNC: 10 MMOL/L (ref 9–16)
BASOPHILS # BLD: 0.07 K/UL (ref 0–0.2)
BASOPHILS NFR BLD: 1 % (ref 0–2)
BNP SERPL-MCNC: 1257 PG/ML (ref 0–125)
BUN SERPL-MCNC: 13 MG/DL (ref 6–20)
CALCIUM SERPL-MCNC: 9 MG/DL (ref 8.6–10.4)
CHLORIDE SERPL-SCNC: 110 MMOL/L (ref 98–107)
CO2 SERPL-SCNC: 21 MMOL/L (ref 20–31)
CREAT SERPL-MCNC: 1 MG/DL (ref 0.6–0.9)
ECHO BSA: 1.73 M2
ECHO LV EF PHYSICIAN: 29 %
ECHO LV EJECTION FRACTION BIPLANE: 29 % (ref 55–100)
EKG ATRIAL RATE: 108 BPM
EKG P AXIS: 79 DEGREES
EKG P-R INTERVAL: 164 MS
EKG Q-T INTERVAL: 360 MS
EKG QRS DURATION: 104 MS
EKG QTC CALCULATION (BAZETT): 482 MS
EKG R AXIS: -31 DEGREES
EKG T AXIS: 106 DEGREES
EKG VENTRICULAR RATE: 108 BPM
EOSINOPHIL # BLD: 0.35 K/UL (ref 0–0.44)
EOSINOPHILS RELATIVE PERCENT: 4 % (ref 1–4)
ERYTHROCYTE [DISTWIDTH] IN BLOOD BY AUTOMATED COUNT: 11.8 % (ref 11.8–14.4)
GFR, ESTIMATED: 65 ML/MIN/1.73M2
GLUCOSE BLD-MCNC: 165 MG/DL (ref 65–105)
GLUCOSE BLD-MCNC: 201 MG/DL (ref 65–105)
GLUCOSE BLD-MCNC: 251 MG/DL (ref 65–105)
GLUCOSE BLD-MCNC: 71 MG/DL (ref 65–105)
GLUCOSE SERPL-MCNC: 98 MG/DL (ref 74–99)
HCT VFR BLD AUTO: 35.8 % (ref 36.3–47.1)
HGB BLD-MCNC: 11.5 G/DL (ref 11.9–15.1)
IMM GRANULOCYTES # BLD AUTO: <0.03 K/UL (ref 0–0.3)
IMM GRANULOCYTES NFR BLD: 0 %
LYMPHOCYTES NFR BLD: 4.15 K/UL (ref 1.1–3.7)
LYMPHOCYTES RELATIVE PERCENT: 47 % (ref 24–43)
MCH RBC QN AUTO: 29.6 PG (ref 25.2–33.5)
MCHC RBC AUTO-ENTMCNC: 32.1 G/DL (ref 28.4–34.8)
MCV RBC AUTO: 92 FL (ref 82.6–102.9)
MONOCYTES NFR BLD: 0.53 K/UL (ref 0.1–1.2)
MONOCYTES NFR BLD: 6 % (ref 3–12)
NEUTROPHILS NFR BLD: 42 % (ref 36–65)
NEUTS SEG NFR BLD: 3.72 K/UL (ref 1.5–8.1)
NRBC BLD-RTO: 0 PER 100 WBC
PLATELET # BLD AUTO: 240 K/UL (ref 138–453)
PMV BLD AUTO: 9.9 FL (ref 8.1–13.5)
POTASSIUM SERPL-SCNC: 3.8 MMOL/L (ref 3.7–5.3)
RBC # BLD AUTO: 3.89 M/UL (ref 3.95–5.11)
SODIUM SERPL-SCNC: 141 MMOL/L (ref 136–145)
WBC OTHER # BLD: 8.8 K/UL (ref 3.5–11.3)

## 2025-03-24 PROCEDURE — 93308 TTE F-UP OR LMTD: CPT

## 2025-03-24 PROCEDURE — 6360000002 HC RX W HCPCS: Performed by: STUDENT IN AN ORGANIZED HEALTH CARE EDUCATION/TRAINING PROGRAM

## 2025-03-24 PROCEDURE — 2500000003 HC RX 250 WO HCPCS: Performed by: STUDENT IN AN ORGANIZED HEALTH CARE EDUCATION/TRAINING PROGRAM

## 2025-03-24 PROCEDURE — 1200000000 HC SEMI PRIVATE

## 2025-03-24 PROCEDURE — G0378 HOSPITAL OBSERVATION PER HR: HCPCS

## 2025-03-24 PROCEDURE — 6370000000 HC RX 637 (ALT 250 FOR IP)

## 2025-03-24 PROCEDURE — 6370000000 HC RX 637 (ALT 250 FOR IP): Performed by: STUDENT IN AN ORGANIZED HEALTH CARE EDUCATION/TRAINING PROGRAM

## 2025-03-24 PROCEDURE — 96372 THER/PROPH/DIAG INJ SC/IM: CPT

## 2025-03-24 PROCEDURE — 82947 ASSAY GLUCOSE BLOOD QUANT: CPT

## 2025-03-24 PROCEDURE — 36415 COLL VENOUS BLD VENIPUNCTURE: CPT

## 2025-03-24 PROCEDURE — 96374 THER/PROPH/DIAG INJ IV PUSH: CPT

## 2025-03-24 PROCEDURE — 99253 IP/OBS CNSLTJ NEW/EST LOW 45: CPT | Performed by: STUDENT IN AN ORGANIZED HEALTH CARE EDUCATION/TRAINING PROGRAM

## 2025-03-24 PROCEDURE — 83880 ASSAY OF NATRIURETIC PEPTIDE: CPT

## 2025-03-24 PROCEDURE — 93010 ELECTROCARDIOGRAM REPORT: CPT | Performed by: INTERNAL MEDICINE

## 2025-03-24 PROCEDURE — 80048 BASIC METABOLIC PNL TOTAL CA: CPT

## 2025-03-24 PROCEDURE — 85025 COMPLETE CBC W/AUTO DIFF WBC: CPT

## 2025-03-24 RX ORDER — FUROSEMIDE 10 MG/ML
40 INJECTION INTRAMUSCULAR; INTRAVENOUS DAILY
Status: COMPLETED | OUTPATIENT
Start: 2025-03-24 | End: 2025-03-25

## 2025-03-24 RX ORDER — SPIRONOLACTONE 25 MG/1
12.5 TABLET ORAL DAILY
Status: DISCONTINUED | OUTPATIENT
Start: 2025-03-24 | End: 2025-03-26 | Stop reason: HOSPADM

## 2025-03-24 RX ORDER — FUROSEMIDE 20 MG/1
40 TABLET ORAL DAILY
Status: DISCONTINUED | OUTPATIENT
Start: 2025-03-26 | End: 2025-03-26 | Stop reason: HOSPADM

## 2025-03-24 RX ADMIN — LACOSAMIDE 100 MG: 50 TABLET, FILM COATED ORAL at 08:14

## 2025-03-24 RX ADMIN — GABAPENTIN 300 MG: 300 CAPSULE ORAL at 14:10

## 2025-03-24 RX ADMIN — SODIUM CHLORIDE, PRESERVATIVE FREE 10 ML: 5 INJECTION INTRAVENOUS at 20:53

## 2025-03-24 RX ADMIN — CYCLOBENZAPRINE 10 MG: 10 TABLET, FILM COATED ORAL at 20:52

## 2025-03-24 RX ADMIN — FUROSEMIDE 20 MG: 20 TABLET ORAL at 08:15

## 2025-03-24 RX ADMIN — ATORVASTATIN CALCIUM 20 MG: 20 TABLET, FILM COATED ORAL at 08:14

## 2025-03-24 RX ADMIN — LACOSAMIDE 100 MG: 50 TABLET, FILM COATED ORAL at 20:52

## 2025-03-24 RX ADMIN — ACETAMINOPHEN 650 MG: 325 TABLET ORAL at 00:41

## 2025-03-24 RX ADMIN — SODIUM CHLORIDE, PRESERVATIVE FREE 10 ML: 5 INJECTION INTRAVENOUS at 08:15

## 2025-03-24 RX ADMIN — GABAPENTIN 300 MG: 300 CAPSULE ORAL at 08:15

## 2025-03-24 RX ADMIN — ENOXAPARIN SODIUM 40 MG: 100 INJECTION SUBCUTANEOUS at 08:15

## 2025-03-24 RX ADMIN — EMPAGLIFLOZIN 10 MG: 10 TABLET, FILM COATED ORAL at 09:36

## 2025-03-24 RX ADMIN — TOPIRAMATE 100 MG: 200 TABLET, FILM COATED ORAL at 08:26

## 2025-03-24 RX ADMIN — TOPIRAMATE 100 MG: 200 TABLET, FILM COATED ORAL at 20:52

## 2025-03-24 RX ADMIN — SPIRONOLACTONE 12.5 MG: 25 TABLET, FILM COATED ORAL at 09:36

## 2025-03-24 RX ADMIN — GABAPENTIN 300 MG: 300 CAPSULE ORAL at 20:52

## 2025-03-24 RX ADMIN — LOSARTAN POTASSIUM 12.5 MG: 25 TABLET, FILM COATED ORAL at 08:26

## 2025-03-24 RX ADMIN — AMLODIPINE BESYLATE 5 MG: 10 TABLET ORAL at 08:15

## 2025-03-24 RX ADMIN — ONDANSETRON 4 MG: 4 TABLET, ORALLY DISINTEGRATING ORAL at 19:42

## 2025-03-24 RX ADMIN — CYCLOBENZAPRINE 10 MG: 10 TABLET, FILM COATED ORAL at 08:14

## 2025-03-24 RX ADMIN — ACETAMINOPHEN 650 MG: 325 TABLET ORAL at 19:40

## 2025-03-24 RX ADMIN — CYCLOBENZAPRINE 10 MG: 10 TABLET, FILM COATED ORAL at 16:51

## 2025-03-24 RX ADMIN — FUROSEMIDE 40 MG: 10 INJECTION, SOLUTION INTRAMUSCULAR; INTRAVENOUS at 09:36

## 2025-03-24 ASSESSMENT — PAIN SCALES - GENERAL
PAINLEVEL_OUTOF10: 4
PAINLEVEL_OUTOF10: 9
PAINLEVEL_OUTOF10: 5

## 2025-03-24 ASSESSMENT — PAIN DESCRIPTION - DESCRIPTORS: DESCRIPTORS: DISCOMFORT

## 2025-03-24 ASSESSMENT — PAIN DESCRIPTION - LOCATION
LOCATION: LEG
LOCATION: BACK;ARM

## 2025-03-24 NOTE — CONSULTS
normal mood with appropriate affect
OVER 17 WITH A MEAN OF 34, WEDGE 19, LVEDP 18   MMHG   2. Mild-to-moderate nonobstructive CAD   3. PA sat 64%, AO sat 96%   4. Cardiac output/cardiac index by assumed Abran is 4.64/2.83 and by   thermodilution is 4.28/2.61     Recommendations:   Tr band to be deflated one hour post procedure 2 cc at a time   Starting entresto and uptitrate GDMT   Once optimized repeat echo to reassess mitral valve disease     Coronary Findings Diagnostic Dominance: Right   Left Main: The vessel was visualized by angiography and is angiographically normal.   Left Anterior Descending: The vessel was visualized by angiography. There is mild diffuse disease throughout the vessel.   Left Circumflex: The vessel was visualized by angiography and is angiographically normal.   Right Coronary Artery: The vessel was visualized by angiography and is angiographically normal.     Intervention   No interventions have been documented.     LABS:   CBC:   Recent Labs     03/23/25  1222 03/24/25  0449   WBC 9.2 8.8   HGB 11.3* 11.5*   HCT 35.2* 35.8*    240     BMP:   Recent Labs     03/23/25  1222 03/24/25  0449    141   K 3.5* 3.8   CO2 21 21   BUN 12 13   CREATININE 0.9 1.0*   LABGLOM 74 65   GLUCOSE 173* 98     BNP: No results for input(s): \"BNP\" in the last 72 hours.  PT/INR: No results for input(s): \"PROTIME\", \"INR\" in the last 72 hours.  APTT:No results for input(s): \"APTT\" in the last 72 hours.  CARDIAC ENZYMES:No results for input(s): \"CKTOTAL\", \"CKMB\", \"CKMBINDEX\", \"TROPONINI\" in the last 72 hours.    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

## 2025-03-24 NOTE — TELEPHONE ENCOUNTER
A [facility] employee called to inform cardio about the recent referral from - [facility] cardiology doesn't do what was requested on the referral. Caller just communicating that the referral will not be accepted.

## 2025-03-24 NOTE — PROGRESS NOTES
[facility]  CDU / OBSERVATION ENCOUNTER  ATTENDING NOTE       I discussed management with the resident or midlevel provider. I reviewed the resident or midlevel provider's note and agree with the documented findings and plan of care. Any areas of disagreement are noted on the chart. I was personally present for the key portions of any procedures. I have documented in the chart those procedures where I was not present during the key portions. I have reviewed the nurses notes. I agree with the chief complaint, past medical history, past surgical history, allergies, medications, social and family history as documented unless otherwise noted below.    The Family history, social history, and ROS are effectively unchanged since admission unless noted elsewhere in the chart.    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 She was noted to be tachycardiac in the ER. ED workup was unremarkable including negative d dimer. She was placed in the observation unit for cardiology consult.     Lita Contreras DO, FACEP  Attending Emergency  Physician

## 2025-03-24 NOTE — PROGRESS NOTES
OBS/CDU   Progress NOTE      Patients PCP is:  Keesha Matias MD      SUBJECTIVE      Patient seen and evaluated bedside.  No acute events overnight.  Patient remains slightly tachycardic and mildly hypertensive but afebrile.   Reports some continued pressure along chest. No SOB, n/v.   Cardiology recommending diuresis and echo     PHYSICAL EXAM      General: NAD, AO X 2  Heent: EOMI,   Neck: SUPPLE, NO JVD  Cardiovascular: RRR, S1S2  Pulmonary: CTAB, NO SOB  Abdomen: SOFT, NTTP, ND  Extremities: +2/4 PULSES DISTAL, NO SWELLING    Neuro / Psych: MENTATION AT BASELINE    PERTINENT TEST /EXAMS      I have reviewed all available laboratory results.    MEDICATIONS CURRENT   furosemide (LASIX) injection 40 mg, Daily  [START ON 3/26/2025] furosemide (LASIX) tablet 40 mg, Daily  spironolactone (ALDACTONE) tablet 12.5 mg, Daily  empagliflozin (JARDIANCE) tablet 10 mg, Daily  lacosamide (VIMPAT) tablet 100 mg, BID  topiramate (TOPAMAX) tablet 100 mg, BID  sodium chloride flush 0.9 % injection 5-40 mL, 2 times per day  sodium chloride flush 0.9 % injection 5-40 mL, PRN  0.9 % sodium chloride infusion, PRN  potassium chloride (KLOR-CON M) extended release tablet 40 mEq, PRN   Or  potassium bicarb-citric acid (EFFER-K) effervescent tablet 40 mEq, PRN   Or  potassium chloride 10 mEq/100 mL IVPB (Peripheral Line), PRN  magnesium sulfate 2000 mg in 50 mL IVPB premix, PRN  enoxaparin (LOVENOX) injection 40 mg, Daily  ondansetron (ZOFRAN-ODT) disintegrating tablet 4 mg, Q8H PRN   Or  ondansetron (ZOFRAN) injection 4 mg, Q6H PRN  polyethylene glycol (GLYCOLAX) packet 17 g, Daily PRN  acetaminophen (TYLENOL) tablet 650 mg, Q6H PRN   Or  acetaminophen (TYLENOL) suppository 650 mg, Q6H PRN  gabapentin (NEURONTIN) capsule 300 mg, TID  cyclobenzaprine (FLEXERIL) tablet 10 mg, TID PRN  amLODIPine (NORVASC) tablet 5 mg, Daily  atorvastatin (LIPITOR) tablet 20 mg, Daily  losartan (COZAAR) tablet 12.5 mg, Daily  metoprolol succinate (TOPROL

## 2025-03-24 NOTE — PROGRESS NOTES
Congestive Heart Failure Education completed and charted. CHF booklet given. Patient was receptive to education.    Discussed the  importance of medication compliance.    Discussed the importance of a heart healthy diet. Discussed 2000 mg sodium-restricted daily diet.  Patient instructed to limit fluid intake to  1.5 to 2 liters per day.    Patient instructed to weigh self at the same time of each day each morning, reinforced teaching to monitor for 3-5 lb weight increase over 1-2 days notify physician if change noted.      Signs and symptoms of CHF discussed with patient, such as feeling more tired than normal, feeling short of breath, coughing that increases when lying down, sudden weight gain, swelling of the feet, legs or belly.  Patient verbalized understanding to notify physician office if these symptoms occur.  EF 28%  Pt is agreeable to an outpatient CHF Clinic referral . Referral placed.

## 2025-03-25 LAB
ANION GAP SERPL CALCULATED.3IONS-SCNC: 11 MMOL/L (ref 9–16)
ANION GAP SERPL CALCULATED.3IONS-SCNC: 12 MMOL/L (ref 9–16)
BUN SERPL-MCNC: 18 MG/DL (ref 6–20)
BUN SERPL-MCNC: 20 MG/DL (ref 6–20)
CALCIUM SERPL-MCNC: 8.2 MG/DL (ref 8.6–10.4)
CALCIUM SERPL-MCNC: 8.6 MG/DL (ref 8.6–10.4)
CHLORIDE SERPL-SCNC: 103 MMOL/L (ref 98–107)
CHLORIDE SERPL-SCNC: 103 MMOL/L (ref 98–107)
CO2 SERPL-SCNC: 22 MMOL/L (ref 20–31)
CO2 SERPL-SCNC: 24 MMOL/L (ref 20–31)
CREAT SERPL-MCNC: 1.3 MG/DL (ref 0.6–0.9)
CREAT SERPL-MCNC: 1.4 MG/DL (ref 0.6–0.9)
GFR, ESTIMATED: 44 ML/MIN/1.73M2
GFR, ESTIMATED: 48 ML/MIN/1.73M2
GLUCOSE BLD-MCNC: 166 MG/DL (ref 65–105)
GLUCOSE BLD-MCNC: 185 MG/DL (ref 65–105)
GLUCOSE BLD-MCNC: 229 MG/DL (ref 65–105)
GLUCOSE BLD-MCNC: 322 MG/DL (ref 65–105)
GLUCOSE SERPL-MCNC: 223 MG/DL (ref 74–99)
GLUCOSE SERPL-MCNC: 232 MG/DL (ref 74–99)
POTASSIUM SERPL-SCNC: 3.8 MMOL/L (ref 3.7–5.3)
POTASSIUM SERPL-SCNC: 4.1 MMOL/L (ref 3.7–5.3)
SODIUM SERPL-SCNC: 137 MMOL/L (ref 136–145)
SODIUM SERPL-SCNC: 138 MMOL/L (ref 136–145)

## 2025-03-25 PROCEDURE — 82947 ASSAY GLUCOSE BLOOD QUANT: CPT

## 2025-03-25 PROCEDURE — 6360000002 HC RX W HCPCS: Performed by: STUDENT IN AN ORGANIZED HEALTH CARE EDUCATION/TRAINING PROGRAM

## 2025-03-25 PROCEDURE — 36415 COLL VENOUS BLD VENIPUNCTURE: CPT

## 2025-03-25 PROCEDURE — 6370000000 HC RX 637 (ALT 250 FOR IP): Performed by: STUDENT IN AN ORGANIZED HEALTH CARE EDUCATION/TRAINING PROGRAM

## 2025-03-25 PROCEDURE — 80048 BASIC METABOLIC PNL TOTAL CA: CPT

## 2025-03-25 PROCEDURE — 1200000000 HC SEMI PRIVATE

## 2025-03-25 PROCEDURE — 6370000000 HC RX 637 (ALT 250 FOR IP)

## 2025-03-25 PROCEDURE — 99233 SBSQ HOSP IP/OBS HIGH 50: CPT | Performed by: NURSE PRACTITIONER

## 2025-03-25 PROCEDURE — 2500000003 HC RX 250 WO HCPCS: Performed by: STUDENT IN AN ORGANIZED HEALTH CARE EDUCATION/TRAINING PROGRAM

## 2025-03-25 RX ORDER — GLUCAGON 1 MG/ML
1 KIT INJECTION PRN
Status: DISCONTINUED | OUTPATIENT
Start: 2025-03-25 | End: 2025-03-26 | Stop reason: HOSPADM

## 2025-03-25 RX ORDER — METOPROLOL SUCCINATE 25 MG/1
25 TABLET, EXTENDED RELEASE ORAL DAILY
Status: DISCONTINUED | OUTPATIENT
Start: 2025-03-26 | End: 2025-03-26 | Stop reason: HOSPADM

## 2025-03-25 RX ORDER — INSULIN LISPRO 100 [IU]/ML
0-4 INJECTION, SOLUTION INTRAVENOUS; SUBCUTANEOUS
Status: DISCONTINUED | OUTPATIENT
Start: 2025-03-25 | End: 2025-03-26 | Stop reason: HOSPADM

## 2025-03-25 RX ORDER — DEXTROSE MONOHYDRATE 100 MG/ML
INJECTION, SOLUTION INTRAVENOUS CONTINUOUS PRN
Status: DISCONTINUED | OUTPATIENT
Start: 2025-03-25 | End: 2025-03-26 | Stop reason: HOSPADM

## 2025-03-25 RX ADMIN — GABAPENTIN 300 MG: 300 CAPSULE ORAL at 20:34

## 2025-03-25 RX ADMIN — TOPIRAMATE 100 MG: 200 TABLET, FILM COATED ORAL at 20:34

## 2025-03-25 RX ADMIN — SPIRONOLACTONE 12.5 MG: 25 TABLET, FILM COATED ORAL at 08:49

## 2025-03-25 RX ADMIN — GABAPENTIN 300 MG: 300 CAPSULE ORAL at 08:49

## 2025-03-25 RX ADMIN — LACOSAMIDE 100 MG: 50 TABLET, FILM COATED ORAL at 08:49

## 2025-03-25 RX ADMIN — CYCLOBENZAPRINE 10 MG: 10 TABLET, FILM COATED ORAL at 08:49

## 2025-03-25 RX ADMIN — LACOSAMIDE 100 MG: 50 TABLET, FILM COATED ORAL at 20:34

## 2025-03-25 RX ADMIN — INSULIN LISPRO 3 UNITS: 100 INJECTION, SOLUTION INTRAVENOUS; SUBCUTANEOUS at 11:52

## 2025-03-25 RX ADMIN — INSULIN LISPRO 1 UNITS: 100 INJECTION, SOLUTION INTRAVENOUS; SUBCUTANEOUS at 20:34

## 2025-03-25 RX ADMIN — METOPROLOL SUCCINATE 12.5 MG: 25 TABLET, EXTENDED RELEASE ORAL at 08:49

## 2025-03-25 RX ADMIN — EMPAGLIFLOZIN 10 MG: 10 TABLET, FILM COATED ORAL at 08:49

## 2025-03-25 RX ADMIN — ATORVASTATIN CALCIUM 20 MG: 20 TABLET, FILM COATED ORAL at 08:49

## 2025-03-25 RX ADMIN — SODIUM CHLORIDE, PRESERVATIVE FREE 10 ML: 5 INJECTION INTRAVENOUS at 09:14

## 2025-03-25 RX ADMIN — CYCLOBENZAPRINE 10 MG: 10 TABLET, FILM COATED ORAL at 13:37

## 2025-03-25 RX ADMIN — FUROSEMIDE 40 MG: 10 INJECTION, SOLUTION INTRAMUSCULAR; INTRAVENOUS at 09:14

## 2025-03-25 RX ADMIN — ENOXAPARIN SODIUM 40 MG: 100 INJECTION SUBCUTANEOUS at 09:14

## 2025-03-25 RX ADMIN — ACETAMINOPHEN 650 MG: 325 TABLET ORAL at 20:34

## 2025-03-25 RX ADMIN — LOSARTAN POTASSIUM 12.5 MG: 25 TABLET, FILM COATED ORAL at 08:49

## 2025-03-25 RX ADMIN — ONDANSETRON 4 MG: 2 INJECTION, SOLUTION INTRAMUSCULAR; INTRAVENOUS at 20:33

## 2025-03-25 RX ADMIN — SODIUM CHLORIDE, PRESERVATIVE FREE 10 ML: 5 INJECTION INTRAVENOUS at 20:35

## 2025-03-25 RX ADMIN — CYCLOBENZAPRINE 10 MG: 10 TABLET, FILM COATED ORAL at 20:35

## 2025-03-25 RX ADMIN — GABAPENTIN 300 MG: 300 CAPSULE ORAL at 13:37

## 2025-03-25 RX ADMIN — TOPIRAMATE 100 MG: 200 TABLET, FILM COATED ORAL at 08:50

## 2025-03-25 RX ADMIN — INSULIN LISPRO 1 UNITS: 100 INJECTION, SOLUTION INTRAVENOUS; SUBCUTANEOUS at 17:24

## 2025-03-25 RX ADMIN — AMLODIPINE BESYLATE 5 MG: 10 TABLET ORAL at 08:49

## 2025-03-25 ASSESSMENT — PAIN SCALES - GENERAL
PAINLEVEL_OUTOF10: 7
PAINLEVEL_OUTOF10: 9
PAINLEVEL_OUTOF10: 7

## 2025-03-25 ASSESSMENT — PAIN DESCRIPTION - LOCATION: LOCATION: LEG

## 2025-03-25 ASSESSMENT — PAIN SCALES - WONG BAKER
WONGBAKER_NUMERICALRESPONSE: NO HURT
WONGBAKER_NUMERICALRESPONSE: NO HURT

## 2025-03-25 ASSESSMENT — PAIN DESCRIPTION - ORIENTATION: ORIENTATION: RIGHT;LEFT

## 2025-03-25 NOTE — PROGRESS NOTES
OBS/CDU   Progress NOTE      Patients PCP is:  Keesha Matias MD      SUBJECTIVE      Patient seen and evaluated bedside.  No acute events overnight.  Patient remains slightly tachycardic but other vitals WNL.   She is tolerating diet.   Reports pressure along chest is much better than yesterday. No SOB, n/v.   Echo yesterday showing EF of 25-30% with no significant changes from echo in 4/2024.     PHYSICAL EXAM      General: NAD, AO X 2  Heent: EOMI,   Neck: SUPPLE, NO JVD  Cardiovascular: RRR, S1S2  Pulmonary: CTAB, NO SOB  Abdomen: SOFT, NTTP, ND  Extremities: +2/4 PULSES DISTAL, NO SWELLING    Neuro / Psych: MENTATION AT BASELINE    PERTINENT TEST /EXAMS      I have reviewed all available laboratory results.    MEDICATIONS CURRENT   insulin lispro (HUMALOG,ADMELOG) injection vial 0-4 Units, 4x Daily AC & HS  glucose chewable tablet 16 g, PRN  dextrose bolus 10% 125 mL, PRN   Or  dextrose bolus 10% 250 mL, PRN  glucagon injection 1 mg, PRN  dextrose 10 % infusion, Continuous PRN  [Held by provider] furosemide (LASIX) tablet 40 mg, Daily  [Held by provider] spironolactone (ALDACTONE) tablet 12.5 mg, Daily  empagliflozin (JARDIANCE) tablet 10 mg, Daily  lacosamide (VIMPAT) tablet 100 mg, BID  topiramate (TOPAMAX) tablet 100 mg, BID  sodium chloride flush 0.9 % injection 5-40 mL, 2 times per day  sodium chloride flush 0.9 % injection 5-40 mL, PRN  0.9 % sodium chloride infusion, PRN  potassium chloride (KLOR-CON M) extended release tablet 40 mEq, PRN   Or  potassium bicarb-citric acid (EFFER-K) effervescent tablet 40 mEq, PRN   Or  potassium chloride 10 mEq/100 mL IVPB (Peripheral Line), PRN  magnesium sulfate 2000 mg in 50 mL IVPB premix, PRN  enoxaparin (LOVENOX) injection 40 mg, Daily  ondansetron (ZOFRAN-ODT) disintegrating tablet 4 mg, Q8H PRN   Or  ondansetron (ZOFRAN) injection 4 mg, Q6H PRN  polyethylene glycol (GLYCOLAX) packet 17 g, Daily PRN  acetaminophen (TYLENOL) tablet 650 mg, Q6H PRN

## 2025-03-25 NOTE — PROGRESS NOTES
[facility]  CDU / OBSERVATION ENCOUNTER  ATTENDING NOTE         I performed a history and physical examination of the patient and discussed management with the resident or midlevel provider. I reviewed the resident or midlevel provider's note and agree with the documented findings and plan of care. Any areas of disagreement are noted on the chart. I was personally present for the key portions of any procedures. I have documented in the chart those procedures where I was not present during the key portions. I have reviewed the nurses notes. I agree with the chief complaint, past medical history, past surgical history, allergies, medications, social and family history as documented unless otherwise noted below.    The Family history, social history, and ROS are effectively unchanged since admission unless noted elsewhere in the chart.     This patient was placed in the observation unit for reevaluation for possible admission to the hospital     Patient evaluated by cardiology.  [place] to require IV diuresis and reassessment.  Patient was amenable to that decision.  Patient with echocardiogram for reassessment of cardiac function.  Patient subsequently admitted due to need for IV medications and reassessment.       Zain Loyola MD  Attending Emergency  Physician

## 2025-03-25 NOTE — PROGRESS NOTES
[facility]  CDU / OBSERVATION ENCOUNTER  ATTENDING NOTE         I performed a history and physical examination of the patient and discussed management with the resident or midlevel provider. I reviewed the resident or midlevel provider's note and agree with the documented findings and plan of care. Any areas of disagreement are noted on the chart. I was personally present for the key portions of any procedures. I have documented in the chart those procedures where I was not present during the key portions. I have reviewed the nurses notes. I agree with the chief complaint, past medical history, past surgical history, allergies, medications, social and family history as documented unless otherwise noted below.    The Family history, social history, and ROS are effectively unchanged since admission unless noted elsewhere in the chart.     This patient was placed in the observation unit for reevaluation for possible admission to the hospital     Symptomatically much better.  Patient however with ongoing KYM.  Will need reevaluation of renal function and close follow-up.  Patient for reassessment by nurse practitioner.       Zain Loyola MD  Attending Emergency  Physician

## 2025-03-25 NOTE — PROGRESS NOTES
[facility]   Progress Note                   Date:   3/25/2025  Patient name: Magui Thayer  Date of admission:  3/23/2025 12:03 PM  MRN:   [id number]  YOB: 1965  PCP: Keesha Matias MD    Reason for Admission: Chest pain [R07.9]  Chest pain, unspecified type [R07.9]  Congestive heart failure with cardiomyopathy (HCC) [I50.9, I42.9]    Subjective:       Clinical Changes / Abnormalities: Pt seen and examined in the room.  Patient resting in bed. Pt denies any current CP or sob.  Patient reports that over night she had pain in both of her arms and neck which caused numbness in her feet. Labs, vitals and tele reviewed-     Medications:   Scheduled Meds:   insulin lispro  0-4 Units SubCUTAneous 4x Daily AC & HS    [Held by provider] furosemide  40 mg Oral Daily    [Held by provider] spironolactone  12.5 mg Oral Daily    empagliflozin  10 mg Oral Daily    lacosamide  100 mg Oral BID    topiramate  100 mg Oral BID    sodium chloride flush  5-40 mL IntraVENous 2 times per day    enoxaparin  40 mg SubCUTAneous Daily    gabapentin  300 mg Oral TID    amLODIPine  5 mg Oral Daily    atorvastatin  20 mg Oral Daily    losartan  12.5 mg Oral Daily    metoprolol succinate  12.5 mg Oral Daily     Continuous Infusions:   dextrose      sodium chloride       CBC:   Recent Labs     03/23/25  1222 03/24/25  0449   WBC 9.2 8.8   HGB 11.3* 11.5*    240     BMP:    Recent Labs     03/23/25  1222 03/24/25  0449 03/25/25  0428    141 137   K 3.5* 3.8 4.1   * 110* 103   CO2 21 21 22   BUN 12 13 18   CREATININE 0.9 1.0* 1.3*   GLUCOSE 173* 98 232*     Hepatic:   Recent Labs     03/23/25  1222   AST 19   ALT 14   BILITOT 0.2   ALKPHOS 104     Troponin:   Recent Labs     03/23/25  1222 03/23/25  1330   TROPHS 16* 14     BNP: No results for input(s): \"BNP\" in the last 72 hours.  Lipids: No results for input(s): \"CHOL\", \"HDL\" in the last 72 hours.    Invalid input(s): \"LDLCALCU\"  INR: No

## 2025-03-26 VITALS
TEMPERATURE: 97.7 F | HEART RATE: 89 BPM | BODY MASS INDEX: 27.63 KG/M2 | DIASTOLIC BLOOD PRESSURE: 87 MMHG | OXYGEN SATURATION: 99 % | RESPIRATION RATE: 18 BRPM | WEIGHT: 150.13 LBS | HEIGHT: 62 IN | SYSTOLIC BLOOD PRESSURE: 103 MMHG

## 2025-03-26 LAB
ANION GAP SERPL CALCULATED.3IONS-SCNC: 10 MMOL/L (ref 9–16)
BUN SERPL-MCNC: 21 MG/DL (ref 6–20)
CALCIUM SERPL-MCNC: 8.9 MG/DL (ref 8.6–10.4)
CHLORIDE SERPL-SCNC: 106 MMOL/L (ref 98–107)
CO2 SERPL-SCNC: 23 MMOL/L (ref 20–31)
CREAT SERPL-MCNC: 1.1 MG/DL (ref 0.6–0.9)
GFR, ESTIMATED: 58 ML/MIN/1.73M2
GLUCOSE BLD-MCNC: 149 MG/DL (ref 65–105)
GLUCOSE BLD-MCNC: 194 MG/DL (ref 65–105)
GLUCOSE SERPL-MCNC: 155 MG/DL (ref 74–99)
POTASSIUM SERPL-SCNC: 4.1 MMOL/L (ref 3.7–5.3)
SODIUM SERPL-SCNC: 139 MMOL/L (ref 136–145)

## 2025-03-26 PROCEDURE — 6370000000 HC RX 637 (ALT 250 FOR IP): Performed by: STUDENT IN AN ORGANIZED HEALTH CARE EDUCATION/TRAINING PROGRAM

## 2025-03-26 PROCEDURE — 6370000000 HC RX 637 (ALT 250 FOR IP)

## 2025-03-26 PROCEDURE — 99232 SBSQ HOSP IP/OBS MODERATE 35: CPT | Performed by: NURSE PRACTITIONER

## 2025-03-26 PROCEDURE — 36415 COLL VENOUS BLD VENIPUNCTURE: CPT

## 2025-03-26 PROCEDURE — 6370000000 HC RX 637 (ALT 250 FOR IP): Performed by: NURSE PRACTITIONER

## 2025-03-26 PROCEDURE — 80048 BASIC METABOLIC PNL TOTAL CA: CPT

## 2025-03-26 PROCEDURE — 82947 ASSAY GLUCOSE BLOOD QUANT: CPT

## 2025-03-26 RX ADMIN — ATORVASTATIN CALCIUM 20 MG: 20 TABLET, FILM COATED ORAL at 09:24

## 2025-03-26 RX ADMIN — AMLODIPINE BESYLATE 5 MG: 10 TABLET ORAL at 09:24

## 2025-03-26 RX ADMIN — GABAPENTIN 300 MG: 300 CAPSULE ORAL at 09:24

## 2025-03-26 RX ADMIN — LOSARTAN POTASSIUM 12.5 MG: 25 TABLET, FILM COATED ORAL at 09:24

## 2025-03-26 RX ADMIN — CYCLOBENZAPRINE 10 MG: 10 TABLET, FILM COATED ORAL at 06:40

## 2025-03-26 RX ADMIN — ACETAMINOPHEN 650 MG: 325 TABLET ORAL at 06:40

## 2025-03-26 RX ADMIN — ACETAMINOPHEN 650 MG: 325 TABLET ORAL at 14:53

## 2025-03-26 RX ADMIN — LACOSAMIDE 100 MG: 50 TABLET, FILM COATED ORAL at 09:24

## 2025-03-26 RX ADMIN — TOPIRAMATE 100 MG: 200 TABLET, FILM COATED ORAL at 09:24

## 2025-03-26 RX ADMIN — METOPROLOL SUCCINATE 25 MG: 25 TABLET, EXTENDED RELEASE ORAL at 09:24

## 2025-03-26 RX ADMIN — CYCLOBENZAPRINE 10 MG: 10 TABLET, FILM COATED ORAL at 14:53

## 2025-03-26 RX ADMIN — EMPAGLIFLOZIN 10 MG: 10 TABLET, FILM COATED ORAL at 09:24

## 2025-03-26 ASSESSMENT — PAIN SCALES - GENERAL
PAINLEVEL_OUTOF10: 3
PAINLEVEL_OUTOF10: 6
PAINLEVEL_OUTOF10: 10

## 2025-03-26 ASSESSMENT — PAIN DESCRIPTION - ORIENTATION
ORIENTATION: RIGHT;LEFT
ORIENTATION: RIGHT;LEFT

## 2025-03-26 ASSESSMENT — PAIN DESCRIPTION - DESCRIPTORS: DESCRIPTORS: ACHING

## 2025-03-26 ASSESSMENT — PAIN DESCRIPTION - LOCATION
LOCATION: LEG
LOCATION: BACK;LEG

## 2025-03-26 NOTE — CARE COORDINATION
discharge: N/A            Potential DME:    Patient expects to discharge to: Apartment  Plan for transportation at discharge: Family    Financial    Payor: [employer] / Plan: [employer] / Product Type: *No Product type* /     Does insurance require precert for SNF: Yes    Potential assistance Purchasing Medications: No  Meds-to-Beds request: Yes      [facility] [place] [place] - [address] [facility] [phone] - F [phone]  [address] [facility] [address]  Phone: [phone] Fax: [phone]      Notes:    Factors facilitating achievement of predicted outcomes: Family support, Motivated, Cooperative, and Pleasant    Barriers to discharge: Decreased endurance, Long standing deficits, and Medical complications    Additional Case Management Notes: plan is to return home independnet w/ ex- and he will transport home     The Plan for Transition of Care is related to the following treatment goals of Chest pain [R07.9]  Chest pain, unspecified type [R07.9]  Congestive heart failure with cardiomyopathy (HCC) [I50.9, I42.9]    IF APPLICABLE: The Patient and/or patient representative Magui and her family were provided with a choice of provider and agrees with the discharge plan. Freedom of choice list with basic dialogue that supports the patient's individualized plan of care/goals and shares the quality data associated with the providers was provided to: Patient   Patient Representative Name:       The Patient and/or Patient Representative Agree with the Discharge Plan? Yes    Case Management Services Information Letter Provided [x]    Marlen Bernal RN/YARON  Case Management Department  Ph: [phone]

## 2025-03-26 NOTE — DISCHARGE INSTRUCTIONS
Follow-up with your primary care physician in the next week.  You may resume your Lasix tomorrow.  Be sure to have your renal function rechecked when primary care physician.  I order has been written for a check on that.  You can get that drawn on your way into the appointment you she needs everything make.

## 2025-03-26 NOTE — PROGRESS NOTES
Pt daughter, Judith, called RN for an update. Judith request when pt is discharged that she is called to arrange a ride to get pt home.

## 2025-03-26 NOTE — DISCHARGE SUMMARY
CDU Discharge Summary        Patient:  Magui Thayer  YOB: 1965    MRN: [id number]   Acct: [id number]    Primary Care Physician: Keesha Matias MD    Admit date:  3/23/2025 12:03 PM  Discharge date: 3/26/2025      Discharge Diagnoses:     1.)  Patient had CHF exacerbation with acute onset due to chronic condition.  Treated with IV diuresis and cardiology evaluation.  Patient's symptoms are resolved with the plan to reassess as outpatient        Follow-up:  Call today/tomorrow for a follow up appointment with Keesha Matias MD , or return to the Emergency Room with worsening symptoms    Stressed to patient the importance of following up with primary care doctor for further workup/management of symptoms.  Pt verbalizes understanding and agrees with plan.    Discharge Medication Changes:       Medication List        CONTINUE taking these medications      B-D 3CC LUER-AMBAR SYR [id number]/2 22G X 1-1/2\" 3 ML Misc  Generic drug: SYRINGE-NEEDLE (DISP) 3 ML     FreeStyle Won 3 Lostant Corine  Use to check blood sugar levels throughout the day     * FreeStyle Won 3 Sensor Misc  Replace every 2 weeks to check blood sugar levels     * FreeStyle Won 3 Plus Sensor Misc  Apply to the back of the upper arm and replace every 15 days     * OneTouch Delica Plus [id number] Misc  Check blood sugars 3 times daily     * OneTouch Delica Plus [id number] Misc  USE TO TEST BLOOD SUGAR DAILY     * OneTouch Delica Lancets 33G Misc  USE TO TEST BLOOD SUGAR DAILY     * Easy Touch Lancets 33G/Twist Misc  USE TO TEST BLOOD SUGAR FOUR TIMES A DAY AS NEEDED     * Lancets Misc  Use one lancet to check blood sugar once daily **Any brand covered under insurance please**     * True Metrix Meter w/Device Kit  USE GLUCOMETER FOR TESTING BLOOD GLUCOSE 4 TIMES DAILY AS NEEDED.     * glucose monitoring kit  Use to check blood sugar once daily **Any brand covered under insurance please**     * TRUEplus Pen Needles 31G X 6 MM Misc  Generic

## 2025-03-26 NOTE — PROGRESS NOTES
[facility]   Progress Note                   Date:   3/26/2025  Patient name: Magui Thayer  Date of admission:  3/23/2025 12:03 PM  MRN:   [id number]  YOB: 1965  PCP: Keesha Matias MD    Reason for Admission: Chest pain [R07.9]  Chest pain, unspecified type [R07.9]  Congestive heart failure with cardiomyopathy (HCC) [I50.9, I42.9]    Subjective:       Clinical Changes / Abnormalities: Pt seen and examined in the room.  Patient resting in bed. Pt denies any current CP or sob.  Patient reports that over night she had pain in both of her arms and neck which caused numbness in her feet. Labs, vitals and tele reviewed- SR    Medications:   Scheduled Meds:   insulin lispro  0-4 Units SubCUTAneous 4x Daily AC & HS    metoprolol succinate  25 mg Oral Daily    [Held by provider] furosemide  40 mg Oral Daily    [Held by provider] spironolactone  12.5 mg Oral Daily    empagliflozin  10 mg Oral Daily    lacosamide  100 mg Oral BID    topiramate  100 mg Oral BID    sodium chloride flush  5-40 mL IntraVENous 2 times per day    enoxaparin  40 mg SubCUTAneous Daily    gabapentin  300 mg Oral TID    amLODIPine  5 mg Oral Daily    atorvastatin  20 mg Oral Daily    losartan  12.5 mg Oral Daily     Continuous Infusions:   dextrose      sodium chloride       CBC:   Recent Labs     03/23/25  1222 03/24/25  0449   WBC 9.2 8.8   HGB 11.3* 11.5*    240     BMP:    Recent Labs     03/25/25  0428 03/25/25  1447 03/26/25  0647    138 139   K 4.1 3.8 4.1    103 106   CO2 22 24 23   BUN 18 20 21*   CREATININE 1.3* 1.4* 1.1*   GLUCOSE 232* 223* 155*     Hepatic:   Recent Labs     03/23/25  1222   AST 19   ALT 14   BILITOT 0.2   ALKPHOS 104     Troponin:   Recent Labs     03/23/25  1222 03/23/25  1330   TROPHS 16* 14     BNP: No results for input(s): \"BNP\" in the last 72 hours.  Lipids: No results for input(s): \"CHOL\", \"HDL\" in the last 72 hours.    Invalid input(s): \"LDLCALCU\"  INR: No

## 2025-03-26 NOTE — PROGRESS NOTES
OBS/CDU   Progress NOTE      Patients PCP is:  Keesha Matias MD        SUBJECTIVE      Patient improved today.  Patient states some swelling to lower extremities which is better after she keeps her feet up.  Worse at night when she has been up all day.  Some mild tenderness to lower extremities.  No significant edema.  No edema above the knees.    Patient was kept overnight due to the renal insufficiency yesterday.  Patient is back to near baseline now.  Patient has been seen by cardiology team.  No new complaint and stating ready for discharge     PHYSICAL EXAM      General: NAD, AO X 3  Heent: EOMI, PERRL  Neck: SUPPLE, NO JVD  Cardiovascular: RRR, S1S2  Pulmonary: CTAB, NO SOB  Abdomen: SOFT, NTTP, ND, +BS  Extremities: +2/4 PULSES DISTAL, NO SWELLING  Neuro / Psych: NO NUMBNESS OR TINGLING, MENTATION AT BASELINE    PERTINENT TEST /EXAMS      I have reviewed all available laboratory results.    MEDICATIONS CURRENT   insulin lispro (HUMALOG,ADMELOG) injection vial 0-4 Units, 4x Daily AC & HS  glucose chewable tablet 16 g, PRN  dextrose bolus 10% 125 mL, PRN   Or  dextrose bolus 10% 250 mL, PRN  glucagon injection 1 mg, PRN  dextrose 10 % infusion, Continuous PRN  metoprolol succinate (TOPROL XL) extended release tablet 25 mg, Daily  [Held by provider] furosemide (LASIX) tablet 40 mg, Daily  [Held by provider] spironolactone (ALDACTONE) tablet 12.5 mg, Daily  empagliflozin (JARDIANCE) tablet 10 mg, Daily  lacosamide (VIMPAT) tablet 100 mg, BID  topiramate (TOPAMAX) tablet 100 mg, BID  sodium chloride flush 0.9 % injection 5-40 mL, 2 times per day  sodium chloride flush 0.9 % injection 5-40 mL, PRN  0.9 % sodium chloride infusion, PRN  potassium chloride (KLOR-CON M) extended release tablet 40 mEq, PRN   Or  potassium bicarb-citric acid (EFFER-K) effervescent tablet 40 mEq, PRN   Or  potassium chloride 10 mEq/100 mL IVPB (Peripheral Line), PRN  magnesium sulfate 2000 mg in 50 mL IVPB premix, PRN  enoxaparin

## 2025-03-27 ENCOUNTER — TELEPHONE (OUTPATIENT)
Dept: OTHER | Age: 60
End: 2025-03-27

## 2025-03-27 NOTE — TELEPHONE ENCOUNTER
Pt called, spoke with arlen, states pt is doing well, following 2000 mg Na diet and fluid limit.  Reminded of appointment 04/09 @ 1030.  
No

## 2025-04-07 ENCOUNTER — HOSPITAL ENCOUNTER (OUTPATIENT)
Dept: OTHER | Age: 60
Discharge: HOME | End: 2025-04-07
Payer: COMMERCIAL

## 2025-04-07 VITALS
HEART RATE: 92 BPM | BODY MASS INDEX: 28.79 KG/M2 | SYSTOLIC BLOOD PRESSURE: 129 MMHG | RESPIRATION RATE: 16 BRPM | WEIGHT: 157.4 LBS | DIASTOLIC BLOOD PRESSURE: 83 MMHG | OXYGEN SATURATION: 100 %

## 2025-04-07 DIAGNOSIS — I50.20 HFREF (HEART FAILURE WITH REDUCED EJECTION FRACTION) (HCC): Primary | ICD-10-CM

## 2025-04-07 PROBLEM — I50.33 CHF (CONGESTIVE HEART FAILURE), NYHA CLASS I, ACUTE ON CHRONIC, DIASTOLIC (HCC): Status: RESOLVED | Noted: 2024-04-18 | Resolved: 2025-04-07

## 2025-04-07 PROCEDURE — 99212 OFFICE O/P EST SF 10 MIN: CPT

## 2025-04-07 PROCEDURE — 99215 OFFICE O/P EST HI 40 MIN: CPT | Performed by: NURSE PRACTITIONER

## 2025-04-07 RX ORDER — METOPROLOL SUCCINATE 25 MG/1
25 TABLET, EXTENDED RELEASE ORAL DAILY
Qty: 30 TABLET | Refills: 3 | Status: SHIPPED | OUTPATIENT
Start: 2025-04-07

## 2025-04-07 ASSESSMENT — ENCOUNTER SYMPTOMS
BLOOD IN STOOL: 0
ABDOMINAL PAIN: 0
COUGH: 0
EYE DISCHARGE: 0
SHORTNESS OF BREATH: 0

## 2025-04-07 NOTE — PROGRESS NOTES
CHF Clinic at [facility]    Office: [phone] Fax: [phone]    [facility] CHF CLIN[facility]  [address] [address]  Dept: [phone]  Loc: [phone]    Magui Thayer is a 59 y.o. female who presents today for CHF evaluation.       HPI:     Denies shortness of breath  +  fatigue  Denies Edema , wears comp hose , qd.   Denies chest pain   Denies  palpitations   No orthopnea , nor PND       Sees Neuro h/o stroke  Has TCC appt on .   Pills in pill bottles   Has monitor for BS , PCP for  DM mngmt           Past Medical History:   Diagnosis Date    Breast cancer 2012    Cancer ([facility]) 2006    L breast - invasive ductal adenocarcinoma    CHF (congestive heart failure), NYHA class I, acute on chronic, diastolic ([facility]) 2024    Conjunctivitis     susceptible to conjunctivitis    Depression     Disorder of patellofemoral joint     DJD (degenerative joint disease) of knee     bilateral    Hyperlipidemia     Hypertension     Malignant neoplasm of nipple of left breast in female, estrogen receptor positive 2015    Pain, knee 10/2006    R lateral meniscus tear    Primary osteoarthritis of left knee     Type II or unspecified type diabetes mellitus without mention of complication, not stated as uncontrolled     Under care of team 2022    PCP - DR. MAN - LAST VISIT 2022    Under care of team 2022    ONCOLOGY - DR. OJEDA - LAST VISIT 10.2021    Under care of team 2022    GENERAL SURGERY - DR. MCKEE - LAST VISIT 3/2022    Wears dentures 2022    FULL    Wears glasses 2022    Weight loss 2022    70 LBS POST BARIATRIC SURGERY.     Past Surgical History:   Procedure Laterality Date    BARIATRIC SURGERY  2019    BREAST SURGERY  2006    L lumpectomy, [place] lymph node bx, L axillary node dissection     SECTION      X2    COLONOSCOPY N/A

## 2025-04-14 ENCOUNTER — TELEPHONE (OUTPATIENT)
Dept: OTHER | Age: 60
End: 2025-04-14

## 2025-04-21 ENCOUNTER — TELEPHONE (OUTPATIENT)
Dept: OTHER | Age: 60
End: 2025-04-21

## 2025-04-21 NOTE — TELEPHONE ENCOUNTER
Called pt for 2 week follow-up from initial CHF Clinic appt. No answer. Unable to leave a voicemail d/t mailbox not being set up.

## 2025-04-25 DIAGNOSIS — D50.8 OTHER IRON DEFICIENCY ANEMIA: Primary | ICD-10-CM

## 2025-04-25 RX ORDER — FERROUS SULFATE 325(65) MG
1 TABLET ORAL 3 TIMES DAILY
Qty: 90 TABLET | Refills: 10 | OUTPATIENT
Start: 2025-04-25

## 2025-04-25 RX ORDER — SPIRONOLACTONE 25 MG/1
12.5 TABLET ORAL DAILY
Qty: 45 TABLET | Refills: 1 | OUTPATIENT
Start: 2025-04-25

## 2025-04-29 ENCOUNTER — HOSPITAL ENCOUNTER (OUTPATIENT)
Dept: OTHER | Age: 60
Discharge: HOME | End: 2025-04-29
Payer: COMMERCIAL

## 2025-04-29 VITALS
WEIGHT: 160.2 LBS | BODY MASS INDEX: 29.3 KG/M2 | RESPIRATION RATE: 16 BRPM | DIASTOLIC BLOOD PRESSURE: 81 MMHG | HEART RATE: 96 BPM | SYSTOLIC BLOOD PRESSURE: 140 MMHG | OXYGEN SATURATION: 99 %

## 2025-04-29 PROCEDURE — 99212 OFFICE O/P EST SF 10 MIN: CPT

## 2025-04-29 NOTE — PROGRESS NOTES
Date:  2025  Time:  9:58 AM    CHF Clinic at [facility]    Office: [phone] Fax: [phone]    Re:  Magui Thayer   Patient : 1965    Vital Signs: BP (!) 140/81   Pulse 96   Resp 16   Wt 72.7 kg (160 lb 3.2 oz)   LMP 2015 (Approximate)   SpO2 99%   BMI 29.30 kg/m²                                                   No results for input(s): \"CBC\", \"HGB\", \"HCT\", \"WBC\", \"PLATELET\", \"NA\", \"K\", \"CL\", \"CO2\", \"BUN\", \"CREATININE\", \"GLUCOSE\", \"BNP\", \"INR\" in the last 72 hours.     Respiratory:    Assessment  Charting Type: Reassessment    Breath Sounds  Right Upper Lobe: Clear  Right Middle Lobe: Clear  Right Lower Lobe: Clear  Left Upper Lobe: Clear  Left Lower Lobe: Clear              Peripheral Vascular  RLE Edema: None  LLE Edema: None    Future Appointments   Date Time Provider Department Center   2025  9:30 AM [facility] CHF CLINIC  [facility]F [facility]   2025 10:00 AM Dorothy Diana MD Neuro Spec Neurology -   2025 10:30 AM Bobby Lynn MD [facility]      Complaints: No new complaints     Physician Orders No new orders     Comment : Weight is up 3 pounds from 1st visit,admits to drinking more fluids asked to focus on staying below 64 ounces per day. Discussed in detail low sodium diet 2000mg per day and to elevate legs when sitting in chair. Has trace ankle edema. We will see in 1 month 25 and see's Dr. Lynn cardiology 25.    Electronically signed by Latricia Rojo RN on 2025 at 9:58 AM

## 2025-05-21 DIAGNOSIS — Z17.0 MALIGNANT NEOPLASM OF NIPPLE OF LEFT BREAST IN FEMALE, ESTROGEN RECEPTOR POSITIVE (HCC): ICD-10-CM

## 2025-05-21 DIAGNOSIS — C50.012 MALIGNANT NEOPLASM OF NIPPLE OF LEFT BREAST IN FEMALE, ESTROGEN RECEPTOR POSITIVE (HCC): ICD-10-CM

## 2025-05-21 RX ORDER — GABAPENTIN 300 MG/1
300 CAPSULE ORAL 3 TIMES DAILY
Qty: 90 CAPSULE | Refills: 0 | OUTPATIENT
Start: 2025-05-21 | End: 2025-06-20

## 2025-05-27 ENCOUNTER — HOSPITAL ENCOUNTER (OUTPATIENT)
Dept: OTHER | Age: 60
Discharge: HOME | End: 2025-05-27
Payer: MEDICAID

## 2025-05-27 VITALS
BODY MASS INDEX: 29.37 KG/M2 | WEIGHT: 160.6 LBS | OXYGEN SATURATION: 100 % | DIASTOLIC BLOOD PRESSURE: 60 MMHG | RESPIRATION RATE: 16 BRPM | HEART RATE: 81 BPM | SYSTOLIC BLOOD PRESSURE: 102 MMHG

## 2025-05-27 PROCEDURE — 99212 OFFICE O/P EST SF 10 MIN: CPT

## 2025-05-27 NOTE — PROGRESS NOTES
Date:  2025  Time:  10:05 AM    CHF Clinic at [facility]    Office: [phone] Fax: [phone]    Re:  Magui Thayer   Patient : 1965    Vital Signs: /60   Pulse 81   Resp 16   Wt 72.8 kg (160 lb 9.6 oz)   LMP 2015 (Approximate)   SpO2 100%   BMI 29.37 kg/m²                                                   No results for input(s): \"CBC\", \"HGB\", \"HCT\", \"WBC\", \"PLATELET\", \"NA\", \"K\", \"CL\", \"CO2\", \"BUN\", \"CREATININE\", \"GLUCOSE\", \"BNP\", \"INR\" in the last 72 hours.     Respiratory:    Assessment  Charting Type: Reassessment    Breath Sounds  Right Upper Lobe: Clear  Right Middle Lobe: Clear  Right Lower Lobe: Clear  Left Upper Lobe: Clear  Left Lower Lobe: Clear              Peripheral Vascular  RLE Edema: None  LLE Edema: None    Future Appointments   Date Time Provider Department Center   2025  9:30 AM [facility]  1 [facility]   2025 10:00 AM Dorothy Diana MD Neuro Spec Neurology -   2025 10:30 AM Bobby Lynn MD [facility]      Complaints: No new complaints    Physician Orders No new orders     Comment : Weight is the same as last visit. Discussed in detail low sodium diet 2000mg or less daily and 64 ounces of fluid per day. Has compression stockings on and has no edema noted, lungs are clear. Has appt. In July with cardiology. We will see in 1 month 25.    Electronically signed by Latricia Rojo RN on 2025 at 10:05 AM

## 2025-06-24 ENCOUNTER — TELEPHONE (OUTPATIENT)
Dept: OTHER | Age: 60
End: 2025-06-24

## 2025-06-26 DIAGNOSIS — C50.012 MALIGNANT NEOPLASM OF NIPPLE OF LEFT BREAST IN FEMALE, ESTROGEN RECEPTOR POSITIVE (HCC): ICD-10-CM

## 2025-06-26 DIAGNOSIS — Z17.0 MALIGNANT NEOPLASM OF NIPPLE OF LEFT BREAST IN FEMALE, ESTROGEN RECEPTOR POSITIVE (HCC): ICD-10-CM

## 2025-06-26 RX ORDER — GABAPENTIN 300 MG/1
300 CAPSULE ORAL 3 TIMES DAILY
Qty: 90 CAPSULE | Refills: 0 | Status: SHIPPED | OUTPATIENT
Start: 2025-06-26 | End: 2025-07-26

## 2025-06-26 NOTE — TELEPHONE ENCOUNTER
Last visit: 02/25/2025  Last Med refill: 03/21/20253  Does patient have enough medication for 72 hours: No: LFET MY CHART MESSAGE TO SCHEDULE FOR MED REFILLS     Next Visit Date:  Future Appointments   Date Time Provider Department Center   7/23/2025 10:30 AM Bobby Lynn MD [facility]       Health Maintenance   Topic Date Due    Hepatitis C screen  Never done    Shingles vaccine (1 of 2) Never done    Diabetic retinal exam  12/31/2020    Pneumococcal 50+ years Vaccine (2 of 2 - PCV) 06/02/2023    Cervical cancer screen  06/21/2023    COVID-19 Vaccine (4 - 2024-25 season) 09/01/2024    Diabetic foot exam  04/18/2025    Diabetic Alb to Cr ratio (uACR) test  05/03/2025    Lipids  05/03/2025    Flu vaccine (Season Ended) 08/01/2025    Depression Monitoring  01/10/2026    A1C test (Diabetic or Prediabetic)  02/25/2026    GFR test (Diabetes, CKD 3-4, OR last GFR 15-59)  03/26/2026    DTaP/Tdap/Td vaccine (4 - Td or Tdap) 04/23/2031    Colorectal Cancer Screen  03/24/2032    Hepatitis B vaccine  Completed    Breast cancer screen  Completed    HIV screen  Completed    Hepatitis A vaccine  Aged Out    Hib vaccine  Aged Out    Polio vaccine  Aged Out    Meningococcal (ACWY) vaccine  Aged Out    Meningococcal B vaccine  Aged Out    Pneumococcal 0-49 years Vaccine  Discontinued       Hemoglobin A1C (%)   Date Value   02/25/2025 8.4   12/25/2024 17.2 (H)   07/24/2024 10.2             ( goal A1C is < 7)   No components found for: \"LABMICR\"  No components found for: \"LDLCHOLESTEROL\", \"LDLCALC\"    (goal LDL is <100)   AST (U/L)   Date Value   03/23/2025 19     ALT (U/L)   Date Value   03/23/2025 14     BUN (mg/dL)   Date Value   03/26/2025 21 (H)     BP Readings from Last 3 Encounters:   05/27/25 102/60   04/29/25 (!) 140/81   04/16/25 (!) 142/98          (goal 120/80)    All Future Testing planned in [facility]  Lab Frequency Next Occurrence   MRI BRAIN W WO CONTRAST Once 03/13/2025   EEG Extended More Than 1 Hour

## 2025-07-28 DIAGNOSIS — I10 PRIMARY HYPERTENSION: ICD-10-CM

## 2025-07-28 RX ORDER — AMLODIPINE BESYLATE 5 MG/1
5 TABLET ORAL DAILY
Qty: 30 TABLET | Refills: 3 | Status: SHIPPED | OUTPATIENT
Start: 2025-07-28

## 2025-07-28 NOTE — TELEPHONE ENCOUNTER
Last visit: 02/25/2025  Last Med refill: 03/21/2025  Does patient have enough medication for 72 hours: No:     Next Visit Date:  Future Appointments   Date Time Provider Department Center   9/2/2025  8:20 AM Dorothy Diana MD Neuro Spec Neurology -   10/21/2025  9:00 AM SCHEDULE, [facility]   1/21/2026  9:00 AM Bobby Lynn MD [facility]       Health Maintenance   Topic Date Due    Hepatitis C screen  Never done    Shingles vaccine (1 of 2) Never done    Diabetic retinal exam  12/31/2020    Pneumococcal 50+ years Vaccine (2 of 2 - PCV) 06/02/2023    Cervical cancer screen  06/21/2023    COVID-19 Vaccine (4 - 2024-25 season) 09/01/2024    Diabetic foot exam  04/18/2025    Diabetic Alb to Cr ratio (uACR) test  05/03/2025    Lipids  05/03/2025    Flu vaccine (1) 08/01/2025    Depression Monitoring  01/10/2026    A1C test (Diabetic or Prediabetic)  02/25/2026    GFR test (Diabetes, CKD 3-4, OR last GFR 15-59)  03/26/2026    DTaP/Tdap/Td vaccine (4 - Td or Tdap) 04/23/2031    Colorectal Cancer Screen  03/24/2032    Hepatitis B vaccine  Completed    Breast cancer screen  Completed    HIV screen  Completed    Hepatitis A vaccine  Aged Out    Hib vaccine  Aged Out    Polio vaccine  Aged Out    Meningococcal (ACWY) vaccine  Aged Out    Meningococcal B vaccine  Aged Out    Pneumococcal 0-49 years Vaccine  Discontinued       Hemoglobin A1C (%)   Date Value   02/25/2025 8.4   12/25/2024 17.2 (H)   07/24/2024 10.2             ( goal A1C is < 7)   No components found for: \"LABMICR\"  No components found for: \"LDLCHOLESTEROL\", \"LDLCALC\"    (goal LDL is <100)   AST (U/L)   Date Value   03/23/2025 19     ALT (U/L)   Date Value   03/23/2025 14     BUN (mg/dL)   Date Value   03/26/2025 21 (H)     BP Readings from Last 3 Encounters:   07/23/25 138/80   05/27/25 102/60   04/29/25 (!) 140/81          (goal 120/80)    All Future Testing planned in [facility]  Lab Frequency Next

## 2025-08-30 PROBLEM — Z86.73 HISTORY OF STROKE: Status: ACTIVE | Noted: 2024-12-25

## (undated) DEVICE — PAD,ABDOMINAL,5"X9",ST,LF,25/BX: Brand: MEDLINE INDUSTRIES, INC.

## (undated) DEVICE — GLOVE ORANGE PI 7 1/2   MSG9075

## (undated) DEVICE — GLOVE ORANGE PI 7   MSG9070

## (undated) DEVICE — STRAP RESTRAIN W3.5XL19IN TECLIN STRRP POS LEG DURING LITH

## (undated) DEVICE — ELECTRODE PT RET AD L9FT HI MOIST COND ADH HYDRGEL CORDED

## (undated) DEVICE — PACK PROCEDURE SURG GEN MIN SVMMC

## (undated) DEVICE — PROTECTOR ULN NRV PUR FOAM HK LOOP STRP ANATOMICALLY

## (undated) DEVICE — KENDALL SCD EXPRESS SLEEVES, KNEE LENGTH, MEDIUM: Brand: KENDALL SCD

## (undated) DEVICE — CULTURETTE AERO/ANEROBIC RED/BLUE BUNDLE

## (undated) DEVICE — GAUZE,PACKING STRIP,IODOFORM,1/2"X5YD,ST: Brand: CURAD

## (undated) DEVICE — TUBING, SUCTION, 9/32" X 20', STRAIGHT: Brand: MEDLINE INDUSTRIES, INC.

## (undated) DEVICE — GAUZE,SPONGE,FLUFF,6"X6.75",STRL,5/TRAY: Brand: MEDLINE